# Patient Record
Sex: MALE | Race: WHITE | NOT HISPANIC OR LATINO | Employment: OTHER | URBAN - METROPOLITAN AREA
[De-identification: names, ages, dates, MRNs, and addresses within clinical notes are randomized per-mention and may not be internally consistent; named-entity substitution may affect disease eponyms.]

---

## 2017-01-01 ENCOUNTER — DOCUMENTATION (OUTPATIENT)
Dept: PHYSICAL THERAPY | Facility: HOSPITAL | Age: 82
End: 2017-01-01

## 2017-01-13 RX ORDER — DILTIAZEM HYDROCHLORIDE 60 MG/1
60 TABLET, FILM COATED ORAL EVERY 6 HOURS SCHEDULED
Qty: 120 TABLET | Refills: 6 | Status: SHIPPED | OUTPATIENT
Start: 2017-01-13 | End: 2017-06-08

## 2017-01-13 RX ORDER — METOPROLOL SUCCINATE 100 MG/1
100 TABLET, EXTENDED RELEASE ORAL
Qty: 30 TABLET | Refills: 6 | Status: SHIPPED | OUTPATIENT
Start: 2017-01-13 | End: 2017-01-18 | Stop reason: SDUPTHER

## 2017-01-16 RX ORDER — FENOFIBRATE 134 MG/1
CAPSULE ORAL
Qty: 30 CAPSULE | Refills: 3 | Status: SHIPPED | OUTPATIENT
Start: 2017-01-16 | End: 2017-05-18 | Stop reason: SDUPTHER

## 2017-01-18 RX ORDER — METOPROLOL SUCCINATE 100 MG/1
100 TABLET, EXTENDED RELEASE ORAL
Qty: 30 TABLET | Refills: 6 | Status: SHIPPED | OUTPATIENT
Start: 2017-01-18 | End: 2018-01-01 | Stop reason: SDUPTHER

## 2017-01-26 ENCOUNTER — LAB (OUTPATIENT)
Dept: INTERNAL MEDICINE | Facility: CLINIC | Age: 82
End: 2017-01-26

## 2017-01-26 DIAGNOSIS — R31.9 HEMATURIA: ICD-10-CM

## 2017-01-26 DIAGNOSIS — E78.01 FAMILIAL HYPERCHOLESTEROLEMIA: ICD-10-CM

## 2017-01-26 DIAGNOSIS — I48.20 CHRONIC ATRIAL FIBRILLATION (HCC): ICD-10-CM

## 2017-01-27 LAB
ALBUMIN SERPL-MCNC: 4.4 G/DL (ref 3.5–5.2)
ALBUMIN/GLOB SERPL: 1.7 G/DL
ALP SERPL-CCNC: 60 U/L (ref 40–129)
ALT SERPL-CCNC: 12 U/L (ref 5–41)
APPEARANCE UR: CLEAR
AST SERPL-CCNC: 21 U/L (ref 5–40)
BACTERIA #/AREA URNS HPF: ABNORMAL /HPF
BASOPHILS # BLD AUTO: 0.04 10*3/MM3 (ref 0–0.2)
BASOPHILS NFR BLD AUTO: 0.6 % (ref 0–2)
BILIRUB SERPL-MCNC: 0.6 MG/DL (ref 0.2–1.2)
BILIRUB UR QL STRIP: NEGATIVE
BUN SERPL-MCNC: 22 MG/DL (ref 8–23)
BUN/CREAT SERPL: 16.3 (ref 7–25)
CALCIUM SERPL-MCNC: 9.8 MG/DL (ref 8.8–10.5)
CASTS URNS MICRO: ABNORMAL
CASTS URNS QL MICRO: PRESENT
CHLORIDE SERPL-SCNC: 102 MMOL/L (ref 98–107)
CHOLEST SERPL-MCNC: 199 MG/DL (ref 0–200)
CO2 SERPL-SCNC: 26.7 MMOL/L (ref 22–29)
COLOR UR: YELLOW
CREAT SERPL-MCNC: 1.35 MG/DL (ref 0.76–1.27)
EOSINOPHIL # BLD AUTO: 0.14 10*3/MM3 (ref 0.1–0.3)
EOSINOPHIL NFR BLD AUTO: 2.2 % (ref 0–4)
EPI CELLS #/AREA URNS HPF: ABNORMAL /HPF
ERYTHROCYTE [DISTWIDTH] IN BLOOD BY AUTOMATED COUNT: 17.5 % (ref 11.5–14.5)
GLOBULIN SER CALC-MCNC: 2.6 GM/DL
GLUCOSE SERPL-MCNC: 88 MG/DL (ref 65–99)
GLUCOSE UR QL: NEGATIVE
HCT VFR BLD AUTO: 39.6 % (ref 42–52)
HDLC SERPL-MCNC: 52 MG/DL (ref 40–60)
HGB BLD-MCNC: 12.5 G/DL (ref 14–18)
HGB UR QL STRIP: NEGATIVE
IMM GRANULOCYTES # BLD: 0.01 10*3/MM3 (ref 0–0.03)
IMM GRANULOCYTES NFR BLD: 0.2 % (ref 0–0.5)
KETONES UR QL STRIP: NEGATIVE
LDLC SERPL CALC-MCNC: 129 MG/DL (ref 0–100)
LDLC/HDLC SERPL: 2.49 {RATIO}
LEUKOCYTE ESTERASE UR QL STRIP: NEGATIVE
LYMPHOCYTES # BLD AUTO: 3.11 10*3/MM3 (ref 0.6–4.8)
LYMPHOCYTES NFR BLD AUTO: 48.3 % (ref 20–45)
MCH RBC QN AUTO: 26 PG (ref 27–31)
MCHC RBC AUTO-ENTMCNC: 31.6 G/DL (ref 31–37)
MCV RBC AUTO: 82.3 FL (ref 80–94)
MICRO URNS: NORMAL
MICRO URNS: NORMAL
MONOCYTES # BLD AUTO: 0.88 10*3/MM3 (ref 0–1)
MONOCYTES NFR BLD AUTO: 13.7 % (ref 3–8)
MUCOUS THREADS URNS QL MICRO: PRESENT /HPF
NEUTROPHILS # BLD AUTO: 2.26 10*3/MM3 (ref 1.5–8.3)
NEUTROPHILS NFR BLD AUTO: 35 % (ref 45–70)
NITRITE UR QL STRIP: NEGATIVE
NRBC BLD AUTO-RTO: 0 /100 WBC (ref 0–0)
PH UR STRIP: 6.5 [PH] (ref 5–7.5)
PLATELET # BLD AUTO: 434 10*3/MM3 (ref 140–500)
POTASSIUM SERPL-SCNC: 4.1 MMOL/L (ref 3.5–5.2)
PROT SERPL-MCNC: 7 G/DL (ref 6–8.5)
PROT UR QL STRIP: NEGATIVE
RBC # BLD AUTO: 4.81 10*6/MM3 (ref 4.7–6.1)
RBC #/AREA URNS HPF: ABNORMAL /HPF
SODIUM SERPL-SCNC: 141 MMOL/L (ref 136–145)
SP GR UR: 1.02 (ref 1–1.03)
TRIGL SERPL-MCNC: 88 MG/DL (ref 0–150)
TSH SERPL DL<=0.005 MIU/L-ACNC: 1.23 MIU/ML (ref 0.27–4.2)
URINALYSIS REFLEX: NORMAL
UROBILINOGEN UR STRIP-MCNC: 0.2 MG/DL (ref 0.2–1)
VLDLC SERPL CALC-MCNC: 17.6 MG/DL (ref 8–32)
WBC # BLD AUTO: 6.44 10*3/MM3 (ref 4.8–10.8)
WBC #/AREA URNS HPF: ABNORMAL /HPF

## 2017-02-02 ENCOUNTER — OFFICE VISIT (OUTPATIENT)
Dept: INTERNAL MEDICINE | Facility: CLINIC | Age: 82
End: 2017-02-02

## 2017-02-02 VITALS
BODY MASS INDEX: 30.61 KG/M2 | SYSTOLIC BLOOD PRESSURE: 128 MMHG | OXYGEN SATURATION: 98 % | DIASTOLIC BLOOD PRESSURE: 80 MMHG | WEIGHT: 246.2 LBS | HEIGHT: 75 IN | HEART RATE: 57 BPM

## 2017-02-02 DIAGNOSIS — M79.89 SWELLING OF LOWER EXTREMITY: ICD-10-CM

## 2017-02-02 DIAGNOSIS — I10 ESSENTIAL HYPERTENSION: ICD-10-CM

## 2017-02-02 DIAGNOSIS — N18.30 CKD (CHRONIC KIDNEY DISEASE), STAGE III (HCC): Primary | ICD-10-CM

## 2017-02-02 DIAGNOSIS — D64.9 ANEMIA, UNSPECIFIED TYPE: ICD-10-CM

## 2017-02-02 DIAGNOSIS — E78.5 HYPERLIPIDEMIA, UNSPECIFIED HYPERLIPIDEMIA TYPE: ICD-10-CM

## 2017-02-02 PROCEDURE — 99214 OFFICE O/P EST MOD 30 MIN: CPT | Performed by: INTERNAL MEDICINE

## 2017-02-02 NOTE — PROGRESS NOTES
Subjective     Fahad Muhammad is a 81 y.o. male, who presents with a chief complaint of   Chief Complaint   Patient presents with   • Follow-up     5 wk f/u    • Atrial Fibrillation   • Hypertension   • Chronic Kidney Disease       HPI Comments: Patient is here for follow up his HTN, CKD stage 3, and AFib and he is doing well. He is now about 5 weeks out from his hospitalization for sepsis. He has follow up with Dr. Kelly (for hematuria next week) and Dr. Feliciano (for knee pain)    HTN: chronic issue and controlled very well. Tolerating medications well without any issues.     Leg swelling: this is an acute on chronic issue. He notes that it has gotten worse over the last few months and he is currently taking lasix daily. He feels pain in his legs. He does not have SOB and his echocardiogram in 12/2016 as roughly normal with good EF. He cannot tolerate compression stockings per patient due to the heat and warmth. He states that the swelling gets better when he props them up during the day.    CKD Stage: chronic issue and slightly worse on his new labs. Cr was 1.35 and GFR was 51. When reviewing his old labs, it looks as though his Cr bounces around a lot and his GFR has been high 40's-70 over the last two years. No change in urination.        The following portions of the patient's history were reviewed and updated as appropriate: allergies, current medications, past family history, past medical history, past social history, past surgical history and problem list.    Allergies: Levofloxacin and Statins    Current Outpatient Prescriptions:   •  aspirin 81 MG chewable tablet, Chew 81 mg daily., Disp: , Rfl:   •  cetirizine (zyrTEC) 5 MG tablet, Take 5 mg by mouth., Disp: , Rfl:   •  Cold Sore Products (LIP BALM MEDICATED) ointment, , Disp: , Rfl:   •  diltiaZEM (CARDIZEM) 60 MG tablet, Take 1 tablet by mouth Every 6 (Six) Hours., Disp: 120 tablet, Rfl: 6  •  docusate sodium 100 MG capsule, Take 100 mg by mouth 2  "(Two) Times a Day., Disp: , Rfl: 0  •  fenofibrate micronized (LOFIBRA) 134 MG capsule, TAKE 1 CAPSULE DAILY., Disp: 30 capsule, Rfl: 3  •  furosemide (LASIX) 20 MG tablet, Take 1 tablet by mouth Daily., Disp: , Rfl:   •  guaiFENesin (MUCINEX) 600 MG 12 hr tablet, Take 1 tablet by mouth 2 (two) times a day., Disp: 20 tablet, Rfl: 1  •  levETIRAcetam (KEPPRA) 500 MG tablet, Take 1 tablet by mouth 2 (two) times a day., Disp: 180 tablet, Rfl: 1  •  metoprolol succinate XL (TOPROL-XL) 100 MG 24 hr tablet, Take 1 tablet by mouth Daily., Disp: 30 tablet, Rfl: 6  •  Misc Natural Products (GLUCOSAMINE CHONDROITIN ADV PO), Take  by mouth., Disp: , Rfl:   •  Multiple Vitamin (MULTI-VITAMIN) tablet, Take 1 tablet by mouth daily., Disp: , Rfl:   •  Multiple Vitamins-Minerals (PX SENIOR VITAMIN PO), , Disp: , Rfl:   •  Omega-3 Fatty Acids (FISH OIL) 1000 MG capsule capsule, Take 1,000 mg by mouth daily., Disp: , Rfl:   •  omeprazole (PriLOSEC) 20 MG capsule, Take 1 capsule by mouth daily., Disp: 30 capsule, Rfl: 1  •  potassium chloride (K-DUR,KLOR-CON) 20 MEQ CR tablet, Take 2 tablets by mouth Daily., Disp: , Rfl:   •  rivaroxaban (XARELTO) 15 MG tablet, Take 15 mg by mouth Daily., Disp: , Rfl:   •  Saw Palmetto, Serenoa repens, 450 MG capsule, Take  by mouth., Disp: , Rfl:   There are no discontinued medications.    Review of Systems   Constitutional: Negative for chills and fever.   HENT: Positive for congestion and rhinorrhea.    Respiratory: Negative for cough and shortness of breath.    Cardiovascular: Positive for leg swelling. Negative for chest pain.   Gastrointestinal: Negative for abdominal pain, nausea and vomiting.   Musculoskeletal: Positive for gait problem.        Knee pain   Neurological: Negative for dizziness and headaches.       Objective     Visit Vitals   • /80 (BP Location: Left arm, Patient Position: Sitting, Cuff Size: Adult)   • Pulse 57   • Ht 75\" (190.5 cm)   • Wt 246 lb 3.2 oz (112 kg)   • SpO2 " 98%   • BMI 30.77 kg/m2         Physical Exam   Constitutional: He is oriented to person, place, and time. He appears well-developed and well-nourished. No distress.   HENT:   Head: Normocephalic and atraumatic.   Right Ear: External ear normal.   Left Ear: External ear normal.   Mouth/Throat: Oropharynx is clear and moist. No oropharyngeal exudate.   Eyes: Conjunctivae are normal. Right eye exhibits no discharge. Left eye exhibits no discharge. No scleral icterus.   Neck: Neck supple.   Cardiovascular: Normal rate, regular rhythm and normal heart sounds.  Exam reveals no gallop and no friction rub.    No murmur heard.  Pulmonary/Chest: Effort normal and breath sounds normal. No respiratory distress. He has no wheezes. He has no rales.   Musculoskeletal:   Pitting edema to mid calf bilaterally    Lymphadenopathy:     He has no cervical adenopathy.   Neurological: He is alert and oriented to person, place, and time.   Skin: Skin is warm. No rash noted.   Psychiatric: He has a normal mood and affect. His behavior is normal.   Nursing note and vitals reviewed.        Results for orders placed or performed in visit on 01/26/17   Comprehensive Metabolic Panel   Result Value Ref Range    Glucose 88 65 - 99 mg/dL    BUN 22 8 - 23 mg/dL    Creatinine 1.35 (H) 0.76 - 1.27 mg/dL    eGFR Non African Am 51 (L) >60 mL/min/1.73    eGFR African Am 62 >60 mL/min/1.73    BUN/Creatinine Ratio 16.3 7.0 - 25.0    Sodium 141 136 - 145 mmol/L    Potassium 4.1 3.5 - 5.2 mmol/L    Chloride 102 98 - 107 mmol/L    Total CO2 26.7 22.0 - 29.0 mmol/L    Calcium 9.8 8.8 - 10.5 mg/dL    Total Protein 7.0 6.0 - 8.5 g/dL    Albumin 4.40 3.50 - 5.20 g/dL    Globulin 2.6 gm/dL    A/G Ratio 1.7 g/dL    Total Bilirubin 0.6 0.2 - 1.2 mg/dL    Alkaline Phosphatase 60 40 - 129 U/L    AST (SGOT) 21 5 - 40 U/L    ALT (SGPT) 12 5 - 41 U/L   Lipid Panel With LDL / HDL Ratio   Result Value Ref Range    Total Cholesterol 199 0 - 200 mg/dL    Triglycerides 88 0  - 150 mg/dL    HDL Cholesterol 52 40 - 60 mg/dL    VLDL Cholesterol 17.6 8 - 32 mg/dL    LDL Cholesterol  129 (H) 0 - 100 mg/dL    LDL/HDL Ratio 2.49    TSH Rfx On Abnormal To Free T4   Result Value Ref Range    TSH 1.23 0.27 - 4.2 mIU/mL   Urinalysis With / Culture If Indicated   Result Value Ref Range    Specific Gravity, UA 1.020 1.005 - 1.030    pH, UA 6.5 5.0 - 7.5    Color, UA Yellow Yellow    Appearance, UA Clear Clear    Leukocytes, UA Negative Negative    Protein Negative Negative/Trace    Glucose, UA Negative Negative    Ketones Negative Negative    Blood, UA Negative Negative    Bilirubin, UA Negative Negative    Urobilinogen, UA 0.2 0.2 - 1.0 mg/dL    Nitrite, UA Negative Negative    Microscopic Examination Comment     MICROSCOPIC EXAMINATION See below:     Urinalysis Reflex Comment    Microscopic Examination   Result Value Ref Range    WBC, UA 0-5 0 - 5 /hpf    RBC, UA 0-2 0 - 2 /hpf    Epithelial Cells (non renal) None seen 0 - 10 /hpf    Casts Present (A) None seen    Cast Type Hyaline casts N/A    Mucus, UA Present Not Estab. /hpf    Bacteria, UA Few None seen/Few /hpf   CBC & Differential   Result Value Ref Range    WBC 6.44 4.80 - 10.80 10*3/mm3    RBC 4.81 4.70 - 6.10 10*6/mm3    Hemoglobin 12.5 (L) 14.0 - 18.0 g/dL    Hematocrit 39.6 (L) 42.0 - 52.0 %    MCV 82.3 80.0 - 94.0 fL    MCH 26.0 (L) 27.0 - 31.0 pg    MCHC 31.6 31.0 - 37.0 g/dL    RDW 17.5 (H) 11.5 - 14.5 %    Platelets 434 140 - 500 10*3/mm3    Neutrophil Rel % 35.0 (L) 45.0 - 70.0 %    Lymphocyte Rel % 48.3 (H) 20.0 - 45.0 %    Monocyte Rel % 13.7 (H) 3.0 - 8.0 %    Eosinophil Rel % 2.2 0.0 - 4.0 %    Basophil Rel % 0.6 0.0 - 2.0 %    Neutrophils Absolute 2.26 1.50 - 8.30 10*3/mm3    Lymphocytes Absolute 3.11 0.60 - 4.80 10*3/mm3    Monocytes Absolute 0.88 0.00 - 1.00 10*3/mm3    Eosinophils Absolute 0.14 0.10 - 0.30 10*3/mm3    Basophils Absolute 0.04 0.00 - 0.20 10*3/mm3    Immature Granulocyte Rel % 0.2 0.0 - 0.5 %    Immature  Grans Absolute 0.01 0.00 - 0.03 10*3/mm3    nRBC 0.0 0.0 - 0.0 /100 WBC       Assessment/Plan   Fahad was seen today for follow-up, atrial fibrillation, hypertension and chronic kidney disease.    Diagnoses and all orders for this visit:    CKD (chronic kidney disease), stage III  -     Comprehensive Metabolic Panel; Future  -     Urinalysis With / Culture If Indicated; Future    Essential hypertension    Hyperlipidemia, unspecified hyperlipidemia type    Swelling of lower extremity  -     US venous doppler lower extremity bilateral (duplex)    Anemia, unspecified type  -     CBC & Differential; Future    I am pleased with how patient is doing and he seems stable. His HTN is under good control and patient will continue to monitor with home BP cuff. No side effects from medications. Will continue current regimen of diltiazem and BB. His Afib is under good control and he is currently stable on rate control and ASA and Xarelto. Will follow up in 3 months. I would like him to see cardiology for follow up which he says that he is planning on scheduling with Dr. Santana.     His HLD Patient's cholesterol is under good control. Will continue current regimen of fenofibrate as he has had severe myalgias with statin therapy. No side effects from medications reported. Will follow up in 6 months to monitor levels     His anemia is improving on own and no longer has blood in his urine.     I am most worried about his leg swelling and kidney function change as he seems to be headed in the wrong direction. I will order lower extremity doppler to evaluate further as patient has had recent normal echocardiogram. We will continue lasix at current dose and patient will continue low salt diet. He does not want to consider compression stockings as he has not had good success in past. Once u/s is done and no concern for DVT or obstruction, we will increase his lasix to 40mg for a few days and recheck his kidney function and if stable,  can keep at that dose. Weight is up 4lbs since I saw him last and I think he would do well with diuresis.     Return in about 3 months (around 5/2/2017) for Recheck of HTN, Afib , and epilepsy.    Elisabeth Warren MD  02/02/2017

## 2017-02-07 ENCOUNTER — OFFICE VISIT (OUTPATIENT)
Dept: ORTHOPEDIC SURGERY | Facility: CLINIC | Age: 82
End: 2017-02-07

## 2017-02-07 DIAGNOSIS — M17.12 OSTEOARTHROSIS, LOCALIZED, PRIMARY, KNEE, LEFT: Primary | ICD-10-CM

## 2017-02-07 PROCEDURE — 20610 DRAIN/INJ JOINT/BURSA W/O US: CPT | Performed by: ORTHOPAEDIC SURGERY

## 2017-02-07 RX ADMIN — BUPIVACAINE HYDROCHLORIDE 4 ML: 5 INJECTION, SOLUTION EPIDURAL; INTRACAUDAL at 12:05

## 2017-02-07 RX ADMIN — LIDOCAINE HYDROCHLORIDE 4 ML: 10 INJECTION, SOLUTION INFILTRATION; PERINEURAL at 12:05

## 2017-02-07 RX ADMIN — TRIAMCINOLONE ACETONIDE 80 MG: 40 INJECTION, SUSPENSION INTRA-ARTICULAR; INTRAMUSCULAR at 12:05

## 2017-02-07 NOTE — PROGRESS NOTES
Subjective:     Patient ID: Fahad Muhammad is a 81 y.o. male.    Chief Complaint:  Follow-up left knee pain, DJD  History of Present Illness  Fahad Muhammad returns to clinic today stating that her left knee has done fairly well after last injection in March 2016 but over the last 2-3 months he is noted significant increase in pain over the anterior and medial aspects of his left knee.  Pain exacerbated with walking, stair climbing, standing for long periods of time as well as rotational activities.  Minimal improvement with rest at this point.  Mild intermittent swelling noted.  No raza locking or catching of left knee appreciated.  Does note crepitus on range of motion.  Rates current pain as 7-8 out of 10 aching in nature.  Denies any associated new numbness or tingling to left lower extremity.     Social History     Occupational History   • Not on file.     Social History Main Topics   • Smoking status: Former Smoker     Quit date: 12/3/1993   • Smokeless tobacco: Former User     Quit date: 9/7/1994   • Alcohol use 0.6 oz/week     1 Shots of liquor per week      Comment: occasional    • Drug use: No   • Sexual activity: Defer      Past Medical History   Diagnosis Date   • Arthritis    • Atrial fibrillation    • Coronary artery disease    • DDD (degenerative disc disease), lumbar    • Heme positive stool    • Hx of colonoscopy      Complete   • Hyperlipidemia    • Hypertension    • Neck strain    • Osteoarthritis    • Seizures    • Sepsis    • Stroke    • Vitamin D deficiency    • Wrist fracture, right      Past Surgical History   Procedure Laterality Date   • Coronary artery bypass graft  1996   • Hernia repair Right      Inguinal hernia repair   • Cardiac surgery         Family History   Problem Relation Age of Onset   • Heart disease Mother    • Cancer Father    • Cancer Sister          Review of Systems   Constitutional: Negative for chills, diaphoresis, fever and unexpected weight change.   HENT:  Negative for hearing loss, nosebleeds, sore throat and tinnitus.    Eyes: Negative for pain and visual disturbance.   Respiratory: Negative for cough, shortness of breath and wheezing.    Cardiovascular: Negative for chest pain and palpitations.   Gastrointestinal: Negative for abdominal pain, diarrhea, nausea and vomiting.   Endocrine: Negative for cold intolerance, heat intolerance and polydipsia.   Genitourinary: Negative for difficulty urinating, dysuria and hematuria.   Musculoskeletal: Positive for arthralgias. Negative for joint swelling and myalgias.   Skin: Negative for rash and wound.   Allergic/Immunologic: Negative for environmental allergies.   Neurological: Negative for dizziness, syncope and numbness.   Hematological: Does not bruise/bleed easily.   Psychiatric/Behavioral: Negative for dysphoric mood and sleep disturbance. The patient is not nervous/anxious.            Objective:  There were no vitals filed for this visit.  There were no vitals filed for this visit.  There is no height or weight on file to calculate BMI.  General: No acute distress.  Resp: normal respiratory effort  Skin: no rashes or wounds; normal turgor  Psych: mood and affect appropriate; recent and remote memory intact       Ortho Exam    Left knee-active range of motion 0-125°, 4+ out of 5 strength on flexion and extension.  Mild effusion, grade 1A Lachman, stable to varus and valgus stress 0 and 30°.  Maximal tenderness to palpation over medial lateral joint line as well as medial and lateral patellar facet, positive active patellar compression test, negative patellar apprehension test.  Imaging:  Review of most recent x-rays from hospitalization on December 14, 2016 a left knee indicates moderate degenerative change particularly along the medial and lateral compartments with moderate patellofemoral joint space narrowing noted as well, no evidence of intra-articular loose body.  No evidence of fracture dislocation, or  subluxation.  Assessment:       1. Osteoarthrosis, localized, primary, knee, left          Plan:  FABIAN query complete.        Discussed treatment options with patient today including not limited to repeat injection, physical therapy, home exercise program, and total knee arthroplasty.  Patient does not want proceed with surgery at this point time after reviewing risks and benefits.  He does want proceed with repeat injection, we'll work on home exercises for strengthening and range of motion.    Fahad Muhammad was in agreement with plan and had all questions answered.     Orders:  Orders Placed This Encounter   Procedures   • Large Joint Arthrocentesis       Medications:  No orders of the defined types were placed in this encounter.      Followup:  Return in about 3 months (around 5/7/2017).      Large Joint Arthrocentesis  Date/Time: 2/7/2017 12:05 PM  Consent given by: patient  Site marked: site marked  Timeout: Immediately prior to procedure a time out was called to verify the correct patient, procedure, equipment, support staff and site/side marked as required   Supporting Documentation  Indications: pain   Procedure Details  Location: knee - L knee  Preparation: Patient was prepped and draped in the usual sterile fashion  Needle size: 22 G  Medications administered: 4 mL bupivacaine (PF) 0.5 %; 4 mL lidocaine 1 %; 80 mg triamcinolone acetonide 40 MG/ML  Patient tolerance: patient tolerated the procedure well with no immediate complications          Dragon transcription disclaimer     Much of this encounter note is an electronic transcription/translation of spoken language to printed text. The electronic translation of spoken language may permit erroneous, or at times, nonsensical words or phrases to be inadvertently transcribed. Although I have reviewed the note for such errors, some may still exist.

## 2017-02-08 ENCOUNTER — TRANSCRIBE ORDERS (OUTPATIENT)
Dept: ADMINISTRATIVE | Facility: HOSPITAL | Age: 82
End: 2017-02-08

## 2017-02-08 DIAGNOSIS — M79.89 SWELLING OF LOWER EXTREMITY: Primary | ICD-10-CM

## 2017-02-10 ENCOUNTER — HOSPITAL ENCOUNTER (OUTPATIENT)
Dept: CARDIOLOGY | Facility: HOSPITAL | Age: 82
Discharge: HOME | End: 2017-02-10
Admitting: INTERNAL MEDICINE

## 2017-02-10 ENCOUNTER — APPOINTMENT (OUTPATIENT)
Dept: ULTRASOUND IMAGING | Facility: HOSPITAL | Age: 82
End: 2017-02-10
Attending: INTERNAL MEDICINE

## 2017-02-10 DIAGNOSIS — M79.89 SWELLING OF LOWER EXTREMITY: ICD-10-CM

## 2017-02-10 LAB
BH CV LOW VAS RIGHT POPLITEAL SPONT: 1
BH CV LOWER VASCULAR LEFT COMMON FEMORAL AUGMENT: NORMAL
BH CV LOWER VASCULAR LEFT COMMON FEMORAL COMPETENT: NORMAL
BH CV LOWER VASCULAR LEFT COMMON FEMORAL COMPRESS: NORMAL
BH CV LOWER VASCULAR LEFT COMMON FEMORAL PHASIC: NORMAL
BH CV LOWER VASCULAR LEFT COMMON FEMORAL SPONT: NORMAL
BH CV LOWER VASCULAR LEFT DISTAL FEMORAL COMPRESS: NORMAL
BH CV LOWER VASCULAR LEFT GASTRONEMIUS COMPRESS: NORMAL
BH CV LOWER VASCULAR LEFT GREATER SAPH AK COMPRESS: NORMAL
BH CV LOWER VASCULAR LEFT GREATER SAPH BK COMPRESS: NORMAL
BH CV LOWER VASCULAR LEFT LESSER SAPH COMPRESS: NORMAL
BH CV LOWER VASCULAR LEFT MID FEMORAL AUGMENT: NORMAL
BH CV LOWER VASCULAR LEFT MID FEMORAL COMPETENT: NORMAL
BH CV LOWER VASCULAR LEFT MID FEMORAL COMPRESS: NORMAL
BH CV LOWER VASCULAR LEFT MID FEMORAL PHASIC: NORMAL
BH CV LOWER VASCULAR LEFT MID FEMORAL SPONT: NORMAL
BH CV LOWER VASCULAR LEFT PERONEAL COMPRESS: NORMAL
BH CV LOWER VASCULAR LEFT POPLITEAL AUGMENT: NORMAL
BH CV LOWER VASCULAR LEFT POPLITEAL COMPETENT: NORMAL
BH CV LOWER VASCULAR LEFT POPLITEAL COMPRESS: NORMAL
BH CV LOWER VASCULAR LEFT POPLITEAL PHASIC: NORMAL
BH CV LOWER VASCULAR LEFT POPLITEAL SPONT: NORMAL
BH CV LOWER VASCULAR LEFT POSTERIOR TIBIAL COMPRESS: NORMAL
BH CV LOWER VASCULAR LEFT PROXIMAL FEMORAL COMPRESS: NORMAL
BH CV LOWER VASCULAR LEFT SAPHENOFEMORAL JUNCTION AUGMENT: NORMAL
BH CV LOWER VASCULAR LEFT SAPHENOFEMORAL JUNCTION COMPETENT: NORMAL
BH CV LOWER VASCULAR LEFT SAPHENOFEMORAL JUNCTION COMPRESS: NORMAL
BH CV LOWER VASCULAR LEFT SAPHENOFEMORAL JUNCTION PHASIC: NORMAL
BH CV LOWER VASCULAR LEFT SAPHENOFEMORAL JUNCTION SPONT: NORMAL
BH CV LOWER VASCULAR RIGHT COMMON FEMORAL AUGMENT: NORMAL
BH CV LOWER VASCULAR RIGHT COMMON FEMORAL COMPETENT: NORMAL
BH CV LOWER VASCULAR RIGHT COMMON FEMORAL COMPRESS: NORMAL
BH CV LOWER VASCULAR RIGHT COMMON FEMORAL PHASIC: NORMAL
BH CV LOWER VASCULAR RIGHT COMMON FEMORAL SPONT: NORMAL
BH CV LOWER VASCULAR RIGHT DISTAL FEMORAL COMPRESS: NORMAL
BH CV LOWER VASCULAR RIGHT GASTRONEMIUS COMPRESS: NORMAL
BH CV LOWER VASCULAR RIGHT LESSER SAPH COMPRESS: NORMAL
BH CV LOWER VASCULAR RIGHT MID FEMORAL AUGMENT: NORMAL
BH CV LOWER VASCULAR RIGHT MID FEMORAL COMPETENT: NORMAL
BH CV LOWER VASCULAR RIGHT MID FEMORAL COMPRESS: NORMAL
BH CV LOWER VASCULAR RIGHT MID FEMORAL PHASIC: NORMAL
BH CV LOWER VASCULAR RIGHT MID FEMORAL SPONT: NORMAL
BH CV LOWER VASCULAR RIGHT PERONEAL COMPRESS: NORMAL
BH CV LOWER VASCULAR RIGHT POPLITEAL AUGMENT: NORMAL
BH CV LOWER VASCULAR RIGHT POPLITEAL COMPETENT: NORMAL
BH CV LOWER VASCULAR RIGHT POPLITEAL COMPRESS: NORMAL
BH CV LOWER VASCULAR RIGHT POPLITEAL PHASIC: NORMAL
BH CV LOWER VASCULAR RIGHT POPLITEAL SPONT: NORMAL
BH CV LOWER VASCULAR RIGHT POSTERIOR TIBIAL COMPRESS: NORMAL
BH CV LOWER VASCULAR RIGHT PROXIMAL FEMORAL COMPRESS: NORMAL
BH CV LOWER VASCULAR RIGHT SAPHENOFEMORAL JUNCTION AUGMENT: NORMAL
BH CV LOWER VASCULAR RIGHT SAPHENOFEMORAL JUNCTION COMPETENT: NORMAL
BH CV LOWER VASCULAR RIGHT SAPHENOFEMORAL JUNCTION COMPRESS: NORMAL
BH CV LOWER VASCULAR RIGHT SAPHENOFEMORAL JUNCTION PHASIC: NORMAL
BH CV LOWER VASCULAR RIGHT SAPHENOFEMORAL JUNCTION SPONT: NORMAL
BH CV LOWER VASCULAR RIGHT VARICOSITY AK COMPRESS: NORMAL
BH CV LOWER VASCULAR RIGHT VARICOSITY BK COMPRESS: NORMAL

## 2017-02-10 PROCEDURE — 93970 EXTREMITY STUDY: CPT

## 2017-02-21 RX ORDER — BUPIVACAINE HYDROCHLORIDE 5 MG/ML
4 INJECTION, SOLUTION EPIDURAL; INTRACAUDAL
Status: COMPLETED | OUTPATIENT
Start: 2017-02-07 | End: 2017-02-07

## 2017-02-21 RX ORDER — TRIAMCINOLONE ACETONIDE 40 MG/ML
80 INJECTION, SUSPENSION INTRA-ARTICULAR; INTRAMUSCULAR
Status: COMPLETED | OUTPATIENT
Start: 2017-02-07 | End: 2017-02-07

## 2017-02-21 RX ORDER — LIDOCAINE HYDROCHLORIDE 10 MG/ML
4 INJECTION, SOLUTION INFILTRATION; PERINEURAL
Status: COMPLETED | OUTPATIENT
Start: 2017-02-07 | End: 2017-02-07

## 2017-02-23 ENCOUNTER — TELEPHONE (OUTPATIENT)
Dept: INTERNAL MEDICINE | Facility: CLINIC | Age: 82
End: 2017-02-23

## 2017-02-23 NOTE — TELEPHONE ENCOUNTER
Per Dr. Warren, I advised patient no DVT on report, but does have venous insufficiency.  Advised needs to wear compression stockings (he has these but does not wear regularly) and keep feet propped when sitting for long periods.  Patient voiced understanding.    ----- Message from Melisa Severino MA sent at 2/23/2017  2:04 PM EST -----  Regarding: FW: LOOKING FOR RESULTS      ----- Message -----     From: Melisa Severino MA     Sent: 2/21/2017   2:10 PM       To: Elisabeth Warren MD  Subject: FW: LOOKING FOR RESULTS                          I am unable to find any note regarding this in either the note section or on the report itself.  Not sure how to advise on this:  ·There was deep venous valvular incompetence noted in the right popliteal.  Thanks.  ----- Message -----     From: Elisabeth Warren MD     Sent: 2/21/2017  11:39 AM       To: Melisa Severino MA  Subject: RE: LOOKING FOR RESULTS                          I left a message in [employer] for him on 2/11. Can you access that record and just read him the results. I think maybe he just didn't read it. Let me know if he has questions or concerns.     ----- Message -----     From: Melisa Severino MA     Sent: 2/21/2017  11:01 AM       To: Elisabeth Warren MD  Subject: FW: LOOKING FOR RESULTS                              ----- Message -----     From: Betzaida Albert     Sent: 2/21/2017  10:57 AM       To: Michelle Riley[address] [place] [address]  Subject: LOOKING FOR RESULTS                              [place]    [phone]     Patient came by office because he hasn't heard anything results about his venous doppler ultrasound done on 2/8.

## 2017-03-16 ENCOUNTER — OFFICE VISIT (OUTPATIENT)
Dept: INTERNAL MEDICINE | Facility: CLINIC | Age: 82
End: 2017-03-16

## 2017-03-16 VITALS
BODY MASS INDEX: 29.69 KG/M2 | DIASTOLIC BLOOD PRESSURE: 78 MMHG | HEART RATE: 87 BPM | WEIGHT: 238.8 LBS | HEIGHT: 75 IN | SYSTOLIC BLOOD PRESSURE: 128 MMHG | OXYGEN SATURATION: 98 %

## 2017-03-16 DIAGNOSIS — S23.9XXA THORACIC BACK SPRAIN, INITIAL ENCOUNTER: Primary | ICD-10-CM

## 2017-03-16 DIAGNOSIS — L85.8 CUTANEOUS HORN: ICD-10-CM

## 2017-03-16 PROCEDURE — 99213 OFFICE O/P EST LOW 20 MIN: CPT | Performed by: INTERNAL MEDICINE

## 2017-03-16 RX ORDER — FUROSEMIDE 20 MG/1
20 TABLET ORAL DAILY
Qty: 90 TABLET | Refills: 3 | Status: SHIPPED | OUTPATIENT
Start: 2017-03-16 | End: 2018-01-01

## 2017-03-16 RX ORDER — TRAMADOL HYDROCHLORIDE 50 MG/1
50 TABLET ORAL EVERY 6 HOURS PRN
Qty: 45 TABLET | Refills: 0 | Status: SHIPPED | OUTPATIENT
Start: 2017-03-16 | End: 2017-06-08

## 2017-03-16 RX ORDER — GUAIFENESIN 600 MG/1
600 TABLET, EXTENDED RELEASE ORAL 2 TIMES DAILY
Qty: 60 TABLET | Refills: 1 | Status: SHIPPED | OUTPATIENT
Start: 2017-03-16

## 2017-03-16 NOTE — PATIENT INSTRUCTIONS
Use massage and heat to area that is tender  Take Tramadol every 6 hours as needed for pain        Back Exercises  If you have pain in your back, do these exercises 2-3 times each day or as told by your doctor. When the pain goes away, do the exercises once each day, but repeat the steps more times for each exercise (do more repetitions). If you do not have pain in your back, do these exercises once each day or as told by your doctor.  EXERCISES  Single Knee to Chest  Do these steps 3-5 times in a row for each le. Lie on your back on a firm bed or the floor with your legs stretched out.  2. Bring one knee to your chest.  3. Hold your knee to your chest by grabbing your knee or thigh.  4. Pull on your knee until you feel a gentle stretch in your lower back.  5. Keep doing the stretch for 10-30 seconds.  6. Slowly let go of your leg and straighten it.  Pelvic Tilt  Do these steps 5-10 times in a row:  1. Lie on your back on a firm bed or the floor with your legs stretched out.  2. Bend your knees so they point up to the ceiling. Your feet should be flat on the floor.  3. Tighten your lower belly (abdomen) muscles to press your lower back against the floor. This will make your tailbone point up to the ceiling instead of pointing down to your feet or the floor.  4. Stay in this position for 5-10 seconds while you gently tighten your muscles and breathe evenly.  Cat-Cow  Do these steps until your lower back bends more easily:  1. Get on your hands and knees on a firm surface. Keep your hands under your shoulders, and keep your knees under your hips. You may put padding under your knees.  2. Let your head hang down, and make your tailbone point down to the floor so your lower back is round like the back of a cat.  3. Stay in this position for 5 seconds.  4. Slowly lift your head and make your tailbone point up to the ceiling so your back hangs low (sags) like the back of a cow.  5. Stay in this position for 5  seconds.  Press-Ups  Do these steps 5-10 times in a row:  1. Lie on your belly (face-down) on the floor.  2. Place your hands near your head, about shoulder-width apart.  3. While you keep your back relaxed and keep your hips on the floor, slowly straighten your arms to raise the top half of your body and lift your shoulders. Do not use your back muscles. To make yourself more comfortable, you may change where you place your hands.  4. Stay in this position for 5 seconds.  5. Slowly return to lying flat on the floor.  Bridges  Do these steps 10 times in a row:  1. Lie on your back on a firm surface.  2. Bend your knees so they point up to the ceiling. Your feet should be flat on the floor.  3. Tighten your butt muscles and lift your butt off of the floor until your waist is almost as high as your knees. If you do not feel the muscles working in your butt and the back of your thighs, slide your feet 1-2 inches farther away from your butt.  4. Stay in this position for 3-5 seconds.  5. Slowly lower your butt to the floor, and let your butt muscles relax.  If this exercise is too easy, try doing it with your arms crossed over your chest.  Belly Crunches  Do these steps 5-10 times in a row:  1. Lie on your back on a firm bed or the floor with your legs stretched out.  2. Bend your knees so they point up to the ceiling. Your feet should be flat on the floor.  3. Cross your arms over your chest.  4. Tip your chin a little bit toward your chest but do not bend your neck.  5. Tighten your belly muscles and slowly raise your chest just enough to lift your shoulder blades a tiny bit off of the floor.  6. Slowly lower your chest and your head to the floor.  Back Lifts  Do these steps 5-10 times in a row:  1. Lie on your belly (face-down) with your arms at your sides, and rest your forehead on the floor.  2. Tighten the muscles in your legs and your butt.  3. Slowly lift your chest off of the floor while you keep your hips on  the floor. Keep the back of your head in line with the curve in your back. Look at the floor while you do this.  4. Stay in this position for 3-5 seconds.  5. Slowly lower your chest and your face to the floor.  GET HELP IF:  · Your back pain gets a lot worse when you do an exercise.  · Your back pain does not lessen 2 hours after you exercise.  If you have any of these problems, stop doing the exercises. Do not do them again unless your doctor says it is okay.  GET HELP RIGHT AWAY IF:  · You have sudden, very bad back pain. If this happens, stop doing the exercises. Do not do them again unless your doctor says it is okay.     This information is not intended to replace advice given to you by your health care provider. Make sure you discuss any questions you have with your health care provider.     Document Released: 01/20/2012 Document Revised: 09/07/2016 Document Reviewed: 02/11/2016  [employer] Interactive Patient Education ©2016 [employer]

## 2017-03-16 NOTE — PROGRESS NOTES
Subjective     Fahad Muhammad is a 81 y.o. male, who presents with a chief complaint of   Chief Complaint   Patient presents with   • Back Pain     pt states this issue has been constant for years, some days are worse than others. Recently pain has been bad, tylenol/aspirin is not helping anymore.    • Nasal Congestion     pt has x questions about mucinex        HPI Comments: Patient is here with back pain that is bilateral, but seems to move around. He feels that it started about 2 weeks ago after he was pulling weeds in his garden. He is having to sleep on his right side. No tingling or numbness in his legs. Movement seems to make the pain worse and getting up makes it worse. No changes in his bowel or bladder except when he took the Norco for pain. He has been using Arnicare Gel and heat and massage which has helped some. He has been usinghydrocodone/acteominophen that helped, but caused him to have constipation which he has stopped.      Skin lesion: he has had this for a while and states that it bleeds and hurts when he gets his hair cut. The lesion is getting larged. He has not done anything that has helped or changed the way that the lesions look.        The following portions of the patient's history were reviewed and updated as appropriate: allergies, current medications, past family history, past medical history, past social history, past surgical history and problem list.    Allergies: Levofloxacin and Statins    Current Outpatient Prescriptions:   •  aspirin 81 MG chewable tablet, Chew 81 mg daily., Disp: , Rfl:   •  cetirizine (zyrTEC) 5 MG tablet, Take 5 mg by mouth., Disp: , Rfl:   •  Cold Sore Products (LIP BALM MEDICATED) ointment, , Disp: , Rfl:   •  diltiaZEM (CARDIZEM) 60 MG tablet, Take 1 tablet by mouth Every 6 (Six) Hours., Disp: 120 tablet, Rfl: 6  •  docusate sodium 100 MG capsule, Take 100 mg by mouth 2 (Two) Times a Day., Disp: , Rfl: 0  •  fenofibrate micronized (LOFIBRA) 134 MG  capsule, TAKE 1 CAPSULE DAILY., Disp: 30 capsule, Rfl: 3  •  furosemide (LASIX) 20 MG tablet, Take 1 tablet by mouth Daily., Disp: 90 tablet, Rfl: 3  •  guaiFENesin (MUCINEX) 600 MG 12 hr tablet, Take 1 tablet by mouth 2 (Two) Times a Day., Disp: 60 tablet, Rfl: 1  •  levETIRAcetam (KEPPRA) 500 MG tablet, Take 1 tablet by mouth 2 (two) times a day., Disp: 180 tablet, Rfl: 1  •  metoprolol succinate XL (TOPROL-XL) 100 MG 24 hr tablet, Take 1 tablet by mouth Daily., Disp: 30 tablet, Rfl: 6  •  Misc Natural Products (GLUCOSAMINE CHONDROITIN ADV PO), Take  by mouth., Disp: , Rfl:   •  Multiple Vitamin (MULTI-VITAMIN) tablet, Take 1 tablet by mouth daily., Disp: , Rfl:   •  Multiple Vitamins-Minerals (PX SENIOR VITAMIN PO), , Disp: , Rfl:   •  Omega-3 Fatty Acids (FISH OIL) 1000 MG capsule capsule, Take 1,000 mg by mouth daily., Disp: , Rfl:   •  omeprazole (PriLOSEC) 20 MG capsule, Take 1 capsule by mouth daily., Disp: 30 capsule, Rfl: 1  •  potassium chloride (K-DUR,KLOR-CON) 20 MEQ CR tablet, Take 2 tablets by mouth Daily., Disp: , Rfl:   •  rivaroxaban (XARELTO) 15 MG tablet, Take 15 mg by mouth Daily., Disp: , Rfl:   •  Saw Palmetto, Serenoa repens, 450 MG capsule, Take  by mouth., Disp: , Rfl:   •  traMADol (ULTRAM) 50 MG tablet, Take 1 tablet by mouth Every 6 (Six) Hours As Needed for Moderate Pain (4-6) or Severe Pain (7-10)., Disp: 45 tablet, Rfl: 0  Medications Discontinued During This Encounter   Medication Reason   • furosemide (LASIX) 20 MG tablet Reorder   • guaiFENesin (MUCINEX) 600 MG 12 hr tablet Reorder       Review of Systems   Constitutional: Negative for chills and fever.   Gastrointestinal: Positive for constipation. Negative for diarrhea, nausea and vomiting.   Musculoskeletal: Positive for back pain and myalgias.   Neurological: Negative for weakness and numbness.       Objective     Visit Vitals   • /78 (BP Location: Left arm, Patient Position: Sitting, Cuff Size: Adult)   • Pulse 87   • Ht  "75\" (190.5 cm)   • Wt 238 lb 12.8 oz (108 kg)   • SpO2 98%   • BMI 29.85 kg/m2         Physical Exam   Constitutional: He is oriented to person, place, and time. He appears well-developed and well-nourished. No distress.   HENT:   Head: Normocephalic and atraumatic.   Right Ear: External ear normal.   Left Ear: External ear normal.   Mouth/Throat: Oropharynx is clear and moist. No oropharyngeal exudate.   Eyes: Conjunctivae are normal. Right eye exhibits no discharge. Left eye exhibits no discharge. No scleral icterus.   Neck: Neck supple.   Cardiovascular: Normal rate and normal heart sounds.  An irregularly irregular rhythm present. Exam reveals no gallop and no friction rub.    No murmur heard.  Pulmonary/Chest: Effort normal and breath sounds normal. No respiratory distress. He has no wheezes. He has no rales.   Abdominal: There is guarding.   Musculoskeletal:   Right upper lumbar paraspinal muscle with spasm. No tenderness over the spinal or left side.    Lymphadenopathy:     He has no cervical adenopathy.   Neurological: He is alert and oriented to person, place, and time.   Skin: Skin is warm. No rash noted.   Psychiatric: He has a normal mood and affect. His behavior is normal.   Nursing note and vitals reviewed.        Results for orders placed or performed during the hospital encounter of 02/10/17   Duplex venous lower extremity bilateral CAR   Result Value Ref Range    Right Popliteal Spont 1     Right Common Femoral Spont Y     Right Common Femoral Phasic Y     Right Common Femoral Augment Y     Right Common Femoral Competent Y     Right Common Femoral Compress C     Right Saphenofemoral Junction Spont Y     Right Saphenofemoral Junction Phasic Y     Right Saphenofemoral Junction Augment Y     Right Saphenofemoral Junction Competent Y     Right Saphenofemoral Junction Compress C     Right Proximal Femoral Compress C     Right Mid Femoral Spont Y     Right Mid Femoral Phasic Y     Right Mid Femoral " Augment Y     Right Mid Femoral Competent Y     Right Mid Femoral Compress C     Right Distal Femoral Compress C     Right Popliteal Spont Y     Right Popliteal Phasic Y     Right Popliteal Augment Y     Right Popliteal Competent N     Right Popliteal Compress C     Right Posterior Tibial Compress C     Right Peroneal Compress C     Right GastronemiusSoleal Compress C     Right Lesser Saph Compress C     Right Varicosity AK Compress C     Right Varicosity BK Compress C     Left Common Femoral Spont Y     Left Common Femoral Phasic Y     Left Common Femoral Augment Y     Left Common Femoral Competent Y     Left Common Femoral Compress C     Left Saphenofemoral Junction Spont Y     Left Saphenofemoral Junction Phasic Y     Left Saphenofemoral Junction Augment Y     Left Saphenofemoral Junction Competent Y     Left Saphenofemoral Junction Compress C     Left Proximal Femoral Compress C     Left Mid Femoral Spont Y     Left Mid Femoral Phasic Y     Left Mid Femoral Augment Y     Left Mid Femoral Competent Y     Left Mid Femoral Compress C     Left Distal Femoral Compress C     Left Popliteal Spont Y     Left Popliteal Phasic Y     Left Popliteal Augment Y     Left Popliteal Competent Y     Left Popliteal Compress C     Left Posterior Tibial Compress C     Left Peroneal Compress C     Left GastronemiusSoleal Compress C     Left Greater Saph AK Compress C     Left Greater Saph BK Compress C     Left Lesser Saph Compress C        Assessment/Plan   Fahad was seen today for back pain and nasal congestion.    Diagnoses and all orders for this visit:    Thoracic back sprain, initial encounter    Cutaneous horn  -     Ambulatory Referral to Dermatology    Other orders  -     traMADol (ULTRAM) 50 MG tablet; Take 1 tablet by mouth Every 6 (Six) Hours As Needed for Moderate Pain (4-6) or Severe Pain (7-10).  -     furosemide (LASIX) 20 MG tablet; Take 1 tablet by mouth Daily.  -     guaiFENesin (MUCINEX) 600 MG 12 hr tablet; Take  1 tablet by mouth 2 (Two) Times a Day.      Back Spasms/Thoracic pain: will treat with heat and stretches and Tramadol as needed for severe/moderate pain. If not better, will consider formal PT and patient to call if he would like to do.     Cutaneous horn: will refer to dermatology for eval    Return for Next scheduled follow up.    Elisabeth Warren MD  03/16/2017

## 2017-03-17 RX ORDER — LEVETIRACETAM 500 MG/1
TABLET ORAL
Qty: 180 TABLET | Refills: 1 | Status: SHIPPED | OUTPATIENT
Start: 2017-03-17 | End: 2017-05-18 | Stop reason: SDUPTHER

## 2017-03-30 DIAGNOSIS — M51.36 DDD (DEGENERATIVE DISC DISEASE), LUMBAR: Primary | ICD-10-CM

## 2017-03-31 ENCOUNTER — HOSPITAL ENCOUNTER (OUTPATIENT)
Dept: GENERAL RADIOLOGY | Facility: HOSPITAL | Age: 82
Discharge: HOME | End: 2017-03-31
Attending: INTERNAL MEDICINE | Admitting: INTERNAL MEDICINE

## 2017-03-31 ENCOUNTER — RESULTS ENCOUNTER (OUTPATIENT)
Dept: INTERNAL MEDICINE | Facility: CLINIC | Age: 82
End: 2017-03-31

## 2017-03-31 DIAGNOSIS — D64.9 ANEMIA, UNSPECIFIED TYPE: ICD-10-CM

## 2017-03-31 DIAGNOSIS — N18.30 CKD (CHRONIC KIDNEY DISEASE), STAGE III (HCC): ICD-10-CM

## 2017-03-31 PROCEDURE — 72100 X-RAY EXAM L-S SPINE 2/3 VWS: CPT

## 2017-04-03 ENCOUNTER — TELEPHONE (OUTPATIENT)
Dept: INTERNAL MEDICINE | Facility: CLINIC | Age: 82
End: 2017-04-03

## 2017-04-03 DIAGNOSIS — M47.819 ARTHRITIS OF SPINE: Primary | ICD-10-CM

## 2017-04-03 DIAGNOSIS — S32.000S LUMBAR COMPRESSION FRACTURE, SEQUELA: ICD-10-CM

## 2017-04-03 NOTE — TELEPHONE ENCOUNTER
----- Message from Elisabeth Warren MD sent at 4/3/2017 10:51 AM EDT -----  Yes, I think that Charla already took care of this. Charla, can you check on this and let the patient know?     ----- Message -----     From: Roseanna Washington MA     Sent: 4/3/2017  10:29 AM       To: Elisabeth Warren MD        ----- Message -----     From: Elisabeth Wright     Sent: 4/3/2017  10:19 AM       To: Michelle Riley2 [facility] Clinical [facility]    PT GOT HIS RESULTS FROM HIS XRAY AND IS READY TO GET HIS REFERRAL DONE FOR PHYSICAL THERAPY. CAN WE SET THAT UP HERE? PT SEES PRICE. CALL BACK -[phone]. ASAP-STATES HE IS IN LOTS OF PAIN. THIS IS FOR ARTHRITIS OF THE SPINE.

## 2017-04-03 NOTE — TELEPHONE ENCOUNTER
Spoke with patients wife. She is concerned on the time lapse of these events. She wanted to know what the quickest avenue would be for him to have pain relief and I told her that I could not exactly answer that and that Dr. Warren would not recommend PT being an option for pain relief if she did not think that it would be beneficial for patient. Wife stated she did not think he would do very well with PT but to go ahead with referral and we can go from there. I told her to call back with any further questions or concerns.       ----- Message from Elisabeth Warren MD sent at 4/1/2017 12:44 PM EDT -----  Please call patient with these results and let him know that he has lots of arthritis in his spine as well an old compression fracture of his L2 vertebrae that was first noted back in December. I would suggest that we try PT for a few weeks and if that does not help with the pain, that we pursue seeing a pain doctor that can inject his back. Would he be okay with me sending in PT referral?

## 2017-04-06 ENCOUNTER — HOSPITAL ENCOUNTER (OUTPATIENT)
Dept: PHYSICAL THERAPY | Facility: HOSPITAL | Age: 82
Setting detail: THERAPIES SERIES
Discharge: HOME | End: 2017-04-06

## 2017-04-06 DIAGNOSIS — M51.36 DDD (DEGENERATIVE DISC DISEASE), LUMBAR: Primary | ICD-10-CM

## 2017-04-06 PROCEDURE — 97035 APP MDLTY 1+ULTRASOUND EA 15: CPT

## 2017-04-06 PROCEDURE — G0283 ELEC STIM OTHER THAN WOUND: HCPCS

## 2017-04-06 PROCEDURE — G8978 MOBILITY CURRENT STATUS: HCPCS

## 2017-04-06 PROCEDURE — 97161 PT EVAL LOW COMPLEX 20 MIN: CPT

## 2017-04-06 PROCEDURE — G8979 MOBILITY GOAL STATUS: HCPCS

## 2017-04-06 NOTE — PROGRESS NOTES
Outpatient Physical Therapy Ortho Initial Evaluation  [facility]     Patient Name: Fahad Muhammad  : 1935  MRN: [id number]  Today's Date: 2017      Visit Date: 2017    Patient Active Problem List   Diagnosis   • Osteoarthrosis, localized, primary, knee   • Hyperlipidemia   • Hypertension   • Seizure disorder   • Vitamin D deficiency   • Coronary artery disease involving native coronary artery of native heart without angina pectoris   • Benign non-nodular prostatic hyperplasia with lower urinary tract symptoms   • Seasonal allergic rhinitis   • Atrial fibrillation   • CKD (chronic kidney disease), stage III   • Closed wedge compression fracture of second lumbar vertebra   • DDD (degenerative disc disease), lumbar   • Closed burst fracture of lumbar vertebra        Past Medical History:   Diagnosis Date   • Arthritis    • Atrial fibrillation    • Coronary artery disease    • DDD (degenerative disc disease), lumbar    • Heme positive stool    • Hx of colonoscopy     Complete   • Hyperlipidemia    • Hypertension    • Neck strain    • Osteoarthritis    • Seizures    • Sepsis    • Stroke    • Vitamin D deficiency    • Wrist fracture, right         Past Surgical History:   Procedure Laterality Date   • CARDIAC SURGERY     • CORONARY ARTERY BYPASS GRAFT     • HERNIA REPAIR Right     Inguinal hernia repair       Visit Dx:     ICD-10-CM ICD-9-CM   1. DDD (degenerative disc disease), lumbar M51.36 722.52             Patient History       17 1500          History    Chief Complaint Difficulty Walking;Difficulty with daily activities;Muscle tenderness;Muscle weakness;Pain  -CC      Type of Pain Back pain  -CC      Date Current Problem(s) Began --   6 weeks ago  -CC      Brief Description of Current Complaint Pt relates he has had episodes of back pain for many years but not as persistent as this episode. Pt denies having done any activity that may have aggravated sx. Pt states he was standing  and pain started. Prior to the onset had been feeling pretty well that day. Pt c/o sx across bilateral lumbar region > L .   -CC      Previous treatment for THIS PROBLEM Medication;Chiropractor   nothing has helped  -CC      Onset Date- PT 4-6-17  -CC      Patient/Caregiver Goals Relieve pain;Return to prior level of function;Improve mobility;Know what to do to help the symptoms  -CC      Patient's Rating of General Health --   Pt had been hosptialized 3 weeks in Dec 2016  -CC      Occupation/sports/leisure activities Pt  /retired  -CC      What clinical tests have you had for this problem? X-ray  -CC      Results of Clinical Tests L2 compression FX from a fall last year-multi-level degenerative changes in spine DDD/facet arthropathy- Partial fusion a T10-12  -CC      Pain     Pain Location Back   sitting in chair 1/10  -CC      Pain at Best 1  -CC      Pain at Worst 8   getting in and out of bed  -CC      Pain Frequency Constant/continuous  -CC      Pain Description Aching;Sharp;Stabbing;Tender  -CC      What Performance Factors Make the Current Problem(s) WORSE? worse with weight bearing  and transitonal motions- sit to stand/ getting in and out of bed  -CC      What Performance Factors Make the Current Problem(s) BETTER? sitting in partial reclined chair-lying on L side  -CC      Tolerance Time- Standing < 5 min  -CC      Tolerance Time- Sitting 60 min  -CC      Tolerance Time- Walking needs walker 5- 10 min  -CC      Tolerance Time- Lying relates does ok once lies on R side.   -CC      Is your sleep disturbed? --   once gets asleep -sleeps soundly  -CC      What position do you sleep in? Left sidelying  -CC      Difficulties at work? NA  -CC      Difficulties with ADL's? Yes  -CC      Difficulties with recreational activities? NA  -CC      Fall Risk Assessment    Any falls in the past year: Yes  -CC      Number of falls reported in the last 12 months one  -CC      Factors that contributed to the fall:  Tripped   on rug and fell on buttocks  -CC      Does patient have a fear of falling Yes (comment)  -CC      Previous Functional Level --   Did not need Rwx all the time  -CC      Daily Activities    Primary Language English  -CC      How does patient learn best? Reading  -CC      Teaching needs identified Home Exercise Program;Management of Condition  -CC      Patient is concerned about/has problems with Bed Mobility;Difficulty with self care (i.e. bathing, dressing, toileting:;Performing home management (household chores, shopping, care of dependents);Sitting;Standing;Transfers (getting out of a chair, bed);Walking  -CC      Does patient have problems with the following? None  -CC      Barriers to learning None  -CC      Pt Participated in POC and Goals Yes  -CC      Safety    Are you being hurt, hit, or frightened by anyone at home or in your life? No  -CC      Are you being neglected by a caregiver No  -CC        User Key  (r) = Recorded By, (t) = Taken By, (c) = Cosigned By    Initials Name Provider Type    CC Brenda Irvin, PT Physical Therapist                PT Ortho       04/06/17 1500    Subjective Comments    Subjective Comments Pt states he started using a RWX since onset of back pain sx. Pt states he feels more stable and has some reduction in severity of back pain if uses  -CC    Precautions and Contraindications    Precautions/Limitations --   Pt limited by pain  -CC    Posture/Observations    Cervical Lordosis Mild;Moderate;Increased  -CC    Thoracic Kyphosis Moderate;Increased  -CC    Rounded Shoulders Bilateral:;Moderate;Increased  -CC    Scapular Elevation Left:;Mild;Moderate;Elevated  -CC    Lumbar lordosis Moderate;Severe;Decreased  -CC    Iliac crests Left:;Moderate;Elevated  -CC    Genu valgus Bilateral:;Moderate  -CC    Posture/Observations Comments Pt stands with increase trunk flexion  -CC    Sensation    Sensation WNL? WFL  -CC    Neural Tension Signs- Lower Quarter Clearing     Slump Bilateral:;Negative  -CC    SLR --   NA to test -pt unable to lie supine  -    Lumbar ROM Screen- Lower Quarter Clearing    Lumbar Flexion Impaired   75%  -CC    Lumbar Extension Impaired   90 %  -CC    Lumbar Lateral Flexion Impaired   > LSB 80%  -CC    Lumbar Rotation Impaired  -CC    Lumbosacral Palpation    SI Bilateral:   denied  -CC    Lumbosacral Segment Tender  -CC    Spinous Process Tender   . at L4/L5  -CC    Erector Spinae (Paraspinals) Tender;Swollen   L lower lumbar  -CC    Lumbosacral Palpation? Yes  -CC    Lumbar/SI Special Tests    Standing Flexion Test (SI Dysfunction) Left:;Positive  -CC    MMT (Manual Muscle Testing)    General MMT Assessment Detail Grossly bilateral  LE  4 /5 -CC    Flexibility    Flexibility Tested? --   Pt unable to lie supine -unable to assess  -    Gait Assessment/Treatment    Gait, [place] Level independent  -    Gait, Assistive Device rolling walker  -    Gait, Gait Deviations bilateral:;antalgic;rachael decreased;forward flexed posture;step length decreased;weight-shifting ability decreased  -    Gait, Impairments decreased flexibility;impaired balance;pain   able to elicit posterior balance readjustment w/perturbations -CC    Gait, Comment Pt uses Rwx at all times for feeling of safety and to try and control back pain  -    Stairs Assessment/Treatment    Number of Stairs Pt lives in patio home and only 1/2 step into home  -CC    Stairs, Handrail Location none  -      User Key  (r) = Recorded By, (t) = Taken By, (c) = Cosigned By    Initials Name Provider Type    JAY Irvin PT Physical Therapist                            Therapy Education       04/06/17 4645          Therapy Education    Given HEP;Symptoms/condition management   Encouraged to increase mobiltiy with walker at home if can tolerate   -        User Key  (r) = Recorded By, (t) = Taken By, (c) = Cosigned By    Initials Name Provider Type    JAY Irvin  PT Physical Therapist                PT OP Goals       04/06/17 1600       PT Short Term Goals    STG Date to Achieve 05/05/17  -CC     STG 1 Pt able to tolerate and perform therapeutic exercises as instructed for HEP  -CC     STG 2 Pt will report decrease pain sx with transitional movement  ex: in/out of bed-sit to stand by 2 pain levels  -CC     STG 3 Pt will report decrease tenderness to palpation lumbar paraspinal 25-50%  -CC     STG 4 Pt will be able to walk in home safely for brief distances  without use of RWX   -CC     STG 5 Pt will be able to briefly tolerate lying in supine postion  -CC       User Key  (r) = Recorded By, (t) = Taken By, (c) = Cosigned By    Initials Name Provider Type    CC Brenda Irvin PT Physical Therapist                PT Assessment/Plan       04/06/17 1626       PT Assessment    Functional Limitations Decreased safety during functional activities;Impaired gait;Impaired locomotion;Limitation in home management;Limitations in community activities;Performance in self-care ADL  -CC     Impairments Balance;Joint integrity;Posture;Joint mobility;Range of motion;Impaired flexibility;Gait;Muscle strength;Pain  -CC     Assessment Comments Pt referred to PT with c/c of constant and at times very severe lumbar back pain for duration of 6 weeks. Pt has tried medication and seeing a chiropractor without relief.Pt DX with degernative changes in lumbar spine.  Pt presents wiith forward flexed posture ambulating with Rwx- 75% decrease in spinal moblity most movements-4/5 gross muscle strength in LE- and soft tissue dysfunction lumbar vertebrae and paraspinals. Negative neural signs in sitting postion but evaluation modified since pt unable to tolerate supine postion. Observed unsteadiness standing and moving without walker. Pt presents with general deconditioning with ongoing inactivity due to back pain but suspect hospitalization in December also a factor. Will give a trial of PT to  decrease pain sx and improve functional mobilty.                                                                                                                                                                                                                                                                                                                                                                                                         -CC     Please refer to paper survey for additional self-reported information Yes   Back Index-score 64  -CC     Rehab Potential Fair  -CC     Patient/caregiver participated in establishment of treatment plan and goals Yes  -CC     Patient would benefit from skilled therapy intervention Yes  -CC     PT Plan    PT Frequency 2x/week  -CC     Predicted Duration of Therapy Intervention (days/wks) 4 weeks  -CC     Planned CPT's? PT EVAL LOW COMPLEXITY: 75460;PT ULTRASOUND EA 15 MIN: 20170;PT HOT OR COLD PACK TREAT MCARE;PT HOT/COLD PACK WC NONMCARE: 70989;PT THER PROC EA 15 MIN: 91537;PT ELECTRICAL STIM UNATTEND:   -CC     Physical Therapy Interventions (Optional Details) lumbar stabilization;strengthening;stretching;modalities;taping;home exercise program;patient/family education  -CC     PT Plan Comments See per POC- will need to modifiy postioning for TE since not able to tolerate lying supine. Did not address HEP this session- wanted to see pt's response to modality treatments only. Pt had tried some back stretches per MD and he had flared sx.  -CC       User Key  (r) = Recorded By, (t) = Taken By, (c) = Cosigned By    Initials Name Provider Type    CC Brenda Irvin, PT Physical Therapist                Modalities       04/06/17 1500          Moist Heat    Location L-S spine -pt sidelying R with IFC  -CC      Rx Minutes 15 mins  -CC      MH S/P Rx Yes  -CC      ELECTRICAL STIMULATION    Attended/Unattended Unattended  -CC      Stimulation Type IFC  -CC       Location/Electrode Placement/Other L-S area with MH -pt sideying R  -CC      Rx Minutes 15 mins  -CC      Ultrasound [id number]    Location L-S area  -CC      Rx Minutes 8 min  -CC      Frequency 1.0 MHz  -CC      Intensity - Wts/cm 1.5  -CC        User Key  (r) = Recorded By, (t) = Taken By, (c) = Cosigned By    Initials Name Provider Type    JAY Irvin PT Physical Therapist              Exercises       04/06/17 1500          Subjective Comments    Subjective Comments Pt states he started using a RWX since onset of back pain sx. Pt states he feels more stable and has some reduction in severity of back pain if uses  -CC        User Key  (r) = Recorded By, (t) = Taken By, (c) = Cosigned By    Initials Name Provider Type    JAY Irvin PT Physical Therapist                              Time Calculation:   Start Time: 1315  Stop Time: 1415  Time Calculation (min): 60 min     Therapy Charges for Today     Code Description Service Date Service Provider Modifiers Qty    55670693156 HC PT EVAL LOW COMPLEXITY 1 4/6/2017 Brenda Irvin, PT GP 1    17777232299 HC PT ELECTRICAL STIM UNATTENDED 4/6/2017 Brenda Irvin, PT  1    37667727056 HC PT HOT OR COLD PACK TREAT MCARE 4/6/2017 Brenda Irvin, PT GP 1    17119924379 HC PT ULTRASOUND EA 15 MIN 4/6/2017 Brenda Irvin PT GP 1    [id number] HC PT MOBILITY CURRENT 4/6/2017 Brenda Irvin, PT GP, CK 1    14109215785 HC PT MOBILITY PROJECTED 4/6/2017 Brenda Irvin, PT GP, CJ 1          PT G-Codes  Functional Limitation: Mobility: Walking and moving around  Mobility: Walking and Moving Around Current Status (): At least 40 percent but less than 60 percent impaired, limited or restricted  Mobility: Walking and Moving Around Goal Status (): At least 20 percent but less than 40 percent impaired, limited or restricted         Brenda Irvin, PT  4/6/2017

## 2017-04-12 ENCOUNTER — HOSPITAL ENCOUNTER (OUTPATIENT)
Dept: PHYSICAL THERAPY | Facility: HOSPITAL | Age: 82
Setting detail: THERAPIES SERIES
Discharge: HOME | End: 2017-04-12

## 2017-04-12 PROCEDURE — 97035 APP MDLTY 1+ULTRASOUND EA 15: CPT

## 2017-04-12 PROCEDURE — G0283 ELEC STIM OTHER THAN WOUND: HCPCS

## 2017-04-12 PROCEDURE — 97110 THERAPEUTIC EXERCISES: CPT

## 2017-04-12 NOTE — PROGRESS NOTES
Outpatient Physical Therapy Ortho Treatment Note  JOLYNN Thibodeaux     Patient Name: Fahad Muhammad  : 1935  MRN: [id number]  Today's Date: 2017      Visit Date: 2017    Visit Dx:  No diagnosis found.    Patient Active Problem List   Diagnosis   • Osteoarthrosis, localized, primary, knee   • Hyperlipidemia   • Hypertension   • Seizure disorder   • Vitamin D deficiency   • Coronary artery disease involving native coronary artery of native heart without angina pectoris   • Benign non-nodular prostatic hyperplasia with lower urinary tract symptoms   • Seasonal allergic rhinitis   • Atrial fibrillation   • CKD (chronic kidney disease), stage III   • Closed wedge compression fracture of second lumbar vertebra   • DDD (degenerative disc disease), lumbar   • Closed burst fracture of lumbar vertebra        Past Medical History:   Diagnosis Date   • Arthritis    • Atrial fibrillation    • Coronary artery disease    • DDD (degenerative disc disease), lumbar    • Heme positive stool    • Hx of colonoscopy     Complete   • Hyperlipidemia    • Hypertension    • Neck strain    • Osteoarthritis    • Seizures    • Sepsis    • Stroke    • Vitamin D deficiency    • Wrist fracture, right         Past Surgical History:   Procedure Laterality Date   • CARDIAC SURGERY     • CORONARY ARTERY BYPASS GRAFT     • HERNIA REPAIR Right     Inguinal hernia repair                             PT Assessment/Plan       17 1415       PT Assessment    Assessment Comments Observed less antalgic gait with RWx this session and pt did have report that severity of sx have been less since PT session.Transitional motions still most painful.  Started stretches in sitting -modification necessary since pt can not tolerate supine position. Pt able to tolerate exercises without aggravating back sx.  -CC     PT Plan    PT Plan Comments Continue per POC-check response to TE introduced today  -CC       User Key  (r) = Recorded By, (t) =  Taken By, (c) = Cosigned By    Initials Name Provider Type    JAY Irvin, PT Physical Therapist                Modalities       04/12/17 1300          Subjective Comments    Subjective Comments Pt having a better day today-states sx fluctuate frequently but does report intensity of sx have been less since PT session last week  -CC      Moist Heat    Location L-S spine -pt sidelying R with IFC  -CC      Rx Minutes 15 mins  -CC      MH S/P Rx Yes  -CC      ELECTRICAL STIMULATION    Attended/Unattended Unattended  -CC      Stimulation Type IFC  -CC      Location/Electrode Placement/Other L-S area with MH -pt sideying R  -CC      Rx Minutes 15 mins  -CC      Ultrasound [id number]    Location L-S area  -CC      Rx Minutes 10 min  -CC      Frequency 1.0 MHz  -CC      Intensity - Wts/cm 1.5  -CC        User Key  (r) = Recorded By, (t) = Taken By, (c) = Cosigned By    Initials Name Provider Type    JAY Irvin, PT Physical Therapist                Exercises       04/12/17 1300          Subjective Comments    Subjective Comments Pt having a better day today-states sx fluctuate frequently but does report intensity of sx have been less since PT session last week  -CC      Exercise 1    Exercise Name 1 Seated neural tension osscilation L LE   -CC      Cueing 1 Verbal;Tactile  -CC      Equipment 1 --   sheet  -CC      Reps 1 20   Pt seated with foot on stool  -CC      Exercise 2    Exercise Name 2 Seated L hamstring stretch  -CC      Cueing 2 Verbal;Tactile  -CC      Equipment 2 --   sheet  -CC      Reps 2 4   L foot on stool  -CC      Time (Seconds) 2 15 sec  -CC      Exercise 3    Exercise Name 3 Seated PPT with lower abdominal hold  -CC      Reps 3 5  -CC      Time (Seconds) 3 5 sec  -CC        User Key  (r) = Recorded By, (t) = Taken By, (c) = Cosigned By    Initials Name Provider Type    JAY Irvin, PT Physical Therapist                                   Therapy Education        04/12/17 1414          Therapy Education    Given --   Issued handout for HEP stretches-reinforced that pt should not feel any pains sx in low back as performs exercises- if so needs to decrease force   -CC        User Key  (r) = Recorded By, (t) = Taken By, (c) = Cosigned By    Initials Name Provider Type    CC Brenda Irvin, PT Physical Therapist                Time Calculation:   Start Time: 1300  Stop Time: 1400  Time Calculation (min): 60 min    Therapy Charges for Today     Code Description Service Date Service Provider Modifiers Qty    65284407510 HC PT ULTRASOUND EA 15 MIN 4/12/2017 Brenda Irvin, PT GP 1    05397128151 HC PT THER PROC EA 15 MIN 4/12/2017 Brenda Irvin, PT GP 1    90708983251 HC PT HOT OR COLD PACK TREAT MCARE 4/12/2017 Brenda Irvin, PT GP 1    64440423788 HC PT ELECTRICAL STIM UNATTENDED 4/12/2017 Brenda Irvin, PT  1                    Brenda Irvin, PT  4/12/2017

## 2017-04-14 ENCOUNTER — HOSPITAL ENCOUNTER (OUTPATIENT)
Dept: PHYSICAL THERAPY | Facility: HOSPITAL | Age: 82
Setting detail: THERAPIES SERIES
Discharge: HOME | End: 2017-04-14

## 2017-04-14 PROCEDURE — 97110 THERAPEUTIC EXERCISES: CPT

## 2017-04-14 PROCEDURE — G0283 ELEC STIM OTHER THAN WOUND: HCPCS

## 2017-04-14 PROCEDURE — 97035 APP MDLTY 1+ULTRASOUND EA 15: CPT

## 2017-04-14 NOTE — PROGRESS NOTES
Outpatient Physical Therapy Ortho Treatment Note  JOLYNN Thibodeaux     Patient Name: Fahad Muhammad  : 1935  MRN: [id number]  Today's Date: 2017      Visit Date: 2017    Visit Dx:  No diagnosis found.    Patient Active Problem List   Diagnosis   • Osteoarthrosis, localized, primary, knee   • Hyperlipidemia   • Hypertension   • Seizure disorder   • Vitamin D deficiency   • Coronary artery disease involving native coronary artery of native heart without angina pectoris   • Benign non-nodular prostatic hyperplasia with lower urinary tract symptoms   • Seasonal allergic rhinitis   • Atrial fibrillation   • CKD (chronic kidney disease), stage III   • Closed wedge compression fracture of second lumbar vertebra   • DDD (degenerative disc disease), lumbar   • Closed burst fracture of lumbar vertebra        Past Medical History:   Diagnosis Date   • Arthritis    • Atrial fibrillation    • Coronary artery disease    • DDD (degenerative disc disease), lumbar    • Heme positive stool    • Hx of colonoscopy     Complete   • Hyperlipidemia    • Hypertension    • Neck strain    • Osteoarthritis    • Seizures    • Sepsis    • Stroke    • Vitamin D deficiency    • Wrist fracture, right         Past Surgical History:   Procedure Laterality Date   • CARDIAC SURGERY     • CORONARY ARTERY BYPASS GRAFT     • HERNIA REPAIR Right     Inguinal hernia repair                             PT Assessment/Plan       17 2966       PT Assessment    Assessment Comments Pt has had a more difficulty with getting in/out of bed last 2 days. Modified RX positions to avoid lying on plinth and pt tolerated PT session better. Added  strengthening exercises for core muscles in seated positon. Discussed possibiltiy of use of home TENS to manage pain sx. Pt was receptive to suggestion                                                                                                                                                                                    -CC     PT Plan    PT Plan Comments Continue PT-check response to PT session today -postioning and TE tolerance  -CC       User Key  (r) = Recorded By, (t) = Taken By, (c) = Cosigned By    Initials Name Provider Type    JAY Irvin PT Physical Therapist                Modalities       04/14/17 1500          Subjective Comments    Subjective Comments Pt relates if he did not have to go to bed he would feel better because getting in/out of bed is so painful. Pt also related that seated hamstring stretch bothered L knee joint so he stopped doing it  -CC      Moist Heat    Location L-S spine -pt seated in chair with IFC   -CC      Rx Minutes 15 mins  -CC      MH S/P Rx Yes  -CC      ELECTRICAL STIMULATION    Attended/Unattended Unattended  -CC      Stimulation Type IFC  -CC      Location/Electrode Placement/Other L-S area with MH-pt seated in chair to avoid increase sx with transitional motion on/off plinth  -CC      Rx Minutes 15 mins  -CC      Ultrasound [id number]    Location L-S area-pt seated on plinth to avoid pain with lying down/up from plinth  -CC      Rx Minutes 8 min  -CC      Frequency 1.0 MHz  -CC      Intensity - Wts/cm 1.5  -CC        User Key  (r) = Recorded By, (t) = Taken By, (c) = Cosigned By    Initials Name Provider Type    JAY Irvin PT Physical Therapist                Exercises       04/14/17 1500          Subjective Comments    Subjective Comments Pt relates if he did not have to go to bed he would feel better because getting in/out of bed is so painful. Pt also related that seated hamstring stretch bothered L knee joint so he stopped doing it  -CC      Exercise 1    Exercise Name 1 Seated neural tension osscilation L LE   -CC      Cueing 1 Verbal;Tactile  -CC      Equipment 1 --   sheet  -CC      Reps 1 20   Pt seated with foot on stool  -CC      Exercise 2    Exercise Name 2 Seated L hamstring stretch  -CC      Cueing 2 Verbal;Tactile   -CC      Equipment 2 --   Hold since pt c/o increase sx L knee joint  -CC      Reps 2 4   L foot on stool  -CC      Time (Seconds) 2 15 sec  -CC      Exercise 3    Exercise Name 3 Seated PPT with lower abdominal hold  -CC      Reps 3 10  -CC      Time (Seconds) 3 5 sec  -CC      Exercise 4    Exercise Name 4 Seated PPT with lower abdominals and bilateral UE raise  -CC      Cueing 4 Demo  -CC      Reps 4 5   UE 90 degrees to avoid increase back pain  -CC      Exercise 5    Exercise Name 5 Seated B Rows  -CC      Cueing 5 Demo  -CC      Equipment 5 Theraband  -CC      Resistance 5 Blue  -CC      Reps 5 10  -CC      Exercise 6    Exercise Name 6 Seated B UE extension  -CC      Cueing 6 Demo  -CC      Equipment 6 Theraband  -CC      Resistance 6 Blue  -CC      Reps 6 8  -CC        User Key  (r) = Recorded By, (t) = Taken By, (c) = Cosigned By    Initials Name Provider Type    JAY Irvin PT Physical Therapist                                   Therapy Education       04/14/17 1554          Therapy Education    Given --   Issued handout for rows and extension with blue TB. Reinforced pt not be vigorous -avoid increasing back pain  -CC        User Key  (r) = Recorded By, (t) = Taken By, (c) = Cosigned By    Initials Name Provider Type    CC Brenda Irvin PT Physical Therapist                Time Calculation:   Start Time: 1400  Stop Time: 1515  Time Calculation (min): 75 min    Therapy Charges for Today     Code Description Service Date Service Provider Modifiers Qty    51519062198 HC PT THER PROC EA 15 MIN 4/14/2017 Brenda Irvin, PT GP 1    07462003230 HC PT ULTRASOUND EA 15 MIN 4/14/2017 Brenda Irvin, PT GP 1    20132419567 HC PT HOT OR COLD PACK TREAT MCARE 4/14/2017 Brenda Irvin, PT GP 1    71375422725 HC PT ELECTRICAL STIM UNATTENDED 4/14/2017 Brenda Irvin, PT  1                    Brenda Irvin, PT  4/14/2017

## 2017-04-18 ENCOUNTER — HOSPITAL ENCOUNTER (OUTPATIENT)
Dept: PHYSICAL THERAPY | Facility: HOSPITAL | Age: 82
Setting detail: THERAPIES SERIES
Discharge: HOME | End: 2017-04-18

## 2017-04-18 PROCEDURE — 97035 APP MDLTY 1+ULTRASOUND EA 15: CPT

## 2017-04-18 PROCEDURE — 97110 THERAPEUTIC EXERCISES: CPT

## 2017-04-18 PROCEDURE — G0283 ELEC STIM OTHER THAN WOUND: HCPCS

## 2017-04-18 NOTE — PROGRESS NOTES
Outpatient Physical Therapy Ortho Treatment Note  JOLYNN Thibodeaux     Patient Name: Faahd Muhammad  : 1935  MRN: [id number]  Today's Date: 2017      Visit Date: 2017    Visit Dx:  No diagnosis found.    Patient Active Problem List   Diagnosis   • Osteoarthrosis, localized, primary, knee   • Hyperlipidemia   • Hypertension   • Seizure disorder   • Vitamin D deficiency   • Coronary artery disease involving native coronary artery of native heart without angina pectoris   • Benign non-nodular prostatic hyperplasia with lower urinary tract symptoms   • Seasonal allergic rhinitis   • Atrial fibrillation   • CKD (chronic kidney disease), stage III   • Closed wedge compression fracture of second lumbar vertebra   • DDD (degenerative disc disease), lumbar   • Closed burst fracture of lumbar vertebra        Past Medical History:   Diagnosis Date   • Arthritis    • Atrial fibrillation    • Coronary artery disease    • DDD (degenerative disc disease), lumbar    • Heme positive stool    • Hx of colonoscopy     Complete   • Hyperlipidemia    • Hypertension    • Neck strain    • Osteoarthritis    • Seizures    • Sepsis    • Stroke    • Vitamin D deficiency    • Wrist fracture, right         Past Surgical History:   Procedure Laterality Date   • CARDIAC SURGERY     • CORONARY ARTERY BYPASS GRAFT     • HERNIA REPAIR Right     Inguinal hernia repair                             PT Assessment/Plan       17 6576       PT Assessment    Assessment Comments Pt did show some improvement in tolerance to TB exercises with better posture and increase reps. Did trial of having pt standup while IFC RX on lumbar area and he reported he did not have the usual severe pain. Continued discussion with pt and spouse that a home TENS may be benefical for pain management                                                                                                                       -CC     PT Plan    PT Plan Comments  Continue 2x/week x 2 more weeks  -CC       User Key  (r) = Recorded By, (t) = Taken By, (c) = Cosigned By    Initials Name Provider Type    JAY Irvin PT Physical Therapist                Modalities       04/18/17 1500          Subjective Comments    Subjective Comments Pt had easier time to tolerate Rx last session in sitting vs attempting to lie down- Pt not sure if intensity or frequency of sx has changed -still very painful getting out of bed and chair  -CC      Moist Heat    Location L-S spine -pt seated in chair with IFC   -CC      Rx Minutes 15 mins  -CC      MH S/P Rx Yes  -CC      ELECTRICAL STIMULATION    Attended/Unattended Unattended  -CC      Stimulation Type IFC  -CC      Location/Electrode Placement/Other L-S area with MH-pt seated in chair to avoid increase sx with transitional motion on/off plinth  -CC      Rx Minutes 15 mins  -CC      Ultrasound [id number]    Location L-S area-pt seated on plinth to avoid pain with lying down/up from plinth  -CC      Rx Minutes 8 min  -CC      Frequency 1.0 MHz  -CC      Intensity - Wts/cm 1.5  -CC        User Key  (r) = Recorded By, (t) = Taken By, (c) = Cosigned By    Initials Name Provider Type    JAY Irvin PT Physical Therapist                Exercises       04/18/17 1500          Subjective Comments    Subjective Comments Pt had easier time to tolerate Rx last session in sitting vs attempting to lie down- Pt not sure if intensity or frequency of sx has changed -still very painful getting out of bed and chair  -CC      Exercise 1    Exercise Name 1 Seated neural tension osscilation L LE   -CC      Cueing 1 Verbal;Tactile  -CC      Equipment 1 --   sheet  -CC      Reps 1 20   Pt seated with foot on stool  -CC      Exercise 2    Exercise Name 2 Seated L hamstring stretch  -CC      Cueing 2 Verbal;Tactile  -CC      Equipment 2 --   Hold since pt c/o increase sx L knee joint  -CC      Reps 2 4   L foot on stool  -CC      Time (Seconds)  2 15 sec  -CC      Exercise 3    Exercise Name 3 Seated PPT with lower abdominal hold  -CC      Reps 3 10  -CC      Time (Seconds) 3 5 sec  -CC      Exercise 4    Exercise Name 4 Seated PPT with lower abdominals and bilateral UE raise  -CC      Cueing 4 Demo  -CC      Reps 4 10   UE 90 degrees to avoid increase back pain  -CC      Exercise 5    Exercise Name 5 Seated B Rows  -CC      Cueing 5 Demo  -CC      Equipment 5 Theraband  -CC      Weights/Plates 5 2  -CC      Resistance 5 Blue  -CC      Reps 5 10   x5 reps 2nd set  -CC      Exercise 6    Exercise Name 6 Seated B UE extension  -CC      Cueing 6 Demo  -CC      Equipment 6 Theraband  -CC      Weights/Plates 6 2   5 reps 2nd set  -CC      Resistance 6 Blue  -CC      Reps 6 10  -CC        User Key  (r) = Recorded By, (t) = Taken By, (c) = Cosigned By    Initials Name Provider Type    JAY Irvin PT Physical Therapist                                   Therapy Education       04/18/17 1725          Therapy Education    Given --   Reinforced HEP - discussed with pt and spouse that increase walking may be beneficial. Pt does not have sidewalks at his home but may try to do indoor walking at [facility]. Reinforced to start slowly and see what pt's tolerance will be  -CC        User Key  (r) = Recorded By, (t) = Taken By, (c) = Cosigned By    Initials Name Provider Type    JAY Irvin PT Physical Therapist                Time Calculation:   Start Time: 1500  Stop Time: 1600  Time Calculation (min): 60 min    Therapy Charges for Today     Code Description Service Date Service Provider Modifiers Qty    55769564932 HC PT ULTRASOUND EA 15 MIN 4/18/2017 Brenda Irvin PT GP 1    61232465461 HC PT THER PROC EA 15 MIN 4/18/2017 Brenda Irvin PT GP 1    99062177013 HC PT ELECTRICAL STIM UNATTENDED 4/18/2017 Brenda Irvin PT  1    20681889084 HC PT HOT OR COLD PACK TREAT MCARE 4/18/2017 Brenda Irvin  PT GP 1                    Brenda Irvin, PT  4/18/2017

## 2017-04-21 ENCOUNTER — HOSPITAL ENCOUNTER (OUTPATIENT)
Dept: PHYSICAL THERAPY | Facility: HOSPITAL | Age: 82
Setting detail: THERAPIES SERIES
Discharge: HOME | End: 2017-04-21

## 2017-04-21 PROCEDURE — 97110 THERAPEUTIC EXERCISES: CPT

## 2017-04-21 PROCEDURE — 97035 APP MDLTY 1+ULTRASOUND EA 15: CPT

## 2017-04-21 PROCEDURE — G0283 ELEC STIM OTHER THAN WOUND: HCPCS

## 2017-04-21 NOTE — PROGRESS NOTES
Outpatient Physical Therapy Ortho Treatment Note  JOLYNN Thibodeaux     Patient Name: Fahad Muhammad  : 1935  MRN: [id number]  Today's Date: 2017      Visit Date: 2017    Visit Dx:  No diagnosis found.    Patient Active Problem List   Diagnosis   • Osteoarthrosis, localized, primary, knee   • Hyperlipidemia   • Hypertension   • Seizure disorder   • Vitamin D deficiency   • Coronary artery disease involving native coronary artery of native heart without angina pectoris   • Benign non-nodular prostatic hyperplasia with lower urinary tract symptoms   • Seasonal allergic rhinitis   • Atrial fibrillation   • CKD (chronic kidney disease), stage III   • Closed wedge compression fracture of second lumbar vertebra   • DDD (degenerative disc disease), lumbar   • Closed burst fracture of lumbar vertebra        Past Medical History:   Diagnosis Date   • Arthritis    • Atrial fibrillation    • Coronary artery disease    • DDD (degenerative disc disease), lumbar    • Heme positive stool    • Hx of colonoscopy     Complete   • Hyperlipidemia    • Hypertension    • Neck strain    • Osteoarthritis    • Seizures    • Sepsis    • Stroke    • Vitamin D deficiency    • Wrist fracture, right         Past Surgical History:   Procedure Laterality Date   • CARDIAC SURGERY     • CORONARY ARTERY BYPASS GRAFT     • HERNIA REPAIR Right     Inguinal hernia repair                             PT Assessment/Plan       17 8960       PT Assessment    Assessment Comments Pt very challenged with lower ABs stabilization on dynadisc. Pt able to do bilateral TB row x5 reps without any back support. Pt having increase tolerance to TE and is starting to notice some increase functional endurance in ADL. Pt's spouse also has observed some gains in ADL tolerance  -CC     PT Plan    PT Plan Comments Continue per POC  -CC       User Key  (r) = Recorded By, (t) = Taken By, (c) = Cosigned By    Initials Name Provider Type    CC  Brenda Irvin, PT Physical Therapist                Modalities       04/21/17 1500          Subjective Comments    Subjective Comments Pt reports still having pain upon getting out of bed in the AM which is usuallly the worst day after therapy but has noticed it has not been as severe the past couple days  -CC      Subjective Pain    Able to rate subjective pain? yes  -CC      Subjective Pain Comment Pt states he has been able to be more active with less pain with and without use of RWX  -CC      Moist Heat    Location L-S spine -pt seated in chair with IFC   -CC      Rx Minutes 15 mins  -CC      MH S/P Rx Yes  -CC      ELECTRICAL STIMULATION    Attended/Unattended Unattended  -CC      Stimulation Type IFC  -CC      Location/Electrode Placement/Other L-S area with MH-pt seated in chair to avoid increase sx with transitional motion on/off plinth  -CC      Rx Minutes 15 mins  -CC      Ultrasound 31277    Location L-S area-pt seated on plinth to avoid pain with lying down/up from plinth  -CC      Rx Minutes 8 min  -CC      Frequency 1.0 MHz  -CC      Intensity - Wts/cm 1.5  -CC        User Key  (r) = Recorded By, (t) = Taken By, (c) = Cosigned By    Initials Name Provider Type     Brenda Irvin, KOJO Physical Therapist                Exercises       04/21/17 1600 04/21/17 1500       Subjective Comments    Subjective Comments  Pt reports still having pain upon getting out of bed in the AM which is usuallly the worst day after therapy but has noticed it has not been as severe the past couple days  -CC     Subjective Pain    Able to rate subjective pain?  yes  -CC     Subjective Pain Comment  Pt states he has been able to be more active with less pain with and without use of RWX  -CC     Exercise 1    Exercise Name 1 Seated neural tension osscilation L LE   -CC      Cueing 1 Verbal;Tactile  -CC      Equipment 1 --   sheet  -CC      Reps 1 20   Pt seated with foot on stool  -CC      Exercise 2     Exercise Name 2 Back stabilization seated on dynadisic  -CC      Cueing 2 Verbal;Demo  -CC      Equipment 2 --   dynadisc  -CC      Reps 2 5   Lower abs tightening without UE support  -CC      Exercise 3    Exercise Name 3 Seated PPT with lower abdominal hold  -CC      Reps 3 10  -CC      Time (Seconds) 3 5 sec  -CC      Exercise 4    Exercise Name 4 Seated PPT with lower abdominals and bilateral UE raise  -CC      Cueing 4 Demo  -CC      Reps 4 10   UE 90 degrees to avoid increase back pain  -CC      Exercise 5    Exercise Name 5 Seated B Rows  -CC      Cueing 5 Demo  -CC      Equipment 5 Theraband  -CC      Weights/Plates 5 2  -CC      Resistance 5 Blue  -CC      Reps 5 10   x5 reps 2nd set  -CC      Exercise 6    Exercise Name 6 Seated B UE extension  -CC      Cueing 6 Demo  -CC      Equipment 6 Theraband  -CC      Weights/Plates 6 2   5 reps 2nd set  -CC      Resistance 6 Blue  -CC      Reps 6 10  -CC        User Key  (r) = Recorded By, (t) = Taken By, (c) = Cosigned By    Initials Name Provider Type    JAY Irvin PT Physical Therapist                                   Therapy Education       04/21/17 1735          Therapy Education    Given --   Reinforced HEP  -CC        User Key  (r) = Recorded By, (t) = Taken By, (c) = Cosigned By    Initials Name Provider Type    JAY Irvin PT Physical Therapist                Time Calculation:   Start Time: 1400  Stop Time: 1500  Time Calculation (min): 60 min    Therapy Charges for Today     Code Description Service Date Service Provider Modifiers Qty    73466872606 HC PT THER PROC EA 15 MIN 4/21/2017 Brenda Irvin, PT GP 1    62788714326 HC PT ULTRASOUND EA 15 MIN 4/21/2017 Brenda Irvin, PT GP 1    07122490986 HC PT ELECTRICAL STIM UNATTENDED 4/21/2017 Brenda Irvin, PT  1    23134302529 HC PT HOT OR COLD PACK TREAT MCARE 4/21/2017 Brenda Irvin, PT GP 1                    Brenda MEJIA  Brandee, PT  4/21/2017

## 2017-04-25 ENCOUNTER — HOSPITAL ENCOUNTER (OUTPATIENT)
Dept: PHYSICAL THERAPY | Facility: HOSPITAL | Age: 82
Setting detail: THERAPIES SERIES
Discharge: HOME | End: 2017-04-25

## 2017-04-25 PROCEDURE — 97035 APP MDLTY 1+ULTRASOUND EA 15: CPT

## 2017-04-25 PROCEDURE — G0283 ELEC STIM OTHER THAN WOUND: HCPCS

## 2017-04-25 PROCEDURE — 97110 THERAPEUTIC EXERCISES: CPT

## 2017-04-25 NOTE — PROGRESS NOTES
Outpatient Physical Therapy Ortho Treatment Note  [facility]ode[facility]     Patient Name: Fahad Muhammad  : 1935  MRN: [id number]  Today's Date: 2017      Visit Date: 2017    Visit Dx:  No diagnosis found.    Patient Active Problem List   Diagnosis   • Osteoarthrosis, localized, primary, knee   • Hyperlipidemia   • Hypertension   • Seizure disorder   • Vitamin D deficiency   • Coronary artery disease involving native coronary artery of native heart without angina pectoris   • Benign non-nodular prostatic hyperplasia with lower urinary tract symptoms   • Seasonal allergic rhinitis   • Atrial fibrillation   • CKD (chronic kidney disease), stage III   • Closed wedge compression fracture of second lumbar vertebra   • DDD (degenerative disc disease), lumbar   • Closed burst fracture of lumbar vertebra        Past Medical History:   Diagnosis Date   • Arthritis    • Atrial fibrillation    • Coronary artery disease    • DDD (degenerative disc disease), lumbar    • Heme positive stool    • Hx of colonoscopy     Complete   • Hyperlipidemia    • Hypertension    • Neck strain    • Osteoarthritis    • Seizures    • Sepsis    • Stroke    • Vitamin D deficiency    • Wrist fracture, right         Past Surgical History:   Procedure Laterality Date   • CARDIAC SURGERY     • CORONARY ARTERY BYPASS GRAFT     • HERNIA REPAIR Right     Inguinal hernia repair                             PT Assessment/Plan       17 [id number]       PT Assessment    Assessment Comments Pt did better performing modified lumbar stabilization exercises today. Pt ambulated without RWX about 10 feet during session without LOB or c/o increase back pain  -CC       User Key  (r) = Recorded By, (t) = Taken By, (c) = Cosigned By    Initials Name Provider Type    CC Brenda Irvin, PT Physical Therapist                Modalities       17 1400          Subjective Comments    Subjective Comments Pt feels he is getting stronger with  exercises - can get up from chair easier most of the time now- getting out of bed still most painful  -CC      Subjective Pain    Subjective Pain Comment Pt states he is thinking of trying to sleep in his recliner to see if he has less pain upon getting up in the AM  -CC      Moist Heat    Location L-S spine -pt seated in chair with IFC   -CC      Rx Minutes 15 mins  -CC      MH S/P Rx Yes  -CC      ELECTRICAL STIMULATION    Attended/Unattended Unattended  -CC      Stimulation Type IFC  -CC      Location/Electrode Placement/Other L-S area with MH-pt seated in chair to avoid increase sx with transitional motion on/off plinth  -CC      Rx Minutes 15 mins  -CC      Ultrasound [id number]    Location L-S area-pt seated on plinth to avoid pain with lying down/up from plinth  -CC      Rx Minutes 8 min  -CC      Frequency 1.0 MHz  -CC      Intensity - Wts/cm 1.5  -CC        User Key  (r) = Recorded By, (t) = Taken By, (c) = Cosigned By    Initials Name Provider Type     Brenda Irvin, PT Physical Therapist                Exercises       04/25/17 1400          Subjective Comments    Subjective Comments Pt feels he is getting stronger with exercises - can get up from chair easier most of the time now- getting out of bed still most painful  -CC      Subjective Pain    Subjective Pain Comment Pt states he is thinking of trying to sleep in his recliner to see if he has less pain upon getting up in the AM  -CC      Exercise 1    Exercise Name 1 Seated neural tension osscilation L LE   -CC      Cueing 1 Verbal;Tactile  -CC      Equipment 1 --   sheet  -CC      Reps 1 20   Pt seated with foot on stool  -CC      Exercise 2    Exercise Name 2 Back stabilization seated on dynadisic  -CC      Cueing 2 Verbal;Demo  -CC      Equipment 2 --   dynadisc  -CC      Reps 2 --   Lower abs tightening without UE support x 6 reps  -CC      Exercise 3    Exercise Name 3 Seated PPT with lower abdominal hold  -CC      Reps 3 10  -CC      Time  (Seconds) 3 5 sec  -CC      Exercise 4    Exercise Name 4 Seated PPT with lower abdominals and bilateral UE raise  -CC      Cueing 4 Demo  -CC      Reps 4 10   UE 90 degrees to avoid increase back pain  -CC      Exercise 5    Exercise Name 5 Seated B Rows  -CC      Cueing 5 Demo  -CC      Equipment 5 Theraband  -CC      Weights/Plates 5 2  -CC      Resistance 5 Blue  -CC      Reps 5 10   x5 reps 2nd set  -CC      Exercise 6    Exercise Name 6 Seated B UE extension  -CC      Cueing 6 Demo  -CC      Equipment 6 Theraband  -CC      Weights/Plates 6 2   5 reps 2nd set  -CC      Resistance 6 Blue  -CC      Reps 6 10  -CC        User Key  (r) = Recorded By, (t) = Taken By, (c) = Cosigned By    Initials Name Provider Type    JAY Irvin PT Physical Therapist                                   Therapy Education       04/25/17 1455          Therapy Education    Given --   reinforced HEP -suggested pt try to sit at edge of kitchen chair and try to raise UE as when on dynadisc for lumbar spine strenghtening  -CC        User Key  (r) = Recorded By, (t) = Taken By, (c) = Cosigned By    Initials Name Provider Type    CC Brenda Irvin PT Physical Therapist                Time Calculation:   Stop Time: 1500    Therapy Charges for Today     Code Description Service Date Service Provider Modifiers Qty    03220669447 HC PT ULTRASOUND EA 15 MIN 4/25/2017 Brenda Irvin, PT GP 1    99137682456 HC PT THER PROC EA 15 MIN 4/25/2017 Brenda Irvin, PT GP 1    41116476855 HC PT ELECTRICAL STIM UNATTENDED 4/25/2017 Brenda Irvin, PT  1    [id number]  PT HOT OR COLD PACK TREAT MCARE 4/25/2017 Brenda Irvin, PT GP 1                    Brenda Irvin, PT  4/25/2017

## 2017-04-28 ENCOUNTER — HOSPITAL ENCOUNTER (OUTPATIENT)
Dept: PHYSICAL THERAPY | Facility: HOSPITAL | Age: 82
Setting detail: THERAPIES SERIES
Discharge: HOME | End: 2017-04-28

## 2017-04-28 PROCEDURE — 97110 THERAPEUTIC EXERCISES: CPT

## 2017-04-28 PROCEDURE — G0283 ELEC STIM OTHER THAN WOUND: HCPCS

## 2017-04-28 NOTE — PROGRESS NOTES
Outpatient Physical Therapy Ortho Treatment Note  [facility]     Patient Name: Fahad Muhammad  : 1935  MRN: [id number]  Today's Date: 2017      Visit Date: 2017    Visit Dx:  No diagnosis found.    Patient Active Problem List   Diagnosis   • Osteoarthrosis, localized, primary, knee   • Hyperlipidemia   • Hypertension   • Seizure disorder   • Vitamin D deficiency   • Coronary artery disease involving native coronary artery of native heart without angina pectoris   • Benign non-nodular prostatic hyperplasia with lower urinary tract symptoms   • Seasonal allergic rhinitis   • Atrial fibrillation   • CKD (chronic kidney disease), stage III   • Closed wedge compression fracture of second lumbar vertebra   • DDD (degenerative disc disease), lumbar   • Closed burst fracture of lumbar vertebra        Past Medical History:   Diagnosis Date   • Arthritis    • Atrial fibrillation    • Coronary artery disease    • DDD (degenerative disc disease), lumbar    • Heme positive stool    • Hx of colonoscopy     Complete   • Hyperlipidemia    • Hypertension    • Neck strain    • Osteoarthritis    • Seizures    • Sepsis    • Stroke    • Vitamin D deficiency    • Wrist fracture, right         Past Surgical History:   Procedure Laterality Date   • CARDIAC SURGERY     • CORONARY ARTERY BYPASS GRAFT     • HERNIA REPAIR Right     Inguinal hernia repair                             PT Assessment/Plan       17 1646       PT Assessment    Assessment Comments Pt having better tolerace to  seated umbar stabilization exercises on dynadisc. Pt showing some improved strenghening endurance but no change in pain especially with sit/supine and back .  -CC     PT Plan    PT Plan Comments Pt has one more session per POC and pt f/u with MD.Pt has completed US series allowed.CC       User Key  (r) = Recorded By, (t) = Taken By, (c) = Cosigned By    Initials Name Provider Type    CC Brenda Irvin, PT Physical  Therapist                Modalities       04/28/17 1500          Subjective Comments    Subjective Comments Pt relates he had more localized pain on L side in a smaller area yesterday and still there but less sx -CC      Moist Heat    Location L-S spine -pt seated in chair with IFC   -CC      Rx Minutes 15 mins  -CC      MH S/P Rx Yes  -CC      ELECTRICAL STIMULATION    Attended/Unattended Unattended  -CC      Stimulation Type IFC  -CC      Location/Electrode Placement/Other L-S area with MH-pt seated in chair to avoid increase sx with transitional motion on/off plinth  -CC      Rx Minutes 15 mins  -CC      Ultrasound [id number]    Location --  -CC      Rx Minutes --  -CC      Frequency --  -CC      Intensity - Wts/cm --  -CC        User Key  (r) = Recorded By, (t) = Taken By, (c) = Cosigned By    Initials Name Provider Type    JAY Irvin, PT Physical Therapist                Exercises       04/28/17 1500          Subjective Comments    Subjective Comments Pt relates he had more localized pain on L side in a smaller area yesterday and still there but less  -CC      Exercise 1    Exercise Name 1 Seated neural tension osscilation L LE   -CC      Cueing 1 Verbal;Tactile  -CC      Equipment 1 --   sheet  -CC      Reps 1 20   Pt seated with foot on stool  -CC      Exercise 2    Exercise Name 2 Back stabilization seated on dynadisic  -CC      Cueing 2 Verbal;Demo  -CC      Equipment 2 --   dynadisc  -CC      Reps 2 --   Lower abs tightening with UE raise x 10  -CC      Exercise 3    Exercise Name 3 Seated PPT with lower abdominal hold  -CC      Reps 3 10  -CC      Time (Seconds) 3 5 sec  -CC      Exercise 4    Exercise Name 4 Seated PPT with lower abdominals and bilateral UE raise  -CC      Cueing 4 Verbal  -CC      Equipment 4 [place]umbell  -CC      Weights/Plates 4 1  -CC      Reps 4 5   UE 90 degrees to avoid increase back pain  -CC      Exercise 5    Exercise Name 5 Seated B Rows  -CC      Cueing 5 Demo  -CC       Equipment 5 Theraband  -CC      Weights/Plates 5 2  -CC      Resistance 5 Blue  -CC      Reps 5 10   x5 reps 2nd set  -CC      Exercise 6    Exercise Name 6 Seated B UE extension  -CC      Cueing 6 Demo  -CC      Equipment 6 Theraband  -CC      Weights/Plates 6 2   5 reps 2nd set  -CC      Resistance 6 Blue  -CC      Reps 6 10  -CC      Exercise 7    Exercise Name 7 Dynadisc seated- hip marches with loweer abs holding  -CC      Cueing 7 Demo  -CC      Reps 7 4  -CC        User Key  (r) = Recorded By, (t) = Taken By, (c) = Cosigned By    Initials Name Provider Type    CC Brenda Irvin, PT Physical Therapist                                   Therapy Education       04/28/17 1645          Therapy Education    Given --   Continue HEP  -CC        User Key  (r) = Recorded By, (t) = Taken By, (c) = Cosigned By    Initials Name Provider Type    CC Brenda Irvin, PT Physical Therapist                Time Calculation:   Start Time: 1400  Stop Time: 1455  Time Calculation (min): 55 min    Therapy Charges for Today     Code Description Service Date Service Provider Modifiers Qty    96688426839 HC PT ELECTRICAL STIM UNATTENDED 4/28/2017 Brenda Irvin, PT  1    64094612777 HC PT HOT OR COLD PACK TREAT MCARE 4/28/2017 Brenda Irvin, PT GP 1    [id number] HC PT THER PROC EA 15 MIN 4/28/2017 Brenda Irvin, PT GP 1                    Brenda Irvin, PT  4/28/2017

## 2017-05-02 ENCOUNTER — HOSPITAL ENCOUNTER (OUTPATIENT)
Dept: PHYSICAL THERAPY | Facility: HOSPITAL | Age: 82
Setting detail: THERAPIES SERIES
Discharge: HOME | End: 2017-05-02

## 2017-05-02 PROCEDURE — G0283 ELEC STIM OTHER THAN WOUND: HCPCS

## 2017-05-02 PROCEDURE — 97110 THERAPEUTIC EXERCISES: CPT

## 2017-05-10 ENCOUNTER — DOCUMENTATION (OUTPATIENT)
Dept: PHYSICAL THERAPY | Facility: HOSPITAL | Age: 82
End: 2017-05-10

## 2017-05-10 ENCOUNTER — HOSPITAL ENCOUNTER (OUTPATIENT)
Dept: PHYSICAL THERAPY | Facility: HOSPITAL | Age: 82
Setting detail: THERAPIES SERIES
Discharge: HOME | End: 2017-05-10

## 2017-05-15 ENCOUNTER — APPOINTMENT (OUTPATIENT)
Dept: PHYSICAL THERAPY | Facility: HOSPITAL | Age: 82
End: 2017-05-15

## 2017-05-18 ENCOUNTER — OFFICE VISIT (OUTPATIENT)
Dept: INTERNAL MEDICINE | Facility: CLINIC | Age: 82
End: 2017-05-18

## 2017-05-18 VITALS
HEIGHT: 75 IN | DIASTOLIC BLOOD PRESSURE: 60 MMHG | SYSTOLIC BLOOD PRESSURE: 102 MMHG | HEART RATE: 74 BPM | WEIGHT: 227 LBS | RESPIRATION RATE: 18 BRPM | BODY MASS INDEX: 28.23 KG/M2

## 2017-05-18 DIAGNOSIS — R63.4 UNEXPLAINED WEIGHT LOSS: ICD-10-CM

## 2017-05-18 DIAGNOSIS — N18.30 CKD (CHRONIC KIDNEY DISEASE), STAGE III (HCC): ICD-10-CM

## 2017-05-18 DIAGNOSIS — M51.36 DDD (DEGENERATIVE DISC DISEASE), LUMBAR: ICD-10-CM

## 2017-05-18 DIAGNOSIS — I10 ESSENTIAL HYPERTENSION: ICD-10-CM

## 2017-05-18 DIAGNOSIS — M54.42 CHRONIC LEFT-SIDED LOW BACK PAIN WITH LEFT-SIDED SCIATICA: Primary | ICD-10-CM

## 2017-05-18 DIAGNOSIS — G89.29 CHRONIC LEFT-SIDED LOW BACK PAIN WITH LEFT-SIDED SCIATICA: Primary | ICD-10-CM

## 2017-05-18 DIAGNOSIS — M53.3 SACRO ILIAL PAIN: ICD-10-CM

## 2017-05-18 LAB
ALBUMIN SERPL-MCNC: 4.3 G/DL (ref 3.5–5.2)
ALBUMIN/GLOB SERPL: 1.6 G/DL
ALP SERPL-CCNC: 86 U/L (ref 40–129)
ALT SERPL-CCNC: 20 U/L (ref 5–41)
AST SERPL-CCNC: 21 U/L (ref 5–40)
BASOPHILS # BLD AUTO: 0.04 10*3/MM3 (ref 0–0.2)
BASOPHILS NFR BLD AUTO: 0.4 % (ref 0–2)
BILIRUB SERPL-MCNC: 1 MG/DL (ref 0.2–1.2)
BUN SERPL-MCNC: 19 MG/DL (ref 8–23)
BUN/CREAT SERPL: 16 (ref 7–25)
CALCIUM SERPL-MCNC: 10.1 MG/DL (ref 8.8–10.5)
CHLORIDE SERPL-SCNC: 97 MMOL/L (ref 98–107)
CO2 SERPL-SCNC: 27.2 MMOL/L (ref 22–29)
CREAT SERPL-MCNC: 1.19 MG/DL (ref 0.76–1.27)
EOSINOPHIL # BLD AUTO: 0.12 10*3/MM3 (ref 0.1–0.3)
EOSINOPHIL NFR BLD AUTO: 1.3 % (ref 0–4)
ERYTHROCYTE [DISTWIDTH] IN BLOOD BY AUTOMATED COUNT: 18.4 % (ref 11.5–14.5)
GLOBULIN SER CALC-MCNC: 2.7 GM/DL
GLUCOSE SERPL-MCNC: 88 MG/DL (ref 65–99)
HCT VFR BLD AUTO: 42.7 % (ref 42–52)
HGB BLD-MCNC: 13.9 G/DL (ref 14–18)
IMM GRANULOCYTES # BLD: 0.07 10*3/MM3 (ref 0–0.03)
IMM GRANULOCYTES NFR BLD: 0.7 % (ref 0–0.5)
LYMPHOCYTES # BLD AUTO: 2.7 10*3/MM3 (ref 0.6–4.8)
LYMPHOCYTES NFR BLD AUTO: 28.2 % (ref 20–45)
MCH RBC QN AUTO: 26.4 PG (ref 27–31)
MCHC RBC AUTO-ENTMCNC: 32.6 G/DL (ref 31–37)
MCV RBC AUTO: 81.2 FL (ref 80–94)
MONOCYTES # BLD AUTO: 1.05 10*3/MM3 (ref 0–1)
MONOCYTES NFR BLD AUTO: 11 % (ref 3–8)
NEUTROPHILS # BLD AUTO: 5.58 10*3/MM3 (ref 1.5–8.3)
NEUTROPHILS NFR BLD AUTO: 58.4 % (ref 45–70)
NRBC BLD AUTO-RTO: 0 /100 WBC (ref 0–0)
PLATELET # BLD AUTO: 497 10*3/MM3 (ref 140–500)
POTASSIUM SERPL-SCNC: 4.4 MMOL/L (ref 3.5–5.2)
PROT SERPL-MCNC: 7 G/DL (ref 6–8.5)
RBC # BLD AUTO: 5.26 10*6/MM3 (ref 4.7–6.1)
SODIUM SERPL-SCNC: 138 MMOL/L (ref 136–145)
UNABLE TO VOID: NORMAL
WBC # BLD AUTO: 9.56 10*3/MM3 (ref 4.8–10.8)

## 2017-05-18 PROCEDURE — 99214 OFFICE O/P EST MOD 30 MIN: CPT | Performed by: INTERNAL MEDICINE

## 2017-05-18 RX ORDER — FENOFIBRATE 134 MG/1
134 CAPSULE ORAL
Qty: 90 CAPSULE | Refills: 3 | Status: SHIPPED | OUTPATIENT
Start: 2017-05-18 | End: 2018-01-01 | Stop reason: SDUPTHER

## 2017-05-18 RX ORDER — LEVETIRACETAM 500 MG/1
500 TABLET ORAL 2 TIMES DAILY
Qty: 180 TABLET | Refills: 3 | Status: SHIPPED | OUTPATIENT
Start: 2017-05-18 | End: 2018-01-01 | Stop reason: SDUPTHER

## 2017-05-19 ENCOUNTER — TELEPHONE (OUTPATIENT)
Dept: INTERNAL MEDICINE | Facility: CLINIC | Age: 82
End: 2017-05-19

## 2017-05-19 DIAGNOSIS — I48.20 CHRONIC ATRIAL FIBRILLATION (HCC): Primary | ICD-10-CM

## 2017-05-22 ENCOUNTER — TELEPHONE (OUTPATIENT)
Dept: INTERNAL MEDICINE | Facility: CLINIC | Age: 82
End: 2017-05-22

## 2017-05-23 ENCOUNTER — APPOINTMENT (OUTPATIENT)
Dept: PHYSICAL THERAPY | Facility: HOSPITAL | Age: 82
End: 2017-05-23

## 2017-05-25 ENCOUNTER — TELEPHONE (OUTPATIENT)
Dept: INTERNAL MEDICINE | Facility: CLINIC | Age: 82
End: 2017-05-25

## 2017-06-08 ENCOUNTER — OFFICE VISIT (OUTPATIENT)
Dept: CARDIOLOGY | Facility: CLINIC | Age: 82
End: 2017-06-08

## 2017-06-08 VITALS
SYSTOLIC BLOOD PRESSURE: 120 MMHG | HEIGHT: 75 IN | WEIGHT: 227 LBS | BODY MASS INDEX: 28.23 KG/M2 | HEART RATE: 89 BPM | DIASTOLIC BLOOD PRESSURE: 62 MMHG

## 2017-06-08 DIAGNOSIS — E78.5 HYPERLIPIDEMIA, UNSPECIFIED HYPERLIPIDEMIA TYPE: Chronic | ICD-10-CM

## 2017-06-08 DIAGNOSIS — I25.10 CORONARY ARTERY DISEASE INVOLVING NATIVE CORONARY ARTERY OF NATIVE HEART WITHOUT ANGINA PECTORIS: Chronic | ICD-10-CM

## 2017-06-08 DIAGNOSIS — N18.30 CKD (CHRONIC KIDNEY DISEASE), STAGE III (HCC): ICD-10-CM

## 2017-06-08 DIAGNOSIS — I48.20 CHRONIC ATRIAL FIBRILLATION (HCC): Primary | Chronic | ICD-10-CM

## 2017-06-08 DIAGNOSIS — I10 ESSENTIAL HYPERTENSION: Chronic | ICD-10-CM

## 2017-06-08 PROCEDURE — 93000 ELECTROCARDIOGRAM COMPLETE: CPT | Performed by: INTERNAL MEDICINE

## 2017-06-08 PROCEDURE — 99214 OFFICE O/P EST MOD 30 MIN: CPT | Performed by: INTERNAL MEDICINE

## 2017-06-08 RX ORDER — DILTIAZEM HYDROCHLORIDE 240 MG/1
240 CAPSULE, COATED, EXTENDED RELEASE ORAL DAILY
Qty: 90 CAPSULE | Refills: 3 | Status: SHIPPED | OUTPATIENT
Start: 2017-06-08 | End: 2018-01-01 | Stop reason: SDUPTHER

## 2017-06-08 NOTE — PATIENT INSTRUCTIONS
Heart Disease Prevention  Heart disease is a leading cause of death. There are many things you can do to help prevent heart disease.  BE PHYSICALLY ACTIVE  Physical activity is good for your heart. It helps control your blood pressure, cholesterol levels, and weight. Try to be physically active every day. Ask your health care provider what activities are best for you.   BE A HEALTHY WEIGHT  Extra weight can strain your heart and affect your blood pressure and cholesterol levels. Lose weight with diet and exercise if recommended by your health care provider.  EAT HEART-HEALTHY FOODS  Follow a healthy eating plan as recommended by your health care provider or dietitian. Heart-healthy foods include:   · High-fiber foods. These include oat bran, oatmeal, and whole-grain breads and cereals.  · Fruits and vegetables.  Avoid:  · Alcohol.  · Fried foods.  · Foods high in saturated fat. These include meats, butter, whole dairy products, shortening, and coconut or palm oil.  · Salty foods. These include canned food, luncheon meat, salty snacks, and fast food.  KEEP YOUR CHOLESTEROL LEVELS UNDER CONTROL  Cholesterol is a substance that is used for many important functions. When your cholesterol levels are high, cholesterol can stick to the insides of your blood vessels, making them narrow or clog. This can lead to chest pain (angina) and a heart attack.   Keep your cholesterol levels under control as recommended by your health care provider. Have your cholesterol checked at least once a year. Target cholesterol levels (in mg/dL) for most people are:   · Total cholesterol below 200.  · LDL cholesterol below 100.  · HDL cholesterol above 40 in men and above 50 in women.  · Triglycerides below 150.  KEEP YOUR BLOOD PRESSURE UNDER CONTROL  Having high blood pressure (hypertension) puts you at risk for stroke and other forms of heart disease. Keep your blood pressure under control as recommended by your health care provider. Ask  your health care provider if you need treatment to lower your blood pressure. If you are 18-39 years of age, have your blood pressure checked every 3-5 years. If you are 40 years of age or older, have your blood pressure checked every year.  DO NOT USE TOBACCO PRODUCTS  Tobacco smoke can damage your heart and blood vessels. Do not use any tobacco products including cigarettes, chewing tobacco, or electronic cigarettes. If you need help quitting, ask your health care provider.  TAKE MEDICINES AS DIRECTED  Take medicines only as directed by your health care provider. Ask your health care provider whether you should take an aspirin every day. Taking aspirin can help reduce your risk of heart disease and stroke.   FOR MORE INFORMATION   To find out more about heart disease, visit the American Heart Association's website at www.americanheart.org     This information is not intended to replace advice given to you by your health care provider. Make sure you discuss any questions you have with your health care provider.     Document Released: 08/01/2005 Document Revised: 01/08/2016 Document Reviewed: 02/11/2015  [employer] Interactive Patient Education ©2017 [employer]

## 2017-06-08 NOTE — PROGRESS NOTES
Subjective:     Encounter Date:06/08/2017      Patient ID: Fahad Muhammad is a 81 y.o. male.    Chief Complaint:  History of Present Illness    Dear Dr. Warren,    I had the pleasure seeing this patient in the office today.  He comes in for follow-up of his cardiac status.  He was seen by Dr. Vega in December of last year when he was hospitalized.  Apparently prior to that he had been seen by Dr. Metcalf and Dr Payne at [facility], and his wife's states that he has been in to see Dr. Manzanares since then, although he thinks it was before he was hospitalized.  He states that he simply here today because your office and made an appointment to make sure that he had a follow-up appointment. Patient is a 81 year old male who has a history of coronary artery disease- s/p CABG in 1995 and chronic atrial fibrillation/aflutter- on Xarelto. He also has a history of hypertension, seizures, stroke, and arthritis.     He denies any cardiac complaints.This patient denies any chest pain, pressure, tightness, squeezing, or heartburn.  This patient has not experienced any feeling of palpitations, tachycardia or heart racing and no presyncope or syncope.  There has not been any problems with dizziness or lightheadedness.  There has not been any orthopnea or PND, and no problems with lower extremity edema.  This patient denies any shortness of breath at rest or with activity and has not had any wheezing.  This patient has not had any problems with unexplained nausea or vomiting. The patient has continued to perform daily activities of living without any specific problem or change in the level of activity.      He had an echocardiogram performed by Dr. Vega when she saw him in December:  Interpretation Summary   · Left ventricular function is hyperdynamic (EF > 70).  · All left ventricular wall segments contract normally.  · Left ventricular diastolic dysfunction (grade I) consistent with impaired relaxation.  · Calculated EF =  71.4%.  · Mild tricuspid valve regurgitation is present.  · Mild mitral valve regurgitation is present  · Estimated right ventricular systolic pressure from tricuspid regurgitation is mildly elevated (35-45 mmHg). Mild pulmonary hypertension is present.       The following portions of the patient's history were reviewed and updated as appropriate: allergies, current medications, past family history, past medical history, past social history, past surgical history and problem list.    Past Medical History:   Diagnosis Date   • Arthritis    • Atrial fibrillation    • Coronary artery disease    • DDD (degenerative disc disease), lumbar    • Heme positive stool    • Hx of colonoscopy     Complete   • Hyperlipidemia    • Hypertension    • Neck strain    • Osteoarthritis    • Seizures    • Sepsis    • Stroke    • Vitamin D deficiency    • Wrist fracture, right        Past Surgical History:   Procedure Laterality Date   • CARDIAC SURGERY     • CORONARY ARTERY BYPASS GRAFT  1996   • HERNIA REPAIR Right     Inguinal hernia repair       Social History     Social History   • Marital status:      Spouse name: N/A   • Number of children: N/A   • Years of education: N/A     Occupational History   • Not on file.     Social History Main Topics   • Smoking status: Former Smoker     Quit date: 12/3/1993   • Smokeless tobacco: Former User     Quit date: 9/7/1994   • Alcohol use 0.6 oz/week     1 Shots of liquor per week      Comment: occasional    • Drug use: No   • Sexual activity: Defer     Other Topics Concern   • Not on file     Social History Narrative       Review of Systems   Constitution: Positive for malaise/fatigue. Negative for chills, decreased appetite, fever and night sweats.   HENT: Negative for ear discharge, ear pain, hearing loss, nosebleeds and sore throat.    Eyes: Negative for blurred vision, double vision and pain.   Cardiovascular: Negative for cyanosis.   Respiratory: Positive for cough. Negative for  "hemoptysis and sputum production.    Endocrine: Negative for cold intolerance and heat intolerance.   Hematologic/Lymphatic: Negative for adenopathy.   Skin: Negative for dry skin, itching, nail changes, rash and suspicious lesions.   Musculoskeletal: Positive for joint pain. Negative for arthritis, gout, muscle cramps, muscle weakness, myalgias and neck pain.   Gastrointestinal: Negative for anorexia, bowel incontinence, constipation, diarrhea, dysphagia, hematemesis and jaundice.   Genitourinary: Negative for bladder incontinence, dysuria, flank pain, frequency, hematuria and nocturia.   Neurological: Negative for focal weakness, numbness, paresthesias and seizures.   Psychiatric/Behavioral: Negative for altered mental status, hallucinations, hypervigilance, suicidal ideas and thoughts of violence.   Allergic/Immunologic: Negative for persistent infections.         ECG 12 Lead  Date/Time: 6/8/2017 3:14 PM  Performed by: LUISA MANCINI III.  Authorized by: LUISA MANCINI III   Comparison: compared with previous ECG   Similar to previous ECG  Rhythm: atrial fibrillation  Rate: normal  Conduction: conduction normal  ST Segments: ST segments normal  T Waves: T waves normal  QRS axis: normal  Other: no other findings  Clinical impression: abnormal ECG               Objective:     Vitals:    06/08/17 1413   BP: 120/62   Pulse: 89   Weight: 227 lb (103 kg)   Height: 75\" (190.5 cm)         Physical Exam   Constitutional: He is oriented to person, place, and time. He appears well-developed and well-nourished. No distress.   HENT:   Head: Normocephalic and atraumatic.   Nose: Nose normal.   Mouth/Throat: Oropharynx is clear and moist.   Eyes: Conjunctivae and EOM are normal. Pupils are equal, round, and reactive to light. Right eye exhibits no discharge. Left eye exhibits no discharge.   Neck: Normal range of motion. Neck supple. No tracheal deviation present. No thyromegaly present.   Cardiovascular: Normal rate, S1 " normal, S2 normal, normal heart sounds and normal pulses.  An irregularly irregular rhythm present. Exam reveals no S3.    Pulmonary/Chest: Effort normal and breath sounds normal. No stridor. No respiratory distress. He exhibits no tenderness.   Abdominal: Soft. Bowel sounds are normal. He exhibits no distension and no mass. There is no tenderness. There is no rebound and no guarding.   Musculoskeletal: Normal range of motion. He exhibits no tenderness or deformity.   Lymphadenopathy:     He has no cervical adenopathy.   Neurological: He is alert and oriented to person, place, and time. He has normal reflexes.   Skin: Skin is warm and dry. No rash noted. He is not diaphoretic. No erythema.   Psychiatric: He has a normal mood and affect. Thought content normal.       Lab Review:             Performed        Assessment:          Diagnosis Plan   1. Chronic atrial fibrillation  diltiaZEM CD (CARDIZEM CD) 240 MG 24 hr capsule    ECG 12 Lead   2. Coronary artery disease involving native coronary artery of native heart without angina pectoris  diltiaZEM CD (CARDIZEM CD) 240 MG 24 hr capsule    ECG 12 Lead   3. Essential hypertension  diltiaZEM CD (CARDIZEM CD) 240 MG 24 hr capsule   4. Hyperlipidemia, unspecified hyperlipidemia type  diltiaZEM CD (CARDIZEM CD) 240 MG 24 hr capsule   5. CKD (chronic kidney disease), stage III            Plan:       1. Coronary Artery Disease  Assessment  • The patient has no angina    Plan  • Lifestyle modifications discussed include adhering to a heart healthy diet, avoidance of tobacco products, maintenance of a healthy weight, medication compliance, regular exercise and regular monitoring of cholesterol and blood pressure    Subjective - Objective  • There is a history of previous coronary artery bypass graft  • Current antiplatelet therapy includes aspirin 81 mg    2. Atrial Fibrillation and Atrial Flutter  Assessment  • The patient has permanent atrial fibrillation  • This is  non-valvular in etiology  • The patient's CHADS2-VASc score is 4  • A [id number] score of 2 or more is considered a high risk for a thromboembolic event  • Rivaroxaban prescribed    Plan  • Continue in atrial fibrillation with rate control  • Continue rivaroxaban for antithrombotic therapy, bleeding issues discussed  • Continue beta blocker and diltiazem for rate control  • He has been on diltiazem 60 mg 4 times daily.  I will switch him over to a once daily formulation    Thank you very much for allowing us to participate in the care of this pleasant patient.  Please don't hesitate to call if I can be of assistance in any way.      Current Outpatient Prescriptions:   •  aspirin 81 MG chewable tablet, Chew 81 mg daily., Disp: , Rfl:   •  cetirizine (zyrTEC) 5 MG tablet, Take 5 mg by mouth., Disp: , Rfl:   •  diltiaZEM (CARDIZEM) 60 MG tablet, Take 1 tablet by mouth Every 6 (Six) Hours., Disp: 120 tablet, Rfl: 6  •  docusate sodium 100 MG capsule, Take 100 mg by mouth 2 (Two) Times a Day. (Patient taking differently: Take 100 mg by mouth As Needed.), Disp: , Rfl: 0  •  fenofibrate micronized (LOFIBRA) 134 MG capsule, Take 1 capsule by mouth Every Morning Before Breakfast., Disp: 90 capsule, Rfl: 3  •  furosemide (LASIX) 20 MG tablet, Take 1 tablet by mouth Daily. (Patient taking differently: Take 20 mg by mouth As Needed.), Disp: 90 tablet, Rfl: 3  •  guaiFENesin (MUCINEX) 600 MG 12 hr tablet, Take 1 tablet by mouth 2 (Two) Times a Day. (Patient taking differently: Take 600 mg by mouth Daily.), Disp: 60 tablet, Rfl: 1  •  levETIRAcetam (KEPPRA) 500 MG tablet, Take 1 tablet by mouth 2 (Two) Times a Day., Disp: 180 tablet, Rfl: 3  •  metoprolol succinate XL (TOPROL-XL) 100 MG 24 hr tablet, Take 1 tablet by mouth Daily., Disp: 30 tablet, Rfl: 6  •  Misc Natural Products (GLUCOSAMINE CHONDROITIN ADV PO), Take  by mouth., Disp: , Rfl:   •  Multiple Vitamin (MULTI-VITAMIN) tablet, Take 1 tablet by mouth daily., Disp: ,  Rfl:   •  Omega-3 Fatty Acids (FISH OIL) 1000 MG capsule capsule, Take 1,000 mg by mouth daily., Disp: , Rfl:   •  rivaroxaban (XARELTO) 15 MG tablet, Take 15 mg by mouth Daily., Disp: , Rfl:   •  Saw Palmetto, Serenoa repens, 450 MG capsule, Take  by mouth., Disp: , Rfl:          EMR Dragon/Transcription disclaimer:    Much of this encounter note is an electronic transcription/translation of spoken language to printed text. The electronic translation of spoken language may permit erroneous, or at times, nonsensical words or phrases to be inadvertently transcribed; Although I have reviewed the note for such errors, some may still exist.

## 2017-08-07 NOTE — THERAPY DISCHARGE NOTE
Outpatient Physical Therapy Ortho Initial Evaluation/Discharge       Patient Name: Fahad Muhammad  : 1935  MRN: [id number]  Today's Date: 2017      Visit Date: 2017    Patient Active Problem List   Diagnosis   • Osteoarthrosis, localized, primary, knee   • Hyperlipidemia   • Hypertension   • Seizure disorder   • Vitamin D deficiency   • Coronary artery disease involving native coronary artery of native heart without angina pectoris   • Benign non-nodular prostatic hyperplasia with lower urinary tract symptoms   • Seasonal allergic rhinitis   • Atrial fibrillation   • CKD (chronic kidney disease), stage III   • Closed wedge compression fracture of second lumbar vertebra   • DDD (degenerative disc disease), lumbar   • Closed burst fracture of lumbar vertebra        Past Medical History:   Diagnosis Date   • Arthritis    • Atrial fibrillation    • Coronary artery disease    • DDD (degenerative disc disease), lumbar    • Heme positive stool    • Hx of colonoscopy     Complete   • Hyperlipidemia    • Hypertension    • Neck strain    • Osteoarthritis    • Seizures    • Sepsis    • Stroke    • Vitamin D deficiency    • Wrist fracture, right         Past Surgical History:   Procedure Laterality Date   • CARDIAC SURGERY     • CORONARY ARTERY BYPASS GRAFT     • HERNIA REPAIR Right     Inguinal hernia repair         Visit Dx:   No diagnosis found.                                   PT OP Goals       17 1100       PT Short Term Goals    STG Date to Achieve 17  -CC     STG 1 Pt able to tolerate and perform therapeutic exercises as instructed for HEP  -CC     STG 1 Progress Met  -CC     STG 2 Pt will report decrease pain sx with transitional movement  ex: in/out of bed-sit to stand by 2 pain levels  -CC     STG 2 Progress Not Met  -CC     STG 3 Pt will report decrease tenderness to palpation lumbar paraspinal 25-50%  -CC     STG 3 Progress Not Met  -CC     STG 4 Pt will be able to walk in  home safely for brief distance in home without use of RWX   -CC     STG 4 Progress Partially Met  -CC     STG 5 Pt will be able to briefly tolerate lying in supine postion  -CC     STG 5 Progress Not Met  -CC       User Key  (r) = Recorded By, (t) = Taken By, (c) = Cosigned By    Initials Name Provider Type    CC Brenda Irvin, PT Physical Therapist                                     Time Calculation:            PT G-Codes  Functional Limitation: Mobility: Walking and moving around  Mobility: Walking and Moving Around Current Status (): At least 60 percent but less than 80 percent impaired, limited or restricted  Mobility: Walking and Moving Around Goal Status (): At least 20 percent but less than 40 percent impaired, limited or restricted  Mobility: Walking and Moving Around Discharge Status (): At least 60 percent but less than 80 percent impaired, limited or restricted     OP PT Discharge Summary  Date of Discharge: 08/07/17  Reason for Discharge: Lack of progress  Outcomes Achieved: Unable to make functional progress toward goals at this time  Discharge Destination: Home with home program  Discharge Instructions: Pt attended PT x 9 sessions and did not make gains with decrease LBP except with temporary relief reported. Pt was issued a home TENS unit for pain management. Initially pt stated it was helpful but later he determined it really did not reduce his back pain enough to aid his functional mobility. Reinforced pt perform HEP as tolerated and avoid exercises that increase pain. Pt to f/u with PCP        Brenda Irvin, PT  8/7/2017

## 2018-01-01 ENCOUNTER — TREATMENT (OUTPATIENT)
Dept: PHYSICAL THERAPY | Facility: CLINIC | Age: 83
End: 2018-01-01

## 2018-01-01 ENCOUNTER — TELEPHONE (OUTPATIENT)
Dept: INTERNAL MEDICINE | Facility: CLINIC | Age: 83
End: 2018-01-01

## 2018-01-01 ENCOUNTER — LAB (OUTPATIENT)
Dept: INTERNAL MEDICINE | Facility: CLINIC | Age: 83
End: 2018-01-01

## 2018-01-01 ENCOUNTER — OFFICE VISIT (OUTPATIENT)
Dept: INTERNAL MEDICINE | Facility: CLINIC | Age: 83
End: 2018-01-01

## 2018-01-01 ENCOUNTER — APPOINTMENT (OUTPATIENT)
Dept: MRI IMAGING | Facility: HOSPITAL | Age: 83
End: 2018-01-01

## 2018-01-01 ENCOUNTER — APPOINTMENT (OUTPATIENT)
Dept: ULTRASOUND IMAGING | Facility: HOSPITAL | Age: 83
End: 2018-01-01

## 2018-01-01 ENCOUNTER — APPOINTMENT (OUTPATIENT)
Dept: MRI IMAGING | Facility: HOSPITAL | Age: 83
End: 2018-01-01
Attending: INTERNAL MEDICINE

## 2018-01-01 ENCOUNTER — APPOINTMENT (OUTPATIENT)
Dept: GENERAL RADIOLOGY | Facility: HOSPITAL | Age: 83
End: 2018-01-01

## 2018-01-01 ENCOUNTER — HOSPITAL ENCOUNTER (OUTPATIENT)
Dept: ULTRASOUND IMAGING | Facility: HOSPITAL | Age: 83
Discharge: HOME | End: 2018-05-18
Attending: INTERNAL MEDICINE | Admitting: INTERNAL MEDICINE

## 2018-01-01 ENCOUNTER — HOSPITAL ENCOUNTER (INPATIENT)
Facility: HOSPITAL | Age: 83
LOS: 11 days | End: 2018-07-20
Attending: INTERNAL MEDICINE | Admitting: INTERNAL MEDICINE

## 2018-01-01 ENCOUNTER — APPOINTMENT (OUTPATIENT)
Dept: CARDIOLOGY | Facility: HOSPITAL | Age: 83
End: 2018-01-01
Attending: NURSE PRACTITIONER

## 2018-01-01 ENCOUNTER — HOSPITAL ENCOUNTER (OUTPATIENT)
Dept: GENERAL RADIOLOGY | Facility: HOSPITAL | Age: 83
Discharge: HOME | End: 2018-05-29
Attending: INTERNAL MEDICINE

## 2018-01-01 ENCOUNTER — CLINICAL SUPPORT (OUTPATIENT)
Dept: INTERNAL MEDICINE | Facility: CLINIC | Age: 83
End: 2018-01-01

## 2018-01-01 ENCOUNTER — OFFICE VISIT (OUTPATIENT)
Dept: ORTHOPEDIC SURGERY | Facility: CLINIC | Age: 83
End: 2018-01-01

## 2018-01-01 ENCOUNTER — OFFICE VISIT (OUTPATIENT)
Dept: GASTROENTEROLOGY | Facility: CLINIC | Age: 83
End: 2018-01-01

## 2018-01-01 ENCOUNTER — APPOINTMENT (OUTPATIENT)
Dept: CT IMAGING | Facility: HOSPITAL | Age: 83
End: 2018-01-01

## 2018-01-01 ENCOUNTER — HOSPITAL ENCOUNTER (OUTPATIENT)
Dept: GENERAL RADIOLOGY | Facility: HOSPITAL | Age: 83
Discharge: HOME | End: 2018-05-29
Attending: INTERNAL MEDICINE | Admitting: INTERNAL MEDICINE

## 2018-01-01 ENCOUNTER — DOCUMENTATION (OUTPATIENT)
Dept: INTERNAL MEDICINE | Facility: CLINIC | Age: 83
End: 2018-01-01

## 2018-01-01 ENCOUNTER — APPOINTMENT (OUTPATIENT)
Dept: LAB | Facility: HOSPITAL | Age: 83
End: 2018-01-01
Attending: INTERNAL MEDICINE

## 2018-01-01 ENCOUNTER — RESULTS ENCOUNTER (OUTPATIENT)
Dept: INTERNAL MEDICINE | Facility: CLINIC | Age: 83
End: 2018-01-01

## 2018-01-01 ENCOUNTER — APPOINTMENT (OUTPATIENT)
Dept: CARDIOLOGY | Facility: HOSPITAL | Age: 83
End: 2018-01-01
Attending: INTERNAL MEDICINE

## 2018-01-01 VITALS
DIASTOLIC BLOOD PRESSURE: 64 MMHG | WEIGHT: 204.4 LBS | HEIGHT: 75 IN | TEMPERATURE: 98.3 F | OXYGEN SATURATION: 94 % | HEART RATE: 132 BPM | SYSTOLIC BLOOD PRESSURE: 118 MMHG | BODY MASS INDEX: 25.41 KG/M2 | RESPIRATION RATE: 14 BRPM

## 2018-01-01 VITALS
HEART RATE: 83 BPM | HEIGHT: 75 IN | OXYGEN SATURATION: 98 % | WEIGHT: 228 LBS | SYSTOLIC BLOOD PRESSURE: 100 MMHG | TEMPERATURE: 97.8 F | RESPIRATION RATE: 12 BRPM | BODY MASS INDEX: 28.35 KG/M2 | DIASTOLIC BLOOD PRESSURE: 52 MMHG

## 2018-01-01 VITALS
TEMPERATURE: 97.8 F | HEIGHT: 75 IN | SYSTOLIC BLOOD PRESSURE: 112 MMHG | WEIGHT: 211 LBS | HEART RATE: 71 BPM | BODY MASS INDEX: 26.24 KG/M2 | OXYGEN SATURATION: 98 % | DIASTOLIC BLOOD PRESSURE: 64 MMHG | RESPIRATION RATE: 18 BRPM

## 2018-01-01 VITALS
HEIGHT: 75 IN | SYSTOLIC BLOOD PRESSURE: 110 MMHG | TEMPERATURE: 97.6 F | HEART RATE: 85 BPM | WEIGHT: 207.6 LBS | DIASTOLIC BLOOD PRESSURE: 64 MMHG | BODY MASS INDEX: 25.81 KG/M2 | RESPIRATION RATE: 18 BRPM | OXYGEN SATURATION: 98 %

## 2018-01-01 VITALS
WEIGHT: 195.4 LBS | BODY MASS INDEX: 24.29 KG/M2 | DIASTOLIC BLOOD PRESSURE: 64 MMHG | SYSTOLIC BLOOD PRESSURE: 110 MMHG | HEIGHT: 75 IN

## 2018-01-01 VITALS
HEART RATE: 113 BPM | WEIGHT: 209.6 LBS | DIASTOLIC BLOOD PRESSURE: 66 MMHG | RESPIRATION RATE: 18 BRPM | OXYGEN SATURATION: 97 % | HEIGHT: 75 IN | BODY MASS INDEX: 26.06 KG/M2 | SYSTOLIC BLOOD PRESSURE: 130 MMHG | TEMPERATURE: 99.6 F

## 2018-01-01 VITALS
WEIGHT: 191.2 LBS | DIASTOLIC BLOOD PRESSURE: 46 MMHG | RESPIRATION RATE: 22 BRPM | BODY MASS INDEX: 23.77 KG/M2 | OXYGEN SATURATION: 82 % | SYSTOLIC BLOOD PRESSURE: 98 MMHG | HEIGHT: 75 IN | TEMPERATURE: 99 F

## 2018-01-01 VITALS
OXYGEN SATURATION: 98 % | TEMPERATURE: 97.6 F | DIASTOLIC BLOOD PRESSURE: 62 MMHG | BODY MASS INDEX: 25.36 KG/M2 | SYSTOLIC BLOOD PRESSURE: 102 MMHG | WEIGHT: 204 LBS | HEIGHT: 75 IN | RESPIRATION RATE: 18 BRPM | HEART RATE: 98 BPM

## 2018-01-01 VITALS — WEIGHT: 217 LBS | HEIGHT: 75 IN | BODY MASS INDEX: 26.98 KG/M2

## 2018-01-01 DIAGNOSIS — N18.30 CKD (CHRONIC KIDNEY DISEASE), STAGE III (HCC): ICD-10-CM

## 2018-01-01 DIAGNOSIS — I10 ESSENTIAL HYPERTENSION: Chronic | ICD-10-CM

## 2018-01-01 DIAGNOSIS — M51.36 DDD (DEGENERATIVE DISC DISEASE), LUMBAR: Primary | ICD-10-CM

## 2018-01-01 DIAGNOSIS — R52 PAIN: Primary | ICD-10-CM

## 2018-01-01 DIAGNOSIS — E78.5 HYPERLIPIDEMIA, UNSPECIFIED HYPERLIPIDEMIA TYPE: Chronic | ICD-10-CM

## 2018-01-01 DIAGNOSIS — S32.000D CLOSED WEDGE COMPRESSION FRACTURE OF LUMBAR VERTEBRA WITH ROUTINE HEALING, UNSPECIFIED LUMBAR VERTEBRAL LEVEL, SUBSEQUENT ENCOUNTER: ICD-10-CM

## 2018-01-01 DIAGNOSIS — I48.20 CHRONIC ATRIAL FIBRILLATION (HCC): Chronic | ICD-10-CM

## 2018-01-01 DIAGNOSIS — R60.0 LOCALIZED EDEMA: Primary | ICD-10-CM

## 2018-01-01 DIAGNOSIS — R63.4 WEIGHT LOSS, ABNORMAL: ICD-10-CM

## 2018-01-01 DIAGNOSIS — I50.31 ACUTE DIASTOLIC HEART FAILURE (HCC): ICD-10-CM

## 2018-01-01 DIAGNOSIS — E78.5 HYPERLIPIDEMIA, UNSPECIFIED HYPERLIPIDEMIA TYPE: Primary | Chronic | ICD-10-CM

## 2018-01-01 DIAGNOSIS — D50.9 IRON DEFICIENCY ANEMIA, UNSPECIFIED IRON DEFICIENCY ANEMIA TYPE: Primary | ICD-10-CM

## 2018-01-01 DIAGNOSIS — D50.9 IRON DEFICIENCY ANEMIA, UNSPECIFIED IRON DEFICIENCY ANEMIA TYPE: ICD-10-CM

## 2018-01-01 DIAGNOSIS — G40.909 SEIZURE DISORDER (HCC): ICD-10-CM

## 2018-01-01 DIAGNOSIS — M79.601 PAIN OF RIGHT UPPER EXTREMITY: ICD-10-CM

## 2018-01-01 DIAGNOSIS — M79.641 HAND PAIN, RIGHT: ICD-10-CM

## 2018-01-01 DIAGNOSIS — M17.12 PRIMARY LOCALIZED OSTEOARTHROSIS OF LEFT LOWER LEG: ICD-10-CM

## 2018-01-01 DIAGNOSIS — D72.829 LEUKOCYTOSIS, UNSPECIFIED TYPE: ICD-10-CM

## 2018-01-01 DIAGNOSIS — N18.30 CKD (CHRONIC KIDNEY DISEASE), STAGE III (HCC): Primary | ICD-10-CM

## 2018-01-01 DIAGNOSIS — G89.4 CHRONIC PAIN SYNDROME: ICD-10-CM

## 2018-01-01 DIAGNOSIS — D72.828 ACUTE NEUTROPHILIA: ICD-10-CM

## 2018-01-01 DIAGNOSIS — L03.113 CELLULITIS OF RIGHT UPPER EXTREMITY: Primary | ICD-10-CM

## 2018-01-01 DIAGNOSIS — R00.0 TACHYCARDIA DETERMINED BY EXAMINATION OF PULSE: ICD-10-CM

## 2018-01-01 DIAGNOSIS — M79.89 ARM SWELLING: Primary | ICD-10-CM

## 2018-01-01 DIAGNOSIS — R79.82 ELEVATED C-REACTIVE PROTEIN (CRP): ICD-10-CM

## 2018-01-01 DIAGNOSIS — N17.9 AKI (ACUTE KIDNEY INJURY) (HCC): ICD-10-CM

## 2018-01-01 DIAGNOSIS — N40.1 BENIGN NON-NODULAR PROSTATIC HYPERPLASIA WITH LOWER URINARY TRACT SYMPTOMS: ICD-10-CM

## 2018-01-01 DIAGNOSIS — I25.10 CORONARY ARTERY DISEASE INVOLVING NATIVE CORONARY ARTERY OF NATIVE HEART WITHOUT ANGINA PECTORIS: Chronic | ICD-10-CM

## 2018-01-01 DIAGNOSIS — R68.83 CHILLS (WITHOUT FEVER): ICD-10-CM

## 2018-01-01 DIAGNOSIS — E55.9 VITAMIN D DEFICIENCY: ICD-10-CM

## 2018-01-01 DIAGNOSIS — M25.531 WRIST PAIN, ACUTE, RIGHT: ICD-10-CM

## 2018-01-01 DIAGNOSIS — Z13.29 SCREENING FOR HYPOTHYROIDISM: ICD-10-CM

## 2018-01-01 DIAGNOSIS — D64.9 NORMOCYTIC ANEMIA: ICD-10-CM

## 2018-01-01 DIAGNOSIS — R00.0 TACHYCARDIA: ICD-10-CM

## 2018-01-01 DIAGNOSIS — Z00.00 MEDICARE ANNUAL WELLNESS VISIT, SUBSEQUENT: Primary | ICD-10-CM

## 2018-01-01 DIAGNOSIS — L03.90 RECURRENT CELLULITIS: Primary | ICD-10-CM

## 2018-01-01 DIAGNOSIS — L89.151 DECUBITUS ULCER OF SACRAL REGION, STAGE 1: ICD-10-CM

## 2018-01-01 DIAGNOSIS — I48.91 ATRIAL FIBRILLATION WITH RAPID VENTRICULAR RESPONSE (HCC): ICD-10-CM

## 2018-01-01 DIAGNOSIS — Z23 NEED FOR PNEUMOCOCCAL VACCINATION: ICD-10-CM

## 2018-01-01 DIAGNOSIS — Z23 NEED FOR HEPATITIS A IMMUNIZATION: Primary | ICD-10-CM

## 2018-01-01 LAB
25(OH)D3+25(OH)D2 SERPL-MCNC: 17.8 NG/ML
ACANTHOCYTES BLD QL SMEAR: ABNORMAL
ALBUMIN SERPL-MCNC: 2.6 G/DL (ref 3.5–5.2)
ALBUMIN SERPL-MCNC: 2.7 G/DL (ref 3.5–5.2)
ALBUMIN SERPL-MCNC: 2.9 G/DL (ref 3.5–5.2)
ALBUMIN SERPL-MCNC: 3.1 G/DL (ref 3.5–5.2)
ALBUMIN SERPL-MCNC: 3.4 G/DL (ref 3.5–5.2)
ALBUMIN SERPL-MCNC: 3.5 G/DL (ref 3.5–5.2)
ALBUMIN SERPL-MCNC: 3.7 G/DL (ref 3.5–5.2)
ALBUMIN SERPL-MCNC: 3.7 G/DL (ref 3.5–5.2)
ALBUMIN/CREAT UR: 89.6 MG/G CREAT (ref 0–30)
ALBUMIN/GLOB SERPL: 0.8 G/DL
ALBUMIN/GLOB SERPL: 0.8 G/DL
ALBUMIN/GLOB SERPL: 0.9 G/DL
ALBUMIN/GLOB SERPL: 0.9 G/DL
ALBUMIN/GLOB SERPL: 1 G/DL
ALBUMIN/GLOB SERPL: 1 G/DL
ALBUMIN/GLOB SERPL: 1.2 G/DL
ALBUMIN/GLOB SERPL: 1.3 G/DL
ALP SERPL-CCNC: 112 U/L (ref 40–129)
ALP SERPL-CCNC: 124 U/L (ref 40–129)
ALP SERPL-CCNC: 125 U/L (ref 40–129)
ALP SERPL-CCNC: 146 U/L (ref 40–129)
ALP SERPL-CCNC: 149 U/L (ref 40–129)
ALP SERPL-CCNC: 152 U/L (ref 40–129)
ALP SERPL-CCNC: 167 U/L (ref 40–129)
ALP SERPL-CCNC: 97 U/L (ref 40–129)
ALT SERPL W P-5'-P-CCNC: 13 U/L (ref 5–41)
ALT SERPL W P-5'-P-CCNC: 15 U/L (ref 5–41)
ALT SERPL W P-5'-P-CCNC: 16 U/L (ref 5–41)
ALT SERPL W P-5'-P-CCNC: 18 U/L (ref 5–41)
ALT SERPL W P-5'-P-CCNC: 18 U/L (ref 5–41)
ALT SERPL W P-5'-P-CCNC: 20 U/L (ref 5–41)
ALT SERPL-CCNC: 10 U/L (ref 5–41)
ALT SERPL-CCNC: 10 U/L (ref 5–41)
ANION GAP SERPL CALCULATED.3IONS-SCNC: 10.4 MMOL/L
ANION GAP SERPL CALCULATED.3IONS-SCNC: 10.9 MMOL/L
ANION GAP SERPL CALCULATED.3IONS-SCNC: 11.4 MMOL/L
ANION GAP SERPL CALCULATED.3IONS-SCNC: 11.5 MMOL/L
ANION GAP SERPL CALCULATED.3IONS-SCNC: 12 MMOL/L
ANION GAP SERPL CALCULATED.3IONS-SCNC: 12.1 MMOL/L
ANION GAP SERPL CALCULATED.3IONS-SCNC: 12.4 MMOL/L
ANION GAP SERPL CALCULATED.3IONS-SCNC: 13 MMOL/L
ANION GAP SERPL CALCULATED.3IONS-SCNC: 15 MMOL/L
ANION GAP SERPL CALCULATED.3IONS-SCNC: 24.8 MMOL/L
ANION GAP SERPL CALCULATED.3IONS-SCNC: 7.2 MMOL/L
ANION GAP SERPL CALCULATED.3IONS-SCNC: 8.6 MMOL/L
ANION GAP SERPL CALCULATED.3IONS-SCNC: 9.9 MMOL/L
ANISOCYTOSIS BLD QL: ABNORMAL
AORTIC DIMENSIONLESS INDEX: 0.8 (DI)
APPEARANCE UR: ABNORMAL
ARTERIAL PATENCY WRIST A: ABNORMAL
AST SERPL-CCNC: 19 U/L (ref 5–40)
AST SERPL-CCNC: 20 U/L (ref 5–40)
AST SERPL-CCNC: 20 U/L (ref 5–40)
AST SERPL-CCNC: 21 U/L (ref 5–40)
AST SERPL-CCNC: 23 U/L (ref 5–40)
AST SERPL-CCNC: 28 U/L (ref 5–40)
AST SERPL-CCNC: 28 U/L (ref 5–40)
AST SERPL-CCNC: 29 U/L (ref 5–40)
ATMOSPHERIC PRESS: 736 MMHG
ATMOSPHERIC PRESS: 736 MMHG
ATMOSPHERIC PRESS: 737 MMHG
BACTERIA #/AREA URNS HPF: ABNORMAL /HPF
BACTERIA BLD CULT: ABNORMAL
BACTERIA ID TEST ISLT QL CULT: ABNORMAL
BACTERIA SPEC AEROBE CULT: ABNORMAL
BACTERIA SPEC AEROBE CULT: NORMAL
BACTERIA UR CULT: NORMAL
BACTERIA UR CULT: NORMAL
BACTERIA UR QL AUTO: ABNORMAL /HPF
BASE EXCESS BLDA CALC-SCNC: -13 MMOL/L (ref 0–2)
BASE EXCESS BLDA CALC-SCNC: -15.5 MMOL/L (ref 0–2)
BASE EXCESS BLDA CALC-SCNC: -2.1 MMOL/L (ref 0–2)
BASOPHILS # BLD AUTO: 0.02 10*3/MM3 (ref 0–0.2)
BASOPHILS # BLD AUTO: 0.03 10*3/MM3 (ref 0–0.2)
BASOPHILS # BLD AUTO: 0.04 10*3/MM3 (ref 0–0.2)
BASOPHILS # BLD AUTO: 0.05 10*3/MM3 (ref 0–0.2)
BASOPHILS # BLD AUTO: 0.05 10*3/MM3 (ref 0–0.2)
BASOPHILS # BLD AUTO: 0.06 10*3/MM3 (ref 0–0.2)
BASOPHILS # BLD AUTO: 0.06 10*3/MM3 (ref 0–0.2)
BASOPHILS # BLD AUTO: 0.09 10*3/MM3 (ref 0–0.2)
BASOPHILS NFR BLD AUTO: 0.2 % (ref 0–2)
BASOPHILS NFR BLD AUTO: 0.3 % (ref 0–2)
BASOPHILS NFR BLD AUTO: 0.4 % (ref 0–2)
BASOPHILS NFR BLD AUTO: 0.5 % (ref 0–2)
BASOPHILS NFR BLD AUTO: 0.6 % (ref 0–2)
BDY SITE: ABNORMAL
BH CV ECHO MEAS - ACS: 1.7 CM
BH CV ECHO MEAS - ACS: 2.4 CM
BH CV ECHO MEAS - AI DEC SLOPE: 404 CM/SEC^2
BH CV ECHO MEAS - AI MAX PG: 65.9 MMHG
BH CV ECHO MEAS - AI MAX VEL: 406 CM/SEC
BH CV ECHO MEAS - AI P1/2T: 294.3 MSEC
BH CV ECHO MEAS - AO MAX PG (FULL): 3 MMHG
BH CV ECHO MEAS - AO MAX PG: 7.1 MMHG
BH CV ECHO MEAS - AO MEAN PG (FULL): 2 MMHG
BH CV ECHO MEAS - AO MEAN PG: 4 MMHG
BH CV ECHO MEAS - AO ROOT AREA (BSA CORRECTED): 2
BH CV ECHO MEAS - AO ROOT AREA (BSA CORRECTED): 2
BH CV ECHO MEAS - AO ROOT AREA: 14.5 CM^2
BH CV ECHO MEAS - AO ROOT AREA: 14.5 CM^2
BH CV ECHO MEAS - AO ROOT DIAM: 4.3 CM
BH CV ECHO MEAS - AO ROOT DIAM: 4.3 CM
BH CV ECHO MEAS - AO V2 MAX: 139 CM/SEC
BH CV ECHO MEAS - AO V2 MEAN: 86 CM/SEC
BH CV ECHO MEAS - AO V2 VTI: 23.1 CM
BH CV ECHO MEAS - AVA(I,A): 3.3 CM^2
BH CV ECHO MEAS - AVA(I,D): 3.3 CM^2
BH CV ECHO MEAS - AVA(V,A): 3 CM^2
BH CV ECHO MEAS - AVA(V,D): 3 CM^2
BH CV ECHO MEAS - BSA(HAYCOCK): 2.1 M^2
BH CV ECHO MEAS - BSA(HAYCOCK): 2.1 M^2
BH CV ECHO MEAS - BSA: 2.1 M^2
BH CV ECHO MEAS - BSA: 2.2 M^2
BH CV ECHO MEAS - BZI_BMI: 23.6 KILOGRAMS/M^2
BH CV ECHO MEAS - BZI_BMI: 24 KILOGRAMS/M^2
BH CV ECHO MEAS - BZI_METRIC_HEIGHT: 190.5 CM
BH CV ECHO MEAS - BZI_METRIC_HEIGHT: 190.5 CM
BH CV ECHO MEAS - BZI_METRIC_WEIGHT: 85.7 KG
BH CV ECHO MEAS - BZI_METRIC_WEIGHT: 87.1 KG
BH CV ECHO MEAS - CONTRAST EF (2CH): 65.5 ML/M^2
BH CV ECHO MEAS - CONTRAST EF 4CH: 68.3 ML/M^2
BH CV ECHO MEAS - EDV(CUBED): 135.8 ML
BH CV ECHO MEAS - EDV(CUBED): 82.3 ML
BH CV ECHO MEAS - EDV(MOD-SP2): 131 ML
BH CV ECHO MEAS - EDV(MOD-SP4): 132 ML
BH CV ECHO MEAS - EDV(TEICH): 126.1 ML
BH CV ECHO MEAS - EDV(TEICH): 85.4 ML
BH CV ECHO MEAS - EF(CUBED): 73.1 %
BH CV ECHO MEAS - EF(CUBED): 94.3 %
BH CV ECHO MEAS - EF(MOD-BP): 66 %
BH CV ECHO MEAS - EF(MOD-SP2): 65.5 %
BH CV ECHO MEAS - EF(MOD-SP4): 68.3 %
BH CV ECHO MEAS - EF(TEICH): 64.5 %
BH CV ECHO MEAS - EF(TEICH): 90.5 %
BH CV ECHO MEAS - ESV(CUBED): 36.6 ML
BH CV ECHO MEAS - ESV(CUBED): 4.7 ML
BH CV ECHO MEAS - ESV(MOD-SP2): 45.2 ML
BH CV ECHO MEAS - ESV(MOD-SP4): 41.8 ML
BH CV ECHO MEAS - ESV(TEICH): 44.8 ML
BH CV ECHO MEAS - ESV(TEICH): 8.1 ML
BH CV ECHO MEAS - FS: 35.4 %
BH CV ECHO MEAS - FS: 61.4 %
BH CV ECHO MEAS - IVS/LVPW: 0.88
BH CV ECHO MEAS - IVS/LVPW: 1.2
BH CV ECHO MEAS - IVSD: 1 CM
BH CV ECHO MEAS - IVSD: 1.1 CM
BH CV ECHO MEAS - LA DIMENSION: 4.1 CM
BH CV ECHO MEAS - LA/AO: 0.95
BH CV ECHO MEAS - LAT PEAK E' VEL: 15 CM/SEC
BH CV ECHO MEAS - LV DIASTOLIC VOL/BSA (35-75): 61.2 ML/M^2
BH CV ECHO MEAS - LV MASS(C)D: 170.2 GRAMS
BH CV ECHO MEAS - LV MASS(C)D: 175.2 GRAMS
BH CV ECHO MEAS - LV MASS(C)DI: 79.5 GRAMS/M^2
BH CV ECHO MEAS - LV MASS(C)DI: 81.2 GRAMS/M^2
BH CV ECHO MEAS - LV MAX PG: 4.2 MMHG
BH CV ECHO MEAS - LV MEAN PG: 2 MMHG
BH CV ECHO MEAS - LV SYSTOLIC VOL/BSA (12-30): 19.4 ML/M^2
BH CV ECHO MEAS - LV V1 MAX: 102 CM/SEC
BH CV ECHO MEAS - LV V1 MEAN: 59.9 CM/SEC
BH CV ECHO MEAS - LV V1 VTI: 18.1 CM
BH CV ECHO MEAS - LVIDD: 4.4 CM
BH CV ECHO MEAS - LVIDD: 5.1 CM
BH CV ECHO MEAS - LVIDS: 1.7 CM
BH CV ECHO MEAS - LVIDS: 3.3 CM
BH CV ECHO MEAS - LVLD AP2: 8.9 CM
BH CV ECHO MEAS - LVLD AP4: 9 CM
BH CV ECHO MEAS - LVLS AP2: 7.8 CM
BH CV ECHO MEAS - LVLS AP4: 7.4 CM
BH CV ECHO MEAS - LVOT AREA (M): 4.2 CM^2
BH CV ECHO MEAS - LVOT AREA: 4.2 CM^2
BH CV ECHO MEAS - LVOT DIAM: 2.3 CM
BH CV ECHO MEAS - LVPWD: 0.84 CM
BH CV ECHO MEAS - LVPWD: 1.2 CM
BH CV ECHO MEAS - MED PEAK E' VEL: 11 CM/SEC
BH CV ECHO MEAS - MR MAX PG: 81 MMHG
BH CV ECHO MEAS - MR MAX VEL: 450 CM/SEC
BH CV ECHO MEAS - MV DEC SLOPE: 1088 CM/SEC^2
BH CV ECHO MEAS - MV DEC TIME: 0.14 SEC
BH CV ECHO MEAS - MV E MAX VEL: 128 CM/SEC
BH CV ECHO MEAS - MV MAX PG: 10.1 MMHG
BH CV ECHO MEAS - MV MEAN PG: 4 MMHG
BH CV ECHO MEAS - MV P1/2T MAX VEL: 165 CM/SEC
BH CV ECHO MEAS - MV P1/2T: 44.4 MSEC
BH CV ECHO MEAS - MV V2 MAX: 159 CM/SEC
BH CV ECHO MEAS - MV V2 MEAN: 85.1 CM/SEC
BH CV ECHO MEAS - MV V2 VTI: 20.3 CM
BH CV ECHO MEAS - MVA P1/2T LCG: 1.3 CM^2
BH CV ECHO MEAS - MVA(P1/2T): 5 CM^2
BH CV ECHO MEAS - MVA(VTI): 3.7 CM^2
BH CV ECHO MEAS - PA ACC TIME: 0.1 SEC
BH CV ECHO MEAS - PA MAX PG (FULL): 1.8 MMHG
BH CV ECHO MEAS - PA MAX PG: 3.5 MMHG
BH CV ECHO MEAS - PA PR(ACCEL): 36.3 MMHG
BH CV ECHO MEAS - PA V2 MAX: 93 CM/SEC
BH CV ECHO MEAS - PULM DIAS VEL: 65.2 CM/SEC
BH CV ECHO MEAS - PULM S/D: 0.52
BH CV ECHO MEAS - PULM SYS VEL: 34 CM/SEC
BH CV ECHO MEAS - PVA(V,A): 3.9 CM^2
BH CV ECHO MEAS - PVA(V,D): 3.9 CM^2
BH CV ECHO MEAS - QP/QS: 0.66
BH CV ECHO MEAS - RAP SYSTOLE: 3 MMHG
BH CV ECHO MEAS - RAP SYSTOLE: 3 MMHG
BH CV ECHO MEAS - RV MAX PG: 1.6 MMHG
BH CV ECHO MEAS - RV MEAN PG: 1 MMHG
BH CV ECHO MEAS - RV V1 MAX: 63.5 CM/SEC
BH CV ECHO MEAS - RV V1 MEAN: 39.7 CM/SEC
BH CV ECHO MEAS - RV V1 VTI: 8.7 CM
BH CV ECHO MEAS - RVOT AREA: 5.7 CM^2
BH CV ECHO MEAS - RVOT DIAM: 2.7 CM
BH CV ECHO MEAS - RVSP: 20.6 MMHG
BH CV ECHO MEAS - RVSP: 23 MMHG
BH CV ECHO MEAS - SI(AO): 155.5 ML/M^2
BH CV ECHO MEAS - SI(CUBED): 36.2 ML/M^2
BH CV ECHO MEAS - SI(CUBED): 46 ML/M^2
BH CV ECHO MEAS - SI(LVOT): 34.9 ML/M^2
BH CV ECHO MEAS - SI(MOD-SP2): 39.8 ML/M^2
BH CV ECHO MEAS - SI(MOD-SP4): 41.8 ML/M^2
BH CV ECHO MEAS - SI(TEICH): 36.1 ML/M^2
BH CV ECHO MEAS - SI(TEICH): 37.7 ML/M^2
BH CV ECHO MEAS - SV(AO): 335.5 ML
BH CV ECHO MEAS - SV(CUBED): 77.6 ML
BH CV ECHO MEAS - SV(CUBED): 99.2 ML
BH CV ECHO MEAS - SV(LVOT): 75.2 ML
BH CV ECHO MEAS - SV(MOD-SP2): 85.8 ML
BH CV ECHO MEAS - SV(MOD-SP4): 90.2 ML
BH CV ECHO MEAS - SV(RVOT): 50 ML
BH CV ECHO MEAS - SV(TEICH): 77.3 ML
BH CV ECHO MEAS - SV(TEICH): 81.3 ML
BH CV ECHO MEAS - TAPSE (>1.6): 1.8 CM2
BH CV ECHO MEAS - TR MAX VEL: 210 CM/SEC
BH CV ECHO MEAS - TR MAX VEL: 223 CM/SEC
BH CV ECHO MEASUREMENTS AVERAGE E/E' RATIO: 9.85
BH CV VAS BP RIGHT ARM: NORMAL MMHG
BH CV VAS BP RIGHT ARM: NORMAL MMHG
BH CV XLRA - RV BASE: 4.1 CM
BH CV XLRA - TDI S': 20 CM/SEC
BILIRUB SERPL-MCNC: 0.5 MG/DL (ref 0.2–1.2)
BILIRUB SERPL-MCNC: 0.8 MG/DL (ref 0.2–1.2)
BILIRUB SERPL-MCNC: 0.9 MG/DL (ref 0.2–1.2)
BILIRUB SERPL-MCNC: 0.9 MG/DL (ref 0.2–1.2)
BILIRUB SERPL-MCNC: 1 MG/DL (ref 0.2–1.2)
BILIRUB SERPL-MCNC: 1 MG/DL (ref 0.2–1.2)
BILIRUB SERPL-MCNC: 1.2 MG/DL (ref 0.2–1.2)
BILIRUB SERPL-MCNC: 1.3 MG/DL (ref 0.2–1.2)
BILIRUB UR QL STRIP: ABNORMAL
BILIRUB UR QL STRIP: NEGATIVE
BLASTS NFR BLD MANUAL: 0 % (ref 0–0)
BODY TEMPERATURE: 37 C
BUN BLD-MCNC: 15 MG/DL (ref 8–23)
BUN BLD-MCNC: 15 MG/DL (ref 8–23)
BUN BLD-MCNC: 16 MG/DL (ref 8–23)
BUN BLD-MCNC: 17 MG/DL (ref 8–23)
BUN BLD-MCNC: 19 MG/DL (ref 8–23)
BUN BLD-MCNC: 20 MG/DL (ref 8–23)
BUN BLD-MCNC: 20 MG/DL (ref 8–23)
BUN BLD-MCNC: 22 MG/DL (ref 8–23)
BUN BLD-MCNC: 22 MG/DL (ref 8–23)
BUN BLD-MCNC: 23 MG/DL (ref 8–23)
BUN BLD-MCNC: 23 MG/DL (ref 8–23)
BUN BLD-MCNC: 26 MG/DL (ref 8–23)
BUN BLD-MCNC: 29 MG/DL (ref 8–23)
BUN SERPL-MCNC: 17 MG/DL (ref 8–23)
BUN SERPL-MCNC: 25 MG/DL (ref 8–23)
BUN SERPL-MCNC: 25 MG/DL (ref 8–23)
BUN/CREAT SERPL: 12.1 (ref 7–25)
BUN/CREAT SERPL: 14.1 (ref 7–25)
BUN/CREAT SERPL: 14.5 (ref 7–25)
BUN/CREAT SERPL: 15.6 (ref 7–25)
BUN/CREAT SERPL: 15.7 (ref 7–25)
BUN/CREAT SERPL: 16.3 (ref 7–25)
BUN/CREAT SERPL: 16.3 (ref 7–25)
BUN/CREAT SERPL: 18.4 (ref 7–25)
BUN/CREAT SERPL: 18.5 (ref 7–25)
BUN/CREAT SERPL: 18.7 (ref 7–25)
BUN/CREAT SERPL: 20.2 (ref 7–25)
BUN/CREAT SERPL: 20.2 (ref 7–25)
BUN/CREAT SERPL: 21 (ref 7–25)
BUN/CREAT SERPL: 21.4 (ref 7–25)
BUN/CREAT SERPL: 25 (ref 7–25)
BUN/CREAT SERPL: 31.9 (ref 7–25)
BURR CELLS BLD QL SMEAR: ABNORMAL
CALCIUM SERPL-MCNC: 10 MG/DL (ref 8.8–10.5)
CALCIUM SERPL-MCNC: 10 MG/DL (ref 8.8–10.5)
CALCIUM SERPL-MCNC: 10.1 MG/DL (ref 8.8–10.5)
CALCIUM SPEC-SCNC: 10 MG/DL (ref 8.8–10.5)
CALCIUM SPEC-SCNC: 6.9 MG/DL (ref 8.8–10.5)
CALCIUM SPEC-SCNC: 8.6 MG/DL (ref 8.8–10.5)
CALCIUM SPEC-SCNC: 8.7 MG/DL (ref 8.8–10.5)
CALCIUM SPEC-SCNC: 8.8 MG/DL (ref 8.8–10.5)
CALCIUM SPEC-SCNC: 9 MG/DL (ref 8.8–10.5)
CALCIUM SPEC-SCNC: 9.2 MG/DL (ref 8.8–10.5)
CALCIUM SPEC-SCNC: 9.3 MG/DL (ref 8.8–10.5)
CALCIUM SPEC-SCNC: 9.4 MG/DL (ref 8.8–10.5)
CALCIUM SPEC-SCNC: 9.9 MG/DL (ref 8.8–10.5)
CASTS URNS MICRO: ABNORMAL
CASTS URNS QL MICRO: PRESENT /LPF
CHLORIDE SERPL-SCNC: 100 MMOL/L (ref 98–107)
CHLORIDE SERPL-SCNC: 101 MMOL/L (ref 98–107)
CHLORIDE SERPL-SCNC: 102 MMOL/L (ref 98–107)
CHLORIDE SERPL-SCNC: 92 MMOL/L (ref 98–107)
CHLORIDE SERPL-SCNC: 95 MMOL/L (ref 98–107)
CHLORIDE SERPL-SCNC: 95 MMOL/L (ref 98–107)
CHLORIDE SERPL-SCNC: 96 MMOL/L (ref 98–107)
CHLORIDE SERPL-SCNC: 97 MMOL/L (ref 98–107)
CHLORIDE SERPL-SCNC: 98 MMOL/L (ref 98–107)
CHLORIDE SERPL-SCNC: 99 MMOL/L (ref 98–107)
CHOLEST SERPL-MCNC: 185 MG/DL (ref 0–200)
CLARITY UR: CLEAR
CO2 SERPL-SCNC: 17.2 MMOL/L (ref 22–29)
CO2 SERPL-SCNC: 20.9 MMOL/L (ref 22–29)
CO2 SERPL-SCNC: 24.5 MMOL/L (ref 22–29)
CO2 SERPL-SCNC: 24.6 MMOL/L (ref 22–29)
CO2 SERPL-SCNC: 25 MMOL/L (ref 22–29)
CO2 SERPL-SCNC: 26 MMOL/L (ref 22–29)
CO2 SERPL-SCNC: 26.6 MMOL/L (ref 22–29)
CO2 SERPL-SCNC: 26.8 MMOL/L (ref 22–29)
CO2 SERPL-SCNC: 27 MMOL/L (ref 22–29)
CO2 SERPL-SCNC: 29.6 MMOL/L (ref 22–29)
CO2 SERPL-SCNC: 29.8 MMOL/L (ref 22–29)
CO2 SERPL-SCNC: 30 MMOL/L (ref 22–29)
CO2 SERPL-SCNC: 30.5 MMOL/L (ref 22–29)
CO2 SERPL-SCNC: 31.1 MMOL/L (ref 22–29)
CO2 SERPL-SCNC: 32.1 MMOL/L (ref 22–29)
CO2 SERPL-SCNC: 32.4 MMOL/L (ref 22–29)
COLOR UR: ABNORMAL
COLOR UR: YELLOW
CREAT BLD-MCNC: 0.72 MG/DL (ref 0.76–1.27)
CREAT BLD-MCNC: 0.92 MG/DL (ref 0.76–1.27)
CREAT BLD-MCNC: 0.96 MG/DL (ref 0.76–1.27)
CREAT BLD-MCNC: 0.98 MG/DL (ref 0.76–1.27)
CREAT BLD-MCNC: 0.99 MG/DL (ref 0.76–1.27)
CREAT BLD-MCNC: 1.03 MG/DL (ref 0.76–1.27)
CREAT BLD-MCNC: 1.03 MG/DL (ref 0.76–1.27)
CREAT BLD-MCNC: 1.04 MG/DL (ref 0.76–1.27)
CREAT BLD-MCNC: 1.05 MG/DL (ref 0.76–1.27)
CREAT BLD-MCNC: 1.08 MG/DL (ref 0.76–1.27)
CREAT BLD-MCNC: 1.08 MG/DL (ref 0.76–1.27)
CREAT BLD-MCNC: 1.14 MG/DL (ref 0.76–1.27)
CREAT BLD-MCNC: 2.05 MG/DL (ref 0.76–1.27)
CREAT SERPL-MCNC: 1.34 MG/DL (ref 0.76–1.27)
CREAT SERPL-MCNC: 1.41 MG/DL (ref 0.76–1.27)
CREAT SERPL-MCNC: 1.73 MG/DL (ref 0.76–1.27)
CREAT UR-MCNC: 344.8 MG/DL
CRP SERPL-MCNC: 1.57 MG/DL (ref 0–0.5)
CRP SERPL-MCNC: 11.4 MG/DL (ref 0–0.5)
CRP SERPL-MCNC: 3.99 MG/DL (ref 0–0.5)
CRP SERPL-MCNC: 4.13 MG/DL (ref 0–0.5)
CRP SERPL-MCNC: 5.33 MG/DL (ref 0–0.5)
CRP SERPL-MCNC: 7.28 MG/DL (ref 0–0.5)
CRP SERPL-MCNC: 8.93 MG/DL (ref 0–0.5)
CRP SERPL-MCNC: 9.27 MG/DL (ref 0–0.5)
CRYSTALS URNS MICRO: ABNORMAL
DEPRECATED RDW RBC AUTO: 47.3 FL (ref 37–54)
DEPRECATED RDW RBC AUTO: 47.5 FL (ref 37–54)
DEPRECATED RDW RBC AUTO: 47.8 FL (ref 37–54)
DEPRECATED RDW RBC AUTO: 47.9 FL (ref 37–54)
DEPRECATED RDW RBC AUTO: 48.7 FL (ref 37–54)
DEPRECATED RDW RBC AUTO: 49.1 FL (ref 37–54)
DEPRECATED RDW RBC AUTO: 49.3 FL (ref 37–54)
DEPRECATED RDW RBC AUTO: 49.7 FL (ref 37–54)
DEPRECATED RDW RBC AUTO: 50.3 FL (ref 37–54)
DEPRECATED RDW RBC AUTO: 50.4 FL (ref 37–54)
DEPRECATED RDW RBC AUTO: 51.3 FL (ref 37–54)
DEPRECATED RDW RBC AUTO: 56.6 FL (ref 37–54)
DIGOXIN SERPL-MCNC: 0.55 NG/ML (ref 0.8–2)
EOSINOPHIL # BLD AUTO: 0.01 10*3/MM3 (ref 0.1–0.3)
EOSINOPHIL # BLD AUTO: 0.02 10*3/MM3 (ref 0.1–0.3)
EOSINOPHIL # BLD AUTO: 0.03 10*3/MM3 (ref 0.1–0.3)
EOSINOPHIL # BLD AUTO: 0.04 10*3/MM3 (ref 0.1–0.3)
EOSINOPHIL # BLD AUTO: 0.07 10*3/MM3 (ref 0.1–0.3)
EOSINOPHIL # BLD AUTO: 0.07 10*3/MM3 (ref 0.1–0.3)
EOSINOPHIL # BLD AUTO: 0.11 10*3/MM3 (ref 0.1–0.3)
EOSINOPHIL # BLD AUTO: 0.12 10*3/MM3 (ref 0.1–0.3)
EOSINOPHIL # BLD AUTO: 0.17 10*3/MM3 (ref 0.1–0.3)
EOSINOPHIL NFR BLD AUTO: 0.1 % (ref 0–4)
EOSINOPHIL NFR BLD AUTO: 0.2 % (ref 0–4)
EOSINOPHIL NFR BLD AUTO: 0.3 % (ref 0–4)
EOSINOPHIL NFR BLD AUTO: 0.4 % (ref 0–4)
EOSINOPHIL NFR BLD AUTO: 0.5 % (ref 0–4)
EOSINOPHIL NFR BLD AUTO: 0.8 % (ref 0–4)
EOSINOPHIL NFR BLD AUTO: 1 % (ref 0–4)
EOSINOPHIL NFR BLD AUTO: 1.1 % (ref 0–4)
EOSINOPHIL NFR BLD AUTO: 1.4 % (ref 0–4)
EPI CELLS #/AREA URNS HPF: ABNORMAL /HPF
ERYTHROCYTE [DISTWIDTH] IN BLOOD BY AUTOMATED COUNT: 14.9 % (ref 11.5–14.5)
ERYTHROCYTE [DISTWIDTH] IN BLOOD BY AUTOMATED COUNT: 18 % (ref 11.5–14.5)
ERYTHROCYTE [DISTWIDTH] IN BLOOD BY AUTOMATED COUNT: 18.1 % (ref 11.5–14.5)
ERYTHROCYTE [DISTWIDTH] IN BLOOD BY AUTOMATED COUNT: 18.2 % (ref 11.5–14.5)
ERYTHROCYTE [DISTWIDTH] IN BLOOD BY AUTOMATED COUNT: 18.2 % (ref 11.5–14.5)
ERYTHROCYTE [DISTWIDTH] IN BLOOD BY AUTOMATED COUNT: 18.3 % (ref 11.5–14.5)
ERYTHROCYTE [DISTWIDTH] IN BLOOD BY AUTOMATED COUNT: 18.4 % (ref 11.5–14.5)
ERYTHROCYTE [DISTWIDTH] IN BLOOD BY AUTOMATED COUNT: 18.6 % (ref 11.5–14.5)
ERYTHROCYTE [DISTWIDTH] IN BLOOD BY AUTOMATED COUNT: 18.9 % (ref 11.5–14.5)
ERYTHROCYTE [DISTWIDTH] IN BLOOD BY AUTOMATED COUNT: 19 % (ref 11.5–14.5)
ERYTHROCYTE [DISTWIDTH] IN BLOOD BY AUTOMATED COUNT: 19.9 % (ref 11.5–14.5)
ERYTHROCYTE [SEDIMENTATION RATE] IN BLOOD: 10 MM/HR (ref 0–20)
ERYTHROCYTE [SEDIMENTATION RATE] IN BLOOD: 25 MM/HR (ref 0–20)
ERYTHROCYTE [SEDIMENTATION RATE] IN BLOOD: 35 MM/HR (ref 0–20)
ERYTHROCYTE [SEDIMENTATION RATE] IN BLOOD: 52 MM/HR (ref 0–20)
FERRITIN SERPL-MCNC: 133 NG/ML (ref 30–400)
FERRITIN SERPL-MCNC: 79 NG/ML (ref 30–400)
FOLATE SERPL-MCNC: 10.33 NG/ML (ref 4.78–20)
GENTAMICIN SERPL-MCNC: 0.6 MCG/ML (ref 0.5–2)
GFR SERPL CREATININE-BSD FRML MDRD: 105 ML/MIN/1.73
GFR SERPL CREATININE-BSD FRML MDRD: 31 ML/MIN/1.73
GFR SERPL CREATININE-BSD FRML MDRD: 62 ML/MIN/1.73
GFR SERPL CREATININE-BSD FRML MDRD: 66 ML/MIN/1.73
GFR SERPL CREATININE-BSD FRML MDRD: 66 ML/MIN/1.73
GFR SERPL CREATININE-BSD FRML MDRD: 68 ML/MIN/1.73
GFR SERPL CREATININE-BSD FRML MDRD: 69 ML/MIN/1.73
GFR SERPL CREATININE-BSD FRML MDRD: 73 ML/MIN/1.73
GFR SERPL CREATININE-BSD FRML MDRD: 74 ML/MIN/1.73
GFR SERPL CREATININE-BSD FRML MDRD: 75 ML/MIN/1.73
GFR SERPL CREATININE-BSD FRML MDRD: 79 ML/MIN/1.73
GFR SERPLBLD CREATININE-BSD FMLA CKD-EPI: 38 ML/MIN/1.73
GFR SERPLBLD CREATININE-BSD FMLA CKD-EPI: 46 ML/MIN/1.73
GFR SERPLBLD CREATININE-BSD FMLA CKD-EPI: 48 ML/MIN/1.73
GFR SERPLBLD CREATININE-BSD FMLA CKD-EPI: 51 ML/MIN/1.73
GFR SERPLBLD CREATININE-BSD FMLA CKD-EPI: 59 ML/MIN/1.73
GFR SERPLBLD CREATININE-BSD FMLA CKD-EPI: 62 ML/MIN/1.73
GLOBULIN SER CALC-MCNC: 2.8 GM/DL
GLOBULIN SER CALC-MCNC: 3.2 GM/DL
GLOBULIN UR ELPH-MCNC: 3.2 GM/DL
GLOBULIN UR ELPH-MCNC: 3.2 GM/DL
GLOBULIN UR ELPH-MCNC: 3.3 GM/DL
GLOBULIN UR ELPH-MCNC: 3.4 GM/DL
GLOBULIN UR ELPH-MCNC: 3.4 GM/DL
GLOBULIN UR ELPH-MCNC: 3.5 GM/DL
GLUCOSE BLD-MCNC: 100 MG/DL (ref 65–99)
GLUCOSE BLD-MCNC: 102 MG/DL (ref 65–99)
GLUCOSE BLD-MCNC: 103 MG/DL (ref 65–99)
GLUCOSE BLD-MCNC: 107 MG/DL (ref 65–99)
GLUCOSE BLD-MCNC: 111 MG/DL (ref 65–99)
GLUCOSE BLD-MCNC: 114 MG/DL (ref 65–99)
GLUCOSE BLD-MCNC: 116 MG/DL (ref 65–99)
GLUCOSE BLD-MCNC: 65 MG/DL (ref 65–99)
GLUCOSE BLD-MCNC: 66 MG/DL (ref 65–99)
GLUCOSE BLD-MCNC: 87 MG/DL (ref 65–99)
GLUCOSE BLD-MCNC: 93 MG/DL (ref 65–99)
GLUCOSE BLD-MCNC: 95 MG/DL (ref 65–99)
GLUCOSE BLD-MCNC: 95 MG/DL (ref 65–99)
GLUCOSE BLDC GLUCOMTR-MCNC: 113 MG/DL (ref 70–130)
GLUCOSE BLDC GLUCOMTR-MCNC: 128 MG/DL (ref 70–130)
GLUCOSE BLDC GLUCOMTR-MCNC: 136 MG/DL (ref 70–130)
GLUCOSE BLDC GLUCOMTR-MCNC: 149 MG/DL (ref 70–130)
GLUCOSE BLDC GLUCOMTR-MCNC: 67 MG/DL (ref 70–130)
GLUCOSE BLDC GLUCOMTR-MCNC: 80 MG/DL (ref 70–130)
GLUCOSE BLDC GLUCOMTR-MCNC: 84 MG/DL (ref 70–130)
GLUCOSE SERPL-MCNC: 84 MG/DL (ref 65–99)
GLUCOSE SERPL-MCNC: 90 MG/DL (ref 65–99)
GLUCOSE SERPL-MCNC: 91 MG/DL (ref 65–99)
GLUCOSE UR QL: NEGATIVE
GLUCOSE UR STRIP-MCNC: NEGATIVE MG/DL
GRAM STN SPEC: ABNORMAL
HBA1C MFR BLD: 5 % (ref 4.8–5.6)
HCO3 BLDA-SCNC: 15.2 MMOL/L (ref 20–26)
HCO3 BLDA-SCNC: 15.3 MMOL/L (ref 20–26)
HCO3 BLDA-SCNC: 23.7 MMOL/L (ref 20–26)
HCT VFR BLD AUTO: 24.4 % (ref 42–52)
HCT VFR BLD AUTO: 26.9 % (ref 42–52)
HCT VFR BLD AUTO: 27.6 % (ref 42–52)
HCT VFR BLD AUTO: 27.7 % (ref 42–52)
HCT VFR BLD AUTO: 28.1 % (ref 42–52)
HCT VFR BLD AUTO: 28.5 % (ref 42–52)
HCT VFR BLD AUTO: 28.6 % (ref 42–52)
HCT VFR BLD AUTO: 29.2 % (ref 42–52)
HCT VFR BLD AUTO: 29.4 % (ref 42–52)
HCT VFR BLD AUTO: 31 % (ref 42–52)
HCT VFR BLD AUTO: 32.7 % (ref 42–52)
HCT VFR BLD AUTO: 33.4 % (ref 42–52)
HCT VFR BLD AUTO: 33.9 % (ref 42–52)
HCT VFR BLD AUTO: 34.1 % (ref 42–52)
HCT VFR BLD AUTO: 34.2 % (ref 42–52)
HCT VFR BLD AUTO: 34.9 % (ref 42–52)
HDLC SERPL-MCNC: 41 MG/DL (ref 40–60)
HGB BLD-MCNC: 10.1 G/DL (ref 14–18)
HGB BLD-MCNC: 10.2 G/DL (ref 14–18)
HGB BLD-MCNC: 10.5 G/DL (ref 14–18)
HGB BLD-MCNC: 10.9 G/DL (ref 14–18)
HGB BLD-MCNC: 10.9 G/DL (ref 14–18)
HGB BLD-MCNC: 11.2 G/DL (ref 14–18)
HGB BLD-MCNC: 11.4 G/DL (ref 14–18)
HGB BLD-MCNC: 7.8 G/DL (ref 14–18)
HGB BLD-MCNC: 8.8 G/DL (ref 14–18)
HGB BLD-MCNC: 9 G/DL (ref 14–18)
HGB BLD-MCNC: 9 G/DL (ref 14–18)
HGB BLD-MCNC: 9.1 G/DL (ref 14–18)
HGB BLD-MCNC: 9.3 G/DL (ref 14–18)
HGB BLD-MCNC: 9.4 G/DL (ref 14–18)
HGB BLD-MCNC: 9.5 G/DL (ref 14–18)
HGB BLD-MCNC: 9.7 G/DL (ref 14–18)
HGB BLDA-MCNC: 10 G/DL (ref 14–18)
HGB BLDA-MCNC: 10.7 G/DL (ref 14–18)
HGB BLDA-MCNC: 11 G/DL (ref 14–18)
HGB UR QL STRIP.AUTO: ABNORMAL
HGB UR QL STRIP: NEGATIVE
HOROWITZ INDEX BLD+IHG-RTO: 100 %
HYALINE CASTS UR QL AUTO: ABNORMAL /LPF
IMM GRANULOCYTES # BLD: 0.03 10*3/MM3 (ref 0–0.03)
IMM GRANULOCYTES # BLD: 0.04 10*3/MM3 (ref 0–0.03)
IMM GRANULOCYTES # BLD: 0.06 10*3/MM3 (ref 0–0.03)
IMM GRANULOCYTES # BLD: 0.07 10*3/MM3 (ref 0–0.03)
IMM GRANULOCYTES # BLD: 0.07 10*3/MM3 (ref 0–0.03)
IMM GRANULOCYTES # BLD: 0.08 10*3/MM3 (ref 0–0.03)
IMM GRANULOCYTES # BLD: 0.08 10*3/MM3 (ref 0–0.03)
IMM GRANULOCYTES # BLD: 0.09 10*3/MM3 (ref 0–0.03)
IMM GRANULOCYTES # BLD: 0.11 10*3/MM3 (ref 0–0.03)
IMM GRANULOCYTES NFR BLD: 0.3 % (ref 0–0.5)
IMM GRANULOCYTES NFR BLD: 0.3 % (ref 0–0.5)
IMM GRANULOCYTES NFR BLD: 0.4 % (ref 0–0.5)
IMM GRANULOCYTES NFR BLD: 0.5 % (ref 0–0.5)
IMM GRANULOCYTES NFR BLD: 0.6 % (ref 0–0.5)
IRON SATN MFR SERPL: 6 %
IRON SATN MFR SERPL: 7 %
IRON SERPL-MCNC: 19 MCG/DL (ref 59–158)
IRON SERPL-MCNC: 21 MCG/DL (ref 59–158)
KETONES UR QL STRIP: ABNORMAL
KETONES UR QL STRIP: ABNORMAL
LDLC SERPL CALC-MCNC: 123 MG/DL (ref 0–100)
LDLC/HDLC SERPL: 3 {RATIO}
LEFT ATRIUM VOLUME INDEX: 54 ML/M2
LEFT ATRIUM VOLUME: 111 CM3
LEUKOCYTE ESTERASE UR QL STRIP.AUTO: NEGATIVE
LEUKOCYTE ESTERASE UR QL STRIP: ABNORMAL
LV EF 2D ECHO EST: 68 %
LYMPHOCYTES # BLD AUTO: 1.25 10*3/MM3 (ref 0.6–4.8)
LYMPHOCYTES # BLD AUTO: 1.49 10*3/MM3 (ref 0.6–4.8)
LYMPHOCYTES # BLD AUTO: 1.5 10*3/MM3 (ref 0.6–4.8)
LYMPHOCYTES # BLD AUTO: 1.52 10*3/MM3 (ref 0.6–4.8)
LYMPHOCYTES # BLD AUTO: 1.82 10*3/MM3 (ref 0.6–4.8)
LYMPHOCYTES # BLD AUTO: 2.15 10*3/MM3 (ref 0.6–4.8)
LYMPHOCYTES # BLD AUTO: 2.29 10*3/MM3 (ref 0.6–4.8)
LYMPHOCYTES # BLD AUTO: 2.36 10*3/MM3 (ref 0.6–4.8)
LYMPHOCYTES # BLD AUTO: 2.55 10*3/MM3 (ref 0.6–4.8)
LYMPHOCYTES # BLD AUTO: 2.8 10*3/MM3 (ref 0.6–4.8)
LYMPHOCYTES # BLD AUTO: 2.8 10*3/MM3 (ref 0.6–4.8)
LYMPHOCYTES # BLD MANUAL: 1.59 10*3/MM3 (ref 0.6–4.8)
LYMPHOCYTES NFR BLD AUTO: 11.9 % (ref 20–45)
LYMPHOCYTES NFR BLD AUTO: 13 % (ref 20–45)
LYMPHOCYTES NFR BLD AUTO: 13.7 % (ref 20–45)
LYMPHOCYTES NFR BLD AUTO: 15.3 % (ref 20–45)
LYMPHOCYTES NFR BLD AUTO: 15.5 % (ref 20–45)
LYMPHOCYTES NFR BLD AUTO: 16.2 % (ref 20–45)
LYMPHOCYTES NFR BLD AUTO: 18.9 % (ref 20–45)
LYMPHOCYTES NFR BLD AUTO: 19.4 % (ref 20–45)
LYMPHOCYTES NFR BLD AUTO: 19.5 % (ref 20–45)
LYMPHOCYTES NFR BLD AUTO: 20.4 % (ref 20–45)
LYMPHOCYTES NFR BLD AUTO: 7.5 % (ref 20–45)
LYMPHOCYTES NFR BLD MANUAL: 1 % (ref 3–8)
LYMPHOCYTES NFR BLD MANUAL: 4 % (ref 20–45)
Lab: ABNORMAL
MACROCYTES BLD QL SMEAR: ABNORMAL
MAGNESIUM SERPL-MCNC: 1.7 MG/DL (ref 1.7–2.5)
MAGNESIUM SERPL-MCNC: 1.7 MG/DL (ref 1.7–2.5)
MAGNESIUM SERPL-MCNC: 2.1 MG/DL (ref 1.7–2.5)
MAXIMAL PREDICTED HEART RATE: 140 BPM
MAXIMAL PREDICTED HEART RATE: 140 BPM
MCH RBC QN AUTO: 23.8 PG (ref 27–31)
MCH RBC QN AUTO: 24 PG (ref 27–31)
MCH RBC QN AUTO: 24.1 PG (ref 27–31)
MCH RBC QN AUTO: 24.1 PG (ref 27–31)
MCH RBC QN AUTO: 24.2 PG (ref 27–31)
MCH RBC QN AUTO: 24.3 PG (ref 27–31)
MCH RBC QN AUTO: 24.5 PG (ref 27–31)
MCH RBC QN AUTO: 24.6 PG (ref 27–31)
MCH RBC QN AUTO: 24.6 PG (ref 27–31)
MCH RBC QN AUTO: 24.7 PG (ref 27–31)
MCH RBC QN AUTO: 25 PG (ref 27–31)
MCH RBC QN AUTO: 26.2 PG (ref 27–31)
MCHC RBC AUTO-ENTMCNC: 30.2 G/DL (ref 31–37)
MCHC RBC AUTO-ENTMCNC: 31.3 G/DL (ref 31–37)
MCHC RBC AUTO-ENTMCNC: 32 G/DL (ref 31–37)
MCHC RBC AUTO-ENTMCNC: 32 G/DL (ref 31–37)
MCHC RBC AUTO-ENTMCNC: 32.1 G/DL (ref 31–37)
MCHC RBC AUTO-ENTMCNC: 32.1 G/DL (ref 31–37)
MCHC RBC AUTO-ENTMCNC: 32.2 G/DL (ref 31–37)
MCHC RBC AUTO-ENTMCNC: 32.5 G/DL (ref 31–37)
MCHC RBC AUTO-ENTMCNC: 32.5 G/DL (ref 31–37)
MCHC RBC AUTO-ENTMCNC: 32.6 G/DL (ref 31–37)
MCHC RBC AUTO-ENTMCNC: 32.9 G/DL (ref 31–37)
MCHC RBC AUTO-ENTMCNC: 32.9 G/DL (ref 31–37)
MCHC RBC AUTO-ENTMCNC: 33 G/DL (ref 31–37)
MCHC RBC AUTO-ENTMCNC: 33 G/DL (ref 31–37)
MCHC RBC AUTO-ENTMCNC: 33.3 G/DL (ref 31–37)
MCHC RBC AUTO-ENTMCNC: 33.5 G/DL (ref 31–37)
MCV RBC AUTO: 73.3 FL (ref 80–94)
MCV RBC AUTO: 73.8 FL (ref 80–94)
MCV RBC AUTO: 73.9 FL (ref 80–94)
MCV RBC AUTO: 74.2 FL (ref 80–94)
MCV RBC AUTO: 74.3 FL (ref 80–94)
MCV RBC AUTO: 74.7 FL (ref 80–94)
MCV RBC AUTO: 75 FL (ref 80–94)
MCV RBC AUTO: 75 FL (ref 80–94)
MCV RBC AUTO: 75.1 FL (ref 80–94)
MCV RBC AUTO: 75.1 FL (ref 80–94)
MCV RBC AUTO: 75.2 FL (ref 80–94)
MCV RBC AUTO: 75.3 FL (ref 80–94)
MCV RBC AUTO: 75.9 FL (ref 80–94)
MCV RBC AUTO: 76.5 FL (ref 80–94)
MCV RBC AUTO: 81.3 FL (ref 80–94)
MCV RBC AUTO: 81.7 FL (ref 80–94)
METAMYELOCYTES NFR BLD MANUAL: 0 % (ref 0–0)
MICRO URNS: ABNORMAL
MICROALBUMIN UR-MCNC: 309 UG/ML
MODALITY: ABNORMAL
MONOCYTES # BLD AUTO: 0.4 10*3/MM3 (ref 0–1)
MONOCYTES # BLD AUTO: 0.83 10*3/MM3 (ref 0–1)
MONOCYTES # BLD AUTO: 0.97 10*3/MM3 (ref 0–1)
MONOCYTES # BLD AUTO: 1.06 10*3/MM3 (ref 0–1)
MONOCYTES # BLD AUTO: 1.14 10*3/MM3 (ref 0–1)
MONOCYTES # BLD AUTO: 1.28 10*3/MM3 (ref 0–1)
MONOCYTES # BLD AUTO: 1.48 10*3/MM3 (ref 0–1)
MONOCYTES # BLD AUTO: 1.49 10*3/MM3 (ref 0–1)
MONOCYTES # BLD AUTO: 1.6 10*3/MM3 (ref 0–1)
MONOCYTES # BLD AUTO: 1.69 10*3/MM3 (ref 0–1)
MONOCYTES # BLD AUTO: 1.71 10*3/MM3 (ref 0–1)
MONOCYTES # BLD AUTO: 2.08 10*3/MM3 (ref 0–1)
MONOCYTES NFR BLD AUTO: 10.4 % (ref 3–8)
MONOCYTES NFR BLD AUTO: 10.6 % (ref 3–8)
MONOCYTES NFR BLD AUTO: 11 % (ref 3–8)
MONOCYTES NFR BLD AUTO: 11.3 % (ref 3–8)
MONOCYTES NFR BLD AUTO: 11.9 % (ref 3–8)
MONOCYTES NFR BLD AUTO: 12.3 % (ref 3–8)
MONOCYTES NFR BLD AUTO: 8.7 % (ref 3–8)
MONOCYTES NFR BLD AUTO: 8.9 % (ref 3–8)
MONOCYTES NFR BLD AUTO: 9 % (ref 3–8)
MONOCYTES NFR BLD AUTO: 9.4 % (ref 3–8)
MONOCYTES NFR BLD AUTO: 9.9 % (ref 3–8)
MUCOUS THREADS URNS QL MICRO: PRESENT /HPF
MYELOCYTES NFR BLD MANUAL: 0 % (ref 0–0)
NEUTROPHILS # BLD AUTO: 11.61 10*3/MM3 (ref 1.5–8.3)
NEUTROPHILS # BLD AUTO: 13.75 10*3/MM3 (ref 1.5–8.3)
NEUTROPHILS # BLD AUTO: 13.78 10*3/MM3 (ref 1.5–8.3)
NEUTROPHILS # BLD AUTO: 14.87 10*3/MM3 (ref 1.5–8.3)
NEUTROPHILS # BLD AUTO: 37.75 10*3/MM3 (ref 1.5–8.3)
NEUTROPHILS # BLD AUTO: 6.78 10*3/MM3 (ref 1.5–8.3)
NEUTROPHILS # BLD AUTO: 7.21 10*3/MM3 (ref 1.5–8.3)
NEUTROPHILS # BLD AUTO: 7.69 10*3/MM3 (ref 1.5–8.3)
NEUTROPHILS # BLD AUTO: 7.7 10*3/MM3 (ref 1.5–8.3)
NEUTROPHILS # BLD AUTO: 7.96 10*3/MM3 (ref 1.5–8.3)
NEUTROPHILS # BLD AUTO: 8.17 10*3/MM3 (ref 1.5–8.3)
NEUTROPHILS # BLD AUTO: 9.7 10*3/MM3 (ref 1.5–8.3)
NEUTROPHILS NFR BLD AUTO: 66 % (ref 45–70)
NEUTROPHILS NFR BLD AUTO: 67.2 % (ref 45–70)
NEUTROPHILS NFR BLD AUTO: 67.8 % (ref 45–70)
NEUTROPHILS NFR BLD AUTO: 68.4 % (ref 45–70)
NEUTROPHILS NFR BLD AUTO: 73.5 % (ref 45–70)
NEUTROPHILS NFR BLD AUTO: 73.5 % (ref 45–70)
NEUTROPHILS NFR BLD AUTO: 74.7 % (ref 45–70)
NEUTROPHILS NFR BLD AUTO: 75.2 % (ref 45–70)
NEUTROPHILS NFR BLD AUTO: 75.4 % (ref 45–70)
NEUTROPHILS NFR BLD AUTO: 75.7 % (ref 45–70)
NEUTROPHILS NFR BLD AUTO: 82.8 % (ref 45–70)
NEUTROPHILS NFR BLD MANUAL: 86 % (ref 45–70)
NEUTS BAND NFR BLD MANUAL: 9 % (ref 0–5)
NITRITE UR QL STRIP: NEGATIVE
NITRITE UR QL STRIP: POSITIVE
NOTIFIED BY: ABNORMAL
NOTIFIED BY: ABNORMAL
NOTIFIED WHO: ABNORMAL
NOTIFIED WHO: ABNORMAL
NRBC BLD AUTO-RTO: 0 /100 WBC (ref 0–0)
NRBC BLD MANUAL-RTO: 0 /100 WBC (ref 0–0)
OTHER CELLS %: 0 % (ref 0–0)
PCO2 BLDA: 43.6 MM HG (ref 35–45)
PCO2 BLDA: 44.1 MM HG (ref 35–45)
PCO2 BLDA: 58.6 MM HG (ref 35–45)
PCO2 TEMP ADJ BLD: 43.6 MM HG (ref 35–45)
PCO2 TEMP ADJ BLD: 44.1 MM HG (ref 35–45)
PCO2 TEMP ADJ BLD: 58.6 MM HG (ref 35–45)
PEEP RESPIRATORY: 5 CM[H2O]
PEEP RESPIRATORY: 5 CM[H2O]
PEEP RESPIRATORY: 8 CM[H2O]
PH BLDA: 7.02 PH UNITS (ref 7.35–7.45)
PH BLDA: 7.15 PH UNITS (ref 7.35–7.45)
PH BLDA: 7.34 PH UNITS (ref 7.35–7.45)
PH UR STRIP.AUTO: <=5 [PH] (ref 4.5–8)
PH UR STRIP: 6 [PH] (ref 5–7.5)
PH, TEMP CORRECTED: 7.02 PH UNITS (ref 7.35–7.45)
PH, TEMP CORRECTED: 7.15 PH UNITS (ref 7.35–7.45)
PH, TEMP CORRECTED: 7.34 PH UNITS (ref 7.35–7.45)
PLASMA CELL PREC NFR BLD MANUAL: 0 % (ref 0–0)
PLAT MORPH BLD: NORMAL
PLATELET # BLD AUTO: 386 10*3/MM3 (ref 140–500)
PLATELET # BLD AUTO: 443 10*3/MM3 (ref 140–500)
PLATELET # BLD AUTO: 470 10*3/MM3 (ref 140–500)
PLATELET # BLD AUTO: 479 10*3/MM3 (ref 140–500)
PLATELET # BLD AUTO: 484 10*3/MM3 (ref 140–500)
PLATELET # BLD AUTO: 539 10*3/MM3 (ref 140–500)
PLATELET # BLD AUTO: 547 10*3/MM3 (ref 140–500)
PLATELET # BLD AUTO: 547 10*3/MM3 (ref 140–500)
PLATELET # BLD AUTO: 561 10*3/MM3 (ref 140–500)
PLATELET # BLD AUTO: 562 10*3/MM3 (ref 140–500)
PLATELET # BLD AUTO: 573 10*3/MM3 (ref 140–500)
PLATELET # BLD AUTO: 619 10*3/MM3 (ref 140–500)
PLATELET # BLD AUTO: 646 10*3/MM3 (ref 140–500)
PLATELET # BLD AUTO: 650 10*3/MM3 (ref 140–500)
PLATELET # BLD AUTO: 654 10*3/MM3 (ref 140–500)
PLATELET # BLD AUTO: 664 10*3/MM3 (ref 140–500)
PMV BLD AUTO: 9 FL (ref 7.4–10.4)
PMV BLD AUTO: 9.1 FL (ref 7.4–10.4)
PMV BLD AUTO: 9.1 FL (ref 7.4–10.4)
PMV BLD AUTO: 9.2 FL (ref 7.4–10.4)
PMV BLD AUTO: 9.4 FL (ref 7.4–10.4)
PMV BLD AUTO: 9.5 FL (ref 7.4–10.4)
PMV BLD AUTO: 9.6 FL (ref 7.4–10.4)
PMV BLD AUTO: 9.7 FL (ref 7.4–10.4)
PMV BLD AUTO: 9.8 FL (ref 7.4–10.4)
PMV BLD AUTO: 9.8 FL (ref 7.4–10.4)
PO2 BLDA: 118 MM HG (ref 83–108)
PO2 BLDA: 155 MM HG (ref 83–108)
PO2 BLDA: 70.7 MM HG (ref 83–108)
PO2 TEMP ADJ BLD: 118 MM HG (ref 83–108)
PO2 TEMP ADJ BLD: 155 MM HG (ref 83–108)
PO2 TEMP ADJ BLD: 70.7 MM HG (ref 83–108)
POTASSIUM BLD-SCNC: 2.9 MMOL/L (ref 3.5–5.2)
POTASSIUM BLD-SCNC: 3.1 MMOL/L (ref 3.5–5.2)
POTASSIUM BLD-SCNC: 3.2 MMOL/L (ref 3.5–5.2)
POTASSIUM BLD-SCNC: 3.5 MMOL/L (ref 3.5–5.2)
POTASSIUM BLD-SCNC: 3.6 MMOL/L (ref 3.5–5.2)
POTASSIUM BLD-SCNC: 3.7 MMOL/L (ref 3.5–5.2)
POTASSIUM BLD-SCNC: 3.9 MMOL/L (ref 3.5–5.2)
POTASSIUM BLD-SCNC: 4 MMOL/L (ref 3.5–5.2)
POTASSIUM BLD-SCNC: 4.2 MMOL/L (ref 3.5–5.2)
POTASSIUM BLD-SCNC: 4.4 MMOL/L (ref 3.5–5.2)
POTASSIUM BLD-SCNC: 4.4 MMOL/L (ref 3.5–5.2)
POTASSIUM BLD-SCNC: 4.7 MMOL/L (ref 3.5–5.2)
POTASSIUM BLD-SCNC: 5.6 MMOL/L (ref 3.5–5.2)
POTASSIUM SERPL-SCNC: 4.4 MMOL/L (ref 3.5–5.2)
POTASSIUM SERPL-SCNC: 4.7 MMOL/L (ref 3.5–5.2)
POTASSIUM SERPL-SCNC: 4.8 MMOL/L (ref 3.5–5.2)
PROCALCITONIN SERPL-MCNC: 0.08 NG/ML (ref 0.1–0.25)
PROCALCITONIN SERPL-MCNC: 0.11 NG/ML (ref 0.1–0.25)
PROCALCITONIN SERPL-MCNC: 0.15 NG/ML (ref 0.1–0.25)
PROLYMPHOCYTES NFR BLD MANUAL: 0 % (ref 0–0)
PROMYELOCYTES NFR BLD MANUAL: 0 % (ref 0–0)
PROT SERPL-MCNC: 5.8 G/DL (ref 6–8.5)
PROT SERPL-MCNC: 5.9 G/DL (ref 6–8.5)
PROT SERPL-MCNC: 6.3 G/DL (ref 6–8.5)
PROT SERPL-MCNC: 6.4 G/DL (ref 6–8.5)
PROT SERPL-MCNC: 6.5 G/DL (ref 6–8.5)
PROT SERPL-MCNC: 6.9 G/DL (ref 6–8.5)
PROT UR QL STRIP: ABNORMAL
PROT UR QL STRIP: NEGATIVE
RBC # BLD AUTO: 3.24 10*6/MM3 (ref 4.7–6.1)
RBC # BLD AUTO: 3.67 10*6/MM3 (ref 4.7–6.1)
RBC # BLD AUTO: 3.7 10*6/MM3 (ref 4.7–6.1)
RBC # BLD AUTO: 3.71 10*6/MM3 (ref 4.7–6.1)
RBC # BLD AUTO: 3.74 10*6/MM3 (ref 4.7–6.1)
RBC # BLD AUTO: 3.8 10*6/MM3 (ref 4.7–6.1)
RBC # BLD AUTO: 3.87 10*6/MM3 (ref 4.7–6.1)
RBC # BLD AUTO: 3.93 10*6/MM3 (ref 4.7–6.1)
RBC # BLD AUTO: 3.96 10*6/MM3 (ref 4.7–6.1)
RBC # BLD AUTO: 4.11 10*6/MM3 (ref 4.7–6.1)
RBC # BLD AUTO: 4.13 10*6/MM3 (ref 4.7–6.1)
RBC # BLD AUTO: 4.27 10*6/MM3 (ref 4.7–6.1)
RBC # BLD AUTO: 4.35 10*6/MM3 (ref 4.7–6.1)
RBC # BLD AUTO: 4.43 10*6/MM3 (ref 4.7–6.1)
RBC # BLD AUTO: 4.54 10*6/MM3 (ref 4.7–6.1)
RBC # BLD AUTO: 4.56 10*6/MM3 (ref 4.7–6.1)
RBC # UR: ABNORMAL /HPF
RBC #/AREA URNS HPF: ABNORMAL /HPF
REF LAB TEST METHOD: ABNORMAL
SAO2 % BLDCOA: 92.1 % (ref 94–99)
SAO2 % BLDCOA: 96.3 % (ref 94–99)
SAO2 % BLDCOA: 98.8 % (ref 94–99)
SET MECH RESP RATE: 16
SET MECH RESP RATE: 16
SET MECH RESP RATE: 22
SODIUM BLD-SCNC: 129 MMOL/L (ref 136–145)
SODIUM BLD-SCNC: 132 MMOL/L (ref 136–145)
SODIUM BLD-SCNC: 133 MMOL/L (ref 136–145)
SODIUM BLD-SCNC: 134 MMOL/L (ref 136–145)
SODIUM BLD-SCNC: 135 MMOL/L (ref 136–145)
SODIUM BLD-SCNC: 136 MMOL/L (ref 136–145)
SODIUM BLD-SCNC: 136 MMOL/L (ref 136–145)
SODIUM BLD-SCNC: 137 MMOL/L (ref 136–145)
SODIUM BLD-SCNC: 139 MMOL/L (ref 136–145)
SODIUM BLD-SCNC: 140 MMOL/L (ref 136–145)
SODIUM BLD-SCNC: 140 MMOL/L (ref 136–145)
SODIUM BLD-SCNC: 141 MMOL/L (ref 136–145)
SODIUM BLD-SCNC: 144 MMOL/L (ref 136–145)
SODIUM SERPL-SCNC: 134 MMOL/L (ref 136–145)
SODIUM SERPL-SCNC: 138 MMOL/L (ref 136–145)
SODIUM SERPL-SCNC: 138 MMOL/L (ref 136–145)
SP GR UR STRIP: 1.02 (ref 1–1.03)
SP GR UR: 1.02 (ref 1–1.03)
SQUAMOUS #/AREA URNS HPF: ABNORMAL /HPF
STRESS TARGET HR: 119 BPM
STRESS TARGET HR: 119 BPM
TIBC SERPL-MCNC: 283 MCG/DL (ref 261–498)
TIBC SERPL-MCNC: 302 MCG/DL (ref 261–498)
TRIGL SERPL-MCNC: 104 MG/DL (ref 0–150)
TROPONIN T SERPL-MCNC: 0.03 NG/ML (ref 0–0.03)
TROPONIN T SERPL-MCNC: 0.07 NG/ML (ref 0–0.03)
TROPONIN T SERPL-MCNC: 0.11 NG/ML (ref 0–0.03)
TROPONIN T SERPL-MCNC: 0.14 NG/ML (ref 0–0.03)
TROPONIN T SERPL-MCNC: <0.01 NG/ML (ref 0–0.03)
TSH SERPL DL<=0.05 MIU/L-ACNC: 0.81 MIU/ML (ref 0.27–4.2)
UIBC SERPL-MCNC: 262 MCG/DL (ref 112–346)
UIBC SERPL-MCNC: 283 MCG/DL (ref 112–346)
UNABLE TO VOID: NORMAL
UNIDENT CRYS URNS QL MICRO: PRESENT
URINALYSIS REFLEX: ABNORMAL
UROBILINOGEN UR QL STRIP: ABNORMAL
UROBILINOGEN UR STRIP-MCNC: 1 MG/DL (ref 0.2–1)
VANCOMYCIN SERPL-MCNC: 25.6 MCG/ML (ref 5–40)
VARIANT LYMPHS NFR BLD MANUAL: 0 % (ref 0–5)
VENTILATOR MODE: AC
VIT B12 SERPL-MCNC: 433 PG/ML (ref 232–1245)
VLDLC SERPL CALC-MCNC: 20.8 MG/DL (ref 8–32)
VT ON VENT VENT: 500 ML
WBC # BLD AUTO: 10.94 10*3/MM3 (ref 4.8–10.8)
WBC # BLD AUTO: 11.36 10*3/MM3 (ref 4.8–10.8)
WBC # BLD AUTO: 12.08 10*3/MM3 (ref 4.8–10.8)
WBC # BLD AUTO: 9.81 10*3/MM3 (ref 4.8–10.8)
WBC #/AREA URNS HPF: ABNORMAL /HPF
WBC MORPH BLD: NORMAL
WBC NRBC COR # BLD: 11.24 10*3/MM3 (ref 4.8–10.8)
WBC NRBC COR # BLD: 14.43 10*3/MM3 (ref 4.8–10.8)
WBC NRBC COR # BLD: 14.75 10*3/MM3 (ref 4.8–10.8)
WBC NRBC COR # BLD: 15.32 10*3/MM3 (ref 4.8–10.8)
WBC NRBC COR # BLD: 15.53 10*3/MM3 (ref 4.8–10.8)
WBC NRBC COR # BLD: 16.6 10*3/MM3 (ref 4.8–10.8)
WBC NRBC COR # BLD: 17.08 10*3/MM3 (ref 4.8–10.8)
WBC NRBC COR # BLD: 17.89 10*3/MM3 (ref 4.8–10.8)
WBC NRBC COR # BLD: 18.33 10*3/MM3 (ref 4.8–10.8)
WBC NRBC COR # BLD: 19.67 10*3/MM3 (ref 4.8–10.8)
WBC NRBC COR # BLD: 39.74 10*3/MM3 (ref 4.8–10.8)
WBC NRBC COR # BLD: 9.22 10*3/MM3 (ref 4.8–10.8)
WBC UR QL AUTO: ABNORMAL /HPF

## 2018-01-01 PROCEDURE — 93325 DOPPLER ECHO COLOR FLOW MAPG: CPT | Performed by: INTERNAL MEDICINE

## 2018-01-01 PROCEDURE — 93005 ELECTROCARDIOGRAM TRACING: CPT | Performed by: INTERNAL MEDICINE

## 2018-01-01 PROCEDURE — 94002 VENT MGMT INPAT INIT DAY: CPT

## 2018-01-01 PROCEDURE — 80048 BASIC METABOLIC PNL TOTAL CA: CPT | Performed by: INTERNAL MEDICINE

## 2018-01-01 PROCEDURE — 80048 BASIC METABOLIC PNL TOTAL CA: CPT | Performed by: HOSPITALIST

## 2018-01-01 PROCEDURE — 25010000002 HYDROMORPHONE PER 4 MG

## 2018-01-01 PROCEDURE — 74177 CT ABD & PELVIS W/CONTRAST: CPT

## 2018-01-01 PROCEDURE — 99233 SBSQ HOSP IP/OBS HIGH 50: CPT | Performed by: INTERNAL MEDICINE

## 2018-01-01 PROCEDURE — 70553 MRI BRAIN STEM W/O & W/DYE: CPT

## 2018-01-01 PROCEDURE — 25010000002 NALOXONE PER 1 MG

## 2018-01-01 PROCEDURE — 85025 COMPLETE CBC W/AUTO DIFF WBC: CPT | Performed by: NURSE PRACTITIONER

## 2018-01-01 PROCEDURE — 25010000002 VANCOMYCIN PER 500 MG

## 2018-01-01 PROCEDURE — 71045 X-RAY EXAM CHEST 1 VIEW: CPT

## 2018-01-01 PROCEDURE — 25010000002 ENOXAPARIN PER 10 MG: Performed by: NURSE PRACTITIONER

## 2018-01-01 PROCEDURE — C1751 CATH, INF, PER/CENT/MIDLINE: HCPCS

## 2018-01-01 PROCEDURE — 80048 BASIC METABOLIC PNL TOTAL CA: CPT | Performed by: NURSE PRACTITIONER

## 2018-01-01 PROCEDURE — 80053 COMPREHEN METABOLIC PANEL: CPT | Performed by: INTERNAL MEDICINE

## 2018-01-01 PROCEDURE — 94799 UNLISTED PULMONARY SVC/PX: CPT

## 2018-01-01 PROCEDURE — 92610 EVALUATE SWALLOWING FUNCTION: CPT

## 2018-01-01 PROCEDURE — 99232 SBSQ HOSP IP/OBS MODERATE 35: CPT | Performed by: NURSE PRACTITIONER

## 2018-01-01 PROCEDURE — A9577 INJ MULTIHANCE: HCPCS | Performed by: INTERNAL MEDICINE

## 2018-01-01 PROCEDURE — 84132 ASSAY OF SERUM POTASSIUM: CPT | Performed by: INTERNAL MEDICINE

## 2018-01-01 PROCEDURE — 25010000002 LEVETIRACETAM IN NACL 0.82% 500 MG/100ML SOLUTION: Performed by: INTERNAL MEDICINE

## 2018-01-01 PROCEDURE — 36415 COLL VENOUS BLD VENIPUNCTURE: CPT | Performed by: INTERNAL MEDICINE

## 2018-01-01 PROCEDURE — 84132 ASSAY OF SERUM POTASSIUM: CPT | Performed by: HOSPITALIST

## 2018-01-01 PROCEDURE — 0 GADOBENATE DIMEGLUMINE 529 MG/ML SOLUTION: Performed by: INTERNAL MEDICINE

## 2018-01-01 PROCEDURE — DRYNDL PR CUSTOM DRY NEEDLING SELF PAY: Performed by: PHYSICAL THERAPIST

## 2018-01-01 PROCEDURE — 25010000002 HYDROMORPHONE PER 4 MG: Performed by: INTERNAL MEDICINE

## 2018-01-01 PROCEDURE — 87077 CULTURE AEROBIC IDENTIFY: CPT | Performed by: INTERNAL MEDICINE

## 2018-01-01 PROCEDURE — 85025 COMPLETE CBC W/AUTO DIFF WBC: CPT | Performed by: HOSPITALIST

## 2018-01-01 PROCEDURE — 72158 MRI LUMBAR SPINE W/O & W/DYE: CPT

## 2018-01-01 PROCEDURE — 25010000002 PIPERACILLIN SOD-TAZOBACTAM PER 1 G: Performed by: INTERNAL MEDICINE

## 2018-01-01 PROCEDURE — 84484 ASSAY OF TROPONIN QUANT: CPT | Performed by: NURSE PRACTITIONER

## 2018-01-01 PROCEDURE — 36600 WITHDRAWAL OF ARTERIAL BLOOD: CPT

## 2018-01-01 PROCEDURE — 74176 CT ABD & PELVIS W/O CONTRAST: CPT

## 2018-01-01 PROCEDURE — 87040 BLOOD CULTURE FOR BACTERIA: CPT | Performed by: INTERNAL MEDICINE

## 2018-01-01 PROCEDURE — 84145 PROCALCITONIN (PCT): CPT | Performed by: NURSE PRACTITIONER

## 2018-01-01 PROCEDURE — 87150 DNA/RNA AMPLIFIED PROBE: CPT | Performed by: INTERNAL MEDICINE

## 2018-01-01 PROCEDURE — 25010000002 VANCOMYCIN

## 2018-01-01 PROCEDURE — 85025 COMPLETE CBC W/AUTO DIFF WBC: CPT | Performed by: INTERNAL MEDICINE

## 2018-01-01 PROCEDURE — 93005 ELECTROCARDIOGRAM TRACING: CPT | Performed by: NURSE PRACTITIONER

## 2018-01-01 PROCEDURE — 25010000002 LEVETRIRACETAM PER 10 MG

## 2018-01-01 PROCEDURE — 25010000002 HYDROMORPHONE PER 4 MG: Performed by: PSYCHIATRY & NEUROLOGY

## 2018-01-01 PROCEDURE — 25010000003 AMPICILLIN PER 500 MG: Performed by: INTERNAL MEDICINE

## 2018-01-01 PROCEDURE — 83735 ASSAY OF MAGNESIUM: CPT | Performed by: INTERNAL MEDICINE

## 2018-01-01 PROCEDURE — 84145 PROCALCITONIN (PCT): CPT | Performed by: INTERNAL MEDICINE

## 2018-01-01 PROCEDURE — 87103 BLOOD FUNGUS CULTURE: CPT | Performed by: INTERNAL MEDICINE

## 2018-01-01 PROCEDURE — 99232 SBSQ HOSP IP/OBS MODERATE 35: CPT | Performed by: INTERNAL MEDICINE

## 2018-01-01 PROCEDURE — 25010000002 DIGOXIN PER 500 MCG: Performed by: INTERNAL MEDICINE

## 2018-01-01 PROCEDURE — 25810000003 DEXTROSE-NACL PER 500 ML: Performed by: NURSE PRACTITIONER

## 2018-01-01 PROCEDURE — 0 DIATRIZOATE MEGLUMINE & SODIUM PER 1 ML: Performed by: INTERNAL MEDICINE

## 2018-01-01 PROCEDURE — 82962 GLUCOSE BLOOD TEST: CPT

## 2018-01-01 PROCEDURE — 93971 EXTREMITY STUDY: CPT

## 2018-01-01 PROCEDURE — 85027 COMPLETE CBC AUTOMATED: CPT | Performed by: INTERNAL MEDICINE

## 2018-01-01 PROCEDURE — 90670 PCV13 VACCINE IM: CPT | Performed by: INTERNAL MEDICINE

## 2018-01-01 PROCEDURE — 80053 COMPREHEN METABOLIC PANEL: CPT | Performed by: NURSE PRACTITIONER

## 2018-01-01 PROCEDURE — 82803 BLOOD GASES ANY COMBINATION: CPT

## 2018-01-01 PROCEDURE — 25010000003 AMPICILLIN-SULBACTAM PER 1.5 G: Performed by: NURSE PRACTITIONER

## 2018-01-01 PROCEDURE — 25010000002 METHOCARBAMOL 1000 MG/10ML SOLUTION: Performed by: INTERNAL MEDICINE

## 2018-01-01 PROCEDURE — 93010 ELECTROCARDIOGRAM REPORT: CPT | Performed by: INTERNAL MEDICINE

## 2018-01-01 PROCEDURE — 86140 C-REACTIVE PROTEIN: CPT | Performed by: NURSE PRACTITIONER

## 2018-01-01 PROCEDURE — 0 IOPAMIDOL PER 1 ML: Performed by: INTERNAL MEDICINE

## 2018-01-01 PROCEDURE — 99213 OFFICE O/P EST LOW 20 MIN: CPT | Performed by: ORTHOPAEDIC SURGERY

## 2018-01-01 PROCEDURE — 25010000003 AMPICILLIN-SULBACTAM PER 1.5 G: Performed by: INTERNAL MEDICINE

## 2018-01-01 PROCEDURE — 93306 TTE W/DOPPLER COMPLETE: CPT | Performed by: INTERNAL MEDICINE

## 2018-01-01 PROCEDURE — 99222 1ST HOSP IP/OBS MODERATE 55: CPT | Performed by: INTERNAL MEDICINE

## 2018-01-01 PROCEDURE — 85007 BL SMEAR W/DIFF WBC COUNT: CPT | Performed by: NURSE PRACTITIONER

## 2018-01-01 PROCEDURE — 85652 RBC SED RATE AUTOMATED: CPT | Performed by: NURSE PRACTITIONER

## 2018-01-01 PROCEDURE — 86140 C-REACTIVE PROTEIN: CPT | Performed by: INTERNAL MEDICINE

## 2018-01-01 PROCEDURE — 93325 DOPPLER ECHO COLOR FLOW MAPG: CPT

## 2018-01-01 PROCEDURE — 25010000002 HYDROMORPHONE PER 4 MG: Performed by: NURSE PRACTITIONER

## 2018-01-01 PROCEDURE — 73110 X-RAY EXAM OF WRIST: CPT

## 2018-01-01 PROCEDURE — 02HV33Z INSERTION OF INFUSION DEVICE INTO SUPERIOR VENA CAVA, PERCUTANEOUS APPROACH: ICD-10-PCS | Performed by: INTERNAL MEDICINE

## 2018-01-01 PROCEDURE — 20610 DRAIN/INJ JOINT/BURSA W/O US: CPT | Performed by: ORTHOPAEDIC SURGERY

## 2018-01-01 PROCEDURE — 99231 SBSQ HOSP IP/OBS SF/LOW 25: CPT | Performed by: HOSPITALIST

## 2018-01-01 PROCEDURE — 25010000002 METHOCARBAMOL 1000 MG/10ML SOLUTION: Performed by: NURSE PRACTITIONER

## 2018-01-01 PROCEDURE — 99214 OFFICE O/P EST MOD 30 MIN: CPT | Performed by: INTERNAL MEDICINE

## 2018-01-01 PROCEDURE — 25010000002 GENTAMICIN PER 80 MG: Performed by: INTERNAL MEDICINE

## 2018-01-01 PROCEDURE — 99221 1ST HOSP IP/OBS SF/LOW 40: CPT | Performed by: INTERNAL MEDICINE

## 2018-01-01 PROCEDURE — 25010000003 LEVETIRACETAM IN NACL 0.75% 1000 MG/100ML SOLUTION: Performed by: PSYCHIATRY & NEUROLOGY

## 2018-01-01 PROCEDURE — 99232 SBSQ HOSP IP/OBS MODERATE 35: CPT | Performed by: HOSPITALIST

## 2018-01-01 PROCEDURE — 25010000002 PROPOFOL 10 MG/ML EMULSION

## 2018-01-01 PROCEDURE — 80202 ASSAY OF VANCOMYCIN: CPT | Performed by: INTERNAL MEDICINE

## 2018-01-01 PROCEDURE — 93306 TTE W/DOPPLER COMPLETE: CPT

## 2018-01-01 PROCEDURE — 80170 ASSAY OF GENTAMICIN: CPT | Performed by: INTERNAL MEDICINE

## 2018-01-01 PROCEDURE — 99213 OFFICE O/P EST LOW 20 MIN: CPT | Performed by: INTERNAL MEDICINE

## 2018-01-01 PROCEDURE — 93308 TTE F-UP OR LMTD: CPT | Performed by: INTERNAL MEDICINE

## 2018-01-01 PROCEDURE — 87186 SC STD MICRODIL/AGAR DIL: CPT | Performed by: INTERNAL MEDICINE

## 2018-01-01 PROCEDURE — 93000 ELECTROCARDIOGRAM COMPLETE: CPT | Performed by: INTERNAL MEDICINE

## 2018-01-01 PROCEDURE — 90471 IMMUNIZATION ADMIN: CPT | Performed by: INTERNAL MEDICINE

## 2018-01-01 PROCEDURE — 94640 AIRWAY INHALATION TREATMENT: CPT

## 2018-01-01 PROCEDURE — 25010000002 AMPICILLIN PER 500 MG: Performed by: INTERNAL MEDICINE

## 2018-01-01 PROCEDURE — 25010000002 PHENYLEPHRINE 10 MG/ML SOLUTION 5 ML VIAL: Performed by: INTERNAL MEDICINE

## 2018-01-01 PROCEDURE — 25010000003 POTASSIUM CHLORIDE 10 MEQ/100ML SOLUTION: Performed by: INTERNAL MEDICINE

## 2018-01-01 PROCEDURE — 99215 OFFICE O/P EST HI 40 MIN: CPT | Performed by: INTERNAL MEDICINE

## 2018-01-01 PROCEDURE — 96160 PT-FOCUSED HLTH RISK ASSMT: CPT | Performed by: INTERNAL MEDICINE

## 2018-01-01 PROCEDURE — 90632 HEPA VACCINE ADULT IM: CPT | Performed by: INTERNAL MEDICINE

## 2018-01-01 PROCEDURE — G0009 ADMIN PNEUMOCOCCAL VACCINE: HCPCS | Performed by: INTERNAL MEDICINE

## 2018-01-01 PROCEDURE — 70450 CT HEAD/BRAIN W/O DYE: CPT

## 2018-01-01 PROCEDURE — 25010000003 LEVETIRACETAM IN NACL 0.75% 1000 MG/100ML SOLUTION: Performed by: NURSE PRACTITIONER

## 2018-01-01 PROCEDURE — 84484 ASSAY OF TROPONIN QUANT: CPT | Performed by: INTERNAL MEDICINE

## 2018-01-01 PROCEDURE — 80162 ASSAY OF DIGOXIN TOTAL: CPT | Performed by: INTERNAL MEDICINE

## 2018-01-01 PROCEDURE — 25010000002 MIDAZOLAM PER 1 MG

## 2018-01-01 PROCEDURE — 73130 X-RAY EXAM OF HAND: CPT

## 2018-01-01 PROCEDURE — 73562 X-RAY EXAM OF KNEE 3: CPT | Performed by: ORTHOPAEDIC SURGERY

## 2018-01-01 PROCEDURE — 93308 TTE F-UP OR LMTD: CPT

## 2018-01-01 PROCEDURE — 93970 EXTREMITY STUDY: CPT

## 2018-01-01 PROCEDURE — 99291 CRITICAL CARE FIRST HOUR: CPT | Performed by: INTERNAL MEDICINE

## 2018-01-01 PROCEDURE — 25010000002 DIGOXIN PER 500 MCG: Performed by: FAMILY MEDICINE

## 2018-01-01 PROCEDURE — 81001 URINALYSIS AUTO W/SCOPE: CPT | Performed by: INTERNAL MEDICINE

## 2018-01-01 PROCEDURE — 25010000002 HALOPERIDOL LACTATE PER 5 MG: Performed by: PSYCHIATRY & NEUROLOGY

## 2018-01-01 PROCEDURE — 84443 ASSAY THYROID STIM HORMONE: CPT | Performed by: INTERNAL MEDICINE

## 2018-01-01 PROCEDURE — 85652 RBC SED RATE AUTOMATED: CPT | Performed by: INTERNAL MEDICINE

## 2018-01-01 PROCEDURE — 83036 HEMOGLOBIN GLYCOSYLATED A1C: CPT | Performed by: NURSE PRACTITIONER

## 2018-01-01 PROCEDURE — 85027 COMPLETE CBC AUTOMATED: CPT | Performed by: HOSPITALIST

## 2018-01-01 PROCEDURE — G0439 PPPS, SUBSEQ VISIT: HCPCS | Performed by: INTERNAL MEDICINE

## 2018-01-01 PROCEDURE — 25010000002 LORAZEPAM PER 2 MG: Performed by: PSYCHIATRY & NEUROLOGY

## 2018-01-01 RX ORDER — BUMETANIDE 0.25 MG/ML
2 INJECTION INTRAMUSCULAR; INTRAVENOUS EVERY 12 HOURS
Status: DISCONTINUED | OUTPATIENT
Start: 2018-01-01 | End: 2018-01-01

## 2018-01-01 RX ORDER — CETIRIZINE HYDROCHLORIDE 10 MG/1
5 TABLET ORAL DAILY
Status: DISCONTINUED | OUTPATIENT
Start: 2018-01-01 | End: 2018-01-01

## 2018-01-01 RX ORDER — DIGOXIN 125 MCG
125 TABLET ORAL
Status: DISCONTINUED | OUTPATIENT
Start: 2018-01-01 | End: 2018-01-01

## 2018-01-01 RX ORDER — BUMETANIDE 1 MG/1
2 TABLET ORAL DAILY
Status: DISCONTINUED | OUTPATIENT
Start: 2018-01-01 | End: 2018-01-01

## 2018-01-01 RX ORDER — DILTIAZEM HYDROCHLORIDE 120 MG/1
240 CAPSULE, COATED, EXTENDED RELEASE ORAL
Status: DISCONTINUED | OUTPATIENT
Start: 2018-01-01 | End: 2018-01-01

## 2018-01-01 RX ORDER — NALOXONE HCL 0.4 MG/ML
VIAL (ML) INJECTION
Status: COMPLETED
Start: 2018-01-01 | End: 2018-01-01

## 2018-01-01 RX ORDER — ALBUTEROL SULFATE 90 UG/1
6 AEROSOL, METERED RESPIRATORY (INHALATION)
Status: DISCONTINUED | OUTPATIENT
Start: 2018-01-01 | End: 2018-01-01 | Stop reason: HOSPADM

## 2018-01-01 RX ORDER — LEVETIRACETAM 500 MG/1
500 TABLET ORAL
Qty: 180 TABLET | Refills: 2 | Status: SHIPPED | OUTPATIENT
Start: 2018-01-01

## 2018-01-01 RX ORDER — PROPOFOL 10 MG/ML
VIAL (ML) INTRAVENOUS ONCE
Status: CANCELLED | OUTPATIENT
Start: 2018-01-01 | End: 2018-01-01

## 2018-01-01 RX ORDER — BUMETANIDE 2 MG/1
2 TABLET ORAL DAILY
Qty: 30 TABLET | Refills: 0 | Status: SHIPPED | OUTPATIENT
Start: 2018-01-01

## 2018-01-01 RX ORDER — METHOCARBAMOL 500 MG/1
TABLET, FILM COATED ORAL
Refills: 0 | COMMUNITY
Start: 2017-01-01

## 2018-01-01 RX ORDER — POTASSIUM CHLORIDE 20 MEQ/1
40 TABLET, EXTENDED RELEASE ORAL AS NEEDED
Status: DISCONTINUED | OUTPATIENT
Start: 2018-01-01 | End: 2018-01-01 | Stop reason: HOSPADM

## 2018-01-01 RX ORDER — LEVETIRACETAM 500 MG/1
500 TABLET ORAL EVERY 12 HOURS SCHEDULED
Status: DISCONTINUED | OUTPATIENT
Start: 2018-01-01 | End: 2018-01-01

## 2018-01-01 RX ORDER — NICOTINE POLACRILEX 4 MG
15 LOZENGE BUCCAL
Status: DISCONTINUED | OUTPATIENT
Start: 2018-01-01 | End: 2018-01-01 | Stop reason: HOSPADM

## 2018-01-01 RX ORDER — LEVETIRACETAM 5 MG/ML
500 INJECTION INTRAVASCULAR EVERY 12 HOURS SCHEDULED
Status: DISCONTINUED | OUTPATIENT
Start: 2018-01-01 | End: 2018-01-01

## 2018-01-01 RX ORDER — POTASSIUM CHLORIDE 1500 MG/1
40 TABLET, FILM COATED, EXTENDED RELEASE ORAL ONCE
Status: COMPLETED | OUTPATIENT
Start: 2018-01-01 | End: 2018-01-01

## 2018-01-01 RX ORDER — DILTIAZEM HYDROCHLORIDE 240 MG/1
240 CAPSULE, COATED, EXTENDED RELEASE ORAL DAILY
Qty: 90 CAPSULE | Refills: 0 | Status: SHIPPED | OUTPATIENT
Start: 2018-01-01

## 2018-01-01 RX ORDER — METOPROLOL TARTRATE 5 MG/5ML
INJECTION INTRAVENOUS
Status: COMPLETED
Start: 2018-01-01 | End: 2018-01-01

## 2018-01-01 RX ORDER — POTASSIUM CHLORIDE 1.5 G/1.77G
40 POWDER, FOR SOLUTION ORAL AS NEEDED
Status: DISCONTINUED | OUTPATIENT
Start: 2018-01-01 | End: 2018-01-01 | Stop reason: HOSPADM

## 2018-01-01 RX ORDER — METHOCARBAMOL 500 MG/1
750 TABLET, FILM COATED ORAL 4 TIMES DAILY
Status: DISCONTINUED | OUTPATIENT
Start: 2018-01-01 | End: 2018-01-01

## 2018-01-01 RX ORDER — DILTIAZEM HYDROCHLORIDE 5 MG/ML
20 INJECTION INTRAVENOUS EVERY 6 HOURS
Status: DISCONTINUED | OUTPATIENT
Start: 2018-01-01 | End: 2018-01-01 | Stop reason: HOSPADM

## 2018-01-01 RX ORDER — METOPROLOL TARTRATE 5 MG/5ML
INJECTION INTRAVENOUS
Status: DISPENSED
Start: 2018-01-01 | End: 2018-01-01

## 2018-01-01 RX ORDER — LIDOCAINE 50 MG/G
PATCH TOPICAL
Status: COMPLETED
Start: 2018-01-01 | End: 2018-01-01

## 2018-01-01 RX ORDER — LORAZEPAM 2 MG/ML
INJECTION INTRAMUSCULAR
Status: DISCONTINUED
Start: 2018-01-01 | End: 2018-01-01 | Stop reason: WASHOUT

## 2018-01-01 RX ORDER — TRIAMCINOLONE ACETONIDE 40 MG/ML
80 INJECTION, SUSPENSION INTRA-ARTICULAR; INTRAMUSCULAR
Status: COMPLETED | OUTPATIENT
Start: 2018-01-01 | End: 2018-01-01

## 2018-01-01 RX ORDER — METHOCARBAMOL 100 MG/ML
1000 INJECTION, SOLUTION INTRAMUSCULAR; INTRAVENOUS EVERY 8 HOURS
Status: DISCONTINUED | OUTPATIENT
Start: 2018-01-01 | End: 2018-01-01

## 2018-01-01 RX ORDER — DILTIAZEM HYDROCHLORIDE 240 MG/1
CAPSULE, EXTENDED RELEASE ORAL
Qty: 30 CAPSULE | Refills: 3 | Status: SHIPPED | OUTPATIENT
Start: 2018-01-01

## 2018-01-01 RX ORDER — DIGOXIN 0.25 MG/ML
250 INJECTION INTRAMUSCULAR; INTRAVENOUS ONCE
Status: COMPLETED | OUTPATIENT
Start: 2018-01-01 | End: 2018-01-01

## 2018-01-01 RX ORDER — MIDAZOLAM HYDROCHLORIDE 5 MG/ML
INJECTION INTRAMUSCULAR; INTRAVENOUS
Status: COMPLETED
Start: 2018-01-01 | End: 2018-01-01

## 2018-01-01 RX ORDER — SODIUM CHLORIDE 9 MG/ML
INJECTION, SOLUTION INTRAVENOUS
Status: COMPLETED
Start: 2018-01-01 | End: 2018-01-01

## 2018-01-01 RX ORDER — PROPOFOL 10 MG/ML
VIAL (ML) INTRAVENOUS
Status: COMPLETED
Start: 2018-01-01 | End: 2018-01-01

## 2018-01-01 RX ORDER — NALOXONE HCL 0.4 MG/ML
0.4 VIAL (ML) INJECTION ONCE
Status: DISCONTINUED | OUTPATIENT
Start: 2018-01-01 | End: 2018-01-01

## 2018-01-01 RX ORDER — DILTIAZEM HYDROCHLORIDE 240 MG/1
240 CAPSULE, COATED, EXTENDED RELEASE ORAL DAILY
Qty: 30 CAPSULE | Refills: 0 | Status: SHIPPED | OUTPATIENT
Start: 2018-01-01 | End: 2018-01-01 | Stop reason: SDUPTHER

## 2018-01-01 RX ORDER — LEVETIRACETAM 10 MG/ML
1000 INJECTION INTRAVASCULAR EVERY 8 HOURS
Status: DISCONTINUED | OUTPATIENT
Start: 2018-01-01 | End: 2018-01-01 | Stop reason: HOSPADM

## 2018-01-01 RX ORDER — DEXTROSE AND SODIUM CHLORIDE 5; .9 G/100ML; G/100ML
INJECTION, SOLUTION INTRAVENOUS
Status: DISPENSED
Start: 2018-01-01 | End: 2018-01-01

## 2018-01-01 RX ORDER — VANCOMYCIN HYDROCHLORIDE 500 MG/10ML
INJECTION, POWDER, LYOPHILIZED, FOR SOLUTION INTRAVENOUS
Status: DISPENSED
Start: 2018-01-01 | End: 2018-01-01

## 2018-01-01 RX ORDER — METHOCARBAMOL 500 MG/1
500 TABLET, FILM COATED ORAL 2 TIMES DAILY PRN
Status: DISCONTINUED | OUTPATIENT
Start: 2018-01-01 | End: 2018-01-01

## 2018-01-01 RX ORDER — ACETAMINOPHEN 325 MG/1
650 TABLET ORAL EVERY 4 HOURS PRN
Status: DISCONTINUED | OUTPATIENT
Start: 2018-01-01 | End: 2018-01-01 | Stop reason: HOSPADM

## 2018-01-01 RX ORDER — SODIUM CHLORIDE 9 MG/ML
INJECTION, SOLUTION INTRAVENOUS
Status: DISPENSED
Start: 2018-01-01 | End: 2018-01-01

## 2018-01-01 RX ORDER — DEXTROSE AND SODIUM CHLORIDE 5; .9 G/100ML; G/100ML
75 INJECTION, SOLUTION INTRAVENOUS CONTINUOUS
Status: DISCONTINUED | OUTPATIENT
Start: 2018-01-01 | End: 2018-01-01 | Stop reason: HOSPADM

## 2018-01-01 RX ORDER — GUAIFENESIN 600 MG/1
600 TABLET, EXTENDED RELEASE ORAL DAILY
Status: DISCONTINUED | OUTPATIENT
Start: 2018-01-01 | End: 2018-01-01

## 2018-01-01 RX ORDER — LIDOCAINE 50 MG/G
2 PATCH TOPICAL
Status: DISCONTINUED | OUTPATIENT
Start: 2018-01-01 | End: 2018-01-01 | Stop reason: HOSPADM

## 2018-01-01 RX ORDER — RIVAROXABAN 15 MG/1
15 TABLET, FILM COATED ORAL DAILY
Qty: 90 TABLET | Refills: 2 | Status: SHIPPED | OUTPATIENT
Start: 2018-01-01

## 2018-01-01 RX ORDER — METOPROLOL SUCCINATE 50 MG/1
100 TABLET, EXTENDED RELEASE ORAL
Status: DISCONTINUED | OUTPATIENT
Start: 2018-01-01 | End: 2018-01-01

## 2018-01-01 RX ORDER — SODIUM CHLORIDE, SODIUM LACTATE, POTASSIUM CHLORIDE, CALCIUM CHLORIDE 600; 310; 30; 20 MG/100ML; MG/100ML; MG/100ML; MG/100ML
500 INJECTION, SOLUTION INTRAVENOUS ONCE
Status: COMPLETED | OUTPATIENT
Start: 2018-01-01 | End: 2018-01-01

## 2018-01-01 RX ORDER — METHOCARBAMOL 500 MG/1
500 TABLET, FILM COATED ORAL EVERY 8 HOURS SCHEDULED
Status: DISCONTINUED | OUTPATIENT
Start: 2018-01-01 | End: 2018-01-01

## 2018-01-01 RX ORDER — LEVETIRACETAM 500 MG/5ML
INJECTION, SOLUTION, CONCENTRATE INTRAVENOUS
Status: COMPLETED
Start: 2018-01-01 | End: 2018-01-01

## 2018-01-01 RX ORDER — NITROGLYCERIN 0.4 MG/1
0.4 TABLET SUBLINGUAL
Status: DISCONTINUED | OUTPATIENT
Start: 2018-01-01 | End: 2018-01-01 | Stop reason: HOSPADM

## 2018-01-01 RX ORDER — DIGOXIN 0.25 MG/ML
500 INJECTION INTRAMUSCULAR; INTRAVENOUS ONCE
Status: COMPLETED | OUTPATIENT
Start: 2018-01-01 | End: 2018-01-01

## 2018-01-01 RX ORDER — DEXTROSE MONOHYDRATE 25 G/50ML
25 INJECTION, SOLUTION INTRAVENOUS
Status: DISCONTINUED | OUTPATIENT
Start: 2018-01-01 | End: 2018-01-01 | Stop reason: HOSPADM

## 2018-01-01 RX ORDER — ASPIRIN 81 MG/1
162 TABLET, CHEWABLE ORAL ONCE
Status: COMPLETED | OUTPATIENT
Start: 2018-01-01 | End: 2018-01-01

## 2018-01-01 RX ORDER — MAGNESIUM SULFATE HEPTAHYDRATE 40 MG/ML
2 INJECTION, SOLUTION INTRAVENOUS AS NEEDED
Status: DISCONTINUED | OUTPATIENT
Start: 2018-01-01 | End: 2018-01-01 | Stop reason: HOSPADM

## 2018-01-01 RX ORDER — SODIUM CHLORIDE 9 MG/ML
40 INJECTION, SOLUTION INTRAVENOUS AS NEEDED
Status: DISCONTINUED | OUTPATIENT
Start: 2018-01-01 | End: 2018-01-01 | Stop reason: HOSPADM

## 2018-01-01 RX ORDER — FENOFIBRATE 134 MG/1
134 CAPSULE ORAL
Qty: 90 CAPSULE | Refills: 3 | Status: SHIPPED | OUTPATIENT
Start: 2018-01-01 | End: 2018-01-01

## 2018-01-01 RX ORDER — VASOPRESSIN 20 U/ML
INJECTION PARENTERAL
Status: DISCONTINUED
Start: 2018-01-01 | End: 2018-01-01 | Stop reason: HOSPADM

## 2018-01-01 RX ORDER — ALBUTEROL SULFATE 90 UG/1
2 AEROSOL, METERED RESPIRATORY (INHALATION)
Status: DISCONTINUED | OUTPATIENT
Start: 2018-01-01 | End: 2018-01-01

## 2018-01-01 RX ORDER — ACETAMINOPHEN 650 MG/1
650 SUPPOSITORY RECTAL EVERY 4 HOURS PRN
Status: DISCONTINUED | OUTPATIENT
Start: 2018-01-01 | End: 2018-01-01 | Stop reason: HOSPADM

## 2018-01-01 RX ORDER — ACETAMINOPHEN 325 MG/1
TABLET ORAL
Status: COMPLETED
Start: 2018-01-01 | End: 2018-01-01

## 2018-01-01 RX ORDER — L.ACID,PARA/B.BIFIDUM/S.THERM 8B CELL
1 CAPSULE ORAL DAILY
Status: DISCONTINUED | OUTPATIENT
Start: 2018-01-01 | End: 2018-01-01

## 2018-01-01 RX ORDER — SODIUM CHLORIDE, SODIUM LACTATE, POTASSIUM CHLORIDE, CALCIUM CHLORIDE 600; 310; 30; 20 MG/100ML; MG/100ML; MG/100ML; MG/100ML
125 INJECTION, SOLUTION INTRAVENOUS CONTINUOUS
Status: ACTIVE | OUTPATIENT
Start: 2018-01-01 | End: 2018-01-01

## 2018-01-01 RX ORDER — POTASSIUM CHLORIDE 7.45 MG/ML
10 INJECTION INTRAVENOUS
Status: DISCONTINUED | OUTPATIENT
Start: 2018-01-01 | End: 2018-01-01 | Stop reason: HOSPADM

## 2018-01-01 RX ORDER — LORAZEPAM 2 MG/ML
1 INJECTION INTRAMUSCULAR ONCE AS NEEDED
Status: DISCONTINUED | OUTPATIENT
Start: 2018-01-01 | End: 2018-01-01

## 2018-01-01 RX ORDER — MAGNESIUM SULFATE HEPTAHYDRATE 40 MG/ML
4 INJECTION, SOLUTION INTRAVENOUS AS NEEDED
Status: DISCONTINUED | OUTPATIENT
Start: 2018-01-01 | End: 2018-01-01 | Stop reason: HOSPADM

## 2018-01-01 RX ORDER — SENNA AND DOCUSATE SODIUM 50; 8.6 MG/1; MG/1
2 TABLET, FILM COATED ORAL 2 TIMES DAILY
Status: DISCONTINUED | OUTPATIENT
Start: 2018-01-01 | End: 2018-01-01

## 2018-01-01 RX ORDER — POTASSIUM CHLORIDE 20 MEQ/1
20 TABLET, EXTENDED RELEASE ORAL DAILY
Status: DISCONTINUED | OUTPATIENT
Start: 2018-01-01 | End: 2018-01-01 | Stop reason: HOSPADM

## 2018-01-01 RX ORDER — SODIUM CHLORIDE 0.9 % (FLUSH) 0.9 %
1-10 SYRINGE (ML) INJECTION AS NEEDED
Status: DISCONTINUED | OUTPATIENT
Start: 2018-01-01 | End: 2018-01-01 | Stop reason: HOSPADM

## 2018-01-01 RX ORDER — DILTIAZEM HYDROCHLORIDE 5 MG/ML
20 INJECTION INTRAVENOUS ONCE
Status: COMPLETED | OUTPATIENT
Start: 2018-01-01 | End: 2018-01-01

## 2018-01-01 RX ORDER — DIGOXIN 0.25 MG/ML
125 INJECTION INTRAMUSCULAR; INTRAVENOUS
Status: DISCONTINUED | OUTPATIENT
Start: 2018-01-01 | End: 2018-01-01 | Stop reason: HOSPADM

## 2018-01-01 RX ORDER — SULFAMETHOXAZOLE AND TRIMETHOPRIM 800; 160 MG/1; MG/1
1 TABLET ORAL 2 TIMES DAILY
Qty: 14 TABLET | Refills: 0 | Status: SHIPPED | OUTPATIENT
Start: 2018-01-01 | End: 2018-01-01

## 2018-01-01 RX ORDER — LIDOCAINE HYDROCHLORIDE 10 MG/ML
8 INJECTION, SOLUTION EPIDURAL; INFILTRATION; INTRACAUDAL; PERINEURAL
Status: COMPLETED | OUTPATIENT
Start: 2018-01-01 | End: 2018-01-01

## 2018-01-01 RX ORDER — ASPIRIN 300 MG/1
150 SUPPOSITORY RECTAL DAILY
Status: DISCONTINUED | OUTPATIENT
Start: 2018-01-01 | End: 2018-01-01 | Stop reason: HOSPADM

## 2018-01-01 RX ORDER — METOPROLOL SUCCINATE 100 MG/1
100 TABLET, EXTENDED RELEASE ORAL
Qty: 30 TABLET | Refills: 6 | Status: SHIPPED | OUTPATIENT
Start: 2018-01-01

## 2018-01-01 RX ORDER — HALOPERIDOL 5 MG/ML
2 INJECTION INTRAMUSCULAR
Status: DISCONTINUED | OUTPATIENT
Start: 2018-01-01 | End: 2018-01-01 | Stop reason: HOSPADM

## 2018-01-01 RX ORDER — SODIUM CHLORIDE 450 MG/100ML
100 INJECTION, SOLUTION INTRAVENOUS CONTINUOUS
Status: DISCONTINUED | OUTPATIENT
Start: 2018-01-01 | End: 2018-01-01

## 2018-01-01 RX ORDER — LEVETIRACETAM 500 MG/1
500 TABLET ORAL
Qty: 180 TABLET | Refills: 2 | Status: SHIPPED | OUTPATIENT
Start: 2018-01-01 | End: 2018-01-01 | Stop reason: SDUPTHER

## 2018-01-01 RX ORDER — DOCUSATE SODIUM 100 MG/1
100 CAPSULE, LIQUID FILLED ORAL AS NEEDED
Status: DISCONTINUED | OUTPATIENT
Start: 2018-01-01 | End: 2018-01-01

## 2018-01-01 RX ORDER — ETOMIDATE 2 MG/ML
INJECTION INTRAVENOUS
Status: COMPLETED
Start: 2018-01-01 | End: 2018-01-01

## 2018-01-01 RX ORDER — AMPICILLIN AND SULBACTAM 2; 1 G/1; G/1
3 INJECTION, POWDER, FOR SOLUTION INTRAMUSCULAR; INTRAVENOUS EVERY 6 HOURS
Status: DISCONTINUED | OUTPATIENT
Start: 2018-01-01 | End: 2018-01-01 | Stop reason: CLARIF

## 2018-01-01 RX ORDER — CEPHALEXIN 500 MG/1
500 CAPSULE ORAL 3 TIMES DAILY
Qty: 30 CAPSULE | Refills: 0 | Status: SHIPPED | OUTPATIENT
Start: 2018-01-01 | End: 2018-01-01

## 2018-01-01 RX ORDER — SODIUM CHLORIDE 450 MG/100ML
75 INJECTION, SOLUTION INTRAVENOUS CONTINUOUS
Status: DISCONTINUED | OUTPATIENT
Start: 2018-01-01 | End: 2018-01-01

## 2018-01-01 RX ORDER — IRON,FM,PS/FOLIC/B,C18/L.CASEI 130-1.25MG
1 CAPSULE ORAL DAILY
COMMUNITY

## 2018-01-01 RX ORDER — ACETAMINOPHEN 650 MG/1
SUPPOSITORY RECTAL
Status: COMPLETED
Start: 2018-01-01 | End: 2018-01-01

## 2018-01-01 RX ORDER — POLYETHYLENE GLYCOL 3350 17 G/17G
17 POWDER, FOR SOLUTION ORAL DAILY
Status: DISCONTINUED | OUTPATIENT
Start: 2018-01-01 | End: 2018-01-01

## 2018-01-01 RX ORDER — SODIUM CHLORIDE 450 MG/100ML
INJECTION, SOLUTION INTRAVENOUS
Status: COMPLETED
Start: 2018-01-01 | End: 2018-01-01

## 2018-01-01 RX ORDER — FENTANYL 12 UG/H
1 PATCH TRANSDERMAL
Status: DISCONTINUED | OUTPATIENT
Start: 2018-01-01 | End: 2018-01-01

## 2018-01-01 RX ORDER — NITROGLYCERIN 0.4 MG/1
TABLET SUBLINGUAL
Status: COMPLETED
Start: 2018-01-01 | End: 2018-01-01

## 2018-01-01 RX ORDER — ASPIRIN 81 MG/1
81 TABLET, CHEWABLE ORAL DAILY
Status: DISCONTINUED | OUTPATIENT
Start: 2018-01-01 | End: 2018-01-01

## 2018-01-01 RX ORDER — DEXTROSE MONOHYDRATE 50 MG/ML
75 INJECTION, SOLUTION INTRAVENOUS CONTINUOUS
Status: DISCONTINUED | OUTPATIENT
Start: 2018-01-01 | End: 2018-01-01 | Stop reason: CLARIF

## 2018-01-01 RX ORDER — LORAZEPAM 2 MG/ML
1 INJECTION INTRAMUSCULAR EVERY 4 HOURS PRN
Status: DISCONTINUED | OUTPATIENT
Start: 2018-01-01 | End: 2018-01-01 | Stop reason: HOSPADM

## 2018-01-01 RX ORDER — LEVETIRACETAM 10 MG/ML
1000 INJECTION INTRAVASCULAR EVERY 12 HOURS SCHEDULED
Status: DISCONTINUED | OUTPATIENT
Start: 2018-01-01 | End: 2018-01-01

## 2018-01-01 RX ADMIN — ALBUTEROL SULFATE 6 PUFF: 90 AEROSOL, METERED RESPIRATORY (INHALATION) at 07:48

## 2018-01-01 RX ADMIN — GADOBENATE DIMEGLUMINE 17 ML: 529 INJECTION, SOLUTION INTRAVENOUS at 15:40

## 2018-01-01 RX ADMIN — POTASSIUM CHLORIDE 10 MEQ: 10 INJECTION, SOLUTION INTRAVENOUS at 12:26

## 2018-01-01 RX ADMIN — HYDROMORPHONE HYDROCHLORIDE 0.25 MG: 1 INJECTION, SOLUTION INTRAMUSCULAR; INTRAVENOUS; SUBCUTANEOUS at 21:08

## 2018-01-01 RX ADMIN — DOCUSATE SODIUM AND SENNOSIDES 2 TABLET: 8.6; 5 TABLET, FILM COATED ORAL at 22:08

## 2018-01-01 RX ADMIN — SODIUM CHLORIDE 3 G: 900 INJECTION INTRAVENOUS at 09:21

## 2018-01-01 RX ADMIN — PIPERACILLIN SODIUM AND TAZOBACTAM SODIUM 3.38 G: 3; .375 INJECTION, POWDER, LYOPHILIZED, FOR SOLUTION INTRAVENOUS at 05:52

## 2018-01-01 RX ADMIN — HYDROMORPHONE HYDROCHLORIDE 0.25 MG: 1 INJECTION, SOLUTION INTRAMUSCULAR; INTRAVENOUS; SUBCUTANEOUS at 03:15

## 2018-01-01 RX ADMIN — POTASSIUM CHLORIDE 40 MEQ: 1500 TABLET, EXTENDED RELEASE ORAL at 19:37

## 2018-01-01 RX ADMIN — PIPERACILLIN SODIUM AND TAZOBACTAM SODIUM 3.38 G: 3; .375 INJECTION, POWDER, LYOPHILIZED, FOR SOLUTION INTRAVENOUS at 04:47

## 2018-01-01 RX ADMIN — POTASSIUM CHLORIDE 40 MEQ: 1500 TABLET, EXTENDED RELEASE ORAL at 09:01

## 2018-01-01 RX ADMIN — LEVETIRACETAM 500 MG: 5 INJECTION INTRAVENOUS at 08:24

## 2018-01-01 RX ADMIN — LEVETIRACETAM 500 MG: 500 TABLET ORAL at 08:01

## 2018-01-01 RX ADMIN — HYDROMORPHONE HYDROCHLORIDE 0.25 MG: 1 INJECTION, SOLUTION INTRAMUSCULAR; INTRAVENOUS; SUBCUTANEOUS at 17:08

## 2018-01-01 RX ADMIN — SODIUM CHLORIDE 75 ML/HR: 4.5 INJECTION, SOLUTION INTRAVENOUS at 11:31

## 2018-01-01 RX ADMIN — POTASSIUM CHLORIDE 20 MEQ: 1500 TABLET, EXTENDED RELEASE ORAL at 08:41

## 2018-01-01 RX ADMIN — SODIUM CHLORIDE, POTASSIUM CHLORIDE, SODIUM LACTATE AND CALCIUM CHLORIDE 125 ML/HR: 600; 310; 30; 20 INJECTION, SOLUTION INTRAVENOUS at 11:50

## 2018-01-01 RX ADMIN — LEVETIRACETAM 1000 MG: 10 INJECTION INTRAVENOUS at 09:26

## 2018-01-01 RX ADMIN — LEVETIRACETAM 1000 MG: 10 INJECTION INTRAVENOUS at 20:20

## 2018-01-01 RX ADMIN — Medication 1500 MG: at 06:37

## 2018-01-01 RX ADMIN — Medication 10 ML: at 23:42

## 2018-01-01 RX ADMIN — POTASSIUM CHLORIDE 20 MEQ: 1500 TABLET, EXTENDED RELEASE ORAL at 20:10

## 2018-01-01 RX ADMIN — LEVETIRACETAM 500 MG: 500 TABLET ORAL at 08:10

## 2018-01-01 RX ADMIN — DIGOXIN 125 MCG: 125 TABLET ORAL at 14:35

## 2018-01-01 RX ADMIN — SODIUM CHLORIDE 3 G: 900 INJECTION INTRAVENOUS at 10:07

## 2018-01-01 RX ADMIN — CETIRIZINE HYDROCHLORIDE 5 MG: 10 TABLET, FILM COATED ORAL at 09:04

## 2018-01-01 RX ADMIN — ASPIRIN 81 MG 81 MG: 81 TABLET ORAL at 09:45

## 2018-01-01 RX ADMIN — SODIUM CHLORIDE 3 G: 900 INJECTION INTRAVENOUS at 16:43

## 2018-01-01 RX ADMIN — ACETAMINOPHEN 650 MG: 650 SUPPOSITORY RECTAL at 00:26

## 2018-01-01 RX ADMIN — SODIUM CHLORIDE 3 G: 900 INJECTION INTRAVENOUS at 04:35

## 2018-01-01 RX ADMIN — METHOCARBAMOL 1000 MG: 100 INJECTION INTRAMUSCULAR; INTRAVENOUS at 00:41

## 2018-01-01 RX ADMIN — HYDROMORPHONE HYDROCHLORIDE 0.25 MG: 1 INJECTION, SOLUTION INTRAMUSCULAR; INTRAVENOUS; SUBCUTANEOUS at 03:37

## 2018-01-01 RX ADMIN — Medication 1500 MG: at 05:26

## 2018-01-01 RX ADMIN — DICLOFENAC 4 G: 10 GEL TOPICAL at 11:10

## 2018-01-01 RX ADMIN — DICLOFENAC 4 G: 10 GEL TOPICAL at 20:55

## 2018-01-01 RX ADMIN — RIVAROXABAN 5 MG: 10 TABLET, FILM COATED ORAL at 14:32

## 2018-01-01 RX ADMIN — PHENYLEPHRINE HYDROCHLORIDE 0.5 MCG/KG/MIN: 10 INJECTION INTRAVENOUS at 02:52

## 2018-01-01 RX ADMIN — POTASSIUM CHLORIDE 40 MEQ: 1500 TABLET, EXTENDED RELEASE ORAL at 05:49

## 2018-01-01 RX ADMIN — METOPROLOL SUCCINATE 100 MG: 50 TABLET, EXTENDED RELEASE ORAL at 16:56

## 2018-01-01 RX ADMIN — HYDROMORPHONE HYDROCHLORIDE 0.5 MG: 1 INJECTION, SOLUTION INTRAMUSCULAR; INTRAVENOUS; SUBCUTANEOUS at 06:39

## 2018-01-01 RX ADMIN — ENOXAPARIN SODIUM 90 MG: 100 INJECTION SUBCUTANEOUS at 17:13

## 2018-01-01 RX ADMIN — LEVETIRACETAM 500 MG: 5 INJECTION INTRAVENOUS at 20:58

## 2018-01-01 RX ADMIN — METHOCARBAMOL 1000 MG: 100 INJECTION INTRAMUSCULAR; INTRAVENOUS at 17:09

## 2018-01-01 RX ADMIN — AMPICILLIN 2 G: 2 INJECTION, POWDER, FOR SOLUTION INTRAVENOUS at 05:31

## 2018-01-01 RX ADMIN — METOPROLOL SUCCINATE 25 MG: 50 TABLET, EXTENDED RELEASE ORAL at 01:16

## 2018-01-01 RX ADMIN — METHOCARBAMOL 500 MG: 500 TABLET ORAL at 18:47

## 2018-01-01 RX ADMIN — DOCUSATE SODIUM AND SENNOSIDES 2 TABLET: 8.6; 5 TABLET, FILM COATED ORAL at 09:02

## 2018-01-01 RX ADMIN — SODIUM CHLORIDE 3 G: 900 INJECTION INTRAVENOUS at 09:17

## 2018-01-01 RX ADMIN — GUAIFENESIN 600 MG: 600 TABLET, EXTENDED RELEASE ORAL at 08:01

## 2018-01-01 RX ADMIN — METHOCARBAMOL 500 MG: 500 TABLET ORAL at 15:33

## 2018-01-01 RX ADMIN — Medication 10 ML: at 18:22

## 2018-01-01 RX ADMIN — CETIRIZINE HYDROCHLORIDE 5 MG: 10 TABLET, FILM COATED ORAL at 09:45

## 2018-01-01 RX ADMIN — LEVETIRACETAM 500 MG: 500 TABLET ORAL at 22:08

## 2018-01-01 RX ADMIN — LEVETIRACETAM 500 MG: 500 TABLET ORAL at 09:43

## 2018-01-01 RX ADMIN — ENOXAPARIN SODIUM 90 MG: 100 INJECTION SUBCUTANEOUS at 18:54

## 2018-01-01 RX ADMIN — POTASSIUM CHLORIDE 10 MEQ: 10 INJECTION, SOLUTION INTRAVENOUS at 16:21

## 2018-01-01 RX ADMIN — SODIUM CHLORIDE 500 ML: 9 INJECTION, SOLUTION INTRAVENOUS at 23:13

## 2018-01-01 RX ADMIN — GUAIFENESIN 600 MG: 600 TABLET, EXTENDED RELEASE ORAL at 09:43

## 2018-01-01 RX ADMIN — METHOCARBAMOL 500 MG: 500 TABLET ORAL at 07:53

## 2018-01-01 RX ADMIN — SODIUM CHLORIDE 3 G: 900 INJECTION INTRAVENOUS at 22:38

## 2018-01-01 RX ADMIN — SODIUM BICARBONATE 50 MEQ: 84 INJECTION, SOLUTION INTRAVENOUS at 06:05

## 2018-01-01 RX ADMIN — Medication 1500 MG: at 13:18

## 2018-01-01 RX ADMIN — ACETAMINOPHEN 650 MG: 325 TABLET, FILM COATED ORAL at 20:24

## 2018-01-01 RX ADMIN — DILTIAZEM HYDROCHLORIDE 240 MG: 120 CAPSULE, COATED, EXTENDED RELEASE ORAL at 09:01

## 2018-01-01 RX ADMIN — LEVETIRACETAM 500 MG: 500 TABLET ORAL at 20:10

## 2018-01-01 RX ADMIN — DICLOFENAC 4 G: 10 GEL TOPICAL at 13:02

## 2018-01-01 RX ADMIN — PIPERACILLIN SODIUM AND TAZOBACTAM SODIUM 3.38 G: 3; .375 INJECTION, POWDER, LYOPHILIZED, FOR SOLUTION INTRAVENOUS at 22:08

## 2018-01-01 RX ADMIN — SODIUM CHLORIDE 3 G: 900 INJECTION INTRAVENOUS at 03:57

## 2018-01-01 RX ADMIN — DILTIAZEM HYDROCHLORIDE 20 MG: 5 INJECTION INTRAVENOUS at 18:23

## 2018-01-01 RX ADMIN — AMPICILLIN 2 G: 2 INJECTION, POWDER, FOR SOLUTION INTRAVENOUS at 04:41

## 2018-01-01 RX ADMIN — GUAIFENESIN 600 MG: 600 TABLET, EXTENDED RELEASE ORAL at 09:03

## 2018-01-01 RX ADMIN — DIGOXIN 500 MCG: 0.25 INJECTION INTRAMUSCULAR; INTRAVENOUS at 13:06

## 2018-01-01 RX ADMIN — DILTIAZEM HYDROCHLORIDE 45 MG: 30 TABLET, FILM COATED ORAL at 11:14

## 2018-01-01 RX ADMIN — DICLOFENAC 4 G: 10 GEL TOPICAL at 08:24

## 2018-01-01 RX ADMIN — RIVAROXABAN 15 MG: 15 TABLET, FILM COATED ORAL at 09:25

## 2018-01-01 RX ADMIN — LEVETIRACETAM 500 MG: 5 INJECTION INTRAVENOUS at 08:46

## 2018-01-01 RX ADMIN — AMPICILLIN 2 G: 2 INJECTION, POWDER, FOR SOLUTION INTRAVENOUS at 20:52

## 2018-01-01 RX ADMIN — HYDROMORPHONE HYDROCHLORIDE 0.25 MG: 1 INJECTION, SOLUTION INTRAMUSCULAR; INTRAVENOUS; SUBCUTANEOUS at 18:11

## 2018-01-01 RX ADMIN — RIVAROXABAN 15 MG: 15 TABLET, FILM COATED ORAL at 09:01

## 2018-01-01 RX ADMIN — SODIUM CHLORIDE 75 ML/HR: 4.5 INJECTION, SOLUTION INTRAVENOUS at 22:31

## 2018-01-01 RX ADMIN — Medication 1500 MG: at 12:28

## 2018-01-01 RX ADMIN — SODIUM CHLORIDE 3 G: 900 INJECTION INTRAVENOUS at 10:44

## 2018-01-01 RX ADMIN — AMPICILLIN 2 G: 2 INJECTION, POWDER, FOR SOLUTION INTRAVENOUS at 17:47

## 2018-01-01 RX ADMIN — LEVETIRACETAM 500 MG: 5 INJECTION INTRAVENOUS at 21:27

## 2018-01-01 RX ADMIN — AMPICILLIN 2 G: 2 INJECTION, POWDER, FOR SOLUTION INTRAVENOUS at 01:07

## 2018-01-01 RX ADMIN — LEVETIRACETAM 500 MG: 500 INJECTION, SOLUTION, CONCENTRATE INTRAVENOUS at 21:26

## 2018-01-01 RX ADMIN — METOPROLOL TARTRATE 5 MG: 1 INJECTION, SOLUTION INTRAVENOUS at 20:56

## 2018-01-01 RX ADMIN — POTASSIUM CHLORIDE 40 MEQ: 1500 TABLET, EXTENDED RELEASE ORAL at 06:20

## 2018-01-01 RX ADMIN — SODIUM CHLORIDE 100 ML/HR: 4.5 INJECTION, SOLUTION INTRAVENOUS at 07:00

## 2018-01-01 RX ADMIN — DIATRIZOATE MEGLUMINE AND DIATRIZOATE SODIUM 30 ML: 600; 100 SOLUTION ORAL; RECTAL at 18:45

## 2018-01-01 RX ADMIN — BUMETANIDE 2 MG: 1 TABLET ORAL at 08:35

## 2018-01-01 RX ADMIN — ASPIRIN 81 MG 81 MG: 81 TABLET ORAL at 09:25

## 2018-01-01 RX ADMIN — AMPICILLIN 2 G: 2 INJECTION, POWDER, FOR SOLUTION INTRAVENOUS at 14:35

## 2018-01-01 RX ADMIN — AMPICILLIN 2 G: 2 INJECTION, POWDER, FOR SOLUTION INTRAVENOUS at 01:37

## 2018-01-01 RX ADMIN — LIDOCAINE 2 PATCH: 50 PATCH CUTANEOUS at 21:32

## 2018-01-01 RX ADMIN — METOPROLOL TARTRATE 5 MG: 1 INJECTION, SOLUTION INTRAVENOUS at 05:32

## 2018-01-01 RX ADMIN — AMPICILLIN 2 G: 2 INJECTION, POWDER, FOR SOLUTION INTRAVENOUS at 20:34

## 2018-01-01 RX ADMIN — HYDROMORPHONE HYDROCHLORIDE 0.25 MG: 1 INJECTION, SOLUTION INTRAMUSCULAR; INTRAVENOUS; SUBCUTANEOUS at 04:35

## 2018-01-01 RX ADMIN — ENOXAPARIN SODIUM 90 MG: 100 INJECTION SUBCUTANEOUS at 05:09

## 2018-01-01 RX ADMIN — NITROGLYCERIN 0.4 MG: 0.4 TABLET SUBLINGUAL at 23:27

## 2018-01-01 RX ADMIN — SODIUM CHLORIDE 3 G: 900 INJECTION INTRAVENOUS at 22:03

## 2018-01-01 RX ADMIN — AMPICILLIN 2 G: 2 INJECTION, POWDER, FOR SOLUTION INTRAVENOUS at 13:10

## 2018-01-01 RX ADMIN — HYDROMORPHONE HYDROCHLORIDE 0.25 MG: 1 INJECTION, SOLUTION INTRAMUSCULAR; INTRAVENOUS; SUBCUTANEOUS at 23:03

## 2018-01-01 RX ADMIN — BUMETANIDE 2 MG: 1 TABLET ORAL at 08:06

## 2018-01-01 RX ADMIN — METOPROLOL SUCCINATE 100 MG: 50 TABLET, EXTENDED RELEASE ORAL at 10:07

## 2018-01-01 RX ADMIN — LEVETIRACETAM 1000 MG: 10 INJECTION INTRAVENOUS at 13:45

## 2018-01-01 RX ADMIN — LEVETIRACETAM 500 MG: 500 TABLET ORAL at 20:37

## 2018-01-01 RX ADMIN — LEVETIRACETAM 500 MG: 500 TABLET ORAL at 08:36

## 2018-01-01 RX ADMIN — METHOCARBAMOL 750 MG: 500 TABLET ORAL at 08:02

## 2018-01-01 RX ADMIN — DIGOXIN 125 MCG: 125 TABLET ORAL at 11:19

## 2018-01-01 RX ADMIN — HALOPERIDOL LACTATE 2 MG: 5 INJECTION, SOLUTION INTRAMUSCULAR at 19:20

## 2018-01-01 RX ADMIN — LEVETIRACETAM 1000 MG: 10 INJECTION INTRAVENOUS at 05:01

## 2018-01-01 RX ADMIN — SODIUM CHLORIDE 3 G: 900 INJECTION INTRAVENOUS at 04:10

## 2018-01-01 RX ADMIN — RIVAROXABAN 15 MG: 15 TABLET, FILM COATED ORAL at 09:44

## 2018-01-01 RX ADMIN — DILTIAZEM HYDROCHLORIDE 20 MG: 5 INJECTION INTRAVENOUS at 07:50

## 2018-01-01 RX ADMIN — VANCOMYCIN HYDROCHLORIDE 1500 MG: 1 INJECTION, POWDER, LYOPHILIZED, FOR SOLUTION INTRAVENOUS at 00:01

## 2018-01-01 RX ADMIN — HYDROMORPHONE HYDROCHLORIDE 0.5 MG: 1 INJECTION, SOLUTION INTRAMUSCULAR; INTRAVENOUS; SUBCUTANEOUS at 08:49

## 2018-01-01 RX ADMIN — ASPIRIN 81 MG 81 MG: 81 TABLET ORAL at 16:57

## 2018-01-01 RX ADMIN — DILTIAZEM HYDROCHLORIDE 20 MG: 5 INJECTION INTRAVENOUS at 13:09

## 2018-01-01 RX ADMIN — SODIUM CHLORIDE 250 ML: 9 INJECTION, SOLUTION INTRAVENOUS at 17:54

## 2018-01-01 RX ADMIN — SODIUM CHLORIDE, POTASSIUM CHLORIDE, SODIUM LACTATE AND CALCIUM CHLORIDE 500 ML: 600; 310; 30; 20 INJECTION, SOLUTION INTRAVENOUS at 03:21

## 2018-01-01 RX ADMIN — DOCUSATE SODIUM AND SENNOSIDES 2 TABLET: 8.6; 5 TABLET, FILM COATED ORAL at 12:46

## 2018-01-01 RX ADMIN — DILTIAZEM HYDROCHLORIDE 300 MG: 180 CAPSULE, COATED, EXTENDED RELEASE ORAL at 08:36

## 2018-01-01 RX ADMIN — AMPICILLIN 2 G: 2 INJECTION, POWDER, FOR SOLUTION INTRAVENOUS at 05:02

## 2018-01-01 RX ADMIN — HYDROMORPHONE HYDROCHLORIDE 0.5 MG: 1 INJECTION, SOLUTION INTRAMUSCULAR; INTRAVENOUS; SUBCUTANEOUS at 03:13

## 2018-01-01 RX ADMIN — Medication 1 CAPSULE: at 08:01

## 2018-01-01 RX ADMIN — NALOXONE HYDROCHLORIDE 0.4 MG: 0.4 INJECTION, SOLUTION INTRAMUSCULAR; INTRAVENOUS; SUBCUTANEOUS at 12:13

## 2018-01-01 RX ADMIN — METHOCARBAMOL 750 MG: 500 TABLET ORAL at 11:15

## 2018-01-01 RX ADMIN — HYDROMORPHONE HYDROCHLORIDE 1 MG: 1 INJECTION, SOLUTION INTRAMUSCULAR; INTRAVENOUS; SUBCUTANEOUS at 14:00

## 2018-01-01 RX ADMIN — SODIUM CHLORIDE 100 ML/HR: 4.5 INJECTION, SOLUTION INTRAVENOUS at 11:17

## 2018-01-01 RX ADMIN — IPRATROPIUM BROMIDE 6 PUFF: 17 AEROSOL, METERED RESPIRATORY (INHALATION) at 07:48

## 2018-01-01 RX ADMIN — DILTIAZEM HYDROCHLORIDE 45 MG: 30 TABLET, FILM COATED ORAL at 19:00

## 2018-01-01 RX ADMIN — ENOXAPARIN SODIUM 90 MG: 100 INJECTION SUBCUTANEOUS at 17:09

## 2018-01-01 RX ADMIN — POTASSIUM CHLORIDE 10 MEQ: 10 INJECTION, SOLUTION INTRAVENOUS at 10:58

## 2018-01-01 RX ADMIN — DIGOXIN 125 MCG: 125 TABLET ORAL at 13:01

## 2018-01-01 RX ADMIN — POTASSIUM CHLORIDE 10 MEQ: 10 INJECTION, SOLUTION INTRAVENOUS at 09:58

## 2018-01-01 RX ADMIN — METOPROLOL TARTRATE 2.5 MG: 1 INJECTION, SOLUTION INTRAVENOUS at 00:08

## 2018-01-01 RX ADMIN — Medication 10 ML: at 10:54

## 2018-01-01 RX ADMIN — LIDOCAINE 2 PATCH: 50 PATCH CUTANEOUS at 22:26

## 2018-01-01 RX ADMIN — NOREPINEPHRINE BITARTRATE 0.02 MCG/KG/MIN: 1 INJECTION, SOLUTION, CONCENTRATE INTRAVENOUS at 08:00

## 2018-01-01 RX ADMIN — PROPOFOL 5 MCG/KG/MIN: 10 INJECTION, EMULSION INTRAVENOUS at 20:13

## 2018-01-01 RX ADMIN — METHOCARBAMOL 500 MG: 500 TABLET ORAL at 14:41

## 2018-01-01 RX ADMIN — DICLOFENAC 4 G: 10 GEL TOPICAL at 08:47

## 2018-01-01 RX ADMIN — DICLOFENAC 4 G: 10 GEL TOPICAL at 08:07

## 2018-01-01 RX ADMIN — POTASSIUM CHLORIDE 20 MEQ: 1500 TABLET, EXTENDED RELEASE ORAL at 08:36

## 2018-01-01 RX ADMIN — METOPROLOL SUCCINATE 100 MG: 50 TABLET, EXTENDED RELEASE ORAL at 08:35

## 2018-01-01 RX ADMIN — ASPIRIN 81 MG 81 MG: 81 TABLET ORAL at 08:35

## 2018-01-01 RX ADMIN — ASPIRIN 81 MG 81 MG: 81 TABLET ORAL at 08:01

## 2018-01-01 RX ADMIN — SODIUM CHLORIDE 3 G: 900 INJECTION INTRAVENOUS at 12:24

## 2018-01-01 RX ADMIN — POTASSIUM CHLORIDE 10 MEQ: 10 INJECTION, SOLUTION INTRAVENOUS at 15:14

## 2018-01-01 RX ADMIN — METHOCARBAMOL 500 MG: 500 TABLET ORAL at 01:14

## 2018-01-01 RX ADMIN — DILTIAZEM HYDROCHLORIDE 300 MG: 180 CAPSULE, COATED, EXTENDED RELEASE ORAL at 10:06

## 2018-01-01 RX ADMIN — ACETAMINOPHEN 650 MG: 650 SUPPOSITORY RECTAL at 20:06

## 2018-01-01 RX ADMIN — SODIUM CHLORIDE 40 ML: 9 INJECTION, SOLUTION INTRAVENOUS at 05:56

## 2018-01-01 RX ADMIN — HYDROMORPHONE HYDROCHLORIDE 0.25 MG: 1 INJECTION, SOLUTION INTRAMUSCULAR; INTRAVENOUS; SUBCUTANEOUS at 03:54

## 2018-01-01 RX ADMIN — LORAZEPAM 1 MG: 2 INJECTION INTRAMUSCULAR; INTRAVENOUS at 12:10

## 2018-01-01 RX ADMIN — POTASSIUM CHLORIDE 10 MEQ: 10 INJECTION, SOLUTION INTRAVENOUS at 13:45

## 2018-01-01 RX ADMIN — Medication 1 MG: at 09:01

## 2018-01-01 RX ADMIN — HYDROMORPHONE HYDROCHLORIDE 0.5 MG: 1 INJECTION, SOLUTION INTRAMUSCULAR; INTRAVENOUS; SUBCUTANEOUS at 23:23

## 2018-01-01 RX ADMIN — RIVAROXABAN 20 MG: 20 TABLET, FILM COATED ORAL at 18:11

## 2018-01-01 RX ADMIN — HYDROMORPHONE HYDROCHLORIDE 0.5 MG: 1 INJECTION, SOLUTION INTRAMUSCULAR; INTRAVENOUS; SUBCUTANEOUS at 21:24

## 2018-01-01 RX ADMIN — NITROGLYCERIN: 0.4 TABLET SUBLINGUAL at 23:45

## 2018-01-01 RX ADMIN — POLYETHYLENE GLYCOL (3350) 17 G: 17 POWDER, FOR SOLUTION ORAL at 12:47

## 2018-01-01 RX ADMIN — Medication 1 MG: at 09:31

## 2018-01-01 RX ADMIN — DILTIAZEM HYDROCHLORIDE 20 MG: 5 INJECTION INTRAVENOUS at 01:07

## 2018-01-01 RX ADMIN — DILTIAZEM HYDROCHLORIDE 300 MG: 180 CAPSULE, COATED, EXTENDED RELEASE ORAL at 08:08

## 2018-01-01 RX ADMIN — METHOCARBAMOL 500 MG: 500 TABLET ORAL at 08:36

## 2018-01-01 RX ADMIN — BUMETANIDE 2 MG: 1 TABLET ORAL at 08:00

## 2018-01-01 RX ADMIN — GUAIFENESIN 600 MG: 600 TABLET, EXTENDED RELEASE ORAL at 16:57

## 2018-01-01 RX ADMIN — HYDROMORPHONE HYDROCHLORIDE 0.25 MG: 1 INJECTION, SOLUTION INTRAMUSCULAR; INTRAVENOUS; SUBCUTANEOUS at 15:05

## 2018-01-01 RX ADMIN — DICLOFENAC 4 G: 10 GEL TOPICAL at 00:42

## 2018-01-01 RX ADMIN — AMPICILLIN 2 G: 2 INJECTION, POWDER, FOR SOLUTION INTRAVENOUS at 17:44

## 2018-01-01 RX ADMIN — Medication 1 CAPSULE: at 09:25

## 2018-01-01 RX ADMIN — AMPICILLIN 2 G: 2 INJECTION, POWDER, FOR SOLUTION INTRAVENOUS at 00:42

## 2018-01-01 RX ADMIN — POTASSIUM CHLORIDE 40 MEQ: 1500 TABLET, EXTENDED RELEASE ORAL at 14:32

## 2018-01-01 RX ADMIN — AMPICILLIN 2 G: 2 INJECTION, POWDER, FOR SOLUTION INTRAVENOUS at 09:25

## 2018-01-01 RX ADMIN — ACETAMINOPHEN 650 MG: 650 SUPPOSITORY RECTAL at 22:03

## 2018-01-01 RX ADMIN — LEVETIRACETAM 500 MG: 500 TABLET ORAL at 23:01

## 2018-01-01 RX ADMIN — DILTIAZEM HYDROCHLORIDE 45 MG: 30 TABLET, FILM COATED ORAL at 05:12

## 2018-01-01 RX ADMIN — Medication 1 MG: at 08:00

## 2018-01-01 RX ADMIN — METHOCARBAMOL 500 MG: 500 TABLET ORAL at 13:04

## 2018-01-01 RX ADMIN — GENTAMICIN SULFATE 100 MG: 40 INJECTION, SOLUTION INTRAMUSCULAR; INTRAVENOUS at 11:52

## 2018-01-01 RX ADMIN — SODIUM CHLORIDE 500 ML: 900 INJECTION, SOLUTION INTRAVENOUS at 06:06

## 2018-01-01 RX ADMIN — METOPROLOL SUCCINATE 100 MG: 50 TABLET, EXTENDED RELEASE ORAL at 07:54

## 2018-01-01 RX ADMIN — METHOCARBAMOL 1000 MG: 100 INJECTION INTRAMUSCULAR; INTRAVENOUS at 01:08

## 2018-01-01 RX ADMIN — METOPROLOL SUCCINATE 100 MG: 50 TABLET, EXTENDED RELEASE ORAL at 08:42

## 2018-01-01 RX ADMIN — LEVETIRACETAM 500 MG: 500 TABLET ORAL at 21:36

## 2018-01-01 RX ADMIN — DOCUSATE SODIUM AND SENNOSIDES 2 TABLET: 8.6; 5 TABLET, FILM COATED ORAL at 23:00

## 2018-01-01 RX ADMIN — AMPICILLIN 2 G: 2 INJECTION, POWDER, FOR SOLUTION INTRAVENOUS at 08:46

## 2018-01-01 RX ADMIN — METHOCARBAMOL 500 MG: 500 TABLET ORAL at 23:33

## 2018-01-01 RX ADMIN — ALBUTEROL SULFATE 2 PUFF: 90 AEROSOL, METERED RESPIRATORY (INHALATION) at 00:28

## 2018-01-01 RX ADMIN — SODIUM CHLORIDE 100 ML/HR: 4.5 INJECTION, SOLUTION INTRAVENOUS at 20:16

## 2018-01-01 RX ADMIN — DILTIAZEM HYDROCHLORIDE 45 MG: 30 TABLET, FILM COATED ORAL at 23:16

## 2018-01-01 RX ADMIN — METHOCARBAMOL 1000 MG: 100 INJECTION INTRAMUSCULAR; INTRAVENOUS at 11:21

## 2018-01-01 RX ADMIN — LIDOCAINE HYDROCHLORIDE 8 ML: 10 INJECTION, SOLUTION EPIDURAL; INFILTRATION; INTRACAUDAL; PERINEURAL at 14:02

## 2018-01-01 RX ADMIN — POTASSIUM CHLORIDE 40 MEQ: 1500 TABLET, EXTENDED RELEASE ORAL at 18:11

## 2018-01-01 RX ADMIN — METHOCARBAMOL 750 MG: 500 TABLET ORAL at 21:05

## 2018-01-01 RX ADMIN — ACETAMINOPHEN 650 MG: 325 TABLET, FILM COATED ORAL at 15:31

## 2018-01-01 RX ADMIN — Medication 1 MG: at 08:36

## 2018-01-01 RX ADMIN — LEVETIRACETAM 500 MG: 500 TABLET ORAL at 20:23

## 2018-01-01 RX ADMIN — ENOXAPARIN SODIUM 90 MG: 100 INJECTION SUBCUTANEOUS at 06:34

## 2018-01-01 RX ADMIN — DICLOFENAC 4 G: 10 GEL TOPICAL at 17:44

## 2018-01-01 RX ADMIN — LEVETIRACETAM 500 MG: 500 TABLET ORAL at 09:01

## 2018-01-01 RX ADMIN — HYDROMORPHONE HYDROCHLORIDE 0.25 MG: 1 INJECTION, SOLUTION INTRAMUSCULAR; INTRAVENOUS; SUBCUTANEOUS at 01:37

## 2018-01-01 RX ADMIN — DILTIAZEM HYDROCHLORIDE 240 MG: 120 CAPSULE, COATED, EXTENDED RELEASE ORAL at 09:45

## 2018-01-01 RX ADMIN — HYDROMORPHONE HYDROCHLORIDE 0.5 MG: 1 INJECTION, SOLUTION INTRAMUSCULAR; INTRAVENOUS; SUBCUTANEOUS at 05:17

## 2018-01-01 RX ADMIN — HYDROMORPHONE HYDROCHLORIDE 0.25 MG: 1 INJECTION, SOLUTION INTRAMUSCULAR; INTRAVENOUS; SUBCUTANEOUS at 11:11

## 2018-01-01 RX ADMIN — ASPIRIN 81 MG 81 MG: 81 TABLET ORAL at 08:41

## 2018-01-01 RX ADMIN — PIPERACILLIN SODIUM AND TAZOBACTAM SODIUM 3.38 G: 3; .375 INJECTION, POWDER, LYOPHILIZED, FOR SOLUTION INTRAVENOUS at 15:32

## 2018-01-01 RX ADMIN — VASOPRESSIN 0.02 UNITS/MIN: 20 INJECTION, SOLUTION INTRAMUSCULAR; SUBCUTANEOUS at 04:53

## 2018-01-01 RX ADMIN — METOPROLOL SUCCINATE 100 MG: 50 TABLET, EXTENDED RELEASE ORAL at 08:27

## 2018-01-01 RX ADMIN — PIPERACILLIN SODIUM AND TAZOBACTAM SODIUM 3.38 G: 3; .375 INJECTION, POWDER, LYOPHILIZED, FOR SOLUTION INTRAVENOUS at 17:57

## 2018-01-01 RX ADMIN — METHOCARBAMOL 500 MG: 500 TABLET ORAL at 18:10

## 2018-01-01 RX ADMIN — POTASSIUM CHLORIDE 40 MEQ: 1500 TABLET, EXTENDED RELEASE ORAL at 07:51

## 2018-01-01 RX ADMIN — AMPICILLIN 2 G: 2 INJECTION, POWDER, FOR SOLUTION INTRAVENOUS at 17:12

## 2018-01-01 RX ADMIN — GADOBENATE DIMEGLUMINE 17 ML: 529 INJECTION, SOLUTION INTRAVENOUS at 12:50

## 2018-01-01 RX ADMIN — ETOMIDATE 10 MG: 2 INJECTION, SOLUTION INTRAVENOUS at 19:02

## 2018-01-01 RX ADMIN — DILTIAZEM HYDROCHLORIDE 20 MG: 5 INJECTION INTRAVENOUS at 11:41

## 2018-01-01 RX ADMIN — SODIUM CHLORIDE 3 G: 900 INJECTION INTRAVENOUS at 21:44

## 2018-01-01 RX ADMIN — IPRATROPIUM BROMIDE 2 PUFF: 17 AEROSOL, METERED RESPIRATORY (INHALATION) at 00:28

## 2018-01-01 RX ADMIN — SODIUM CHLORIDE 3 G: 900 INJECTION INTRAVENOUS at 16:38

## 2018-01-01 RX ADMIN — GADOBENATE DIMEGLUMINE 18 ML: 529 INJECTION, SOLUTION INTRAVENOUS at 14:15

## 2018-01-01 RX ADMIN — SODIUM CHLORIDE 3 G: 900 INJECTION INTRAVENOUS at 16:16

## 2018-01-01 RX ADMIN — SODIUM CHLORIDE 3 G: 900 INJECTION INTRAVENOUS at 22:02

## 2018-01-01 RX ADMIN — HYDROMORPHONE HYDROCHLORIDE 0.5 MG: 1 INJECTION, SOLUTION INTRAMUSCULAR; INTRAVENOUS; SUBCUTANEOUS at 11:05

## 2018-01-01 RX ADMIN — RIVAROXABAN 15 MG: 15 TABLET, FILM COATED ORAL at 08:01

## 2018-01-01 RX ADMIN — DILTIAZEM HYDROCHLORIDE 300 MG: 180 CAPSULE, COATED, EXTENDED RELEASE ORAL at 08:00

## 2018-01-01 RX ADMIN — ENOXAPARIN SODIUM 90 MG: 100 INJECTION SUBCUTANEOUS at 05:39

## 2018-01-01 RX ADMIN — MAGNESIUM SULFATE HEPTAHYDRATE 4 G: 40 INJECTION, SOLUTION INTRAVENOUS at 09:39

## 2018-01-01 RX ADMIN — SODIUM BICARBONATE: 84 INJECTION, SOLUTION INTRAVENOUS at 06:49

## 2018-01-01 RX ADMIN — IOPAMIDOL 100 ML: 755 INJECTION, SOLUTION INTRAVENOUS at 22:45

## 2018-01-01 RX ADMIN — METOPROLOL SUCCINATE 100 MG: 50 TABLET, EXTENDED RELEASE ORAL at 08:09

## 2018-01-01 RX ADMIN — PIPERACILLIN SODIUM AND TAZOBACTAM SODIUM 3.38 G: 3; .375 INJECTION, POWDER, LYOPHILIZED, FOR SOLUTION INTRAVENOUS at 23:01

## 2018-01-01 RX ADMIN — CETIRIZINE HYDROCHLORIDE 5 MG: 10 TABLET, FILM COATED ORAL at 08:36

## 2018-01-01 RX ADMIN — BUMETANIDE 2 MG: 0.25 INJECTION INTRAMUSCULAR; INTRAVENOUS at 05:49

## 2018-01-01 RX ADMIN — HYDROMORPHONE HYDROCHLORIDE 0.25 MG: 1 INJECTION, SOLUTION INTRAMUSCULAR; INTRAVENOUS; SUBCUTANEOUS at 09:27

## 2018-01-01 RX ADMIN — ENOXAPARIN SODIUM 90 MG: 100 INJECTION SUBCUTANEOUS at 17:45

## 2018-01-01 RX ADMIN — BUMETANIDE 2 MG: 1 TABLET ORAL at 09:25

## 2018-01-01 RX ADMIN — POTASSIUM CHLORIDE 10 MEQ: 10 INJECTION, SOLUTION INTRAVENOUS at 08:47

## 2018-01-01 RX ADMIN — Medication 1 CAPSULE: at 08:36

## 2018-01-01 RX ADMIN — PHENYLEPHRINE HYDROCHLORIDE 3 MCG/KG/MIN: 10 INJECTION INTRAVENOUS at 07:57

## 2018-01-01 RX ADMIN — Medication 1 MG: at 09:45

## 2018-01-01 RX ADMIN — METOPROLOL TARTRATE 5 MG: 1 INJECTION, SOLUTION INTRAVENOUS at 14:05

## 2018-01-01 RX ADMIN — CETIRIZINE HYDROCHLORIDE 5 MG: 10 TABLET, FILM COATED ORAL at 16:56

## 2018-01-01 RX ADMIN — DIGOXIN 125 MCG: 0.25 INJECTION INTRAMUSCULAR; INTRAVENOUS at 13:08

## 2018-01-01 RX ADMIN — DIGOXIN 125 MCG: 125 TABLET ORAL at 11:30

## 2018-01-01 RX ADMIN — DOCUSATE SODIUM AND SENNOSIDES 2 TABLET: 8.6; 5 TABLET, FILM COATED ORAL at 20:20

## 2018-01-01 RX ADMIN — POTASSIUM CHLORIDE 40 MEQ: 1500 TABLET, EXTENDED RELEASE ORAL at 09:39

## 2018-01-01 RX ADMIN — BUMETANIDE 2 MG: 0.25 INJECTION INTRAMUSCULAR; INTRAVENOUS at 17:08

## 2018-01-01 RX ADMIN — CETIRIZINE HYDROCHLORIDE 5 MG: 10 TABLET, FILM COATED ORAL at 08:01

## 2018-01-01 RX ADMIN — NITROGLYCERIN 0.4 MG: 0.4 TABLET SUBLINGUAL at 02:14

## 2018-01-01 RX ADMIN — ACETAMINOPHEN 650 MG: 325 TABLET, FILM COATED ORAL at 11:31

## 2018-01-01 RX ADMIN — FENTANYL 1 PATCH: 12 PATCH TRANSDERMAL at 18:10

## 2018-01-01 RX ADMIN — GUAIFENESIN 600 MG: 600 TABLET, EXTENDED RELEASE ORAL at 09:25

## 2018-01-01 RX ADMIN — ASPIRIN 150 MG: 300 SUPPOSITORY RECTAL at 09:27

## 2018-01-01 RX ADMIN — ASPIRIN 81 MG 162 MG: 81 TABLET ORAL at 00:15

## 2018-01-01 RX ADMIN — SODIUM CHLORIDE 500 ML: 9 INJECTION, SOLUTION INTRAVENOUS at 19:28

## 2018-01-01 RX ADMIN — DEXTROSE AND SODIUM CHLORIDE 75 ML/HR: 5; 900 INJECTION, SOLUTION INTRAVENOUS at 08:03

## 2018-01-01 RX ADMIN — Medication 10 ML: at 09:02

## 2018-01-01 RX ADMIN — POTASSIUM CHLORIDE 10 MEQ: 10 INJECTION, SOLUTION INTRAVENOUS at 12:44

## 2018-01-01 RX ADMIN — DICLOFENAC 4 G: 10 GEL TOPICAL at 17:09

## 2018-01-01 RX ADMIN — POTASSIUM CHLORIDE 10 MEQ: 10 INJECTION, SOLUTION INTRAVENOUS at 11:36

## 2018-01-01 RX ADMIN — ENOXAPARIN SODIUM 90 MG: 100 INJECTION SUBCUTANEOUS at 06:30

## 2018-01-01 RX ADMIN — LEVETIRACETAM 500 MG: 500 TABLET ORAL at 20:20

## 2018-01-01 RX ADMIN — Medication 1500 MG: at 11:31

## 2018-01-01 RX ADMIN — CETIRIZINE HYDROCHLORIDE 5 MG: 10 TABLET, FILM COATED ORAL at 09:25

## 2018-01-01 RX ADMIN — SODIUM CHLORIDE 100 ML/HR: 4.5 INJECTION, SOLUTION INTRAVENOUS at 21:25

## 2018-01-01 RX ADMIN — AMPICILLIN 2 G: 2 INJECTION, POWDER, FOR SOLUTION INTRAVENOUS at 13:02

## 2018-01-01 RX ADMIN — LEVETIRACETAM 500 MG: 500 TABLET ORAL at 09:25

## 2018-01-01 RX ADMIN — LEVETIRACETAM 500 MG: 5 INJECTION INTRAVENOUS at 20:52

## 2018-01-01 RX ADMIN — METOPROLOL SUCCINATE 100 MG: 50 TABLET, EXTENDED RELEASE ORAL at 09:43

## 2018-01-01 RX ADMIN — POTASSIUM CHLORIDE 10 MEQ: 10 INJECTION, SOLUTION INTRAVENOUS at 06:44

## 2018-01-01 RX ADMIN — Medication 1 MG: at 16:56

## 2018-01-01 RX ADMIN — DILTIAZEM HYDROCHLORIDE 300 MG: 180 CAPSULE, COATED, EXTENDED RELEASE ORAL at 08:29

## 2018-01-01 RX ADMIN — HYDROMORPHONE HYDROCHLORIDE 0.5 MG: 1 INJECTION, SOLUTION INTRAMUSCULAR; INTRAVENOUS; SUBCUTANEOUS at 11:12

## 2018-01-01 RX ADMIN — METHOCARBAMOL 500 MG: 500 TABLET ORAL at 09:03

## 2018-01-01 RX ADMIN — HYDROMORPHONE HYDROCHLORIDE 1 MG: 1 INJECTION, SOLUTION INTRAMUSCULAR; INTRAVENOUS; SUBCUTANEOUS at 12:50

## 2018-01-01 RX ADMIN — AMPICILLIN 2 G: 2 INJECTION, POWDER, FOR SOLUTION INTRAVENOUS at 21:46

## 2018-01-01 RX ADMIN — MIDAZOLAM HYDROCHLORIDE: 5 INJECTION, SOLUTION INTRAMUSCULAR; INTRAVENOUS at 01:08

## 2018-01-01 RX ADMIN — DEXTROSE AND SODIUM CHLORIDE 75 ML/HR: 5; 900 INJECTION, SOLUTION INTRAVENOUS at 02:43

## 2018-01-01 RX ADMIN — RIVAROXABAN 15 MG: 15 TABLET, FILM COATED ORAL at 16:57

## 2018-01-01 RX ADMIN — SODIUM CHLORIDE 3 G: 900 INJECTION INTRAVENOUS at 04:00

## 2018-01-01 RX ADMIN — METHOCARBAMOL 500 MG: 500 TABLET ORAL at 20:20

## 2018-01-01 RX ADMIN — LIDOCAINE 2 PATCH: 50 PATCH CUTANEOUS at 20:57

## 2018-01-01 RX ADMIN — DILTIAZEM HYDROCHLORIDE 45 MG: 30 TABLET, FILM COATED ORAL at 13:01

## 2018-01-01 RX ADMIN — DEXTROSE MONOHYDRATE 25 G: 25 INJECTION, SOLUTION INTRAVENOUS at 20:35

## 2018-01-01 RX ADMIN — LORAZEPAM 1 MG: 2 INJECTION INTRAMUSCULAR; INTRAVENOUS at 18:20

## 2018-01-01 RX ADMIN — TRIAMCINOLONE ACETONIDE 80 MG: 40 INJECTION, SUSPENSION INTRA-ARTICULAR; INTRAMUSCULAR at 14:02

## 2018-01-01 RX ADMIN — METOPROLOL SUCCINATE 100 MG: 50 TABLET, EXTENDED RELEASE ORAL at 09:01

## 2018-01-01 RX ADMIN — METHOCARBAMOL 750 MG: 500 TABLET ORAL at 13:01

## 2018-01-01 RX ADMIN — DIGOXIN 250 MCG: 0.25 INJECTION INTRAMUSCULAR; INTRAVENOUS at 14:32

## 2018-01-01 RX ADMIN — ASPIRIN 81 MG 81 MG: 81 TABLET ORAL at 09:04

## 2018-01-01 RX ADMIN — BUMETANIDE 2 MG: 0.25 INJECTION INTRAMUSCULAR; INTRAVENOUS at 16:57

## 2018-01-15 NOTE — PROGRESS NOTES
Physical Therapy Initial Evaluation and Plan of Care        Patient: Fahad Muhammad   : 1935  Diagnosis/ICD-10 Code:  DDD (degenerative disc disease), lumbar [M51.36]  Referring practitioner: No ref. provider found  Date of Initial Visit: 1/15/2018  Today's Date: 1/15/2018  Patient seen for 1 sessions                 Subjective Evaluation    History of Present Illness  Date of onset: 1/15/2016  Mechanism of injury: Stumbled backwards fell and fractured L2- pain meds, chiropractor, PT x 5 weeks, pain management, back brace.  Pain worse with standing comfortable sitting      Patient Occupation: retired Quality of life: good    Pain  Current pain ratin  At best pain ratin  At worst pain rating: 10  Location: left low back pain - occasionally both sides   Quality: cramping  Relieving factors: rest, ice and heat  Aggravating factors: ambulation (carrying)  Progression: worsening    Diagnostic Tests  X-ray: abnormal (multi-level DDD)    Treatments  Previous treatment: chiropractic, immobilization, physical therapy, medication and injection treatment  Current treatment: physical therapy  Patient Goals  Patient goals for therapy: decreased edema, decreased pain, increased motion and independence with ADLs/IADLs             Objective       Palpation   Left   Tenderness of the iliopsoas, lumbar paraspinals and quadratus lumborum.     Additional Palpation Details  Tender L > R L2-L4 PVM's         Assessment & Plan     Assessment  Assessment details: Informed and signed consent for Dry Needling obtained. Instructed patient on purpose and procedure. Asked patient to apply moist heat again tonight. Cont PT 1x per week or as indicated by patient for Dry Needling. Discussed with patient and wife feel that patient would benefit long-term from a proper fitting back brace.         Manual Therapy:         mins  79944;  Therapeutic Exercise:         mins  09033;     Neuromuscular Eulalio:        mins  49501;     Therapeutic Activity:          mins  88426;     Gait Training:          mins  69290;     Ultrasound:         mins  43485;    Electrical Stimulation:         mins  21442 ( );  Dry Needling     15     mins self-pay    Timed Treatment:      mins   Total Treatment:     25   mins    PT SIGNATURE: Madiha Green, PT   DATE TREATMENT INITIATED: 1/15/2018    Initial Certification  Certification Period: 4/15/2018  I certify that the therapy services are furnished while this patient is under my care.  The services outlined above are required by this patient, and will be reviewed every 90 days.     PHYSICIAN:       DATE:     Please sign and return via fax to [phone].. Thank you, [facility].

## 2018-01-23 NOTE — PROGRESS NOTES
Physical Therapy Daily Progress Note        Visit # : 2  Fahad Muhammad reports: I am actually feeling a little better for the first time in a long time     Subjective     Objective       Palpation   Left   Hypertonic in the erector spinae, iliopsoas, lumbar paraspinals and quadratus lumborum.     Right   Hypertonic in the erector spinae, iliopsoas, lumbar paraspinals and quadratus lumborum.     Tenderness     Left Hip   Tenderness in the PSIS.     Right Hip   Tenderness in the PSIS.      See Exercise, Manual, and Modality Logs for complete treatment.       Assessment & Plan     Assessment  Assessment details: Patient reported improved subjective reports and mobility after last dry needling session. Presents with increased muscle tone bilateral Lumbar PVM's. Improved muscle tone after Dry Needling today. Cont PT 1x per week or as indicated by patient for Dry Needling.         Progress per Plan of Care           Manual Therapy:         mins  04102;  Therapeutic Exercise:         mins  87859;     Neuromuscular Eulalio:       mins  19327;    Therapeutic Activity:          mins  93901;     Gait Training:           mins  42988;     Ultrasound:          mins  94178;    Electrical Stimulation:         mins  01989 ( );  Dry Needling     15     mins self-pay    Timed Treatment:      mins   Total Treatment:     25   mins    Madiha Green, PT  Physical Therapist  [place] License # [id number]

## 2018-01-30 NOTE — PROGRESS NOTES
Physical Therapy Daily Progress Note      Visit # : 3  Fahad Muhammad reports: Think I am doing a little better overall - feel like I am moving better - pain is about the same    Subjective     Objective       Palpation   Left   Hypertonic in the erector spinae, lumbar interspinals, lumbar paraspinals and piriformis.   Tenderness of the iliopsoas and quadratus lumborum.     Right   Hypertonic in the erector spinae, lumbar interspinals, lumbar paraspinals and piriformis. Tenderness of the iliopsoas and quadratus lumborum.      See Exercise, Manual, and Modality Logs for complete treatment.       Assessment & Plan     Assessment  Assessment details: Patient reported improved subjective reports and mobility after last dry needling session. Presents with increased muscle tone bilateral Lumbar PVM's. Improved muscle tone after Dry Needling today. Cont PT 1x per week or as indicated by patient for Dry Needling.         Progress strengthening /stabilization /functional activity           Manual Therapy:         mins  61702;  Therapeutic Exercise:         mins  53022;     Neuromuscular Eulalio:        mins  15525;    Therapeutic Activity:          mins  42782;     Gait Training:           mins  46695;     Ultrasound:          mins  66275;    Electrical Stimulation:         mins  02039 ( );  Dry Needling     15     mins self-pay    Timed Treatment:      mins   Total Treatment:     25   mins    Madiha Green PT  Physical Therapist  [place] License # [id number]

## 2018-02-05 NOTE — PROGRESS NOTES
Physical Therapy Daily Progress Note        Visit # : 4  Fahad Muhammad reports: I was sitting for a long time yesterday - got up and my right leg went completely numb    Subjective     Objective       Palpation   Left   Hypertonic in the erector spinae, lumbar paraspinals and quadratus lumborum.     Right   Hypertonic in the erector spinae and rectus abdominus. Tenderness of the lumbar paraspinals and quadratus lumborum.      See Exercise, Manual, and Modality Logs for complete treatment.       Assessment & Plan     Assessment  Assessment details: Patient reported improved subjective reports and mobility after last dry needling session. Presents with increased muscle tone bilateral Lumbar PVM's. Improved muscle tone after Dry Needling today. Educated patient to alter position every 30 min or so when relaxing at home.  Cont PT 1x per week or as indicated by patient for Dry Needling.         Progress per Plan of Care           Manual Therapy:         mins  80416;  Therapeutic Exercise:         mins  72380;     Neuromuscular Eulalio:        mins  76526;    Therapeutic Activity:          mins  35160;     Gait Training:           mins  25575;     Ultrasound:         mins  33679;    Electrical Stimulation:         mins  31898 ( );  Dry Needling     15     mins self-pay    Timed Treatment:      mins   Total Treatment:     25   mins    Madiha Green, PT  Physical Therapist  [place] License # [id number]

## 2018-02-13 NOTE — PROGRESS NOTES
Physical Therapy Daily Progress Note        Visit # : 5  Fahad Muhammad reports: Over everything I have tried - I think the Dry Needling is helping the most     Subjective     Objective       Palpation   Left   Hypertonic in the erector spinae, gluteus barbara, iliopsoas, lumbar interspinals, lumbar paraspinals, piriformis and quadratus lumborum.     Right   Hypertonic in the erector spinae, gluteus barbara, iliopsoas, lumbar interspinals, lumbar paraspinals, piriformis and quadratus lumborum.      See Exercise, Manual, and Modality Logs for complete treatment.       Assessment & Plan     Assessment  Assessment details: Patient reported improved subjective reports and mobility after last dry needling session. Presents with increased muscle tone bilateral Lumbar PVM's. Improved muscle tone after Dry Needling today. Educated patient to apply moist heat again tonight at home.  Cont PT 1x per week or as indicated by patient for Dry Needling.         Progress strengthening /stabilization /functional activity           Manual Therapy:         mins  26751;  Therapeutic Exercise:         mins  00861;     Neuromuscular Eulalio:       mins  42737;    Therapeutic Activity:          mins  81610;     Gait Training:           mins  31201;     Ultrasound:          mins  40054;    Electrical Stimulation:         mins  72692 ( );  Dry Needling     15     mins self-pay    Timed Treatment:      mins   Total Treatment:     25   mins    Madiha Green, PT  Physical Therapist  [place] License # [id number]

## 2018-02-22 NOTE — PROGRESS NOTES
Physical Therapy Daily Progress Note        Visit # : 6  Fahad Muhammad reports: The Dry Needling is really helping the pain - I just need something to help me walk better now    Subjective     Objective       Palpation   Left   Hypertonic in the lumbar paraspinals and quadratus lumborum.   Tenderness of the erector spinae and lumbar interspinals.     Right   Hypertonic in the lumbar paraspinals and quadratus lumborum. Tenderness of the erector spinae and lumbar interspinals.      See Exercise, Manual, and Modality Logs for complete treatment.       Assessment & Plan     Assessment  Assessment details: Patient reported improved subjective reports and mobility after last dry needling session. Presents with increased muscle tone bilateral Lumbar PVM's. Improved muscle tone after Dry Needling today. Educated patient to apply moist heat again tonight at home.  Cont PT 1x per week or as indicated by patient for Dry Needling.         Progress per Plan of Care           Manual Therapy:         mins  60873;  Therapeutic Exercise:         mins  64684;     Neuromuscular Eulalio:        mins  54105;    Therapeutic Activity:         mins  16999;     Gait Training:           mins  87049;     Ultrasound:          mins  54052;    Electrical Stimulation:         mins  00586 ( );  Dry Needling     15     mins self-pay    Timed Treatment:      mins   Total Treatment:     25   mins    Madiha Green, PT  Physical Therapist  [place] License # [id number]

## 2018-03-01 NOTE — PROGRESS NOTES
Physical Therapy Daily Progress Note        Visit # : 7  Fahad Muhammad reports: I am noticing some improvement with Dry Needling - seems to last for several days     Subjective     Objective       Palpation   Left   Hypertonic in the erector spinae, gluteus barbara, gluteus medius, lumbar interspinals, lumbar paraspinals, piriformis and quadratus lumborum.     Right   Hypertonic in the erector spinae, gluteus barbara, gluteus medius, lumbar interspinals, lumbar paraspinals, piriformis and quadratus lumborum.      See Exercise, Manual, and Modality Logs for complete treatment.       Assessment & Plan     Assessment  Assessment details: Patient reported improved subjective reports and mobility after last dry needling session. Presents with increased muscle tone bilateral Lumbar PVM's. Improved muscle tone after Dry Needling today. Educated patient to apply moist heat again tonight at home.  Cont PT 1x per week or as indicated by patient for Dry Needling. Issued back brace today - patient verbalized and demonstrated proper donning/ doffing and wear schedule.         Progress per Plan of Care           Manual Therapy:         mins  37453;  Therapeutic Exercise:         mins  83128;     Neuromuscular Eulalio:        mins  75057;    Therapeutic Activity:          mins  45040;     Gait Training:           mins  44855;     Ultrasound:         mins  00286;    Electrical Stimulation:         mins  23188 ( );  Dry Needling      15    mins self-pay    Timed Treatment:      mins   Total Treatment:     25   mins    Madiha Green PT  Physical Therapist  [place] License # [id number]

## 2018-03-08 NOTE — PROGRESS NOTES
Please call and check on patient. His kidney function is down and his white blood cell count is up? Is he doing okay? I will see him tomorrow, but wanted to make sure that he was not sick or having any acute issues.

## 2018-03-09 PROBLEM — G89.4 CHRONIC PAIN SYNDROME: Status: ACTIVE | Noted: 2018-01-01

## 2018-03-09 NOTE — PROGRESS NOTES
QUICK REFERENCE INFORMATION:  The ABCs of the Annual Wellness Visit    Subsequent Medicare Wellness Visit    HEALTH RISK ASSESSMENT    1935    Recent Hospitalizations:  No hospitalization(s) within the last year..        Current Medical Providers:  Patient Care Team:  Elisabeth Warren MD as PCP - General (Internal Medicine)  Alexsander Matute MD as Consulting Physician (Neurology)  MD Ry Goff III, MD as Consulting Physician (Cardiology)        Smoking Status:  History   Smoking Status   • Former Smoker   • Quit date: 12/3/1993   Smokeless Tobacco   • Former User   • Quit date: 9/7/1994       Alcohol Consumption:  History   Alcohol Use   • 0.6 oz/week   • 1 Shots of liquor per week     Comment: occasional        Depression Screen:   PHQ-2/PHQ-9 Depression Screening 3/9/2018   Little interest or pleasure in doing things 1   Feeling down, depressed, or hopeless 3   Trouble falling or staying asleep, or sleeping too much 3   Feeling tired or having little energy 1   Poor appetite or overeating 1   Feeling bad about yourself - or that you are a failure or have let yourself or your family down 3   Trouble concentrating on things, such as reading the newspaper or watching television 2   Moving or speaking so slowly that other people could have noticed. Or the opposite - being so fidgety or restless that you have been moving around a lot more than usual 0   Thoughts that you would be better off dead, or of hurting yourself in some way 0   Total Score 14   If you checked off any problems, how difficult have these problems made it for you to do your work, take care of things at home, or get along with other people? Somewhat difficult       Health Habits and Functional and Cognitive Screening:  Functional & Cognitive Status 3/9/2018   Do you have difficulty preparing food and eating? Yes   Do you have difficulty bathing yourself, getting dressed or grooming yourself? No   Do you have  difficulty using the toilet? No   Do you have difficulty moving around from place to place? Yes   Do you have trouble with steps or getting out of a bed or a chair? Yes   In the past year have you fallen or experienced a near fall? No   Current Diet Well Balanced Diet   Dental Exam Not up to date    Eye Exam Not up to date   Exercise (times per week) 0 times per week, but walks when he can    Current Exercise Activities Include None   Do you need help using the phone?  No   Are you deaf or do you have serious difficulty hearing?  Yes   Do you need help with transportation? Yes   Do you need help shopping? Yes   Do you need help preparing meals?  Yes   Do you need help with housework?  Yes   Do you need help with laundry? Yes   Do you need help taking your medications? No   Do you need help managing money? No   Have you felt unusual stress, anger or loneliness in the last month? Yes   Who do you live with? Spouse   If you need help, do you have trouble finding someone available to you? No   Have you been bothered in the last four weeks by sexual problems? No   Do you have difficulty concentrating, remembering or making decisions? Yes           Does the patient have evidence of cognitive impairment? No    Aspirin use counseling: Taking ASA appropriately as indicated      Recent Lab Results:  CMP:  Lab Results   Component Value Date    GLU 84 03/07/2018    BUN 17 03/07/2018    CREATININE 1.41 (H) 03/07/2018    EGFRIFNONA 48 (L) 03/07/2018    EGFRIFAFRI 59 (L) 03/07/2018    BCR 12.1 03/07/2018     03/07/2018    K 4.8 03/07/2018    CO2 27.0 03/07/2018    CALCIUM 10.1 03/07/2018    PROTENTOTREF 6.9 03/07/2018    ALBUMIN 3.70 03/07/2018    LABGLOBREF 3.2 03/07/2018    LABIL2 1.2 03/07/2018    BILITOT 1.0 03/07/2018    ALKPHOS 112 03/07/2018    AST 21 03/07/2018    ALT 10 03/07/2018     Lipid Panel:  Lab Results   Component Value Date    TRIG 104 03/07/2018    HDL 41 03/07/2018    VLDL 20.8 03/07/2018    LDLHDL 3.00  "03/07/2018     HbA1c:       Visual Acuity:  No exam data present    Age-appropriate Screening Schedule:  Refer to the list below for future screening recommendations based on patient's age, sex and/or medical conditions. Orders for these recommended tests are listed in the plan section. The patient has been provided with a written plan.    Health Maintenance   Topic Date Due   • TDAP/TD VACCINES (1 - Tdap) 09/25/1956   • ZOSTER VACCINE  01/21/2016   • LIPID PANEL  03/07/2019   • INFLUENZA VACCINE  Completed   • PNEUMOCOCCAL VACCINES (65+ LOW/MEDIUM RISK)  Completed        Subjective   History of Present Illness    Fahad Muhammad is a 82 y.o. male who presents for an Subsequent Wellness Visit.    81 yo gentleman with chronic pain of his back and kness, HTN, Afib on Xarelto, and epilepsy here for follow up. He has chronic low back pain related to DDD and compression fractures and is taking Tylenol and Robaxin for his pain and is seeing Dr. Deal. He saw a chiropractor that helped him. He is seeing PT as well that is not helping much. He has has epidural and facet injections as well as \"burning\" of nerves that did not last. .Acupuncture has been helpful and he has had more pain where he has the compression fracture. He walks with his walker and can walk about 500 feet without getting in pain. He wants to continue PT for his gait disturbance.     He saw Dr. Garcia for his Afib which is stable. He is taking Diltiazem and Xarelto which he is tolerating well as well as BB.     He saw Dr. Severino for the spot on his kidney. He suggested that it be followed closely. Nothing further to do.     He does have blood in his stool when he wipes sometimes . He does have fatigue that is chronic. I reviewed his labs with him that showed new normocytic anemia. He is unsure when his last c-scope was, but thinks that it was done at [facility] in [place].     He has CKD that is chronic and has not been drinking enough water. He states " that he forgets and prefers tea or coffee.    He has CAD s/p CABG and has been intolerant to statins. He is doing well on fibrate and his Omega 3. LDL is 123 on this last check. TG's are normal.     The following portions of the patient's history were reviewed and updated as appropriate: allergies, current medications, past family history, past medical history, past social history, past surgical history and problem list.    Outpatient Medications Prior to Visit   Medication Sig Dispense Refill   • aspirin 81 MG chewable tablet Chew 81 mg daily.     • cetirizine (zyrTEC) 5 MG tablet Take 5 mg by mouth.     • diltiaZEM CD (CARDIZEM CD) 240 MG 24 hr capsule Take 1 capsule by mouth Daily. 90 capsule 3   • docusate sodium 100 MG capsule Take 100 mg by mouth 2 (Two) Times a Day. (Patient taking differently: Take 100 mg by mouth As Needed.)  0   • fenofibrate micronized (LOFIBRA) 134 MG capsule Take 1 capsule by mouth Every Morning Before Breakfast. 90 capsule 3   • furosemide (LASIX) 20 MG tablet Take 1 tablet by mouth Daily. (Patient taking differently: Take 20 mg by mouth As Needed.) 90 tablet 3   • guaiFENesin (MUCINEX) 600 MG 12 hr tablet Take 1 tablet by mouth 2 (Two) Times a Day. (Patient taking differently: Take 600 mg by mouth Daily.) 60 tablet 1   • levETIRAcetam (KEPPRA) 500 MG tablet Take 1 tablet by mouth 2 (Two) Times a Day. 180 tablet 3   • metoprolol succinate XL (TOPROL-XL) 100 MG 24 hr tablet Take 1 tablet by mouth Daily. 30 tablet 6   • Misc Natural Products (GLUCOSAMINE CHONDROITIN ADV PO) Take  by mouth.     • Multiple Vitamin (MULTI-VITAMIN) tablet Take 1 tablet by mouth daily.     • Omega-3 Fatty Acids (FISH OIL) 1000 MG capsule capsule Take 1,000 mg by mouth daily.     • rivaroxaban (XARELTO) 15 MG tablet Take 1 tablet by mouth Daily. 30 tablet 3   • Saw Palmetto, Serenoa repens, 450 MG capsule Take  by mouth.       No facility-administered medications prior to visit.        Patient Active Problem  List   Diagnosis   • Osteoarthrosis, localized, primary, knee   • Hyperlipidemia   • Hypertension   • Seizure disorder   • Vitamin D deficiency   • Coronary artery disease involving native coronary artery of native heart without angina pectoris   • Benign non-nodular prostatic hyperplasia with lower urinary tract symptoms   • Seasonal allergic rhinitis   • Atrial fibrillation   • CKD (chronic kidney disease), stage III   • Closed wedge compression fracture of lumbar vertebra with routine healing   • DDD (degenerative disc disease), lumbar   • Closed stable burst fracture of lumbar vertebra   • Chronic pain syndrome       Advance Care Planning:  has an advance directive - a copy HAS NOT been provided. Have asked the patient to send this to us to add to record.    Identification of Risk Factors:  Risk factors include: weight , cardiovascular risk, inactivity, increased fall risk, chronic pain, isolation, vision limitations, hearing limitations and polypharmacy.    Review of Systems   Constitutional: Positive for fatigue. Negative for chills, fever and unexpected weight change.   HENT: Negative for congestion.    Eyes: Negative for visual disturbance.   Respiratory: Negative for cough.    Gastrointestinal: Negative for abdominal pain, constipation, diarrhea, nausea and vomiting.   Musculoskeletal: Positive for arthralgias, back pain, gait problem and myalgias.   Skin: Negative for rash.   Neurological: Negative for seizures and headaches.       Compared to one year ago, the patient feels his physical health is better.  Compared to one year ago, the patient feels his mental health is better.    Objective     Physical Exam   Constitutional: He is oriented to person, place, and time. He appears well-developed and well-nourished. He appears ill (chronically illl appearing ). No distress.   HENT:   Head: Normocephalic and atraumatic.   Right Ear: External ear normal.   Left Ear: External ear normal.   Mouth/Throat:  "Oropharynx is clear and moist. No oropharyngeal exudate.   Eyes: Conjunctivae are normal. Right eye exhibits no discharge. Left eye exhibits no discharge. No scleral icterus.   Neck: Neck supple.   Cardiovascular: Normal rate and normal heart sounds.  An irregularly irregular rhythm present. Exam reveals no gallop and no friction rub.    No murmur heard.  Pulmonary/Chest: Effort normal and breath sounds normal. No respiratory distress. He has no wheezes. He has no rales.   Lymphadenopathy:     He has no cervical adenopathy.   Neurological: He is alert and oriented to person, place, and time. He exhibits normal muscle tone. Coordination (walking with walker with antalgic gait ) abnormal.   Skin: Skin is warm. No rash noted.   Psychiatric: He has a normal mood and affect. His behavior is normal.   Nursing note and vitals reviewed.      Vitals:    03/09/18 0940   BP: 100/52   BP Location: Left arm   Patient Position: Sitting   Cuff Size: Adult   Pulse: 83   Resp: 12   Temp: 97.8 °F (36.6 °C)   TempSrc: Oral   SpO2: 98%   Weight: 103 kg (228 lb)   Height: 190.5 cm (75\")   PainSc:   6   PainLoc: Back       Body mass index is 28.5 kg/(m^2).  Discussed the patient's BMI with him. BMI is above normal parameters. Follow-up plan includes:  exercise counseling and nutrition counseling.    Assessment/Plan   Patient Self-Management and Personalized Health Advice  The patient has been provided with information about: diet, exercise, weight management, fall prevention, designing advance directives and supplements and preventive services including:   · Advance directive, Diabetes screening, see lab orders, Exercise counseling provided, Fall Risk assessment done, Fall Risk plan of care done. Prevnar given     Visit Diagnoses:    ICD-10-CM ICD-9-CM   1. Medicare annual wellness visit, subsequent Z00.00 V70.0   2. Chronic atrial fibrillation I48.2 427.31   3. Essential hypertension I10 401.9   4. CKD (chronic kidney disease), stage " III N18.3 585.3   5. Seizure disorder G40.909 345.90   6. Hyperlipidemia, unspecified hyperlipidemia type E78.5 272.4   7. Normocytic anemia D64.9 285.9   8. JONA (acute kidney injury) N17.9 584.9   9. Need for pneumococcal vaccination Z23 V03.82   10. Chronic pain syndrome G89.4 338.4     Will continue to see pain management for his pain. I can prescribe Robaxin if he needs this and can use other modalities including acupuncture, topical Voltaren as well as PT.     His BP is a little on the low side, but tolerating medications well. He is going to watch this at home and if continues to be low, we will go down on his BB to 75mg and see how he tolerates this. He is taking Lasix as needed, not regularly.     He does have JONA on CKD. He admits that he is not drinking enough water. Will  Increase this and hold and Lasix. Repeat BMP in 2 weeks.     Continue Keppra for his seizures. He has been stable with this and no indication that he has had a seizure in sometime.     His anemia is worrisome. Likely just related to chronic illness, however, will obtain labs as well as old c-scope for review.No blood in stool regularly other than from hemorrhoids, but will need to make sure that there is no GI bleed if this continues to be low.  GI referral if repeat in still low and all other labs check out okay.     Prevnar given today.     Consider stopping fibrate at next visit since his TG's are normal. I would love to go down on his medication that are not necessary and he is in agreement.   Orders Placed This Encounter   Procedures   • Pneumococcal Conjugate Vaccine 13-Valent All (PCV13)   • Folate   • Iron Profile   • Ferritin   • Urinalysis With / Culture If Indicated - Urine, Clean Catch   • Microalbumin / Creatinine Urine Ratio - Urine, Clean Catch   • Vitamin B12       Outpatient Encounter Prescriptions as of 3/9/2018   Medication Sig Dispense Refill   • aspirin 81 MG chewable tablet Chew 81 mg daily.     • cetirizine  (zyrTEC) 5 MG tablet Take 5 mg by mouth.     • diltiaZEM CD (CARDIZEM CD) 240 MG 24 hr capsule Take 1 capsule by mouth Daily. 90 capsule 3   • docusate sodium 100 MG capsule Take 100 mg by mouth 2 (Two) Times a Day. (Patient taking differently: Take 100 mg by mouth As Needed.)  0   • fenofibrate micronized (LOFIBRA) 134 MG capsule Take 1 capsule by mouth Every Morning Before Breakfast. 90 capsule 3   • furosemide (LASIX) 20 MG tablet Take 1 tablet by mouth Daily. (Patient taking differently: Take 20 mg by mouth As Needed.) 90 tablet 3   • guaiFENesin (MUCINEX) 600 MG 12 hr tablet Take 1 tablet by mouth 2 (Two) Times a Day. (Patient taking differently: Take 600 mg by mouth Daily.) 60 tablet 1   • levETIRAcetam (KEPPRA) 500 MG tablet Take 1 tablet by mouth 2 (Two) Times a Day. 180 tablet 3   • methocarbamol (ROBAXIN) 500 MG tablet TAKE 1 TABLET BY MOUTH TWICE A DAY, PRN  0   • metoprolol succinate XL (TOPROL-XL) 100 MG 24 hr tablet Take 1 tablet by mouth Daily. 30 tablet 6   • Misc Natural Products (GLUCOSAMINE CHONDROITIN ADV PO) Take  by mouth.     • Multiple Vitamin (MULTI-VITAMIN) tablet Take 1 tablet by mouth daily.     • Omega-3 Fatty Acids (FISH OIL) 1000 MG capsule capsule Take 1,000 mg by mouth daily.     • rivaroxaban (XARELTO) 15 MG tablet Take 1 tablet by mouth Daily. 30 tablet 3   • Saw Palmetto, Serenoa repens, 450 MG capsule Take  by mouth.       No facility-administered encounter medications on file as of 3/9/2018.        Reviewed use of high risk medication in the elderly: yes  Reviewed for potential of harmful drug interactions in the elderly: yes    Follow Up:  Return in about 3 months (around 6/9/2018) for Recheck of kidneys and pain .     An After Visit Summary and PPPS with all of these plans were given to the patient.

## 2018-03-09 NOTE — TELEPHONE ENCOUNTER
"Patient/wife advised and voiced understanding.    ----- Message from Elisabeth Warren MD sent at 3/9/2018  2:28 PM EST -----  Okay, will you let patient know that he never had one done and if his blood work comes back concerning for iron being low we can talk about a c-scope and EGD to make sure that he is not having bleeding in his colon or stomach.     ----- Message -----     From: Melisa Severino MA     Sent: 3/9/2018   2:15 PM       To: Elisabeth Warren MD    No c-scope record found at either [facility] or [facility].  I checked with patient.  He said it was done by Dr. Ng.  I check ed with Melisa @ Dr. Ng's office.  She stated they only have one OV note from 2016, and a c-scope was supposed to be set up but was not.    ----- Message -----     From: Melisa Severino MA     Sent: 3/9/2018  12:58 PM       To: Melisa Severino MA        ----- Message -----     From: Elisabeth Warren MD     Sent: 3/9/2018  12:27 PM       [facility]    Can we obtain c-scope for patient. States he had it a few years ago at [facility] with an \"old man\".         "

## 2018-03-09 NOTE — PATIENT INSTRUCTIONS
Medicare Wellness  Personal Prevention Plan of Service     Date of Office Visit:  2018  Encounter Provider:  Elisabeth Warren MD  Place of Service:  [facility]  Patient Name: Fahad Muhammad  :  1935    As part of the Medicare Wellness portion of your visit today, we are providing you with this personalized preventive plan of services (PPPS). This plan is based upon recommendations of the [place] Preventive Services Task Force (USPSTF) and the Advisory Committee on Immunization Practices (ACIP).    This lists the preventive care services that should be considered, and provides dates of when you are due. Items listed as completed are up-to-date and do not require any further intervention.    Health Maintenance   Topic Date Due   • TDAP/TD VACCINES (1 - Tdap) 1956   • PNEUMOCOCCAL VACCINES (65+ LOW/MEDIUM RISK) (1 of 2 - PCV13) 2002   • ZOSTER VACCINE  2016   • LIPID PANEL  2019   • MEDICARE ANNUAL WELLNESS  2019   • INFLUENZA VACCINE  Completed       Orders Placed This Encounter   Procedures   • Folate   • Iron Profile   • Ferritin   • Urinalysis With / Culture If Indicated - Urine, Clean Catch   • Microalbumin / Creatinine Urine Ratio - Urine, Clean Catch   • Vitamin B12       Return in about 3 months (around 2018) for Recheck of kidneys and pain .

## 2018-03-15 NOTE — PROGRESS NOTES
Physical Therapy Daily Progress Note        Visit # : 8  Fahad Muhammad reports: I went to my PCP she wants me to start PT - my back still feels better after the Dry Needling and wearing the back brace    Subjective     Objective       Palpation   Left   Hypertonic in the erector spinae, lumbar interspinals, lumbar paraspinals and quadratus lumborum.      See Exercise, Manual, and Modality Logs for complete treatment.       Assessment & Plan     Assessment  Assessment details: Patient reported improved subjective reports and mobility after last dry needling session. Presents with increased muscle tone bilateral Lumbar PVM's. Improved muscle tone after Dry Needling today. Educated patient to apply moist heat again tonight at home.  Cont PT 1x per week or as indicated by patient for Dry Needling. Sent note to  Dr. Warren for PT order/ specification as indicated by patient.         Progress strengthening /stabilization /functional activity           Manual Therapy:         mins  21322;  Therapeutic Exercise:        mins  88755;     Neuromuscular Eulalio:        mins  39081;    Therapeutic Activity:          mins  74985;     Gait Training:          mins  56408;     Ultrasound:          mins  03939;    Electrical Stimulation:         mins  82126 ( );  Dry Needling     15     mins self-pay    Timed Treatment:      mins   Total Treatment:     25   mins    Madiha Green, PT  Physical Therapist  [place] License # [id number]

## 2018-03-15 NOTE — PROGRESS NOTES
Iron level is really low. Needs to see GI for iron deficiency anemia  to discuss scope to evaluate this. In meantime, he needs to start on ferrous sulfate 325mg of iron once daily with a glass of orange juice or orange.Increase to BID if he can tolerate. Will make his stools darker in color and may have some constipation. Needs follow up in 3 months with labs before.

## 2018-03-16 NOTE — TELEPHONE ENCOUNTER
"Patient has been advised and voiced understanding. Patient would like to see Dr. Ng (same as his wife), he advised me not to add a referral and that he will call to make his own apt.  Patient states he cannot take RX ferrous sulfate due to it making him ill. Patient states he was once told \"If I get the shot I will be better off.\" I advised I would speak with Dr. Warren and let her know.        ----- Message from Elisabeth Warren MD sent at 3/15/2018 12:50 PM EDT -----  Iron level is really low. Needs to see GI for iron deficiency anemia  to discuss scope to evaluate this. In meantime, he needs to start on ferrous sulfate 325mg of iron once daily with a glass of orange juice or orange.Increase to BID if he can tolerate. Will make his stools darker in color and may have some constipation. Needs follow up in 3 months with labs before.    "

## 2018-03-20 NOTE — PROGRESS NOTES
Physical Therapy Daily Progress Note        Visit # : 9  Fahad Muhammad reports: I wore my brace for 5 hours one day and then the next day my back was sore and I couldn't sit in my brace - overall back is feeling a little better     Subjective     Objective       Palpation   Left   Hypertonic in the erector spinae, lumbar interspinals, lumbar paraspinals and quadratus lumborum.     Right   Hypertonic in the erector spinae, lumbar interspinals, lumbar paraspinals and quadratus lumborum.      See Exercise, Manual, and Modality Logs for complete treatment.       Assessment & Plan     Assessment  Assessment details: Patient reported improved subjective reports and mobility after last dry needling session. Presents with increased muscle tone bilateral Lumbar PVM's. Improved muscle tone after Dry Needling today. Educated patient to apply moist heat again tonight at home.  Cont PT 1x per week or as indicated by patient for Dry Needling.         Progress strengthening /stabilization /functional activity           Manual Therapy:         mins  90366;  Therapeutic Exercise:         mins  25055;     Neuromuscular Eulalio:        mins  53631;    Therapeutic Activity:          mins  12224;     Gait Training:           mins  21111;     Ultrasound:          mins  89494;    Electrical Stimulation:         mins  95532 ( );  Dry Needling     15     mins self-pay    Timed Treatment:      mins   Total Treatment:     25   mins    Madiha Green, PT  Physical Therapist  [place] License # [id number]

## 2018-03-29 NOTE — PROGRESS NOTES
Physical Therapy Daily Progress Note        Visit # : 10  Fahad Muhammad reports: The only thing that has given me prolonged relief is the Dry Needling     Subjective     Objective       Palpation   Left   Hypertonic in the erector spinae, lumbar interspinals, lumbar paraspinals and quadratus lumborum.     Right   Hypertonic in the erector spinae, lumbar interspinals, lumbar paraspinals and quadratus lumborum.      See Exercise, Manual, and Modality Logs for complete treatment.       Assessment & Plan     Assessment  Assessment details: Patient reported improved subjective reports and mobility after last dry needling session. Presents with increased muscle tone bilateral Lumbar PVM's. Improved muscle tone after Dry Needling today. Educated patient to apply moist heat again tonight at home.  Cont PT 1x per week or as indicated by patient for Dry Needling. Discussed with patient switching to conservative PT - he feels Dry Needling is really helping - asked patient to start walking more at home - every 30 min get up and walk.         Progress per Plan of Care           Manual Therapy:         mins  55592;  Therapeutic Exercise:         mins  01555;     Neuromuscular Eulalio:       mins  88125;    Therapeutic Activity:          mins  30843;     Gait Training:           mins  22163;     Ultrasound:          mins  45276;    Electrical Stimulation:         mins  36566 ( );  Dry Needling     15     mins self-pay    Timed Treatment:      mins   Total Treatment:     25   mins    Madiha Green, PT  Physical Therapist  [place] License # [id number]

## 2018-04-10 NOTE — PROGRESS NOTES
Physical Therapy Daily Progress Note        Visit # : 11  Fahad Muhammad reports: I have good and bad days - still can't walk for prolonged periods of time     Subjective     Objective       Palpation   Left   Hypertonic in the erector spinae, lumbar paraspinals and quadratus lumborum.     Right   Hypertonic in the erector spinae, lumbar paraspinals and quadratus lumborum.      See Exercise, Manual, and Modality Logs for complete treatment.       Assessment & Plan     Assessment  Assessment details: Patient reported improved subjective reports and mobility after last dry needling session. Presents with increased muscle tone bilateral Lumbar PVM's. Improved muscle tone after Dry Needling today. Educated patient to apply moist heat again tonight at home.  Cont PT 1x per week or as indicated by patient for Dry Needling.         Progress per Plan of Care           Manual Therapy:         mins  28163;  Therapeutic Exercise:        mins  73960;     Neuromuscular Eulalio:        mins  02775;    Therapeutic Activity:          mins  83495;     Gait Training:           mins  23572;     Ultrasound:          mins  57324;    Electrical Stimulation:         mins  39786 ( );  Dry Needling     15     mins self-pay    Timed Treatment:      mins   Total Treatment:     25   mins    Madiha Green, PT  Physical Therapist  [place] License # [id number]

## 2018-04-19 NOTE — PROGRESS NOTES
Physical Therapy Daily Progress Note        Visit # : 12  Fahad Muhammad reports: This is definitely helping the sore spot is getting smaller - now my knees are really bothering me    Subjective     Objective       Palpation   Left   Hypertonic in the erector spinae, lumbar interspinals, lumbar paraspinals and quadratus lumborum.     Right   Hypertonic in the erector spinae, lumbar interspinals, lumbar paraspinals and quadratus lumborum.      See Exercise, Manual, and Modality Logs for complete treatment.       Assessment & Plan     Assessment  Assessment details: Patient reported improved subjective reports and mobility after last dry needling session. Presents with increased muscle tone bilateral Lumbar PVM's. Improved muscle tone after Dry Needling today. Educated patient to apply moist heat again tonight at home.  Cont PT 1x per week or as indicated by patient for Dry Needling.         Progress strengthening /stabilization /functional activity           Manual Therapy:         mins  51302;  Therapeutic Exercise:         mins  70868;     Neuromuscular Eulalio:        mins  64578;    Therapeutic Activity:          mins  90346;     Gait Training:          mins  13481;     Ultrasound:          mins  98342;    Electrical Stimulation:         mins  68211 ( );  Dry Needling     15     mins self-pay    Timed Treatment:      mins   Total Treatment:     25   mins    Madiha Green PT  Physical Therapist  [place] License # [id number]

## 2018-04-24 NOTE — PROGRESS NOTES
Physical Therapy Daily Progress Note        Visit # : 13  Fahad Muhammad reports: My back has been feeling about the same - no significant improvement     Subjective     Objective       Palpation   Left   Hypertonic in the erector spinae, lumbar paraspinals and quadratus lumborum.     Right   Hypertonic in the erector spinae, lumbar paraspinals and quadratus lumborum.      See Exercise, Manual, and Modality Logs for complete treatment.       Assessment & Plan     Assessment  Assessment details: Patient reported improved subjective reports and mobility after last dry needling session. Presents with increased muscle tone bilateral Lumbar PVM's. Improved muscle tone after Dry Needling today. Educated patient to apply moist heat again tonight at home.  Cont PT 1x per week or as indicated by patient for Dry Needling.         Progress strengthening /stabilization /functional activity           Manual Therapy:         mins  39514;  Therapeutic Exercise:         mins  78497;     Neuromuscular Eulalio:       mins  14242;    Therapeutic Activity:          mins  55780;     Gait Training:           mins  76143;     Ultrasound:          mins  12555;    Electrical Stimulation:         mins  82919 ( );  Dry Needling     15     mins self-pay    Timed Treatment:      mins   Total Treatment:     25   mins    Madiha Green PT  Physical Therapist  [place] License # [id number]

## 2018-05-01 NOTE — PROGRESS NOTES
Subjective:     Patient ID: Fahad Muhammad is a 82 y.o. male.    Chief Complaint:  Follow-up left knee pain, DJD  History of Present Illness  Fahad Muhammad returns to clinic today for evaluation of left knee, states his last injection back in February 2017 did very well for his pain but over the last 2-3 weeks he's been having increasing symptoms particularly along the anterior medial aspect of his left knee, worse with getting up from a seated position, walking, stair climbing.  Using walker for assistance in mobilization.  Rates current level of pain as an 8-9 out of 10 aching in nature, occasional sharp pain with deep flexion and rotation on the left knee noted.  Moderate swelling does wax and wane.  Denies associated numbness or tingling to left lower extremity, denies radiation of pain.      Social History     Occupational History   • Not on file.     Social History Main Topics   • Smoking status: Former Smoker     Quit date: 12/3/1993   • Smokeless tobacco: Former User     Quit date: 9/7/1994   • Alcohol use 0.6 oz/week     1 Shots of liquor per week      Comment: occasional    • Drug use: No   • Sexual activity: Defer      Past Medical History:   Diagnosis Date   • Arthritis    • Atrial fibrillation    • Coronary artery disease    • DDD (degenerative disc disease), lumbar    • Heme positive stool    • Hx of colonoscopy     Complete   • Hyperlipidemia    • Hypertension    • Neck strain    • Osteoarthritis    • Seizures    • Sepsis    • Stroke    • Vitamin D deficiency    • Wrist fracture, right      Past Surgical History:   Procedure Laterality Date   • CARDIAC SURGERY     • CORONARY ARTERY BYPASS GRAFT  1996   • HERNIA REPAIR Right     Inguinal hernia repair       Family History   Problem Relation Age of Onset   • Heart disease Mother    • Cancer Father    • Cancer Sister          Review of Systems   Constitutional: Negative for chills, diaphoresis, fever and unexpected weight change.   HENT: Negative  "for hearing loss, nosebleeds, sore throat and tinnitus.    Eyes: Negative for pain and visual disturbance.   Respiratory: Negative for cough, shortness of breath and wheezing.    Cardiovascular: Negative for chest pain and palpitations.   Gastrointestinal: Negative for abdominal pain, diarrhea, nausea and vomiting.   Endocrine: Negative for cold intolerance, heat intolerance and polydipsia.   Genitourinary: Negative for difficulty urinating, dysuria and hematuria.   Musculoskeletal: Positive for arthralgias, back pain and myalgias. Negative for joint swelling.   Skin: Negative for rash and wound.   Allergic/Immunologic: Negative for environmental allergies.   Neurological: Negative for dizziness, syncope and numbness.   Hematological: Does not bruise/bleed easily.   Psychiatric/Behavioral: Negative for dysphoric mood and sleep disturbance. The patient is not nervous/anxious.            Objective:  Vitals:    05/01/18 1320   Weight: 98.4 kg (217 lb)   Height: 190.5 cm (75\")     1    05/01/18  1320   Weight: 98.4 kg (217 lb)     Body mass index is 27.12 kg/m².  General: No acute distress.  Resp: normal respiratory effort  Skin: no rashes or wounds; normal turgor  Psych: mood and affect appropriate; recent and remote memory intact         Ortho Exam    Left knee-active range of motion 5-120°, 4+ out of 5 strength on flexion and extension, moderate effusion noted.  Maximal tenderness along medial joint line as well as medial and lateral patellar facet, mildly positive active patellar compression test, positive Caldwell exam with pain, no click.  Stable to varus and valgus stress at 5 and 30°.  Brisk cap refill all digits, 1+ dorsalis pedis pulse left foot.    Imaging:  Left Knee X-Ray  Indication: Pain    AP, Lateral, and [place] views    Findings:  No fracture  No bony lesion  Advanced tricompartment osteoarthritis with osteophyte formation along medial tibial plateau, near bone-on-bone articulation medial compartment " noted with overall varus alignment appreciated.    Compared to prior office x-rays    Assessment:       1. Pain    2. Primary localized osteoarthrosis of left lower leg          Plan:          Discussed treatment options at length with patient at today's visit. Given significant and prolonged improvement with prior intra-articular injection to the left knee, patient will like to proceed with that today.  I also reviewed options at length with him including but not limited to weight loss, home exercise program, physical therapy, off  brace, and total knee arthroplasty.    Fahad Muhammad was in agreement with plan and had all questions answered.     Orders:  Orders Placed This Encounter   Procedures   • Large Joint Arthrocentesis   • XR Knee 3 View Left       Medications:  No orders of the defined types were placed in this encounter.      Followup:  Return in about 3 months (around 8/1/2018).    Fahad was seen today for pain and edema.    Diagnoses and all orders for this visit:    Pain  -     XR Knee 3 View Left    Primary localized osteoarthrosis of left lower leg    Other orders  -     Large Joint Arthrocentesis          Dictated utilizing Fliton dictation   Large Joint Arthrocentesis  Date/Time: 5/1/2018 2:02 PM  Consent given by: patient  Site marked: site marked  Timeout: Immediately prior to procedure a time out was called to verify the correct patient, procedure, equipment, support staff and site/side marked as required   Supporting Documentation  Indications: pain   Procedure Details  Location: knee - L knee  Preparation: Patient was prepped and draped in the usual sterile fashion  Needle size: 22 G  Approach: anterolateral  Medications administered: 8 mL lidocaine PF 1% 1 %; 80 mg triamcinolone acetonide 40 MG/ML  Patient tolerance: patient tolerated the procedure well with no immediate complications      10 minutes out of 15 minutes face-to-face time was spent counseling patient extensively on  exercise, opportunities for weight loss with particular target of weight loss of 15 pounds, options in regards to underlying pathology including but not limited to surgical options including arthroplasty as well as injections.

## 2018-05-08 NOTE — TELEPHONE ENCOUNTER
----- Message from Betzaida Meganmichel sent at 5/7/2018  3:54 PM EDT -----  Regarding: Hep A Coverage  [place]    Patient interested in getting Hep A due to recent outbreak in [facility].  I spoke with Mrs. Muhammad about insurance possibly not covering per the [facility] in [place].    5/7 phoned [employer] at [phone] asking if insurance would cover Hep A Immunization for patient. I did not have cpt code for representative.  I was told immunization in PCP office should be covered at 100#.  Phone Ref # [id number]    5/7 phoned [employer] a second time the same date with same question and again was told generally immunizations are covered at 100%. Phone Ref # [id number]

## 2018-05-11 NOTE — TELEPHONE ENCOUNTER
----- Message from Betzaida Meganmichel sent at 5/7/2018  3:54 PM EDT -----  Regarding: Hep A Coverage  [place]    Patient interested in getting Hep A due to recent outbreak in [facility].  I spoke with Mrs. Muhammad about insurance possibly not covering per the [facility] in [place].    5/7 phoned [employer] at [phone] asking if insurance would cover Hep A Immunization for patient. I did not have cpt code for representative.  I was told immunization in PCP office should be covered at 100#.  Phone Ref # [id number]    5/7 phoned [employer] a second time the same date with same question and again was told generally immunizations are covered at 100%. Phone Ref # [id number]

## 2018-05-16 NOTE — TELEPHONE ENCOUNTER
"Spoke to patient last week- patient voiced understanding.     ----- Message from Elisabeth Warren MD sent at 5/9/2018  1:46 PM EDT -----  Regarding: RE:    Okay, is he had bleeding on his stool, gets dizzy, or light headed, he needs to let someone know. Try to eat iron rich foods until then. We can attempt IV iron on future once he discusses with me.    ----- Message -----  From: Charla Nick CMA  Sent: 5/9/2018   8:56 AM  To: Elisabeth Warren MD    I was able to get a hold of the patient, after a few attempts. I meant to speak to patient yesterday at his Hep A shot but forgot. Patient states he tried the tablet in the past but never the oral solution. I asked if patient would be willing to try oral and patient denied. Patient states he would like to wait until he f/u with you in early June.     ----- Message -----  From: Elisabeth Warren MD  Sent: 3/19/2018   8:11 AM  To: Charla Nick CMA    Can he take an oral solution? The iron infusions are very expensive and we should try for at least one week before we attempt the IV.     ----- Message -----  From: Charla Nick CMA  Sent: 3/16/2018 4:52 PM   To: Elisabeth Warren MD    Patient has been advised and voiced understanding. Patient would like to see Dr. Ng (same as his wife), he advised me not to add a referral and that he will call to make his own apt.  Patient states he cannot take RX ferrous sulfate due to it making him ill. Patient states he was once told \"If I get the shot I will be better off.\" I advised I would speak with Dr. Warren and let her know.         ----- Message from Elisabeth Warren MD sent at 3/15/2018 12:50 PM EDT -----  Iron level is really low. Needs to see GI for iron deficiency anemia  to discuss scope to evaluate this. In meantime, he needs to start on ferrous sulfate 325mg of iron once daily with a glass of orange juice or orange.Increase to BID if he can tolerate. Will make his stools darker in color and may have some " constipation. Needs follow up in 3 months with labs before.

## 2018-05-18 NOTE — PROGRESS NOTES
Fahad Muhammad is a 82 y.o. male, who presents with a chief complaint of   Chief Complaint   Patient presents with   • Arm Pain     R arm, forearm, swelling, painful        83 yo M with Afib here with 1 week history of chills and right arm swelling in the lower part of his arm. Sleeps on that arm a lot. No redness or signs of cellulitis. He has had pain and pitting edema. Wife states that he has been fatigued and tired more than usual. No fever. No recent injury to that arm.     He has higher HR here, but no CP or palpitations or SOB.          The following portions of the patient's history were reviewed and updated as appropriate: allergies, current medications, past family history, past medical history, past social history, past surgical history and problem list.    Allergies: Gabapentin; Levofloxacin; and Statins    Current Outpatient Prescriptions:   •  aspirin 81 MG chewable tablet, Chew 81 mg daily., Disp: , Rfl:   •  cetirizine (zyrTEC) 5 MG tablet, Take 5 mg by mouth., Disp: , Rfl:   •  diltiaZEM CD (CARDIZEM CD) 240 MG 24 hr capsule, Take 1 capsule by mouth Daily., Disp: 90 capsule, Rfl: 3  •  docusate sodium 100 MG capsule, Take 100 mg by mouth 2 (Two) Times a Day. (Patient taking differently: Take 100 mg by mouth As Needed.), Disp: , Rfl: 0  •  fenofibrate micronized (LOFIBRA) 134 MG capsule, Take 1 capsule by mouth Every Morning Before Breakfast., Disp: 90 capsule, Rfl: 3  •  furosemide (LASIX) 20 MG tablet, Take 1 tablet by mouth Daily. (Patient taking differently: Take 20 mg by mouth As Needed.), Disp: 90 tablet, Rfl: 3  •  guaiFENesin (MUCINEX) 600 MG 12 hr tablet, Take 1 tablet by mouth 2 (Two) Times a Day. (Patient taking differently: Take 600 mg by mouth Daily.), Disp: 60 tablet, Rfl: 1  •  levETIRAcetam (KEPPRA) 500 MG tablet, Take 1 tablet by mouth 2 (Two) Times a Day., Disp: 180 tablet, Rfl: 3  •  methocarbamol (ROBAXIN) 500 MG tablet, TAKE 1 TABLET BY MOUTH TWICE A DAY, PRN, Disp: ,  "Rfl: 0  •  metoprolol succinate XL (TOPROL-XL) 100 MG 24 hr tablet, Take 1 tablet by mouth Daily., Disp: 30 tablet, Rfl: 6  •  Misc Natural Products (GLUCOSAMINE CHONDROITIN ADV PO), Take  by mouth., Disp: , Rfl:   •  Multiple Vitamin (MULTI-VITAMIN) tablet, Take 1 tablet by mouth daily., Disp: , Rfl:   •  Omega-3 Fatty Acids (FISH OIL) 1000 MG capsule capsule, Take 1,000 mg by mouth daily., Disp: , Rfl:   •  rivaroxaban (XARELTO) 15 MG tablet, Take 1 tablet by mouth Daily., Disp: 90 tablet, Rfl: 1  •  Saw Palmetto, Serenoa repens, 450 MG capsule, Take  by mouth., Disp: , Rfl:   There are no discontinued medications.    Review of Systems   Constitutional: Positive for chills and fatigue. Negative for fever.   Respiratory: Negative for cough and shortness of breath.    Cardiovascular: Negative for chest pain and palpitations.   Gastrointestinal: Negative for abdominal pain, constipation, diarrhea, nausea and vomiting.   Skin: Negative for rash.        Swelling and pain in his right arm    Neurological: Negative for dizziness and headaches.             /66 (BP Location: Left arm, Patient Position: Sitting, Cuff Size: Adult)   Pulse 113   Temp 99.6 °F (37.6 °C) (Oral)   Resp 18   Ht 190.5 cm (75\")   Wt 95.1 kg (209 lb 9.6 oz)   SpO2 97%   BMI 26.20 kg/m²         Physical Exam   Constitutional: He is oriented to person, place, and time. He appears well-developed and well-nourished. No distress.   HENT:   Head: Normocephalic and atraumatic.   Right Ear: External ear normal.   Left Ear: External ear normal.   Mouth/Throat: Oropharynx is clear and moist. No oropharyngeal exudate.   Eyes: Conjunctivae are normal. Right eye exhibits no discharge. Left eye exhibits no discharge. No scleral icterus.   Neck: Neck supple.   Cardiovascular: Regular rhythm and normal heart sounds.  Tachycardia present.  Exam reveals no gallop and no friction rub.    No murmur heard.  Pulmonary/Chest: Effort normal and breath sounds " normal. No respiratory distress. He has no wheezes. He has no rales.   Neurological: He is alert and oriented to person, place, and time.   Skin: Skin is warm. No rash noted.   Diffuse swelling with pitting edema of right wrist up to near elbow. No redness. Normal pulses. Not warmer to touch than other arm.    Psychiatric: He has a normal mood and affect. His behavior is normal.   Nursing note and vitals reviewed.      ECG 12 Lead  Date/Time: 5/18/2018 4:41 PM  Performed by: KORINA THRASHER  Authorized by: KORINA THRASHER   Comparison: compared with previous ECG from 6/8/2017  Rhythm: sinus rhythm  Rate: normal  Conduction: left bundle branch block  ST Segments: ST segments normal  T Waves: T waves normal  QRS axis: normal  Other: no other findings  Clinical impression: abnormal ECG              Results for orders placed or performed in visit on 03/10/18   Comprehensive metabolic panel   Result Value Ref Range    Glucose 84 65 - 99 mg/dL    BUN 17 8 - 23 mg/dL    Creatinine 1.41 (H) 0.76 - 1.27 mg/dL    eGFR Non African Am 48 (L) >60 mL/min/1.73    eGFR African Am 59 (L) >60 mL/min/1.73    BUN/Creatinine Ratio 12.1 7.0 - 25.0    Sodium 138 136 - 145 mmol/L    Potassium 4.8 3.5 - 5.2 mmol/L    Chloride 100 98 - 107 mmol/L    Total CO2 27.0 22.0 - 29.0 mmol/L    Calcium 10.1 8.8 - 10.5 mg/dL    Total Protein 6.9 6.0 - 8.5 g/dL    Albumin 3.70 3.50 - 5.20 g/dL    Globulin 3.2 gm/dL    A/G Ratio 1.2 g/dL    Total Bilirubin 1.0 0.2 - 1.2 mg/dL    Alkaline Phosphatase 112 40 - 129 U/L    AST (SGOT) 21 5 - 40 U/L    ALT (SGPT) 10 5 - 41 U/L   Lipid Panel With LDL / HDL Ratio   Result Value Ref Range    Total Cholesterol 185 0 - 200 mg/dL    Triglycerides 104 0 - 150 mg/dL    HDL Cholesterol 41 40 - 60 mg/dL    VLDL Cholesterol 20.8 8 - 32 mg/dL    LDL Cholesterol  123 (H) 0 - 100 mg/dL    LDL/HDL Ratio 3.00    Vitamin D 25 hydroxy   Result Value Ref Range    25 Hydroxy, Vitamin D 17.8 ng/ml   CBC & Differential   Result  Value Ref Range    WBC 12.08 (H) 4.80 - 10.80 10*3/mm3    RBC 4.27 (L) 4.70 - 6.10 10*6/mm3    Hemoglobin 11.2 (L) 14.0 - 18.0 g/dL    Hematocrit 34.9 (L) 42.0 - 52.0 %    MCV 81.7 80.0 - 94.0 fL    MCH 26.2 (L) 27.0 - 31.0 pg    MCHC 32.1 31.0 - 37.0 g/dL    RDW 14.9 (H) 11.5 - 14.5 %    Platelets 443 140 - 500 10*3/mm3    Neutrophil Rel % 66.0 45.0 - 70.0 %    Lymphocyte Rel % 19.5 (L) 20.0 - 45.0 %    Monocyte Rel % 12.3 (H) 3.0 - 8.0 %    Eosinophil Rel % 1.4 0.0 - 4.0 %    Basophil Rel % 0.5 0.0 - 2.0 %    Neutrophils Absolute 7.96 1.50 - 8.30 10*3/mm3    Lymphocytes Absolute 2.36 0.60 - 4.80 10*3/mm3    Monocytes Absolute 1.49 (H) 0.00 - 1.00 10*3/mm3    Eosinophils Absolute 0.17 0.10 - 0.30 10*3/mm3    Basophils Absolute 0.06 0.00 - 0.20 10*3/mm3    Immature Granulocyte Rel % 0.3 0.0 - 0.5 %    Immature Grans Absolute 0.04 (H) 0.00 - 0.03 10*3/mm3    nRBC 0.0 0.0 - 0.0 /100 WBC           Fahad was seen today for arm pain.    Diagnoses and all orders for this visit:    Arm swelling  -     CBC & Differential  -     Comprehensive Metabolic Panel  -     C-reactive Protein  -     US venous doppler upper extremity right (duplex)    Pain of right upper extremity  -     CBC & Differential  -     Comprehensive Metabolic Panel  -     C-reactive Protein  -     US venous doppler upper extremity right (duplex)    Chills (without fever)    Tachycardia  -     ECG 12 Lead      Patient with arm swelling of unknown cause. Looks like dependent edema on exam, but chills and tachycardia worries me for infection. Will check labs and doppler and call patient with results later tonight.     Once those are back, we will have more information. He is on Xarelto so low likelihood of clot, but could happen.     Spoke with Dr. Vega regarding EKG and she felt that it was likely relate to rate which seemed to be related to pain and possible infection. She said if no CP or SOB, not to worry and will likely resolve once rate normalizes.      Will determine follow based on results of test.       Return Return to clinic after test .    Elisabeth Warren MD  05/18/2018

## 2018-05-21 PROBLEM — R63.4 WEIGHT LOSS, ABNORMAL: Status: ACTIVE | Noted: 2018-01-01

## 2018-05-21 PROBLEM — D50.9 IRON DEFICIENCY ANEMIA: Status: ACTIVE | Noted: 2018-01-01

## 2018-05-21 NOTE — TELEPHONE ENCOUNTER
Patient scheduled for f/u later this afternoon.     ----- Message from Elisabeth Warren MD sent at 5/18/2018  6:16 PM EDT -----  Spoke to patient’s wife at 6:15p regarding negative u/s and elevated WBC and CRP. Start Keflex TID and sent to pharmacy. Call wife on Monday at [phone] to schedule follow up with me on Monday. Told them to go to ER if worse over weekend, fever, confusion is worsening symptoms.

## 2018-05-22 NOTE — TELEPHONE ENCOUNTER
Patient has been advised and voiced understanding.     ----- Message from Elisabeth Warren MD sent at 5/22/2018 11:20 AM EDT -----  Blood work looks better. White blood cell counts is better and the elevated inflammation marker is down. I want him to keep his follow up as scheduled. We may repeat blood work the day that he comes back in.

## 2018-05-22 NOTE — PROGRESS NOTES
Blood work looks better. White blood cell counts is better and the elevated inflammation marker is down. I want him to keep his follow up as scheduled. We may repeat blood work the day that he comes back in.

## 2018-05-24 NOTE — PROGRESS NOTES
Physical Therapy Daily Progress Note        Visit # : 16  Fahad Muhammad reports: I have had a rough week - my back has been sore and I have cellulitis on my right arm     Subjective     Objective       Palpation   Left   Hypertonic in the erector spinae, lumbar interspinals, lumbar paraspinals and quadratus lumborum.      See Exercise, Manual, and Modality Logs for complete treatment.       Assessment & Plan     Assessment  Assessment details: Patient reported increased subjective reports of left low back pain with prolonged sitting. Presents with increased muscle tone Left > Right Lumbar PVM's. Improved muscle tone after Dry Needling today. Educated patient to apply moist heat again tonight at home.  Cont PT 1x per week or as indicated by patient for Dry Needling.         Progress per Plan of Care           Manual Therapy:         mins  04605;  Therapeutic Exercise:        mins  97005;     Neuromuscular Eulalio:        mins  92144;    Therapeutic Activity:          mins  29955;     Gait Training:           mins  21685;     Ultrasound:         mins  49385;    Electrical Stimulation:         mins  52408 ( );  Dry Needling     15     mins self-pay    Timed Treatment:      mins   Total Treatment:     25   mins    Madiha Green PT  Physical Therapist  [place] License # [id number]

## 2018-05-29 NOTE — TELEPHONE ENCOUNTER
----- Message from Roseanna Washington MA sent at 5/29/2018 10:55 AM EDT -----  Regarding: FW: MEDICATION ?  Contact: [phone]      ----- Message -----  From: Elisabeth Warren MD  Sent: 5/29/2018  10:39 AM  To: Roseanna Washington MA  Subject: RE: MEDICATION ?                                 I need to see him. I cannot just give him antibiotic again without examining him as he may need something stronger or there could be something else wrong. Please schedule him today for acute, may need to be seen at lunch if no other openings.   ----- Message -----  From: Roseanna Washington MA  Sent: 5/29/2018  10:27 AM  To: Elisabeth Warren MD  Subject: FW: MEDICATION ?                                     ----- Message -----  From: Celestino Daniel  Sent: 5/29/2018  10:01 AM  To: Michelle Schuler [address]  Subject: MEDICATION ?                                     [facility]    Patient's spouse called to say that the swelling in his arm has gone down from his cellulitis but now it is in his thumb and it is painful.  They are asking to fill the antibiotic again if possible.    [facility]    Please call pt    Thanks!  celestino

## 2018-05-31 NOTE — TELEPHONE ENCOUNTER
Patient wife has been advised and voiced understanding. Advised to contact office with any questions or concerns.     ----- Message from Elisabeth Warren MD sent at 5/30/2018  9:53 AM EDT -----  X-ray of hand and wrist are negative for infection, but show significant arthritis which may be the cause of some of his pain. Tylenol is michelle for him to take for pain as well as ice. Let me know if he needs anything stronger.

## 2018-06-06 NOTE — PROGRESS NOTES
I am going to see patient on 6/12 in clinic, but until then please call and tell him to push fluids. His kidney numbers do not look good because he is so dehydrated. Needs to drink at least 8 full glasses of water today and if kidney numbers are not better, I will give him fluids in hospital tomorrow. Can we also check on getting Venofer for him? Can that be done in hospital in the [facility]? Melisa may know and she is back today.

## 2018-06-11 NOTE — PROGRESS NOTES
Physical Therapy Daily Progress Note      Visit # : 17  Fahad Muhammad reports: My back always feels better for a couple days after Dry Needling     Subjective     Objective       Palpation   Left   Hypertonic in the erector spinae, lumbar paraspinals and quadratus lumborum.      See Exercise, Manual, and Modality Logs for complete treatment.       Assessment & Plan     Assessment  Assessment details: Patient reported improvement in subjective reports after Dry Needling. Presents with increased muscle tone Left > Right Lumbar PVM's. Improved muscle tone after Dry Needling today. Educated patient to apply moist heat again tonight at home.  Cont PT 1x per week or as indicated by patient for Dry Needling.         Progress per Plan of Care           Manual Therapy:         mins  58672;  Therapeutic Exercise:        mins  25265;     Neuromuscular Eulalio:        mins  08225;    Therapeutic Activity:         mins  02913;     Gait Training:           mins  70952;     Ultrasound:         mins  62407;    Electrical Stimulation:         mins  08097 ( );  Dry Needling     15     mins self-pay    Timed Treatment:      mins   Total Treatment:     25   mins    Madiha Green PT  Physical Therapist  [place] License # [id number]

## 2018-06-12 NOTE — PROGRESS NOTES
Fahad Muhammad is a 82 y.o. male, who presents with a chief complaint of   Chief Complaint   Patient presents with   • Follow-up     3 month f/u, recent labs    • Chronic Kidney Disease       81 yo M with Afib, CAD, HLD, HTN and seizure here for follow up. He has lost about 15-20 lbs in the last 6 months. He has been eating less because his appetite is not as good. He is not exercising much.     He has Afib that is managed by Dr. Garcia. He is taking Toprol and Cardizem every day and tolerating well. His BP is running lower here, but asymptomatic.     He is taking about 4 Tylenol per day for pain. He does well on this for his pain.     He has JEAN MARIE that is stable and he is getting worked up by Dr. Ng. He has not tolerated iron pills in the past.     His HLD is chronic and doing well on current regimen. Not tolerated statin in past and his TG's were close to 100 a few months ago.    CKD has worsened and not drinking as much water as he should. He has been on Keflex as well as Bactrim for arm infection over the last few weeks that has now cleared. Still has elevated WBC and CRP.            The following portions of the patient's history were reviewed and updated as appropriate: allergies, current medications, past family history, past medical history, past social history, past surgical history and problem list.    Allergies: Gabapentin; Levofloxacin; and Statins    Current Outpatient Prescriptions:   •  aspirin 81 MG chewable tablet, Chew 81 mg daily., Disp: , Rfl:   •  cetirizine (zyrTEC) 5 MG tablet, Take 5 mg by mouth., Disp: , Rfl:   •  diltiaZEM CD (CARDIZEM CD) 240 MG 24 hr capsule, TAKE 1 CAPSULE BY MOUTH DAILY., Disp: 30 capsule, Rfl: 0  •  docusate sodium 100 MG capsule, Take 100 mg by mouth 2 (Two) Times a Day. (Patient taking differently: Take 100 mg by mouth As Needed.), Disp: , Rfl: 0  •  furosemide (LASIX) 20 MG tablet, Take 1 tablet by mouth Daily. (Patient taking differently: Take 20 mg by  "mouth As Needed.), Disp: 90 tablet, Rfl: 3  •  guaiFENesin (MUCINEX) 600 MG 12 hr tablet, Take 1 tablet by mouth 2 (Two) Times a Day. (Patient taking differently: Take 600 mg by mouth Daily.), Disp: 60 tablet, Rfl: 1  •  Iron-FA-B Kwp-X-Nwwg-Probiotic (FUSION PLUS) capsule, Take 1 tablet by mouth Daily., Disp: , Rfl:   •  levETIRAcetam (KEPPRA) 500 MG tablet, TAKE 1 TABLET BY MOUTH 2 (TWO) TIMES A DAY., Disp: 180 tablet, Rfl: 2  •  methocarbamol (ROBAXIN) 500 MG tablet, TAKE 1 TABLET BY MOUTH TWICE A DAY, PRN, Disp: , Rfl: 0  •  metoprolol succinate XL (TOPROL-XL) 100 MG 24 hr tablet, Take 1 tablet by mouth Daily., Disp: 30 tablet, Rfl: 6  •  Misc Natural Products (GLUCOSAMINE CHONDROITIN ADV PO), Take  by mouth., Disp: , Rfl:   •  Multiple Vitamin (MULTI-VITAMIN) tablet, Take 1 tablet by mouth daily., Disp: , Rfl:   •  Omega-3 Fatty Acids (FISH OIL) 1000 MG capsule capsule, Take 1,000 mg by mouth daily., Disp: , Rfl:   •  rivaroxaban (XARELTO) 15 MG tablet, Take 1 tablet by mouth Daily., Disp: 90 tablet, Rfl: 1  •  Saw Palmetto, Serenoa repens, 450 MG capsule, Take  by mouth., Disp: , Rfl:   Medications Discontinued During This Encounter   Medication Reason   • fenofibrate micronized (LOFIBRA) 134 MG capsule *Therapy completed       Review of Systems   Constitutional: Positive for fatigue. Negative for chills and fever.   HENT: Negative for congestion.    Respiratory: Negative for cough and shortness of breath.    Cardiovascular: Negative for chest pain and palpitations.   Gastrointestinal: Negative for abdominal pain, constipation and diarrhea.   Genitourinary: Positive for frequency (in the middle of the night). Negative for difficulty urinating and dysuria.   Skin: Negative for rash.   Hematological: Negative for adenopathy.             /62 (BP Location: Right arm, Patient Position: Sitting, Cuff Size: Adult)   Pulse 98   Temp 97.6 °F (36.4 °C) (Oral)   Resp 18   Ht 190.5 cm (75\")   Wt 92.5 kg (204 " lb)   SpO2 98%   BMI 25.50 kg/m²       Physical Exam   Constitutional: He is oriented to person, place, and time. He appears well-developed and well-nourished. He appears ill (using walker and chronically ill ). No distress.   HENT:   Head: Normocephalic and atraumatic.   Right Ear: External ear normal.   Left Ear: External ear normal.   Mouth/Throat: Oropharynx is clear and moist. No oropharyngeal exudate.   Eyes: Conjunctivae are normal. Right eye exhibits no discharge. Left eye exhibits no discharge. No scleral icterus.   Neck: Neck supple.   Cardiovascular: Normal rate and normal heart sounds.  An irregularly irregular rhythm present. Exam reveals no gallop and no friction rub.    No murmur heard.  Pulmonary/Chest: Effort normal and breath sounds normal. No respiratory distress. He has no wheezes. He has no rales.   Lymphadenopathy:     He has no cervical adenopathy.   Neurological: He is alert and oriented to person, place, and time.   Skin: Skin is warm. No rash noted.   Psychiatric: He has a normal mood and affect. His behavior is normal.   Nursing note and vitals reviewed.        Results for orders placed or performed in visit on 06/08/18   Comprehensive metabolic panel   Result Value Ref Range    Glucose 91 65 - 99 mg/dL    BUN 25 (H) 8 - 23 mg/dL    Creatinine 1.73 (H) 0.76 - 1.27 mg/dL    eGFR Non African Am 38 (L) >60 mL/min/1.73    eGFR  Am 46 (L) >60 mL/min/1.73    BUN/Creatinine Ratio 14.5 7.0 - 25.0    Sodium 134 (L) 136 - 145 mmol/L    Potassium 4.4 3.5 - 5.2 mmol/L    Chloride 97 (L) 98 - 107 mmol/L    Total CO2 24.5 22.0 - 29.0 mmol/L    Calcium 10.0 8.8 - 10.5 mg/dL    Total Protein 6.5 6.0 - 8.5 g/dL    Albumin 3.70 3.50 - 5.20 g/dL    Globulin 2.8 gm/dL    A/G Ratio 1.3 g/dL    Total Bilirubin 0.5 0.2 - 1.2 mg/dL    Alkaline Phosphatase 124 40 - 129 U/L    AST (SGOT) 20 5 - 40 U/L    ALT (SGPT) 10 5 - 41 U/L   C-reactive protein   Result Value Ref Range    C-Reactive Protein 8.93 (H)  0.00 - 0.50 mg/dL   Iron Profile   Result Value Ref Range    TIBC 283 261 - 498 mcg/dL    UIBC 262 112 - 346 mcg/dL    Iron 21 (L) 59 - 158 mcg/dL    Iron Saturation 7 %   Ferritin   Result Value Ref Range    Ferritin 133.00 30.00 - 400.00 ng/mL   Unable To Void   Result Value Ref Range    Unable to Void Comment    CBC & Differential   Result Value Ref Range    WBC 10.94 (H) 4.80 - 10.80 10*3/mm3    RBC 4.54 (L) 4.70 - 6.10 10*6/mm3    Hemoglobin 10.9 (L) 14.0 - 18.0 g/dL    Hematocrit 34.1 (L) 42.0 - 52.0 %    MCV 75.1 (L) 80.0 - 94.0 fL    MCH 24.0 (L) 27.0 - 31.0 pg    MCHC 32.0 31.0 - 37.0 g/dL    RDW 18.0 (H) 11.5 - 14.5 %    Platelets 650 (H) 140 - 500 10*3/mm3    Neutrophil Rel % 74.7 (H) 45.0 - 70.0 %    Lymphocyte Rel % 13.7 (L) 20.0 - 45.0 %    Monocyte Rel % 10.4 (H) 3.0 - 8.0 %    Eosinophil Rel % 0.2 0.0 - 4.0 %    Basophil Rel % 0.4 0.0 - 2.0 %    Neutrophils Absolute 8.17 1.50 - 8.30 10*3/mm3    Lymphocytes Absolute 1.50 0.60 - 4.80 10*3/mm3    Monocytes Absolute 1.14 (H) 0.00 - 1.00 10*3/mm3    Eosinophils Absolute 0.02 (L) 0.10 - 0.30 10*3/mm3    Basophils Absolute 0.04 0.00 - 0.20 10*3/mm3    Immature Granulocyte Rel % 0.6 (H) 0.0 - 0.5 %    Immature Grans Absolute 0.07 (H) 0.00 - 0.03 10*3/mm3    nRBC 0.0 0.0 - 0.0 /100 WBC           Fahad was seen today for follow-up and chronic kidney disease.    Diagnoses and all orders for this visit:    Hyperlipidemia, unspecified hyperlipidemia type    Essential hypertension    Chronic atrial fibrillation    CKD (chronic kidney disease), stage III  -     Basic Metabolic Panel    Iron deficiency anemia, unspecified iron deficiency anemia type    Weight loss, abnormal    Leukocytosis, unspecified type  -     CBC & Differential    Elevated C-reactive protein (CRP)  -     C-reactive Protein      I am going to stop fibrate since his TG's are 100 and recheck in 3 months and see how he does off of this. Will see back in 9/2018 with labs before.     His BP is a  little on the low side today. I am going to talk to Dr. Garcia about possibly reducing Dilt to 180mg per day. He is rate controlled today on exam.     He has an JONA on CKD today. Will recheck today and push fluids. No reason for this at this time other than the antibiotics which he is no longer taking. If Cr is still high on recheck, will hold Lasix for a few days.     JEAN MARIE is stable. He has appointment with GI and we will start iron supplement today that we had in the office today. If tolerates, will try to give him more samples.     Will recheck CBC and CRP today as they are still elevated after infection.     Weight check in one month. Encouraged small meals, Ensure and follow up with me and Dr. Ng.     Return in about 3 months (around 9/12/2018) for Recheck.    Elisabeth Warren MD  06/12/2018

## 2018-06-12 NOTE — PROGRESS NOTES
White blood cell count is all normal, kidney function better and CRP almost normal. I want him to hold his Lasix for 3 days and push fluids and we will recheck kidney function is one week. Just needs BMP.

## 2018-06-25 NOTE — PROGRESS NOTES
PATIENT INFORMATION  Fahad Muhammad       - 1935    CHIEF COMPLAINT  Chief Complaint   Patient presents with   • Weight Loss   • Anemia       HISTORY OF PRESENT ILLNESS  83 yo with iron deficiency anemia no obvious bleeding and doesn't know when his last colonosocpy wa and I tolerating his daily iron  Is down 10 or so pounds overall but is only a fair historian.       Anemia   Presents for follow-up visit. Symptoms include bruises/bleeds easily, leg swelling and weight loss. There has been no abdominal pain, anorexia or pica. Signs of blood loss that are present include hematochezia and melena. Signs of blood loss that are not present include hematemesis. Compliance problems include medication side effects.  Compliance with medications is 51-75%. Side effects of medications include GI discomfort.           REVIEW OF SYSTEMS  Review of Systems   Constitutional: Positive for unexpected weight change and weight loss.   Gastrointestinal: Positive for hematochezia and melena. Negative for abdominal pain, anorexia and hematemesis.   Hematological: Bruises/bleeds easily.   All other systems reviewed and are negative.        ACTIVE PROBLEMS  Patient Active Problem List    Diagnosis   • Weight loss, abnormal [R63.4]   • Iron deficiency anemia [D50.9]   • Chronic pain syndrome [G89.4]   • Closed wedge compression fracture of lumbar vertebra with routine healing [S32.000D]   • DDD (degenerative disc disease), lumbar [M51.36]   • Seizure disorder [G40.909]   • Vitamin D deficiency [E55.9]   • Coronary artery disease involving native coronary artery of native heart without angina pectoris [I25.10]   • Benign non-nodular prostatic hyperplasia with lower urinary tract symptoms [N40.1]   • Seasonal allergic rhinitis [J30.2]   • Atrial fibrillation [I48.91]   • CKD (chronic kidney disease), stage III [N18.3]   • Hyperlipidemia [E78.5]   • Hypertension [I10]   • Closed stable burst fracture of lumbar vertebra [S32.001A]    • Osteoarthrosis, localized, primary, knee [M17.10]         PAST MEDICAL HISTORY  Past Medical History:   Diagnosis Date   • Arthritis    • Atrial fibrillation    • Coronary artery disease    • DDD (degenerative disc disease), lumbar    • Heme positive stool    • Hx of colonoscopy     Complete   • Hyperlipidemia    • Hypertension    • Neck strain    • Osteoarthritis    • Seizures    • Sepsis    • Stroke    • Vitamin D deficiency    • Wrist fracture, right          SURGICAL HISTORY  Past Surgical History:   Procedure Laterality Date   • CARDIAC SURGERY     • COLONOSCOPY     • CORONARY ARTERY BYPASS GRAFT  1996   • HERNIA REPAIR Right     Inguinal hernia repair         FAMILY HISTORY  Family History   Problem Relation Age of Onset   • Heart disease Mother    • Cancer Father    • Cancer Sister    • Colon cancer Neg Hx    • Colon polyps Neg Hx          SOCIAL HISTORY  Social History     Occupational History   • Not on file.     Social History Main Topics   • Smoking status: Former Smoker     Quit date: 12/3/1993   • Smokeless tobacco: Former User     Quit date: 9/7/1994   • Alcohol use 0.6 oz/week     1 Shots of liquor per week      Comment: occasional    • Drug use: No   • Sexual activity: Defer         CURRENT MEDICATIONS    Current Outpatient Prescriptions:   •  aspirin 81 MG chewable tablet, Chew 81 mg daily., Disp: , Rfl:   •  cetirizine (zyrTEC) 5 MG tablet, Take 5 mg by mouth., Disp: , Rfl:   •  diltiaZEM CD (CARDIZEM CD) 240 MG 24 hr capsule, TAKE 1 CAPSULE BY MOUTH DAILY., Disp: 30 capsule, Rfl: 0  •  diltiaZEM CD (CARDIZEM CD) 240 MG 24 hr capsule, Take 1 capsule by mouth Daily. Pt needs to contact office to schedule an appt for further refills, Disp: 90 capsule, Rfl: 0  •  docusate sodium 100 MG capsule, Take 100 mg by mouth 2 (Two) Times a Day. (Patient taking differently: Take 100 mg by mouth As Needed.), Disp: , Rfl: 0  •  furosemide (LASIX) 20 MG tablet, Take 1 tablet by mouth Daily. (Patient taking  "differently: Take 20 mg by mouth As Needed.), Disp: 90 tablet, Rfl: 3  •  guaiFENesin (MUCINEX) 600 MG 12 hr tablet, Take 1 tablet by mouth 2 (Two) Times a Day. (Patient taking differently: Take 600 mg by mouth Daily.), Disp: 60 tablet, Rfl: 1  •  Iron-FA-B Ziz-L-Euuq-Probiotic (FUSION PLUS) capsule, Take 1 tablet by mouth Daily., Disp: , Rfl:   •  levETIRAcetam (KEPPRA) 500 MG tablet, TAKE 1 TABLET BY MOUTH 2 (TWO) TIMES A DAY., Disp: 180 tablet, Rfl: 2  •  methocarbamol (ROBAXIN) 500 MG tablet, TAKE 1 TABLET BY MOUTH TWICE A DAY, PRN, Disp: , Rfl: 0  •  metoprolol succinate XL (TOPROL-XL) 100 MG 24 hr tablet, Take 1 tablet by mouth Daily., Disp: 30 tablet, Rfl: 6  •  Misc Natural Products (GLUCOSAMINE CHONDROITIN ADV PO), Take  by mouth., Disp: , Rfl:   •  Multiple Vitamin (MULTI-VITAMIN) tablet, Take 1 tablet by mouth daily., Disp: , Rfl:   •  Omega-3 Fatty Acids (FISH OIL) 1000 MG capsule capsule, Take 1,000 mg by mouth daily., Disp: , Rfl:   •  Saw Palmetto, Serenoa repens, 450 MG capsule, Take  by mouth., Disp: , Rfl:   •  XARELTO 15 MG tablet, TAKE 1 TABLET BY MOUTH DAILY., Disp: 90 tablet, Rfl: 2    ALLERGIES  Gabapentin; Levofloxacin; and Statins    VITALS  Vitals:    06/25/18 1444   BP: 110/64   Weight: 88.6 kg (195 lb 6.4 oz)   Height: 190.5 cm (75\")       LAST RESULTS   Office Visit on 06/12/2018   Component Date Value Ref Range Status   • WBC 06/12/2018 11.36* 4.80 - 10.80 10*3/mm3 Final   • RBC 06/12/2018 4.35* 4.70 - 6.10 10*6/mm3 Final   • Hemoglobin 06/12/2018 10.5* 14.0 - 18.0 g/dL Final   • Hematocrit 06/12/2018 32.7* 42.0 - 52.0 % Final   • MCV 06/12/2018 75.2* 80.0 - 94.0 fL Final   • MCH 06/12/2018 24.1* 27.0 - 31.0 pg Final   • MCHC 06/12/2018 32.1  31.0 - 37.0 g/dL Final   • RDW 06/12/2018 19.0* 11.5 - 14.5 % Final   • Platelets 06/12/2018 646* 140 - 500 10*3/mm3 Final   • Neutrophil Rel % 06/12/2018 67.8  45.0 - 70.0 % Final   • Lymphocyte Rel % 06/12/2018 18.9* 20.0 - 45.0 % Final   • " Monocyte Rel % 06/12/2018 11.3* 3.0 - 8.0 % Final   • Eosinophil Rel % 06/12/2018 1.1  0.0 - 4.0 % Final   • Basophil Rel % 06/12/2018 0.4  0.0 - 2.0 % Final   • Neutrophils Absolute 06/12/2018 7.70  1.50 - 8.30 10*3/mm3 Final   • Lymphocytes Absolute 06/12/2018 2.15  0.60 - 4.80 10*3/mm3 Final   • Monocytes Absolute 06/12/2018 1.28* 0.00 - 1.00 10*3/mm3 Final   • Eosinophils Absolute 06/12/2018 0.12  0.10 - 0.30 10*3/mm3 Final   • Basophils Absolute 06/12/2018 0.05  0.00 - 0.20 10*3/mm3 Final   • Immature Granulocyte Rel % 06/12/2018 0.5  0.0 - 0.5 % Final   • Immature Grans Absolute 06/12/2018 0.06* 0.00 - 0.03 10*3/mm3 Final   • nRBC 06/12/2018 0.0  0.0 - 0.0 /100 WBC Final   • C-Reactive Protein 06/12/2018 1.57* 0.00 - 0.50 mg/dL Final   • Glucose 06/12/2018 90  65 - 99 mg/dL Final   • BUN 06/12/2018 25* 8 - 23 mg/dL Final   • Creatinine 06/12/2018 1.34* 0.76 - 1.27 mg/dL Final   • eGFR Non  Am 06/12/2018 51* >60 mL/min/1.73 Final    Comment: The MDRD GFR formula is only valid for adults with stable  renal function between ages 18 and 70.     • eGFR  Am 06/12/2018 62  >60 mL/min/1.73 Final   • BUN/Creatinine Ratio 06/12/2018 18.7  7.0 - 25.0 Final   • Sodium 06/12/2018 138  136 - 145 mmol/L Final   • Potassium 06/12/2018 4.7  3.5 - 5.2 mmol/L Final   • Chloride 06/12/2018 101  98 - 107 mmol/L Final   • Total CO2 06/12/2018 26.8  22.0 - 29.0 mmol/L Final   • Calcium 06/12/2018 10.0  8.8 - 10.5 mg/dL Final     Xr Wrist 3+ View Right    Result Date: 5/29/2018  Narrative: EXAM: 3 views the right wrist.  DATE: 5/29/2018.  HISTORY: Right hand and wrist cellulitis with swelling which began 2 weeks ago. Persistent pain after antibiotic therapy, greatest in the first digit.  FINDINGS: No fracture. No dislocation. Mild radio carpal joint space. Mild degenerative spurring of the distal scaphoid. Mild degenerative narrowing of the scaphoid-trapezium and first carpal metacarpal joints. Mild calcification the  triangular fibrocartilage. No radiographic evidence of osteomyelitis. No retained radiopaque foreign body. No subcutaneous gas is seen. Mild osteopenia.      Impression: Degenerative changes the right wrist with mild osteopenia. No acute findings.  This report was finalized on 5/29/2018 4:31 PM by Dr. Nadege Correa MD.      Xr Hand 3+ View Right    Result Date: 5/29/2018  Narrative: EXAM: 3 views of the right hand  DATE: 5/29/2018.  HISTORY: Right hand and wrist cellulitis with swelling which began 2 weeks ago. Completed antibiotic treatment, with persistent pain greatest in the first digit.  FINDINGS: No fracture. No dislocation. No radiographic evidence of osteomyelitis. Mild osteopenic changes. Mild degenerative changes the interphalangeal joints, first metacarpal phalangeal joint, and the radial margin of the wrist. No unexpected retained radiopaque foreign body. No osteolytic or osteoblastic abnormality. No soft tissue gas.      Impression: Mild generalized osteopenia and mild degenerative changes of the right hand. No acute findings.  This report was finalized on 5/29/2018 4:30 PM by Dr. Nadege Correa MD.        PHYSICAL EXAM  Physical Exam   Constitutional: He is oriented to person, place, and time. He appears well-developed and well-nourished.   HENT:   Head: Normocephalic.   Mouth/Throat: Oropharynx is clear and moist.   Eyes: Conjunctivae and EOM are normal. Pupils are equal, round, and reactive to light. No scleral icterus.   Neck: Normal range of motion. Neck supple. No thyromegaly present.   Cardiovascular: Normal rate, regular rhythm, normal heart sounds and intact distal pulses.  Exam reveals no gallop.    No murmur heard.  Pulmonary/Chest: Effort normal and breath sounds normal. He has no wheezes. He has no rales.   Abdominal: Soft. Bowel sounds are normal. He exhibits no shifting dullness, no distension, no fluid wave, no abdominal bruit, no ascites and no mass. There is no hepatosplenomegaly.  There is no tenderness. There is no guarding and negative Lau's sign. Hernia confirmed negative in the ventral area.   Musculoskeletal: Normal range of motion. He exhibits no edema.   Lymphadenopathy:     He has no cervical adenopathy.   Neurological: He is alert and oriented to person, place, and time.   Skin: Skin is warm and dry. No rash noted. He is not diaphoretic. No erythema.   Psychiatric: His behavior is normal.       ASSESSMENT  Diagnoses and all orders for this visit:    Iron deficiency anemia, unspecified iron deficiency anemia type    -  Will review his last colon and encourage him to stick with his oral iron.     Weight loss, abnormal          PLAN  Return in about 3 months (around 9/25/2018).

## 2018-06-28 NOTE — PROGRESS NOTES
Physical Therapy Daily Progress Note        Visit # : 18  Fahad Muhammad reports: My mid-back is more sore today - unable to sleep in bed past several nights     Subjective     Objective       Postural Observations    Additional Postural Observation Details  Patient unable to tolerate laying supine or side-lying today - had to perform Dry Needling in sitting     Palpation   Left   Hypertonic in the erector spinae, lumbar paraspinals and quadratus lumborum.     Right   Hypertonic in the erector spinae, lumbar paraspinals and quadratus lumborum.     Additional Palpation Details  Increased muscle tone bilateral thoracic PVM's     See Exercise, Manual, and Modality Logs for complete treatment.       Assessment & Plan     Assessment  Assessment details: Patient reported improvement in subjective reports after Dry Needling. Presents with increased muscle tone Left > Right Lumbar PVM's. Improved muscle tone after Dry Needling today. Educated patient to apply moist heat again tonight at home.  Cont PT 1x per week or as indicated by patient for Dry Needling.         Progress strengthening /stabilization /functional activity           Manual Therapy:         mins  16871;  Therapeutic Exercise:         mins  46496;     Neuromuscular Eulalio:        mins  99480;    Therapeutic Activity:          mins  68280;     Gait Training:           mins  85319;     Ultrasound:         mins  87075;    Electrical Stimulation:         mins  58124 ( );  Dry Needling     15     mins self-pay    Timed Treatment:      mins   Total Treatment:     25   mins    Madiha Green, PT  Physical Therapist  [place] License # [id number]

## 2018-06-29 NOTE — TELEPHONE ENCOUNTER
"Patient wife has been advised and voiced understanding. Patient wife requesting [facility], referral placed. MRI order placed. Patient wife advised he can use Tylenol until MRI but will contact our office if patient changes his mind.     ----- Message from Elisabeth Warren MD sent at 2018 12:00 PM EDT -----  It is reasonable for patient to take hemp oil if he wants for pain. I think we should get him set up for MRI of his back to consider getting him into pain management. We can give him stronger pain medication before we can get MRI ordered if Tylenol I not helping like Tramadol. Heat is also good. Let me know if they are okay with this, sounds like he is in a lot of pain.     ----- Message -----  From: Charla Nick CMA  Sent: 2018  11:47 AM  To: Elisabeth Warren MD    Patient wife called this afternoon. Wife states that patient had PT dry needling done yesterday. Patient has been in \"severe\" pain this week due to his back, wife states patient has slept a lot this week. Wife states patient woke up \"very\" confused this AM. Wife is also asking if she can start patient on \"hemp oil\". I asked to speak to patient. I asked patient a series of questions: name, , age, address, last meal etc. Patient answered all questions adequately and at appropriate speed. Patient states PT dry needling feels good at the time but does not keep him pain free for very long. Patient uses heating pad at home. Patient states the reason his wife was concerned is because he was attempting to go to the bathroom and started walking in the wrong direction. I asked patient if he realized he was walking the wrong way and patient confirmed. Patient states he was \"not foggy\" this AM. Patient states back pain is at a \"10\", not consistent high pain but spasms occur.       "

## 2018-07-02 NOTE — TELEPHONE ENCOUNTER
----- Message from Carolina Montgomery sent at 7/2/2018  3:19 PM EDT -----  Spoke w/ wife and sent message to ainsley about conversation w/ wife  ----- Message -----  From: Melisa Severino MA  Sent: 7/2/2018   2:30 PM  To: Carolina Montgomery    I'm answering phones today.  Could you call about this?  ----- Message -----  From: Elisabeth Warren MD  Sent: 7/2/2018   2:06 PM  To: Melisa Severino MA    I would discuss with the facility that he is getting MRI and see if they have suggestions. They may be able to place blankets or elevate table to accomodates. I am honestly not sure, but they do MRI's on people with low back pain all the time, so there has to be something they can do. Let me know what you find out.     ----- Message -----  From: Melisa Severino MA  Sent: 7/2/2018   1:58 PM  To: Elisabeth Warren MD    Patient wife calls stating that patient is scheduled for an MRI and is unable to lie on his back x 2 years.

## 2018-07-03 NOTE — PROGRESS NOTES
Physical Therapy Daily Progress Note        Visit # : 19  Fahad Muhammad reports: My back is still really hurting - have not been able to sleep in the bed     Subjective     Objective       Palpation   Left   Hypertonic in the erector spinae, lumbar interspinals, lumbar paraspinals and quadratus lumborum.     Right   Hypertonic in the erector spinae, lumbar interspinals, lumbar paraspinals and quadratus lumborum.      See Exercise, Manual, and Modality Logs for complete treatment.       Assessment & Plan     Assessment  Assessment details: Patient reported improvement in subjective reports after Dry Needling. Presents with increased muscle tone Left > Right Lumbar PVM's. Improved muscle tone after Dry Needling today. Educated patient to apply moist heat again tonight at home.  Cont PT 1x per week or as indicated by patient for Dry Needling.         Progress strengthening /stabilization /functional activity           Manual Therapy:         mins  63373;  Therapeutic Exercise:         mins  45554;     Neuromuscular Eulalio:       mins  12354;    Therapeutic Activity:          mins  53083;     Gait Training:           mins  35905;     Ultrasound:          mins  10998;    Electrical Stimulation:         mins  59238 ( );  Dry Needling     15     mins self-pay    Timed Treatment:      mins   Total Treatment:     25   mins    Madiha Green, PT  Physical Therapist  [place] License # [id number]

## 2018-07-09 NOTE — H&P
[facility] HOSPITALIST     Elisabeth Warren MD    CHIEF COMPLAINT: LE swelling and SOB    HISTORY OF PRESENT ILLNESS:    Pt is a 83 yo male PMH as noted below who went to his Primary office today for checkup and while there was noted to have increasing LE edema as well as abnormal heart rate. Pt a wife report that he was recently in rehab for back pain and he has been at home and had progressive LE swelling as he is mostly stays in bed and only ambulates from chair to restroom. He has had some SOB. In the primary office his HR was elevated to the 120-130 and irregular. Direct admission was requested. Currently he feels slightly fatigued with mild SOB in bed. He has no fever or chills. They do additionally report he has developed sacral sore since staying mostly in bed.      Past Medical History:   Diagnosis Date   • Arthritis    • Atrial fibrillation (CMS/HCC)    • Coronary artery disease    • DDD (degenerative disc disease), lumbar    • Heme positive stool    • Hx of colonoscopy     Complete   • Hyperlipidemia    • Hypertension    • Neck strain    • Osteoarthritis    • Seizures (CMS/HCC)    • Sepsis (CMS/HCC)    • Stroke (CMS/HCC)    • Vitamin D deficiency    • Wrist fracture, right      Past Surgical History:   Procedure Laterality Date   • CARDIAC SURGERY     • COLONOSCOPY     • CORONARY ARTERY BYPASS GRAFT  1996   • HERNIA REPAIR Right     Inguinal hernia repair     Family History   Problem Relation Age of Onset   • Heart disease Mother    • Cancer Father    • Cancer Sister    • Colon cancer Neg Hx    • Colon polyps Neg Hx      Social History   Substance Use Topics   • Smoking status: Former Smoker     Quit date: 12/3/1993   • Smokeless tobacco: Former User     Quit date: 9/7/1994   • Alcohol use 0.6 oz/week     1 Shots of liquor per week      Comment: occasional      Prescriptions Prior to Admission   Medication Sig Dispense Refill Last Dose   • aspirin 81 MG chewable tablet Chew 81 mg daily.    Taking   • bumetanide (BUMEX) 2 MG tablet Take 1 tablet by mouth Daily. 30 tablet 0    • cetirizine (zyrTEC) 5 MG tablet Take 5 mg by mouth.   Taking   • diltiaZEM CD (CARDIZEM CD) 240 MG 24 hr capsule Take 1 capsule by mouth Daily. Pt needs to contact office to schedule an appt for further refills 90 capsule 0 Taking   • docusate sodium 100 MG capsule Take 100 mg by mouth 2 (Two) Times a Day. (Patient taking differently: Take 100 mg by mouth As Needed.)  0 Taking   • guaiFENesin (MUCINEX) 600 MG 12 hr tablet Take 1 tablet by mouth 2 (Two) Times a Day. (Patient taking differently: Take 600 mg by mouth Daily.) 60 tablet 1 Taking   • Iron-FA-B Ztn-E-Gozo-Probiotic (FUSION PLUS) capsule Take 1 tablet by mouth Daily.   Taking   • levETIRAcetam (KEPPRA) 500 MG tablet TAKE 1 TABLET BY MOUTH 2 (TWO) TIMES A DAY. 180 tablet 2 Taking   • methocarbamol (ROBAXIN) 500 MG tablet TAKE 1 TABLET BY MOUTH TWICE A DAY, PRN  0 Taking   • metoprolol succinate XL (TOPROL-XL) 100 MG 24 hr tablet Take 1 tablet by mouth Daily. 30 tablet 6 Taking   • Misc Natural Products (GLUCOSAMINE CHONDROITIN ADV PO) Take  by mouth.   Taking   • Multiple Vitamin (MULTI-VITAMIN) tablet Take 1 tablet by mouth daily.   Taking   • Omega-3 Fatty Acids (FISH OIL) 1000 MG capsule capsule Take 1,000 mg by mouth daily.   Taking   • Saw Palmetto, Serenoa repens, 450 MG capsule Take  by mouth.   Taking   • XARELTO 15 MG tablet TAKE 1 TABLET BY MOUTH DAILY. 90 tablet 2 Taking     Allergies:  Gabapentin; Levofloxacin; and Statins    REVIEW OF SYSTEMS:  Please see the above history of present illness for pertinent positives and negatives.  The remainder of the patient's systems have been reviewed and are negative.     Vital Signs  Temp:  [98.3 °F (36.8 °C)-98.7 °F (37.1 °C)] 98.7 °F (37.1 °C)  Heart Rate:  [] 93  Resp:  [14-18] 18  BP: (103-118)/(64-69) 103/69  Oxygen Therapy  SpO2: 94 %  Device (Oxygen Therapy): (P) room air}  Body mass index is 24.05  "kg/m².  Flowsheet Rows      First Filed Value   Admission Height  190.5 cm (75\") Documented at 07/09/2018 1507   Admission Weight  87.3 kg (192 lb 6.4 oz) Documented at 07/09/2018 1507             Physical Exam:  Physical Exam   Constitutional: Patient appears well-developed and well-nourished and in no acute distress   HEENT:   Head: Normocephalic and atraumatic.   Eyes:  Pupils are equal, round, and reactive to light. EOM are intact. Sclera are anicteric and non-injected.  Mouth and Throat: Patient has moist mucous membranes. Oropharynx is clear of any erythema or exudate.     Neck: Neck supple. No JVD present. No thyromegaly present. No lymphadenopathy present.  Cardiovascular: Irreg Irregular rate, no murumr  Pulmonary/Chest: Lungs are clear to auscultation bilaterally. No respiratory distress. No wheezes. No rhonchi. No rales.   Abdominal: Soft. Bowel sounds are normal. No distension and no mass. There is no hepatosplenomegaly. There is no tenderness.   Musculoskeletal: Normal Muscle tone  Extremities: Severe pitting edema b/l LE  Neurological: Patient is alert and oriented to person, place, and time. Cranial nerves II-XII are grossly intact with no focal deficits.  Skin: stage I-II ulcer of sacrum no drainage     Results Review:    I reviewed the patient's new clinical results.  Lab Results (most recent)     None          Imaging Results (most recent)     None            ECG/EMG Results (most recent)     None            Assessment/Plan     Acute HFpEF exacerbation   -poss d/t uncontrolled a fib  -start Iv bumex  -check albumin   -echo '16 with EF 71 and grade I DDFX, will repeat  -cardiology consulted from primary office    A fib, variable control   -currently Hr , EKG from office was 128  -will give home metoprolol and cont on cardizem  -if Hr becomes too uncontrolled may need gtt  -check mg and basic labs  -c/w Xarelto    Seizure disorder  -c/w Keppra    Chronic microcytic anemia  -being worked up by " primary  -check current hgb and c/w iron levels    CKD III  -monitor Cr closely with diuresis    DVT ppx-Xarelto    I discussed the patients findings and my recommendations with patient and wife.     Stan Palacios DO  07/09/18  4:04 PM

## 2018-07-09 NOTE — PROGRESS NOTES
"      Fahad Muhammad is a 82 y.o. male, who presents with a chief complaint of   Chief Complaint   Patient presents with   • Edema       81 yo M with AFib, diastolic dysfunction,  CKD stage 3, and ne JEAN MARIE on Xarelto here for acute visit.     2 weeks ago, he was doing very well and then had some more fatigue and back issues. He started sleeping in a chair with his feet down.  He did feel more SOB. His right leg was swelling more than left. No leg redness or pain. He is still taking Lasix 20mg every other day for the last 2 weeks which has not helped. He has noticed decreased exercise tolerance as well. He doesn't have CP or palpitations.     He noticed that he had a \"blister in the superior gluteal cleft\" about 2 weeks ago that has been oozing blood onto his underwear. No fever or chills. Wife is retired nurse and has been putting ointment on it that has not helped.          The following portions of the patient's history were reviewed and updated as appropriate: allergies, current medications, past family history, past medical history, past social history, past surgical history and problem list.    Allergies: Gabapentin; Levofloxacin; and Statins  No current outpatient prescriptions on file.  Medications Discontinued During This Encounter   Medication Reason   • diltiaZEM CD (CARDIZEM CD) 240 MG 24 hr capsule Duplicate order   • furosemide (LASIX) 20 MG tablet *Therapy completed       Review of Systems   Constitutional: Positive for fatigue. Negative for chills and fever.   Respiratory: Positive for shortness of breath.    Cardiovascular: Positive for leg swelling. Negative for chest pain and palpitations.   Skin: Positive for wound.   Neurological: Negative for dizziness and headaches.             /64   Pulse (!) 132   Temp 98.3 °F (36.8 °C)   Resp 14   Ht 190.5 cm (75\")   Wt 92.7 kg (204 lb 6.4 oz)   SpO2 94%   BMI 25.55 kg/m²         Physical Exam   Constitutional: He is oriented to person, place, " and time. He appears well-developed and well-nourished. No distress.   HENT:   Head: Normocephalic and atraumatic.   Right Ear: External ear normal.   Left Ear: External ear normal.   Mouth/Throat: Oropharynx is clear and moist. No oropharyngeal exudate.   Eyes: Conjunctivae are normal. Right eye exhibits no discharge. Left eye exhibits no discharge. No scleral icterus.   Neck: Neck supple.   Cardiovascular: Normal heart sounds.  An irregularly irregular rhythm present. Tachycardia present.  Exam reveals no gallop and no friction rub.    No murmur heard.  Pulmonary/Chest: Effort normal and breath sounds normal. No respiratory distress. He has no wheezes. He has no rales.   Musculoskeletal: He exhibits edema (pitting edema to mid calf , right great than left ).   Lymphadenopathy:     He has no cervical adenopathy.   Neurological: He is alert and oriented to person, place, and time.   Skin: Skin is warm. No rash noted.   Psychiatric: He has a normal mood and affect. His behavior is normal.   Nursing note and vitals reviewed.        Results for orders placed or performed in visit on 06/12/18   C-reactive Protein   Result Value Ref Range    C-Reactive Protein 1.57 (H) 0.00 - 0.50 mg/dL   Basic Metabolic Panel   Result Value Ref Range    Glucose 90 65 - 99 mg/dL    BUN 25 (H) 8 - 23 mg/dL    Creatinine 1.34 (H) 0.76 - 1.27 mg/dL    eGFR Non African Am 51 (L) >60 mL/min/1.73    eGFR African Am 62 >60 mL/min/1.73    BUN/Creatinine Ratio 18.7 7.0 - 25.0    Sodium 138 136 - 145 mmol/L    Potassium 4.7 3.5 - 5.2 mmol/L    Chloride 101 98 - 107 mmol/L    Total CO2 26.8 22.0 - 29.0 mmol/L    Calcium 10.0 8.8 - 10.5 mg/dL   CBC & Differential   Result Value Ref Range    WBC 11.36 (H) 4.80 - 10.80 10*3/mm3    RBC 4.35 (L) 4.70 - 6.10 10*6/mm3    Hemoglobin 10.5 (L) 14.0 - 18.0 g/dL    Hematocrit 32.7 (L) 42.0 - 52.0 %    MCV 75.2 (L) 80.0 - 94.0 fL    MCH 24.1 (L) 27.0 - 31.0 pg    MCHC 32.1 31.0 - 37.0 g/dL    RDW 19.0 (H) 11.5  - 14.5 %    Platelets 646 (H) 140 - 500 10*3/mm3    Neutrophil Rel % 67.8 45.0 - 70.0 %    Lymphocyte Rel % 18.9 (L) 20.0 - 45.0 %    Monocyte Rel % 11.3 (H) 3.0 - 8.0 %    Eosinophil Rel % 1.1 0.0 - 4.0 %    Basophil Rel % 0.4 0.0 - 2.0 %    Neutrophils Absolute 7.70 1.50 - 8.30 10*3/mm3    Lymphocytes Absolute 2.15 0.60 - 4.80 10*3/mm3    Monocytes Absolute 1.28 (H) 0.00 - 1.00 10*3/mm3    Eosinophils Absolute 0.12 0.10 - 0.30 10*3/mm3    Basophils Absolute 0.05 0.00 - 0.20 10*3/mm3    Immature Granulocyte Rel % 0.5 0.0 - 0.5 %    Immature Grans Absolute 0.06 (H) 0.00 - 0.03 10*3/mm3    nRBC 0.0 0.0 - 0.0 /100 WBC     ECG 12 Lead  Date/Time: 7/9/2018 3:08 PM  Performed by: KORINA THRASHER  Authorized by: KORINA THRASHER   Comparison: compared with previous ECG from 5/18/2018  Rhythm: atrial fibrillation  Rate: tachycardic  Conduction: conduction normal  ST Segments: ST segments normal  T Waves: T waves normal  QRS axis: normal  Other: no other findings  Clinical impression: abnormal ECG                Fahad was seen today for edema.    Diagnoses and all orders for this visit:    Localized edema   -     CBC & Differential  -     Comprehensive Metabolic Panel  -     proBNP  -     Magnesium  -     TSH Rfx On Abnormal To Free T4    Atrial fibrillation with rapid ventricular response (CMS/HCC)  -     CBC & Differential  -     Comprehensive Metabolic Panel  -     ECG 12 Lead    CKD (chronic kidney disease), stage III  -     Comprehensive Metabolic Panel    Acute diastolic heart failure (CMS/HCC)   -     proBNP    Tachycardia determined by examination of pulse  -     ECG 12 Lead    Decubitus ulcer of sacral region, stage 1    Screening for hypothyroidism  -     TSH Rfx On Abnormal To Free T4    Other orders  -     bumetanide (BUMEX) 2 MG tablet; Take 1 tablet by mouth Daily.      I initially was going to see him tomorrow with diuresis at home, but could not get his HR down which I thought was from walking from the  waiting area to the room. EKG reveled Afib with RVR. I am concerned with the severity of his chronic medical illnesses and acute decline that he needs to have IV diuresis with better rate control. I spoke to Dr. Garcia (his cardiologist) as well as Dr. Palacios (hospitalist on call ) and they are agreeable to admitting.  Patient was sent to hospital and complete workup there including labs, CXR and echo. Discussed with Dr. Palacios about his sacral decubitus ulcer and he will need wound care as well. Spent 40 minutes face to face time with patient regarding his care and coordinating admission to hospital today due to see acute decompensation.     Return Go to registration to be admitted.    Elisabeth Warren MD  07/09/2018

## 2018-07-10 PROBLEM — M79.89 LEG SWELLING: Status: ACTIVE | Noted: 2018-01-01

## 2018-07-10 PROBLEM — R93.1 ABNORMAL ECHOCARDIOGRAM: Status: ACTIVE | Noted: 2018-01-01

## 2018-07-10 PROBLEM — L89.159 DECUBITUS ULCER OF SACRAL REGION: Status: ACTIVE | Noted: 2018-01-01

## 2018-07-10 PROBLEM — I48.92 ATRIAL FIBRILLATION AND FLUTTER (HCC): Status: ACTIVE | Noted: 2018-01-01

## 2018-07-10 PROBLEM — I48.91 ATRIAL FIBRILLATION AND FLUTTER (HCC): Status: ACTIVE | Noted: 2018-01-01

## 2018-07-10 NOTE — CONSULTS
Malnutrition Severity Assessment    Patient Name:  Fahad Muhammad  YOB: 1935  MRN: [id number]  Admit Date:  7/9/2018    Patient meets criteria for : Severe malnutrition    Comments:  Pt with Pressure Injury & Significant wt loss in 3-4 months.     Malnutrition Type: Acute Illness/Injury Malnutrition     Malnutrition Type (last 8 hours)      Malnutrition Severity Assessment     Row Name 07/10/18 1245       Malnutrition Severity Assessment    Malnutrition Type Acute Illness/Injury Malnutrition    Row Name 07/10/18 1245       Physical Signs of Malnutrition (Acute)    Secondary Physical Signs Present (comment)   Pressure Injury gluteal area noted    Row Name 07/10/18 1245       Weight Status (Acute)    Weight Loss Severe (>7.5% / 3 mo)    Row Name 07/10/18 1245       Criteria Met (Must meet criteria for severity in at least 2 of these categories: M Wasting, Fat Loss, Fluid, Secondary Signs, Wt. Status, Intake)    Patient meets criteria for  Severe malnutrition          Electronically signed by:  Keri Cleaning RD  07/10/18 12:48 PM

## 2018-07-10 NOTE — PROGRESS NOTES
"SERVICE: [facility] HOSPITALIST    CONSULTANTS:  Cardiology    CHIEF COMPLAINT: LE Edema    SUBJECTIVE:    Pt unclear if edema is better or worse, complains of significant joint pain, and that is why he has not been getting up and going to the bathroom. Denies infx symptoms, specifically cough, fever, new skin discoloration, dsyuria, but may have increased urinary frequency.    States he feels chills, but unclear if new or chronic.    OBJECTIVE:    /66   Pulse 98   Temp 98.3 °F (36.8 °C) (Oral)   Resp 18   Ht 190.5 cm (75\")   Wt 87.3 kg (192 lb 7.4 oz)   SpO2 98%   BMI 24.06 kg/m²     MEDS/LABS REVIEWED AND ORDERED      aspirin 81 mg Oral Daily   b complex-C-folic acid 1 capsule Oral Daily   bumetanide 2 mg Intravenous Q12H   cetirizine 5 mg Oral Daily   diltiaZEM  mg Oral Q24H   guaiFENesin 600 mg Oral Daily   levETIRAcetam 500 mg Oral Q12H   metoprolol succinate  mg Oral Q24H   rivaroxaban 15 mg Oral Daily       Physical Exam   Constitutional: He appears well-developed and well-nourished. No distress.   Eyes: Pupils are equal, round, and reactive to light.   Neck:   JVD not well visualized, no noted heptojuglar reflex   Cardiovascular: Normal rate, normal heart sounds and intact distal pulses.    Irregularrly irregular   Pulmonary/Chest: Effort normal and breath sounds normal. No stridor. No respiratory distress. He has no wheezes.   Abdominal: Soft. Bowel sounds are normal. He exhibits distension.   Musculoskeletal: He exhibits edema.   3+ pitting edema to above knee   Neurological: He is alert.   Skin: Skin is warm and dry. No rash noted. He is not diaphoretic. No erythema.   Nursing note and vitals reviewed.      LAB/DIAGNOSTICS:    Lab Results (last 24 hours)     Procedure Component Value Units Date/Time    Magnesium [[id number]]  (Normal) Collected:  07/10/18 0411    Specimen:  Blood Updated:  07/10/18 0603     Magnesium 1.7 mg/dL     Basic Metabolic Panel [[id number]] "  (Abnormal) Collected:  07/10/18 0411    Specimen:  Blood Updated:  07/10/18 0450     Glucose 102 (H) mg/dL      BUN 19 mg/dL      Creatinine 1.03 mg/dL      Sodium 136 mmol/L      Potassium 2.9 (L) mmol/L      Chloride 97 (L) mmol/L      CO2 26.6 mmol/L      Calcium 9.4 mg/dL      eGFR Non African Amer 69 mL/min/1.73      BUN/Creatinine Ratio 18.4     Anion Gap 12.4 mmol/L     Narrative:       The MDRD GFR formula is only valid for adults with stable renal function between ages 18 and 70.    CBC (No Diff) [[id number]]  (Abnormal) Collected:  07/10/18 0411    Specimen:  Blood Updated:  07/10/18 0423     WBC 17.08 (H) 10*3/mm3      RBC 3.93 (L) 10*6/mm3      Hemoglobin 9.5 (L) g/dL      Hematocrit 29.2 (L) %      MCV 74.3 (L) fL      MCH 24.2 (L) pg      MCHC 32.5 g/dL      RDW 18.4 (H) %      RDW-SD 49.1 fl      MPV 9.0 fL      Platelets 547 (H) 10*3/mm3     Comprehensive Metabolic Panel [[id number]]  (Abnormal) Collected:  07/09/18 1609    Specimen:  Blood Updated:  07/09/18 1631     Glucose 95 mg/dL      BUN 20 mg/dL      Creatinine 1.08 mg/dL      Sodium 136 mmol/L      Potassium 3.7 mmol/L      Chloride 95 (L) mmol/L      CO2 26.0 mmol/L      Calcium 9.9 mg/dL      Total Protein 6.9 g/dL      Albumin 3.40 (L) g/dL      ALT (SGPT) 18 U/L      AST (SGOT) 23 U/L      Alkaline Phosphatase 167 (H) U/L      Total Bilirubin 1.3 (H) mg/dL      eGFR Non African Amer 66 mL/min/1.73      Globulin 3.5 gm/dL      A/G Ratio 1.0 g/dL      BUN/Creatinine Ratio 18.5     Anion Gap 15.0 mmol/L     Narrative:       The MDRD GFR formula is only valid for adults with stable renal function between ages 18 and 70.    Magnesium [[id number]]  (Normal) Collected:  07/09/18 1609    Specimen:  Blood Updated:  07/09/18 1631     Magnesium 1.7 mg/dL     CBC (No Diff) [[id number]]  (Abnormal) Collected:  07/09/18 1609    Specimen:  Blood Updated:  07/09/18 1616     WBC 17.89 (H) 10*3/mm3      RBC 4.13 (L) 10*6/mm3      Hemoglobin 10.2 (L) g/dL       Hematocrit 31.0 (L) %      MCV 75.1 (L) fL      MCH 24.7 (L) pg      MCHC 32.9 g/dL      RDW 18.6 (H) %      RDW-SD 50.3 fl      MPV 9.6 fL      Platelets 547 (H) 10*3/mm3         Results for orders placed during the hospital encounter of 07/09/18   Adult Transthoracic Echo Complete W/ Cont if Necessary Per Protocol    Narrative · Left ventricular systolic function is normal. Calculated EF = 66%.   Estimated EF was in agreement with the calculated EF. Estimated EF = 68%.   Normal left ventricular cavity size noted. All left ventricular wall   segments contract normally. Left ventricular wall thickness is consistent   with mild concentric hypertrophy. Left ventricular diastolic function was   indeterminate.  · Left atrial volume is severely increased.  · Right atrial cavity size is severely dilated.  · The aortic valve is abnormal in structure. There is severe thickening of   the aortic valve. The aortic valve is severely thickened with hazy density   on the ventricular surface of the aortic valve. Cannot rule out vegetative   process. Consider transesophageal echocardiogram to assess further..  · The mitral valve is abnormal in structure. The anterior mitral valve   leaflet is thickened on the ventricular surface. Suspicious for possible   vegetative process. Consider transesophageal echocardiogram if clinically   indicated.. Mild mitral valve regurgitation is present.  · Mild tricuspid valve regurgitation is present. Estimated right   ventricular systolic pressure from tricuspid regurgitation is normal (<35   mmHg).        Xr Chest 1 View    Result Date: 7/9/2018  1. Cardiomegaly. CABG. Pulmonary vascularity is within normal limits. 2. Chronically low lung volumes with chronic elevation of the right hemidiaphragm and chronic scarring or atelectasis in the right lung base. 3. Ectatic and tortuous thoracic aorta.  This report was finalized on 7/9/2018 4:39 PM by Dr. Jefferson Bowen MD.           ASSESSMENT/PLAN:    Acute  HF, clinically unclear if   - Cr 1.03, co2 26, likely room to continue iv diuresis, continue bumex bid  - Cardiology following  - Echo shows WNL LV systolic function, indeterminate diastolic, but thickened valves that could represent vegetations  - Discuss need for SKIP w/ Cards    Afib w/ intermittent RVR  - On xarelto  - Usually well controlled, but has short paroxysms of increased rates into the 120's on tele  - 's this AM, will keep with home metop of 100mg, & home dilt of 240mg    Leukocytosis  - Check procal, crp and sed  - check blood cultures,  - No resp symptoms, do not feel the need to repeat cxr at this time    Sacral Ulcer poa  - wound care  - pt has been afebrile, at this time, doubt source of leukocytosis but will monitor    Seizure Disorder  - Cont keppra

## 2018-07-10 NOTE — SIGNIFICANT NOTE
07/10/18 1151   Rehab Time/Intention   Evaluation Not Performed patient/family decline, not feeling well  (Pt c/o chronic back pain (pain level is at baseline per pt) which he states limits his activity level. Attempted to encourage pt to particapte in evaluation (answer basic orientation question, state prior level of function, sit on eob, transfer to chair, etc) for a 20 minute period.  Pt either declined to answer the questions, gave responses that were off topic or declined the  activity.  Pt stated he would get up on his own if her were at home, but he said it was easier to have staff assist him in the hospital)   Rehab Treatment   Discipline occupational therapist

## 2018-07-10 NOTE — CONSULTS
Date of Hospital Visit: 07/10/18  Encounter Provider: Naeem Laughlin MD  Place of Service: [facility] CARDIOLOGY  Patient Name: Fahad Muhammad  :1935  Referral Provider: Stan Palacios DO    Chief complaint: Abnormal HR     CHADS2-VASc score: 4    History of Present Illness: Mr. Fahad Muhammad is an 82 year old man with CAD (CABG in ) and atrial fibrillation/flutter (chronic).  He has numerous other medical conditions; he follows with Dr. Garcia for his cardiac care but was last seen in the office in 2017.    He was directly admitted yesterday after Dr. Warren saw him in her office.  He has been in rehab for back pain, but has been markedly immobile.  He has developed a sacral wound from immobility.  His wife has noticed increased leg swelling and increasing fatigue; his pulse was noted to be elevated yesterday.  He received IV bumetanide with good diuresis.  He is requiring no oxygen, and states his breathing is fine, but that he is tired.      An echo was performed today; it shows normal systolic function indeterminate diastology.  There is concern for valvular vegetation.  Upon my review, I suspect that he has valvular thickening without vegetation.      Previous Testing:    ECHO 7/10/18      Renal US 12/15/16      Past Medical History:   Diagnosis Date   • Arthritis    • Benign non-nodular prostatic hyperplasia with lower urinary tract symptoms 2016   • Chronic atrial fibrillation (CMS/HCC)    • Chronic pain syndrome 3/9/2018   • CKD (chronic kidney disease), stage III 2016   • Coronary artery disease    • DDD (degenerative disc disease), lumbar    • Heme positive stool    • Hx of colonoscopy     Complete   • Hyperlipidemia    • Hypertension    • Iron deficiency anemia 2018   • Neck strain    • Osteoarthritis    • Seizure disorder (CMS/HCC) 2016   • Seizures (CMS/HCC)    • Sepsis (CMS/HCC)    • Stroke (CMS/HCC)    • Vitamin D deficiency    •  Wrist fracture, right        Past Surgical History:   Procedure Laterality Date   • CARDIAC SURGERY     • COLONOSCOPY     • CORONARY ARTERY BYPASS GRAFT  1996   • HERNIA REPAIR Right     Inguinal hernia repair       Prior to Admission medications    Medication Sig Start Date End Date Taking? Authorizing Provider   aspirin 81 MG chewable tablet Chew 81 mg daily. 1/14/16   Historical Provider, MD   bumetanide (BUMEX) 2 MG tablet Take 1 tablet by mouth Daily. 7/9/18   Elisabeth Warren MD   cetirizine (zyrTEC) 5 MG tablet Take 5 mg by mouth.    Historical Provider, MD   diltiaZEM CD (CARDIZEM CD) 240 MG 24 hr capsule Take 1 capsule by mouth Daily. Pt needs to contact office to schedule an appt for further refills 6/14/18   Ry Garcia III, MD   docusate sodium 100 MG capsule Take 100 mg by mouth 2 (Two) Times a Day.  Patient taking differently: Take 100 mg by mouth As Needed. 12/15/16   FELI Macario   guaiFENesin (MUCINEX) 600 MG 12 hr tablet Take 1 tablet by mouth 2 (Two) Times a Day.  Patient taking differently: Take 600 mg by mouth Daily. 3/16/17   Elisabeth Warren MD   Iron-FA-B Vzd-P-Llzi-Probiotic (FUSION PLUS) capsule Take 1 tablet by mouth Daily.    Historical Provider, MD   levETIRAcetam (KEPPRA) 500 MG tablet TAKE 1 TABLET BY MOUTH 2 (TWO) TIMES A DAY. 6/14/18   Elisabeth Warren MD   methocarbamol (ROBAXIN) 500 MG tablet TAKE 1 TABLET BY MOUTH TWICE A DAY, PRN 12/14/17   Historical Provider, MD   metoprolol succinate XL (TOPROL-XL) 100 MG 24 hr tablet Take 1 tablet by mouth Daily. 2/13/18   Elisabeth Warren MD   Misc Natural Products (GLUCOSAMINE CHONDROITIN ADV PO) Take  by mouth.    Historical Provider, MD   Multiple Vitamin (MULTI-VITAMIN) tablet Take 1 tablet by mouth daily. 11/25/13   Historical Provider, MD   Omega-3 Fatty Acids (FISH OIL) 1000 MG capsule capsule Take 1,000 mg by mouth daily. 11/25/13   Historical Provider, MD   Saw Sterling, Serenoa repens, 450 MG capsule Take  by mouth.     "Historical Provider, MD   XARELTO 15 MG tablet TAKE 1 TABLET BY MOUTH DAILY. 6/14/18   Elisabeth Warren MD       Social History     Social History   • Marital status:      Spouse name: N/A   • Number of children: N/A   • Years of education: N/A     Occupational History   • Not on file.     Social History Main Topics   • Smoking status: Former Smoker     Quit date: 12/3/1993   • Smokeless tobacco: Former User     Quit date: 9/7/1994   • Alcohol use 0.6 oz/week     1 Shots of liquor per week      Comment: occasional    • Drug use: No   • Sexual activity: Defer     Other Topics Concern   • Not on file     Social History Narrative   • No narrative on file       Family History   Problem Relation Age of Onset   • Heart disease Mother    • Cancer Father    • Cancer Sister    • Colon cancer Neg Hx    • Colon polyps Neg Hx        Review of Systems   Constitutional: Positive for fatigue.   Cardiovascular: Positive for leg swelling.   Musculoskeletal: Positive for back pain.   Skin: Positive for wound.   All other systems reviewed and are negative.       Objective:     Vitals:    07/09/18 2254 07/10/18 0413 07/10/18 0604 07/10/18 1059   BP: 117/65 110/66 107/66 113/67   BP Location:    Right arm   Patient Position:    Sitting   Pulse: 92 99 98 110   Resp: 19 18 18 18   Temp: 98.4 °F (36.9 °C) 98.4 °F (36.9 °C) 98.3 °F (36.8 °C) 98.9 °F (37.2 °C)   TempSrc: Oral Oral Oral Oral   SpO2: 98% 96% 98% 95%   Weight:       Height:         Body mass index is 24.06 kg/m².  Flowsheet Rows      First Filed Value   Admission Height  190.5 cm (75\") Documented at 07/09/2018 1507   Admission Weight  87.3 kg (192 lb 6.4 oz) Documented at 07/09/2018 1507          Physical Exam   Constitutional: He appears well-developed and well-nourished.   HENT:   Head: Normocephalic.   Nose: Nose normal.   Mouth/Throat: Oropharynx is clear and moist.   Eyes: Conjunctivae and EOM are normal. Pupils are equal, round, and reactive to light.   Neck: Normal " range of motion. No JVD present.   Cardiovascular: Normal rate, regular rhythm and intact distal pulses.    Murmur heard.   Systolic murmur is present with a grade of 1/6   Pulmonary/Chest: Effort normal and breath sounds normal.   Abdominal: Soft. He exhibits no mass. There is no tenderness.   Musculoskeletal: Normal range of motion. He exhibits no edema (marked edema, doughy (both pitting and nonpitting), with varicosities).   Neurological: No cranial nerve deficit.   Skin: Skin is warm and dry. No erythema.   Psychiatric: He has a normal mood and affect. His behavior is normal.   Vitals reviewed.              Lab Review:                  Results from last 7 days  Lab Units 07/10/18  0411   SODIUM mmol/L 136   POTASSIUM mmol/L 2.9*   CHLORIDE mmol/L 97*   CO2 mmol/L 26.6   BUN mg/dL 19   CREATININE mg/dL 1.03   GLUCOSE mg/dL 102*   CALCIUM mg/dL 9.4           Results from last 7 days  Lab Units 07/10/18  0411   WBC 10*3/mm3 17.08*   HEMOGLOBIN g/dL 9.5*   HEMATOCRIT % 29.2*   PLATELETS 10*3/mm3 547*               Results from last 7 days  Lab Units 07/10/18  0411   MAGNESIUM mg/dL 1.7       EKG 7/10/18      EKG 12/5/16        I personally viewed and interpreted the patient's EKG/Telemetry data    Assessment/Plan:     1.  Chronic atrial fibrillation and flutter (CMS/HCC) -- with suboptimal rate control lately, likely as he's in flutter more so than atrial fibrillation.  He's on diltiazem and metoprolol at home without much room to move in terms of BP.  I'm going to give him one dose of digoxin today; but this isn't a good option long-term either due to CKD.  He's on renally dosed rivaroxaban.    2.  Leg swelling -- his edema is well out of proportion to any CHF findings.  He doesn't have JVD, and his lungs are clear.  The swelling is somewhat doughy and nonpitting, and looks more like venous insufficiency than anything else.  His albumin is a little low, but not severe.  I will check a TSH.  He needs to elevate  them, and they likely need to be wrapped.  It's unlikely that he has DVTs as he's anticoagulated, but some people are under-responders to DOACs, and he's quite immobile, so I'm going to exclude it.  His LVEF is normal.    3.  Abnormal echo -- I see MV/AV thickening.  A vegetation can't be excluded on the MV but I have low clinical suspicion.  I will check blood cultures.  If those are normal, then it's unlikely he has endocarditis.  He is not a candidate for a SKIP in his current clinical state.    4.  HTN -- well controlled    5.  CKD    6.  Decubitus ulcer    7.  Hypokalemia

## 2018-07-10 NOTE — NURSING NOTE
Discharge Planning Assessment  [facility]     Patient Name: Fahad Muhammad  MRN: [id number]  Today's Date: 7/10/2018    Admit Date: 7/9/2018          Discharge Needs Assessment     Row Name 07/10/18 1413       Living Environment    Lives With spouse    Name(s) of Who Lives With Patient Denisa Muhammad - wife    Current Living Arrangements home/apartment/condo    Primary Care Provided by spouse/significant other    Provides Primary Care For no one, unable/limited ability to care for self    Family Caregiver if Needed spouse    Quality of Family Relationships involved;helpful;supportive    Able to Return to Prior Arrangements yes       Resource/Environmental Concerns    Resource/Environmental Concerns none       Transition Planning    Patient/Family Anticipates Transition to home with family    Patient/Family Anticipated Services at Transition none    Transportation Anticipated family or friend will provide       Discharge Needs Assessment    Readmission Within the Last 30 Days no previous admission in last 30 days    Equipment Currently Used at Home walker, rolling;rollator    Anticipated Changes Related to Illness none    Equipment Needed After Discharge none            Discharge Plan     Row Name 07/10/18 1418       Plan    Plan Home when stable    Patient/Family in Agreement with Plan yes    Plan Comments Spoke with Mr Muhammad and his wife (with permission) at bedside.  [facility] in [place].  He has had [facility] in the past but is not current at the moment.  He uses a rolling walker or rolltor for ambulation.  He is not on any oxygen.  He is independent with his ADL's and requires minimal assist from his wife.  [facility] [place] and there are no issues with paying for hiw prescriptions.  Plan is home when stable.  Will continue to follow        Destination     No service coordination in this encounter.      Durable Medical Equipment     No service coordination in this  encounter.      Dialysis/Infusion     No service coordination in this encounter.      Home Medical Care     No service coordination in this encounter.      Social Care     No service coordination in this encounter.                Demographic Summary     Row Name 07/10/18 3403       General Information    Admission Type inpatient    Arrived From home    Referral Source admission list    Reason for Consult discharge planning    Preferred Language English     Used During This Interaction no       Contact Information    Permission Granted to Share Info With             Functional Status    No documentation.           Psychosocial    No documentation.           Abuse/Neglect    No documentation.           Legal    No documentation.           Substance Abuse    No documentation.           Patient Forms    No documentation.         Amina Morel RN

## 2018-07-10 NOTE — CONSULTS
Adult Nutrition  Assessment/PES    Patient Name:  Fahad Muhammad  YOB: 1935  MRN: [id number]  Admit Date:  7/9/2018    Assessment Date:  7/10/2018    Comments:  Recommend add Ensure Enlive (house for Plus) 3 per day  Will cont to follow and monitor.           Reason for Assessment     Row Name 07/10/18 1238          Reason for Assessment    Reason For Assessment nurse/nurse practitioner consult     Diagnosis other (see comments)   Acute HF, AFIB, Sz d/o, Pressure Injury     Identified At Risk by Screening Criteria MST SCORE 2+             Nutrition/Diet History     Row Name 07/10/18 1239          Nutrition/Diet History    Typical Food/Fluid Intake Pt receiving care from staff/therpay at 2 attempted visits.  Noted rehab recently for back pain reported in notes.             Anthropometrics     Row Name 07/10/18 1240          Anthropometrics    Weight --   87.3 kg         Usual Body Weight (UBW)    Weight Loss --   35.6# loss      Weight Loss Time Frame since 3/2018 per EMR data which is 15.6% loss         Body Mass Index (BMI)    BMI Assessment BMI 18.5-24.9: normal             Labs/Tests/Procedures/Meds     Row Name 07/10/18 1241          Labs/Procedures/Meds    Lab Results Reviewed reviewed     Lab Results Comments k 2.9 L        Diagnostic Tests/Procedures    Diagnostic Test/Procedure Reviewed --   none to note        Medications    Pertinent Medications Reviewed reviewed     Pertinent Medications Comments b/c complex, bumex             Physical Findings     Row Name 07/10/18 1242          Physical Findings    Skin pressure injury   gluteal stage 1-2 per interdisciplinary rounds             Estimated/Assessed Needs     Row Name 07/10/18 1243          Calculation Measurements    Weight Used For Calculations 87.3 kg (192 lb 7.4 oz)        Estimated/Assessed Needs    Additional Documentation Calorie Requirements (Group);Fluid Requirements (Group);Protein Requirements (Group)        Calorie  Requirements    Estimated Calorie Need Method [place] Jeor     Estimated Calorie Requirement Comment 4958-1282 kcal (mifflin x 1.2- mifflin + 500 kcal )                                                                           [place]-St. Jeor Equation    RMR ([place]-St. Jeor Equation) 1668.63        Protein Requirements    Est Protein Requirement Amount (gms/kg) 1.0 gm protein     Estimated Protein Requirements (gms/day) 87.3        Fluid Requirements    Estimated Fluid Requirements (mL/day) --   4774-7861 ml CHF guidelines     Estimated Fluid Requirement Method other (see comments)                   Nutrition Prescription Ordered     Row Name 07/10/18 1244          Nutrition Prescription PO    Common Modifiers Cardiac             Evaluation of Received Nutrient/Fluid Intake     Row Name 07/10/18 1245 07/10/18 1243       Calculation Measurements    Weight Used For Calculations  -- 87.3 kg (192 lb 7.4 oz)       Fluid Intake Evaluation    Oral Fluid (mL) --   120 ml insufficent data first day of admit  --       PO Evaluation    Number of Meals 2  --    % PO Intake 63%  --            Evaluation of Prescribed Nutrient/Fluid Intake     Row Name 07/10/18 1243          Calculation Measurements    Weight Used For Calculations 87.3 kg (192 lb 7.4 oz)             Malnutrition Severity Assessment     Row Name 07/10/18 1245          Malnutrition Severity Assessment    Malnutrition Type Acute Illness/Injury Malnutrition        Physical Signs of Malnutrition (Acute)    Secondary Physical Signs Present (comment)   Pressure Injury gluteal area noted        Weight Status (Acute)    Weight Loss Severe (>7.5% / 3 mo)        Criteria Met (Must meet criteria for severity in at least 2 of these categories: M Wasting, Fat Loss, Fluid, Secondary Signs, Wt. Status, Intake)    Patient meets criteria for  Severe malnutrition         Problem/Interventions:        Problem 1     Row Name 07/10/18 1246          Nutrition Diagnoses Problem 1     Problem 1 Malnutrition     Etiology (related to) Factors Affecting Nutrition     Signs/Symptoms (evidenced by) Unintended Weight Change;Other (comment)   pressure injury      Unintended Weight Change Loss     Number of Pounds Lost 35.6#      Weight loss time period 3-4 months                     Intervention Goal     Row Name 07/10/18 1247          Intervention Goal    PO Establish PO;PO intake (%)     PO Intake % 75 %   or greater     Weight No significant weight loss             Nutrition Intervention     Row Name 07/10/18 1247          Nutrition Intervention    RD/Tech Action Follow Tx progress             Nutrition Prescription     Row Name 07/10/18 1247          Nutrition Prescription PO    PO Prescription Begin/change supplement     Supplement Ensure Enlive     Supplement Frequency 3 times a day             Education/Evaluation     Row Name 07/10/18 1248          Education    Education Other (comment)   Pt with other staff at visits, will follow.         Monitor/Evaluation    Monitor Per protocol;I&O;PO intake;Pertinent labs;Weight;Symptoms;Skin status         Electronically signed by:  Keri Cleaning RD  07/10/18 12:49 PM

## 2018-07-10 NOTE — SIGNIFICANT NOTE
"   07/10/18 1304   Rehab Time/Intention   Evaluation Not Performed patient/family declined evaluation  (pt reports he is currently having low back pain, and states this current pain is at baseline for him prior to admission.  Attempted to have patient participate in evaluation for over 20 minute period, however pt adamantly refused to answer orientation questions, or provided off topic responses to questions.  Patient continued to decline out of bed activity throughout evaluation attempts, stating if he were at home he would get up and do it, but states \"it is easier to just get help here\" )     "

## 2018-07-10 NOTE — PLAN OF CARE
Problem: Patient Care Overview  Goal: Plan of Care Review  Outcome: Ongoing (interventions implemented as appropriate)   07/10/18 0537   Coping/Psychosocial   Plan of Care Reviewed With patient   Plan of Care Review   Progress improving       Problem: Skin Injury Risk (Adult)  Goal: Identify Related Risk Factors and Signs and Symptoms  Outcome: Ongoing (interventions implemented as appropriate)   07/10/18 0537   Skin Injury Risk (Adult)   Related Risk Factors (Skin Injury Risk) advanced age;body weight extremes;edema     Goal: Skin Health and Integrity  Outcome: Ongoing (interventions implemented as appropriate)   07/10/18 0537   Skin Injury Risk (Adult)   Skin Health and Integrity making progress toward outcome       Problem: Fall Risk (Adult)  Goal: Identify Related Risk Factors and Signs and Symptoms  Outcome: Ongoing (interventions implemented as appropriate)   07/10/18 0537   Fall Risk (Adult)   Related Risk Factors (Fall Risk) age-related changes;fatigue/slow reaction;gait/mobility problems   Signs and Symptoms (Fall Risk) presence of risk factors     Goal: Absence of Fall  Outcome: Ongoing (interventions implemented as appropriate)   07/10/18 0537   Fall Risk (Adult)   Absence of Fall making progress toward outcome

## 2018-07-11 NOTE — PROGRESS NOTES
LOS: 2 days   Patient Care Team:  Elisabeth Warren MD as PCP - General (Internal Medicine)  Alexsander Matute MD as Consulting Physician (Neurology)  MD Ry Goff III, MD as Consulting Physician (Cardiology)        Subjective     Interval History:     Patient Complaints:   Patient Denies:         Review of Systems:        Objective     Vital Signs  Temp:  [97 °F (36.1 °C)-100.9 °F (38.3 °C)] 97.6 °F (36.4 °C)  Heart Rate:  [] 81  Resp:  [18-20] 20  BP: ()/(55-69) 99/61    Physical Exam:          Results Review:      Lab Results (last 72 hours)     Procedure Component Value Units Date/Time    Blood Culture - Blood, Arm, Left [[id number]]  (Abnormal) Collected:  07/10/18 1130    Specimen:  Blood from Arm, Left Updated:  07/11/18 1110     Blood Culture Abnormal Stain (A)     Gram Stain Result Aerobic Bottle Gram positive cocci in chains      Aerobic Bottle Gram negative bacilli    Blood Culture ID, PCR - Blood, [[id number]]  (Abnormal) Collected:  07/10/18 1130    Specimen:  Blood from Arm, Left Updated:  07/11/18 1109     BCID, PCR Enterococcus spp. Identification by BCID PCR. (C)     BCID, PCR 2 Escherichia coli. Identification by BCID PCR. (C)    Blood Culture - Blood, [[id number]] Collected:  07/11/18 1008    Specimen:  Blood from Hand, Right Updated:  07/11/18 1012    Blood Culture - Blood, [[id number]] Collected:  07/11/18 1009    Specimen:  Blood from Hand, Left Updated:  07/11/18 1011    Blood Culture - Blood, Arm, Left [[id number]]  (Abnormal) Collected:  07/10/18 1200    Specimen:  Blood from Arm, Left Updated:  07/11/18 0949     Blood Culture Abnormal Stain (A)     Gram Stain Result Aerobic Bottle Gram positive cocci in chains    Basic Metabolic Panel [[id number]]  (Abnormal) Collected:  07/11/18 0409    Specimen:  Blood Updated:  07/11/18 0521     Glucose 107 (H) mg/dL      BUN 22 mg/dL      Creatinine 1.03 mg/dL      Sodium 132 (L) mmol/L      Potassium 3.6 mmol/L       Chloride 95 (L) mmol/L      CO2 29.8 (H) mmol/L      Calcium 9.0 mg/dL      eGFR Non African Amer 69 mL/min/1.73      BUN/Creatinine Ratio 21.4     Anion Gap 7.2 mmol/L     Narrative:       The MDRD GFR formula is only valid for adults with stable renal function between ages 18 and 70.    CBC & Differential [[id number]] Collected:  07/11/18 0409    Specimen:  Blood Updated:  07/11/18 0505    Narrative:       The following orders were created for panel order CBC & Differential.  Procedure                               Abnormality         Status                     ---------                               -----------         ------                     Scan Slide[[id number]]                                                                  CBC Auto Differential[[id number]]        Abnormal            Final result                 Please view results for these tests on the individual orders.    CBC Auto Differential [[id number]]  (Abnormal) Collected:  07/11/18 0409    Specimen:  Blood Updated:  07/11/18 0504     WBC 19.67 (H) 10*3/mm3      RBC 3.71 (L) 10*6/mm3      Hemoglobin 9.0 (L) g/dL      Hematocrit 27.7 (L) %      MCV 74.7 (L) fL      MCH 24.3 (L) pg      MCHC 32.5 g/dL      RDW 18.2 (H) %      RDW-SD 49.3 fl      MPV 9.2 fL      Platelets 539 (H) 10*3/mm3      Neutrophil % 75.4 (H) %      Lymphocyte % 13.0 (L) %      Monocyte % 10.6 (H) %      Eosinophil % 0.2 %      Basophil % 0.2 %      Immature Grans % 0.6 (H) %      Neutrophils, Absolute 14.87 (H) 10*3/mm3      Lymphocytes, Absolute 2.55 10*3/mm3      Monocytes, Absolute 2.08 (H) 10*3/mm3      Eosinophils, Absolute 0.03 (L) 10*3/mm3      Basophils, Absolute 0.03 10*3/mm3      Immature Grans, Absolute 0.11 (H) 10*3/mm3      nRBC 0.0 /100 WBC     Urinalysis, Microscopic Only - Urine, Clean Catch [[id number]]  (Abnormal) Collected:  07/10/18 1649    Specimen:  Urine from Urine, Clean Catch Updated:  07/10/18 1758     RBC, UA 13-20 (A) /HPF      WBC, UA 0-2 (A) /HPF       Bacteria, UA None Seen /HPF      Squamous Epithelial Cells, UA 0-2 /HPF      Hyaline Casts, UA 0-2 /LPF      Methodology Manual Light Microscopy    Urinalysis With Culture If Indicated - Urine, Clean Catch [[id number]]  (Abnormal) Collected:  07/10/18 1649    Specimen:  Urine from Urine, Clean Catch Updated:  07/10/18 1743     Color, UA Dark Yellow (A)     Appearance, UA Clear     pH, UA <=5.0     Specific Gravity, UA 1.025     Comment: Result obtained by Refractometer        Glucose, UA Negative     Ketones, UA Trace (A)     Bilirubin, UA Small (1+) (A)     Blood, UA Moderate (2+) (A)     Protein, UA Negative     Leuk Esterase, UA Negative     Nitrite, UA Negative     Urobilinogen, UA 1.0 E.U./dL    Fungus Culture, Blood - Blood, Arm, Left [[id number]] Collected:  07/10/18 1130    Specimen:  Blood from Arm, Left Updated:  07/10/18 1735    Fungus Culture, Blood - Blood, Arm, Left [[id number]] Collected:  07/10/18 1200    Specimen:  Blood from Hand, Left Updated:  07/10/18 1735    TSH [[id number]]  (Normal) Collected:  07/10/18 0411    Specimen:  Blood Updated:  07/10/18 1238     TSH 0.814 mIU/mL     Procalcitonin [[id number]]  (Normal) Collected:  07/10/18 0411    Specimen:  Blood Updated:  07/10/18 1207     Procalcitonin 0.15 ng/mL     Narrative:       As a Marker for Sepsis (Non-Neonates):   1. <0.5 ng/mL represents a low risk of severe sepsis and/or septic shock.  2. >2 ng/mL represents a high risk of severe sepsis and/or septic shock.    As a Marker for Lower Respiratory Tract Infections that require antibiotic therapy:    PCT on Admission     Antibiotic Therapy       6-12 Hrs later  > 0.5                Strongly Recommended             >0.25 - <0.5         Recommended  0.1 - 0.25           Discouraged              Remeasure/reassess PCT  <0.1                 Strongly Discouraged     Remeasure/reassess PCT                     PCT values of < 0.5 ng/mL do not exclude an infection, because localized infections  (without systemic signs) may be associated with such low concentrations, or a systemic infection in its initial stages (< 6 hours). Furthermore, increased PCT can occur without infection. PCT concentrations between 0.5 and 2.0 ng/mL should be interpreted taking into account the patient's history. It is recommended to retest PCT within 6-24 hours if any concentrations < 2 ng/mL are obtained.    C-reactive Protein [[id number]]  (Abnormal) Collected:  07/10/18 0411    Specimen:  Blood Updated:  07/10/18 1207     C-Reactive Protein 9.27 (H) mg/dL     Sedimentation Rate [[id number]]  (Abnormal) Collected:  07/10/18 0411    Specimen:  Blood Updated:  07/10/18 1153     Sed Rate 35 (H) mm/hr     Magnesium [[id number]]  (Normal) Collected:  07/10/18 0411    Specimen:  Blood Updated:  07/10/18 0603     Magnesium 1.7 mg/dL     Basic Metabolic Panel [[id number]]  (Abnormal) Collected:  07/10/18 0411    Specimen:  Blood Updated:  07/10/18 0450     Glucose 102 (H) mg/dL      BUN 19 mg/dL      Creatinine 1.03 mg/dL      Sodium 136 mmol/L      Potassium 2.9 (L) mmol/L      Chloride 97 (L) mmol/L      CO2 26.6 mmol/L      Calcium 9.4 mg/dL      eGFR Non African Amer 69 mL/min/1.73      BUN/Creatinine Ratio 18.4     Anion Gap 12.4 mmol/L     Narrative:       The MDRD GFR formula is only valid for adults with stable renal function between ages 18 and 70.    CBC (No Diff) [[id number]]  (Abnormal) Collected:  07/10/18 0411    Specimen:  Blood Updated:  07/10/18 0423     WBC 17.08 (H) 10*3/mm3      RBC 3.93 (L) 10*6/mm3      Hemoglobin 9.5 (L) g/dL      Hematocrit 29.2 (L) %      MCV 74.3 (L) fL      MCH 24.2 (L) pg      MCHC 32.5 g/dL      RDW 18.4 (H) %      RDW-SD 49.1 fl      MPV 9.0 fL      Platelets 547 (H) 10*3/mm3     Comprehensive Metabolic Panel [[id number]]  (Abnormal) Collected:  07/09/18 1609    Specimen:  Blood Updated:  07/09/18 1631     Glucose 95 mg/dL      BUN 20 mg/dL      Creatinine 1.08 mg/dL      Sodium 136 mmol/L       Potassium 3.7 mmol/L      Chloride 95 (L) mmol/L      CO2 26.0 mmol/L      Calcium 9.9 mg/dL      Total Protein 6.9 g/dL      Albumin 3.40 (L) g/dL      ALT (SGPT) 18 U/L      AST (SGOT) 23 U/L      Alkaline Phosphatase 167 (H) U/L      Total Bilirubin 1.3 (H) mg/dL      eGFR Non African Amer 66 mL/min/1.73      Globulin 3.5 gm/dL      A/G Ratio 1.0 g/dL      BUN/Creatinine Ratio 18.5     Anion Gap 15.0 mmol/L     Narrative:       The MDRD GFR formula is only valid for adults with stable renal function between ages 18 and 70.    Magnesium [[id number]]  (Normal) Collected:  07/09/18 1609    Specimen:  Blood Updated:  07/09/18 1631     Magnesium 1.7 mg/dL     CBC (No Diff) [[id number]]  (Abnormal) Collected:  07/09/18 1609    Specimen:  Blood Updated:  07/09/18 1616     WBC 17.89 (H) 10*3/mm3      RBC 4.13 (L) 10*6/mm3      Hemoglobin 10.2 (L) g/dL      Hematocrit 31.0 (L) %      MCV 75.1 (L) fL      MCH 24.7 (L) pg      MCHC 32.9 g/dL      RDW 18.6 (H) %      RDW-SD 50.3 fl      MPV 9.6 fL      Platelets 547 (H) 10*3/mm3           Imaging Results (last 72 hours)     Procedure Component Value Units Date/Time    MRI Lumbar Spine With & Without Contrast [[id number]] Updated:  07/11/18 1443    US Venous Doppler Lower Extremity Bilateral (duplex) [[id number]] Collected:  07/10/18 1733     Updated:  07/10/18 1735    Narrative:       VENOUS DOPPLER ULTRASOUND, BILATERAL LOWER EXTREMITIES, 7/10/2018     HISTORY:   82-year-old male with two week history of bilateral lower extremity  edema.     TECHNIQUE:   Venous Doppler ultrasound examination of both legs was performed using  grey-scale, spectral Doppler, and color flow Doppler ultrasound imaging.     FINDINGS:   The examination is negative.  There is no evidence of deep venous  thrombosis from the groin to the lower calf bilaterally. The greater  saphenous veins are also patent.       Impression:       Negative examination.  No evidence of lower extremity DVT on the right  or  left.     This report was finalized on 7/10/2018 5:33 PM by Dr. Jefferson Bowen MD.       XR Chest 1 View [[id number]] Collected:  07/09/18 1636     Updated:  07/09/18 1641    Narrative:       CHEST X-RAY, 7/9/2018     HISTORY:   82-year-old male admitted to the hospital today with newly noted  tachycardia and lower extremity swelling. Some shortness of air over the  last two weeks. History of atrial fibrillation.     TECHNIQUE:  AP portable upright chest x-ray.     COMPARISON:  *  Chest x-ray, 12-16.  *  CT chest, 12/8/2016.     FINDINGS:  Lung volumes are chronically low, and there is chronic elevation of the  right hemidiaphragm with scarring or atelectasis in the right lung base.  The lungs are otherwise clear.     Mild to moderate cardiomegaly. Ulnar vascularity is within normal  limits. Ectatic and tortuous thoracic aorta. Postop changes previous  CABG surgery.       Impression:       1. Cardiomegaly. CABG. Pulmonary vascularity is within normal limits.  2. Chronically low lung volumes with chronic elevation of the right  hemidiaphragm and chronic scarring or atelectasis in the right lung  base.  3. Ectatic and tortuous thoracic aorta.     This report was finalized on 7/9/2018 4:39 PM by Dr. Jefferson Bowen MD.               Medication Review:     Hospital Medications (active)       Dose Frequency Start End    acetaminophen (TYLENOL) tablet 650 mg 650 mg Every 4 Hours PRN 7/10/2018     Sig - Route: Take 2 tablets by mouth Every 4 (Four) Hours As Needed for Mild Pain , Headache or Fever. - Oral    aspirin chewable tablet 81 mg 81 mg Daily 7/9/2018     Sig - Route: Chew 1 tablet Daily. - Oral    b complex-C-folic acid capsule 1 mg 1 capsule Daily 7/9/2018     Sig - Route: Take 1 capsule by mouth Daily. - Oral    cetirizine (zyrTEC) tablet 5 mg 5 mg Daily 7/9/2018     Sig - Route: Take 0.5 tablets by mouth Daily. - Oral    diltiaZEM CD (CARDIZEM CD) 24 hr capsule 240 mg 240 mg Every 24 Hours Scheduled  "7/10/2018     Sig - Route: Take 2 capsules by mouth Daily. - Oral    gadobenate dimeglumine (MULTIHANCE) injection 18 mL 18 mL Once in Imaging 7/11/2018 7/11/2018    Sig - Route: Infuse 18 mL into a venous catheter Once. - Intravenous    gentamicin (GARAMYCIN) 100 mg in sodium chloride 0.9 % 100 mL IVPB 100 mg Every 8 Hours 7/11/2018 7/16/2018    Sig - Route: Infuse 100 mg into a venous catheter Every 8 (Eight) Hours. - Intravenous    Linked Group 1:  \"Followed by\" Linked Group Details        guaiFENesin (MUCINEX) 12 hr tablet 600 mg 600 mg Daily 7/9/2018     Sig - Route: Take 1 tablet by mouth Daily. - Oral    HYDROmorphone (DILAUDID) injection 0.25 mg 0.25 mg Every 2 Hours PRN 7/10/2018 7/20/2018    Sig - Route: Infuse 0.25 mL into a venous catheter Every 2 (Two) Hours As Needed for Moderate Pain  or Severe Pain . - Intravenous    HYDROmorphone (DILAUDID) injection 1 mg 1 mg Once 7/11/2018 7/11/2018    Sig - Route: Infuse 1 mL into a venous catheter 1 (One) Time. - Intravenous    lactated ringers infusion 125 mL/hr Continuous 7/11/2018 7/11/2018    Sig - Route: Infuse 125 mL/hr into a venous catheter Continuous. - Intravenous    levETIRAcetam (KEPPRA) tablet 500 mg 500 mg Every 12 Hours Scheduled 7/9/2018     Sig - Route: Take 1 tablet by mouth Every 12 (Twelve) Hours. - Oral    LORazepam (ATIVAN) injection 1 mg 1 mg Once As Needed 7/11/2018     Sig - Route: Infuse 0.5 mL into a venous catheter 1 (One) Time As Needed for Anxiety (for CT scan). - Intravenous    Magnesium Sulfate 2 gram / 50mL Infusion (GIVE X 3 BAGS TO EQUAL 6GM TOTAL DOSE) - Mg 1.1 - 1/5 mg/dl 2 g As Needed 7/10/2018     Sig - Route: Infuse 50 mL into a venous catheter As Needed (See Administration Instructions). - Intravenous    Linked Group 2:  \"Or\" Linked Group Details        Magnesium Sulfate 2 gram Bolus, followed by 8 gram infusion (total Mg dose 10 grams)- Mg less than or equal to 1mg/dL 2 g As Needed 7/10/2018     Sig - Route: Infuse 50 mL " "into a venous catheter As Needed (See Administration Instructions). - Intravenous    Linked Group 2:  \"Or\" Linked Group Details        Magnesium Sulfate 4 gram infusion- Mg 1.6-1.9 mg/dL 4 g As Needed 7/10/2018     Sig - Route: Infuse 100 mL into a venous catheter As Needed (See Administration Instructions). - Intravenous    Linked Group 2:  \"Or\" Linked Group Details        methocarbamol (ROBAXIN) tablet 500 mg 500 mg 2 Times Daily PRN 7/9/2018     Sig - Route: Take 1 tablet by mouth 2 (Two) Times a Day As Needed for Muscle Spasms. - Oral    metoprolol succinate XL (TOPROL-XL) 24 hr tablet 100 mg 100 mg Every 24 Hours Scheduled 7/9/2018     Sig - Route: Take 2 tablets by mouth Daily. - Oral    Pharmacy to Dose gentamicin (GARAMYCIN)  Continuous PRN 7/16/2018 8/13/2018    Sig - Route: Continuous As Needed for Consult (pharmacy to dose gentamicin beginning with second dose). - Does not apply    Linked Group 1:  \"Followed by\" Linked Group Details        Pharmacy to dose vancomycin  Continuous PRN 7/11/2018 8/8/2018    Sig - Route: Continuous As Needed for Consult. - Does not apply    piperacillin-tazobactam (ZOSYN) 3.375 g/100 mL 0.9% NS IVPB (mbp) 3.375 g Once 7/11/2018     Sig - Route: Infuse 100 mL into a venous catheter 1 (One) Time. - Intravenous    piperacillin-tazobactam (ZOSYN) 3.375 g/100 mL 0.9% NS IVPB (mbp) 3.375 g Every 8 Hours 7/11/2018 7/16/2018    Sig - Route: Infuse 100 mL into a venous catheter Every 8 (Eight) Hours. - Intravenous    polyethylene glycol (MIRALAX) powder 17 g 17 g Daily 7/10/2018     Sig - Route: Take 17 g by mouth Daily. - Oral    potassium chloride (K-DUR,KLOR-CON) CR tablet 40 mEq 40 mEq As Needed 7/10/2018     Sig - Route: Take 2 tablets by mouth As Needed (potassium replacement.  see admin instructions). - Oral    Linked Group 3:  \"Or\" Linked Group Details        potassium chloride (K-DUR,KLOR-CON) ER tablet 40 mEq 40 mEq Once 7/11/2018 7/11/2018    Sig - Route: Take 2 tablets by " "mouth 1 (One) Time. - Oral    Notes to Pharmacy: Potassium protocol    Cosign for Ordering: Required by Stan Palacios, DO    potassium chloride (KLOR-CON) packet 40 mEq 40 mEq As Needed 7/10/2018     Sig - Route: Take 40 mEq by mouth As Needed (potassium replacement, see admin instructions). - Oral    Linked Group 3:  \"Or\" Linked Group Details        potassium chloride 10 mEq in 100 mL IVPB 10 mEq Every 1 Hour PRN 7/10/2018     Sig - Route: Infuse 100 mL into a venous catheter Every 1 (One) Hour As Needed (potassium protocol PERIPHERAL - see admin instructions). - Intravenous    Linked Group 3:  \"Or\" Linked Group Details        rivaroxaban (XARELTO) tablet 15 mg 15 mg Daily 7/9/2018     Sig - Route: Take 1 tablet by mouth Daily. - Oral    sennosides-docusate sodium (SENOKOT-S) 8.6-50 MG tablet 2 tablet 2 tablet 2 Times Daily 7/10/2018     Sig - Route: Take 2 tablets by mouth 2 (Two) Times a Day. - Oral    sodium chloride 0.9 % flush 1-10 mL 1-10 mL As Needed 7/9/2018     Sig - Route: Infuse 1-10 mL into a venous catheter As Needed for Line Care. - Intravenous    sodium chloride 0.9 % infusion 40 mL 40 mL As Needed 7/9/2018     Sig - Route: Infuse 40 mL into a venous catheter As Needed for Line Care. - Intravenous    vancomycin 1500 mg/250 mL 0.9% NS IVPB 1,500 mg Every 12 Hours 7/11/2018 7/18/2018    Sig - Route: Infuse 250 mL into a venous catheter Every 12 (Twelve) Hours. - Intravenous    bumetanide (BUMEX) injection 2 mg (Discontinued) 2 mg Every 12 Hours 7/9/2018 7/11/2018    Sig - Route: Infuse 8 mL into a venous catheter Every 12 (Twelve) Hours. - Intravenous    vancomycin 1500 mg/250 mL 0.9% NS IVPB (Discontinued) 1,500 mg Every 8 Hours 7/11/2018 7/11/2018    Sig - Route: Infuse 250 mL into a venous catheter Every 8 (Eight) Hours. - Intravenous          aspirin 81 mg Oral Daily   b complex-C-folic acid 1 capsule Oral Daily   cetirizine 5 mg Oral Daily   diltiaZEM  mg Oral Q24H   gentamicin 100 mg " Intravenous Q8H   guaiFENesin 600 mg Oral Daily   levETIRAcetam 500 mg Oral Q12H   metoprolol succinate  mg Oral Q24H   piperacillin-tazobactam 3.375 g Intravenous Once   piperacillin-tazobactam 3.375 g Intravenous Q8H   polyethylene glycol 17 g Oral Daily   rivaroxaban 15 mg Oral Daily   sennosides-docusate sodium 2 tablet Oral BID   vancomycin 1,500 mg Intravenous Q12H       Assessment/Plan       Active Problems:    Hypertension    Stage 3 chronic kidney disease    Decubitus ulcer of sacral region    Abnormal echocardiogram    Atrial fibrillation and flutter (CMS/HCC)    Leg swelling      Bacteremia  Strep and Ecoli     Plan :    Agree with vanc  Add tomer Cantu MD  07/11/18  2:44 PM

## 2018-07-11 NOTE — PLAN OF CARE
Problem: Patient Care Overview  Goal: Plan of Care Review  Pt assessed and identified with malnutrition. Oral supplement in place to aid increase calorie/protein intake.

## 2018-07-11 NOTE — PROGRESS NOTES
"SERVICE: [facility] HOSPITALIST    CONSULTANTS:  ID  Cards    CHIEF COMPLAINT: Back Pain    SUBJECTIVE:    Pts mood / mobility has improved, but back pain is still present. Worse in the lower thoracic / lumbar region. Pain is better with robaxin. Continues to refuse to take opioid pain medication.     PMH of several 'injections' from [facility]. Wife states they have no records of exactly what and when he was injected. History unclear if they were steroid injections or not.    OBJECTIVE:    BP 97/55 (BP Location: Right arm, Patient Position: Lying)   Pulse 96   Temp 97 °F (36.1 °C) (Oral)   Resp 20   Ht 190.5 cm (75\")   Wt 87.3 kg (192 lb 7.4 oz)   SpO2 95%   BMI 24.06 kg/m²     MEDS/LABS REVIEWED AND ORDERED      aspirin 81 mg Oral Daily   b complex-C-folic acid 1 capsule Oral Daily   bumetanide 2 mg Intravenous Q12H   cetirizine 5 mg Oral Daily   diltiaZEM  mg Oral Q24H   gentamicin 100 mg Intravenous Q8H   guaiFENesin 600 mg Oral Daily   levETIRAcetam 500 mg Oral Q12H   metoprolol succinate  mg Oral Q24H   polyethylene glycol 17 g Oral Daily   rivaroxaban 15 mg Oral Daily   sennosides-docusate sodium 2 tablet Oral BID   vancomycin 1,500 mg Intravenous Q12H       Physical Exam   Constitutional: He appears well-developed and well-nourished.   Eyes: Pupils are equal, round, and reactive to light.   Neck: No JVD present.   Cardiovascular: Normal rate and regular rhythm.    Pulmonary/Chest: Effort normal and breath sounds normal. No respiratory distress.   Abdominal: Soft. Bowel sounds are normal. He exhibits no distension.   Musculoskeletal: He exhibits edema and tenderness.   Neurological: He is alert.   Gait slow, small steps, able to ambulate with walker   Skin: Skin is warm and dry. No erythema.   Psychiatric: He has a normal mood and affect. His behavior is normal.   Vitals reviewed.      LAB/DIAGNOSTICS:    Lab Results (last 24 hours)     Procedure Component " Value Units Date/Time    Blood Culture - Blood, [[id number]] Collected:  07/11/18 1008    Specimen:  Blood from Hand, Right Updated:  07/11/18 1012    Blood Culture - Blood, [[id number]] Collected:  07/11/18 1009    Specimen:  Blood from Hand, Left Updated:  07/11/18 1011    Blood Culture - Blood, Arm, Left [[id number]]  (Abnormal) Collected:  07/10/18 1130    Specimen:  Blood from Arm, Left Updated:  07/11/18 0949     Blood Culture Abnormal Stain (A)     Gram Stain Result Aerobic Bottle Gram positive cocci in chains    Blood Culture ID, PCR - Blood, [[id number]] Collected:  07/10/18 1130    Specimen:  Blood from Arm, Left Updated:  07/11/18 0949    Blood Culture - Blood, Arm, Left [[id number]]  (Abnormal) Collected:  07/10/18 1200    Specimen:  Blood from Arm, Left Updated:  07/11/18 0949     Blood Culture Abnormal Stain (A)     Gram Stain Result Aerobic Bottle Gram positive cocci in chains    Basic Metabolic Panel [[id number]]  (Abnormal) Collected:  07/11/18 0409    Specimen:  Blood Updated:  07/11/18 0521     Glucose 107 (H) mg/dL      BUN 22 mg/dL      Creatinine 1.03 mg/dL      Sodium 132 (L) mmol/L      Potassium 3.6 mmol/L      Chloride 95 (L) mmol/L      CO2 29.8 (H) mmol/L      Calcium 9.0 mg/dL      eGFR Non African Amer 69 mL/min/1.73      BUN/Creatinine Ratio 21.4     Anion Gap 7.2 mmol/L     Narrative:       The MDRD GFR formula is only valid for adults with stable renal function between ages 18 and 70.    CBC & Differential [[id number]] Collected:  07/11/18 0409    Specimen:  Blood Updated:  07/11/18 0505    Narrative:       The following orders were created for panel order CBC & Differential.  Procedure                               Abnormality         Status                     ---------                               -----------         ------                     Scan Slide[[id number]]                                                                  CBC Auto Differential[[id number]]        Abnormal             Final result                 Please view results for these tests on the individual orders.    CBC Auto Differential [[id number]]  (Abnormal) Collected:  07/11/18 0409    Specimen:  Blood Updated:  07/11/18 0504     WBC 19.67 (H) 10*3/mm3      RBC 3.71 (L) 10*6/mm3      Hemoglobin 9.0 (L) g/dL      Hematocrit 27.7 (L) %      MCV 74.7 (L) fL      MCH 24.3 (L) pg      MCHC 32.5 g/dL      RDW 18.2 (H) %      RDW-SD 49.3 fl      MPV 9.2 fL      Platelets 539 (H) 10*3/mm3      Neutrophil % 75.4 (H) %      Lymphocyte % 13.0 (L) %      Monocyte % 10.6 (H) %      Eosinophil % 0.2 %      Basophil % 0.2 %      Immature Grans % 0.6 (H) %      Neutrophils, Absolute 14.87 (H) 10*3/mm3      Lymphocytes, Absolute 2.55 10*3/mm3      Monocytes, Absolute 2.08 (H) 10*3/mm3      Eosinophils, Absolute 0.03 (L) 10*3/mm3      Basophils, Absolute 0.03 10*3/mm3      Immature Grans, Absolute 0.11 (H) 10*3/mm3      nRBC 0.0 /100 WBC     Urinalysis, Microscopic Only - Urine, Clean Catch [[id number]]  (Abnormal) Collected:  07/10/18 1649    Specimen:  Urine from Urine, Clean Catch Updated:  07/10/18 1758     RBC, UA 13-20 (A) /HPF      WBC, UA 0-2 (A) /HPF      Bacteria, UA None Seen /HPF      Squamous Epithelial Cells, UA 0-2 /HPF      Hyaline Casts, UA 0-2 /LPF      Methodology Manual Light Microscopy    Urinalysis With Culture If Indicated - Urine, Clean Catch [[id number]]  (Abnormal) Collected:  07/10/18 1649    Specimen:  Urine from Urine, Clean Catch Updated:  07/10/18 1743     Color, UA Dark Yellow (A)     Appearance, UA Clear     pH, UA <=5.0     Specific Gravity, UA 1.025     Comment: Result obtained by Refractometer        Glucose, UA Negative     Ketones, UA Trace (A)     Bilirubin, UA Small (1+) (A)     Blood, UA Moderate (2+) (A)     Protein, UA Negative     Leuk Esterase, UA Negative     Nitrite, UA Negative     Urobilinogen, UA 1.0 E.U./dL    Fungus Culture, Blood - Blood, Arm, Left [[id number]] Collected:  07/10/18 1130    Specimen:   Blood from Arm, Left Updated:  07/10/18 1735    Fungus Culture, Blood - Blood, Arm, Left [[id number]] Collected:  07/10/18 1200    Specimen:  Blood from Hand, Left Updated:  07/10/18 1735    TSH [[id number]]  (Normal) Collected:  07/10/18 0411    Specimen:  Blood Updated:  07/10/18 1238     TSH 0.814 mIU/mL     Procalcitonin [[id number]]  (Normal) Collected:  07/10/18 0411    Specimen:  Blood Updated:  07/10/18 1207     Procalcitonin 0.15 ng/mL     Narrative:       As a Marker for Sepsis (Non-Neonates):   1. <0.5 ng/mL represents a low risk of severe sepsis and/or septic shock.  2. >2 ng/mL represents a high risk of severe sepsis and/or septic shock.    As a Marker for Lower Respiratory Tract Infections that require antibiotic therapy:    PCT on Admission     Antibiotic Therapy       6-12 Hrs later  > 0.5                Strongly Recommended             >0.25 - <0.5         Recommended  0.1 - 0.25           Discouraged              Remeasure/reassess PCT  <0.1                 Strongly Discouraged     Remeasure/reassess PCT                     PCT values of < 0.5 ng/mL do not exclude an infection, because localized infections (without systemic signs) may be associated with such low concentrations, or a systemic infection in its initial stages (< 6 hours). Furthermore, increased PCT can occur without infection. PCT concentrations between 0.5 and 2.0 ng/mL should be interpreted taking into account the patient's history. It is recommended to retest PCT within 6-24 hours if any concentrations < 2 ng/mL are obtained.    C-reactive Protein [[id number]]  (Abnormal) Collected:  07/10/18 0411    Specimen:  Blood Updated:  07/10/18 1207     C-Reactive Protein 9.27 (H) mg/dL     Sedimentation Rate [[id number]]  (Abnormal) Collected:  07/10/18 0411    Specimen:  Blood Updated:  07/10/18 1153     Sed Rate 35 (H) mm/hr         Results for orders placed during the hospital encounter of 07/09/18   Adult Transthoracic Echo Complete W/  Cont if Necessary Per Protocol    Addendum · Left ventricular systolic function is normal. Calculated EF = 66%.  Estimated EF was in agreement with the calculated EF. Estimated EF = 68%.  Normal left ventricular cavity size noted. All left ventricular wall  segments contract normally. Left ventricular wall thickness is consistent  with mild concentric hypertrophy. Left ventricular diastolic function was  indeterminate. · Left atrial volume is severely increased. · Right atrial cavity size is severely dilated. · The aortic valve is abnormal in structure. There is severe thickening of  the aortic valve. The aortic valve is severely thickened with hazy density  on the ventricular surface of the aortic valve. Cannot rule out vegetative  process. Consider transesophageal echocardiogram to assess further.. · The mitral valve is abnormal in structure. The anterior mitral valve  leaflet is thickened on the ventricular surface. Suspicious for possible  vegetative process. Consider transesophageal echocardiogram if clinically  indicated.. Mild mitral valve regurgitation is present. · Mild tricuspid valve regurgitation is present. Estimated right  ventricular systolic pressure from tricuspid regurgitation is normal (<35  mmHg). · Addendum: Mild dilation of the aortic root is present. Aortic root  measures 4.3 cm.        Alivia Mayo MD 7/10/2018 11:30 AM          Narrative · Left ventricular systolic function is normal. Calculated EF = 66%.   Estimated EF was in agreement with the calculated EF. Estimated EF = 68%.   Normal left ventricular cavity size noted. All left ventricular wall   segments contract normally. Left ventricular wall thickness is consistent   with mild concentric hypertrophy. Left ventricular diastolic function was   indeterminate.  · Left atrial volume is severely increased.  · Right atrial cavity size is severely dilated.  · The aortic valve is abnormal in structure. There is severe thickening of   the  aortic valve. The aortic valve is severely thickened with hazy density   on the ventricular surface of the aortic valve. Cannot rule out vegetative   process. Consider transesophageal echocardiogram to assess further..  · The mitral valve is abnormal in structure. The anterior mitral valve   leaflet is thickened on the ventricular surface. Suspicious for possible   vegetative process. Consider transesophageal echocardiogram if clinically   indicated.. Mild mitral valve regurgitation is present.  · Mild tricuspid valve regurgitation is present. Estimated right   ventricular systolic pressure from tricuspid regurgitation is normal (<35   mmHg).        Xr Chest 1 View    Result Date: 7/9/2018  1. Cardiomegaly. CABG. Pulmonary vascularity is within normal limits. 2. Chronically low lung volumes with chronic elevation of the right hemidiaphragm and chronic scarring or atelectasis in the right lung base. 3. Ectatic and tortuous thoracic aorta.  This report was finalized on 7/9/2018 4:39 PM by Dr. Jefferson Bowen MD.      Us Venous Doppler Lower Extremity Bilateral (duplex)    Result Date: 7/10/2018  Negative examination.  No evidence of lower extremity DVT on the right or left.  This report was finalized on 7/10/2018 5:33 PM by Dr. Jefferson Bowen MD.          ASSESSMENT/PLAN:    Gram Pos Cocci in Chain Bacteremia  - 2 out of 2 aerobic bottles positive, w/ 'abnormal strain' of bacteria  - CRP 9.27, SED  35  - ?Vegetations seen on valve  - Ordering SKIP, and ID consult  - Started Empiric Endocarditis abx (Vanc and gent), after getting second set of blood cultures today  - Cancelled fungal cultures for now, if clinical suspicion for fungal infx remains, will attempt to get records from pain management clinic  - MRI of Lumbar spine to rule out paraspinal infection    Borderline blood pressures  - Starting LR, will limit to 500cc, will monitor   - has significant edema, holding iv bumex for now  - Feel distributive  venodilation from bacteremia >> problem than possible fluid overload at this point    Sacral Ulcer  - Continue wound care    Afib w/ intermittent RVR  - On xarelto  - Usually well controlled, but has short paroxysms of increased rates into the 120's on tele  - 's this AM, will keep with home metop of 100mg, & home dilt of 240mg  - Placing blood pressure hold parameters on metop and dilt    Back Pain  - Continue robaxin

## 2018-07-11 NOTE — SIGNIFICANT NOTE
07/11/18 1051   Rehab Time/Intention   Evaluation Not Performed patient/family declined evaluation  (attempted to see again for evaluation.  Patient continues to report chronic back pain, however states it remains at baseline for him.  pt states he has been up ambulating within the room with nursing staff.  attempted to have patient participate in formal PT Evaluation for approximately 18 minutes, however pt declines to participate in mobility and responses to questions frequently off topic.  At this time, pt reports he has no concerns getting up with nursing staff as needed.  Unable to identify any potential PT needs, as pt is unwilling to actively participate in evaluation.)   Rehab Treatment   Discipline physical therapist

## 2018-07-11 NOTE — CONSULTS
LOS: 1 day   Patient Care Team:  Elisabeth Warren MD as PCP - General (Internal Medicine)  Alexsander Matute MD as Consulting Physician (Neurology)  MD Ry Goff III, MD as Consulting Physician (Cardiology)        Subjective       Attending MD : Stan Palacios DO    Patient Complaints: leg swelling         History of Present Illness  :     Patient Denies:  NVD    PMH : Active Problems:    Hypertension    Stage 3 chronic kidney disease    Decubitus ulcer of sacral region    Abnormal echocardiogram    Atrial fibrillation and flutter (CMS/HCC)    Leg swelling      Review of Systems:    10 point ROS done    Objective     Vital Signs  Temp:  [98.3 °F (36.8 °C)-100.9 °F (38.3 °C)] 100.7 °F (38.2 °C)  Heart Rate:  [] 115  Resp:  [18-19] 18  BP: (107-120)/(59-69) 120/59    Physical Exam:     General Appearance:    Alert, cooperative, in no acute distress   Head:    Normocephalic, without obvious abnormality, atraumatic   Eyes:            Lids and lashes normal, conjunctivae and sclerae normal, no   icterus, no pallor, corneas clear, PERRLA   Ears:    Ears appear intact with no abnormalities noted   Throat:   No oral lesions, no thrush, oral mucosa moist   Neck:   No adenopathy, supple, trachea midline, no thyromegaly, no   carotid bruit, no JVD   Back:     No kyphosis present, no scoliosis present, no skin lesions,      erythema or scars, no tenderness to percussion or                   palpation,   range of motion normal   Lungs:     Clear to auscultation,respirations regular, even and                  unlabored    Heart:    Regular rhythm and normal rate, normal S1 and S2, no            murmur, no gallop, no rub, no click   Chest Wall:    No abnormalities observed   Abdomen:     Normal bowel sounds, no masses, no organomegaly, soft        non-tender, non-distended, no guarding, no rebound                tenderness   Rectal:     Deferred   Extremities:   Moves all extremities well, +  4  edema, no cyanosis, no             redness   Pulses:   Pulses palpable and equal bilaterally   Skin:   No bleeding, bruising or rash   Lymph nodes:   No palpable adenopathy   Neurologic:   Cranial nerves 2 - 12 grossly intact, sensation intact, DTR       present and equal bilaterally          Results Review:    Lab Results (last 72 hours)     Procedure Component Value Units Date/Time    Urinalysis, Microscopic Only - Urine, Clean Catch [[id number]]  (Abnormal) Collected:  07/10/18 1649    Specimen:  Urine from Urine, Clean Catch Updated:  07/10/18 1758     RBC, UA 13-20 (A) /HPF      WBC, UA 0-2 (A) /HPF      Bacteria, UA None Seen /HPF      Squamous Epithelial Cells, UA 0-2 /HPF      Hyaline Casts, UA 0-2 /LPF      Methodology Manual Light Microscopy    Urinalysis With Culture If Indicated - Urine, Clean Catch [[id number]]  (Abnormal) Collected:  07/10/18 1649    Specimen:  Urine from Urine, Clean Catch Updated:  07/10/18 1743     Color, UA Dark Yellow (A)     Appearance, UA Clear     pH, UA <=5.0     Specific Gravity, UA 1.025     Comment: Result obtained by Refractometer        Glucose, UA Negative     Ketones, UA Trace (A)     Bilirubin, UA Small (1+) (A)     Blood, UA Moderate (2+) (A)     Protein, UA Negative     Leuk Esterase, UA Negative     Nitrite, UA Negative     Urobilinogen, UA 1.0 E.U./dL    Fungus Culture, Blood - Blood, Arm, Left [[id number]] Collected:  07/10/18 1130    Specimen:  Blood from Arm, Left Updated:  07/10/18 1735    Fungus Culture, Blood - Blood, Arm, Left [[id number]] Collected:  07/10/18 1200    Specimen:  Blood from Hand, Left Updated:  07/10/18 1735    TSH [[id number]]  (Normal) Collected:  07/10/18 0411    Specimen:  Blood Updated:  07/10/18 1238     TSH 0.814 mIU/mL     Procalcitonin [[id number]]  (Normal) Collected:  07/10/18 0411    Specimen:  Blood Updated:  07/10/18 1207     Procalcitonin 0.15 ng/mL     Narrative:       As a Marker for Sepsis (Non-Neonates):   1. <0.5 ng/mL  represents a low risk of severe sepsis and/or septic shock.  2. >2 ng/mL represents a high risk of severe sepsis and/or septic shock.    As a Marker for Lower Respiratory Tract Infections that require antibiotic therapy:    PCT on Admission     Antibiotic Therapy       6-12 Hrs later  > 0.5                Strongly Recommended             >0.25 - <0.5         Recommended  0.1 - 0.25           Discouraged              Remeasure/reassess PCT  <0.1                 Strongly Discouraged     Remeasure/reassess PCT                     PCT values of < 0.5 ng/mL do not exclude an infection, because localized infections (without systemic signs) may be associated with such low concentrations, or a systemic infection in its initial stages (< 6 hours). Furthermore, increased PCT can occur without infection. PCT concentrations between 0.5 and 2.0 ng/mL should be interpreted taking into account the patient's history. It is recommended to retest PCT within 6-24 hours if any concentrations < 2 ng/mL are obtained.    C-reactive Protein [[id number]]  (Abnormal) Collected:  07/10/18 0411    Specimen:  Blood Updated:  07/10/18 1207     C-Reactive Protein 9.27 (H) mg/dL     Sedimentation Rate [[id number]]  (Abnormal) Collected:  07/10/18 0411    Specimen:  Blood Updated:  07/10/18 1153     Sed Rate 35 (H) mm/hr     Magnesium [[id number]]  (Normal) Collected:  07/10/18 0411    Specimen:  Blood Updated:  07/10/18 0603     Magnesium 1.7 mg/dL     Basic Metabolic Panel [[id number]]  (Abnormal) Collected:  07/10/18 0411    Specimen:  Blood Updated:  07/10/18 0450     Glucose 102 (H) mg/dL      BUN 19 mg/dL      Creatinine 1.03 mg/dL      Sodium 136 mmol/L      Potassium 2.9 (L) mmol/L      Chloride 97 (L) mmol/L      CO2 26.6 mmol/L      Calcium 9.4 mg/dL      eGFR Non African Amer 69 mL/min/1.73      BUN/Creatinine Ratio 18.4     Anion Gap 12.4 mmol/L     Narrative:       The MDRD GFR formula is only valid for adults with stable renal function  between ages 18 and 70.    CBC (No Diff) [[id number]]  (Abnormal) Collected:  07/10/18 0411    Specimen:  Blood Updated:  07/10/18 0423     WBC 17.08 (H) 10*3/mm3      RBC 3.93 (L) 10*6/mm3      Hemoglobin 9.5 (L) g/dL      Hematocrit 29.2 (L) %      MCV 74.3 (L) fL      MCH 24.2 (L) pg      MCHC 32.5 g/dL      RDW 18.4 (H) %      RDW-SD 49.1 fl      MPV 9.0 fL      Platelets 547 (H) 10*3/mm3     Comprehensive Metabolic Panel [[id number]]  (Abnormal) Collected:  07/09/18 1609    Specimen:  Blood Updated:  07/09/18 1631     Glucose 95 mg/dL      BUN 20 mg/dL      Creatinine 1.08 mg/dL      Sodium 136 mmol/L      Potassium 3.7 mmol/L      Chloride 95 (L) mmol/L      CO2 26.0 mmol/L      Calcium 9.9 mg/dL      Total Protein 6.9 g/dL      Albumin 3.40 (L) g/dL      ALT (SGPT) 18 U/L      AST (SGOT) 23 U/L      Alkaline Phosphatase 167 (H) U/L      Total Bilirubin 1.3 (H) mg/dL      eGFR Non African Amer 66 mL/min/1.73      Globulin 3.5 gm/dL      A/G Ratio 1.0 g/dL      BUN/Creatinine Ratio 18.5     Anion Gap 15.0 mmol/L     Narrative:       The MDRD GFR formula is only valid for adults with stable renal function between ages 18 and 70.    Magnesium [[id number]]  (Normal) Collected:  07/09/18 1609    Specimen:  Blood Updated:  07/09/18 1631     Magnesium 1.7 mg/dL     CBC (No Diff) [[id number]]  (Abnormal) Collected:  07/09/18 1609    Specimen:  Blood Updated:  07/09/18 1616     WBC 17.89 (H) 10*3/mm3      RBC 4.13 (L) 10*6/mm3      Hemoglobin 10.2 (L) g/dL      Hematocrit 31.0 (L) %      MCV 75.1 (L) fL      MCH 24.7 (L) pg      MCHC 32.9 g/dL      RDW 18.6 (H) %      RDW-SD 50.3 fl      MPV 9.6 fL      Platelets 547 (H) 10*3/mm3               Imaging Results (last 72 hours)     Procedure Component Value Units Date/Time    US Venous Doppler Lower Extremity Bilateral (duplex) [[id number]] Collected:  07/10/18 1733     Updated:  07/10/18 1735    Narrative:       VENOUS DOPPLER ULTRASOUND, BILATERAL LOWER EXTREMITIES,  7/10/2018     HISTORY:   82-year-old male with two week history of bilateral lower extremity  edema.     TECHNIQUE:   Venous Doppler ultrasound examination of both legs was performed using  grey-scale, spectral Doppler, and color flow Doppler ultrasound imaging.     FINDINGS:   The examination is negative.  There is no evidence of deep venous  thrombosis from the groin to the lower calf bilaterally. The greater  saphenous veins are also patent.       Impression:       Negative examination.  No evidence of lower extremity DVT on the right  or left.     This report was finalized on 7/10/2018 5:33 PM by Dr. Jefferson Bowen MD.       XR Chest 1 View [[id number]] Collected:  07/09/18 1636     Updated:  07/09/18 1641    Narrative:       CHEST X-RAY, 7/9/2018     HISTORY:   82-year-old male admitted to the hospital today with newly noted  tachycardia and lower extremity swelling. Some shortness of air over the  last two weeks. History of atrial fibrillation.     TECHNIQUE:  AP portable upright chest x-ray.     COMPARISON:  *  Chest x-ray, 12-16.  *  CT chest, 12/8/2016.     FINDINGS:  Lung volumes are chronically low, and there is chronic elevation of the  right hemidiaphragm with scarring or atelectasis in the right lung base.  The lungs are otherwise clear.     Mild to moderate cardiomegaly. Ulnar vascularity is within normal  limits. Ectatic and tortuous thoracic aorta. Postop changes previous  CABG surgery.       Impression:       1. Cardiomegaly. CABG. Pulmonary vascularity is within normal limits.  2. Chronically low lung volumes with chronic elevation of the right  hemidiaphragm and chronic scarring or atelectasis in the right lung  base.  3. Ectatic and tortuous thoracic aorta.     This report was finalized on 7/9/2018 4:39 PM by Dr. Jefferson Bowen MD.               Medication Review:      Hospital Medications (active)       Dose Frequency Start End    acetaminophen (TYLENOL) tablet 650 mg 650 mg Every 4  "Hours PRN 7/10/2018     Sig - Route: Take 2 tablets by mouth Every 4 (Four) Hours As Needed for Mild Pain , Headache or Fever. - Oral    aspirin chewable tablet 81 mg 81 mg Daily 7/9/2018     Sig - Route: Chew 1 tablet Daily. - Oral    b complex-C-folic acid capsule 1 mg 1 capsule Daily 7/9/2018     Sig - Route: Take 1 capsule by mouth Daily. - Oral    bumetanide (BUMEX) injection 2 mg 2 mg Every 12 Hours 7/9/2018     Sig - Route: Infuse 8 mL into a venous catheter Every 12 (Twelve) Hours. - Intravenous    cetirizine (zyrTEC) tablet 5 mg 5 mg Daily 7/9/2018     Sig - Route: Take 0.5 tablets by mouth Daily. - Oral    digoxin (LANOXIN) injection 500 mcg 500 mcg Once 7/10/2018 7/10/2018    Sig - Route: Infuse 2 mL into a venous catheter 1 (One) Time. - Intravenous    diltiaZEM CD (CARDIZEM CD) 24 hr capsule 240 mg 240 mg Every 24 Hours Scheduled 7/10/2018     Sig - Route: Take 2 capsules by mouth Daily. - Oral    guaiFENesin (MUCINEX) 12 hr tablet 600 mg 600 mg Daily 7/9/2018     Sig - Route: Take 1 tablet by mouth Daily. - Oral    HYDROmorphone (DILAUDID) injection 0.25 mg 0.25 mg Every 2 Hours PRN 7/10/2018 7/20/2018    Sig - Route: Infuse 0.25 mL into a venous catheter Every 2 (Two) Hours As Needed for Moderate Pain  or Severe Pain . - Intravenous    levETIRAcetam (KEPPRA) tablet 500 mg 500 mg Every 12 Hours Scheduled 7/9/2018     Sig - Route: Take 1 tablet by mouth Every 12 (Twelve) Hours. - Oral    Magnesium Sulfate 2 gram / 50mL Infusion (GIVE X 3 BAGS TO EQUAL 6GM TOTAL DOSE) - Mg 1.1 - 1/5 mg/dl 2 g As Needed 7/10/2018     Sig - Route: Infuse 50 mL into a venous catheter As Needed (See Administration Instructions). - Intravenous    Linked Group 1:  \"Or\" Linked Group Details        Magnesium Sulfate 2 gram Bolus, followed by 8 gram infusion (total Mg dose 10 grams)- Mg less than or equal to 1mg/dL 2 g As Needed 7/10/2018     Sig - Route: Infuse 50 mL into a venous catheter As Needed (See Administration " "Instructions). - Intravenous    Linked Group 1:  \"Or\" Linked Group Details        Magnesium Sulfate 4 gram infusion- Mg 1.6-1.9 mg/dL 4 g As Needed 7/10/2018     Sig - Route: Infuse 100 mL into a venous catheter As Needed (See Administration Instructions). - Intravenous    Linked Group 1:  \"Or\" Linked Group Details        methocarbamol (ROBAXIN) tablet 500 mg 500 mg 2 Times Daily PRN 7/9/2018     Sig - Route: Take 1 tablet by mouth 2 (Two) Times a Day As Needed for Muscle Spasms. - Oral    metoprolol succinate XL (TOPROL-XL) 24 hr tablet 100 mg 100 mg Every 24 Hours Scheduled 7/9/2018     Sig - Route: Take 2 tablets by mouth Daily. - Oral    polyethylene glycol (MIRALAX) powder 17 g 17 g Daily 7/10/2018     Sig - Route: Take 17 g by mouth Daily. - Oral    potassium chloride (K-DUR,KLOR-CON) CR tablet 40 mEq 40 mEq As Needed 7/10/2018     Sig - Route: Take 2 tablets by mouth As Needed (potassium replacement.  see admin instructions). - Oral    Linked Group 2:  \"Or\" Linked Group Details        potassium chloride (KLOR-CON) packet 40 mEq 40 mEq As Needed 7/10/2018     Sig - Route: Take 40 mEq by mouth As Needed (potassium replacement, see admin instructions). - Oral    Linked Group 2:  \"Or\" Linked Group Details        potassium chloride 10 mEq in 100 mL IVPB 10 mEq Every 1 Hour PRN 7/10/2018     Sig - Route: Infuse 100 mL into a venous catheter Every 1 (One) Hour As Needed (potassium protocol PERIPHERAL - see admin instructions). - Intravenous    Linked Group 2:  \"Or\" Linked Group Details        rivaroxaban (XARELTO) tablet 15 mg 15 mg Daily 7/9/2018     Sig - Route: Take 1 tablet by mouth Daily. - Oral    sennosides-docusate sodium (SENOKOT-S) 8.6-50 MG tablet 2 tablet 2 tablet 2 Times Daily 7/10/2018     Sig - Route: Take 2 tablets by mouth 2 (Two) Times a Day. - Oral    sodium chloride 0.9 % flush 1-10 mL 1-10 mL As Needed 7/9/2018     Sig - Route: Infuse 1-10 mL into a venous catheter As Needed for Line Care. - " Intravenous    sodium chloride 0.9 % infusion 40 mL 40 mL As Needed 7/9/2018     Sig - Route: Infuse 40 mL into a venous catheter As Needed for Line Care. - Intravenous    docusate sodium (COLACE) capsule 100 mg (Discontinued) 100 mg As Needed 7/9/2018 7/10/2018    Sig - Route: Take 1 capsule by mouth As Needed for Constipation. - Oral          Assessment/Plan       Active Problems:    Hypertension    Stage 3 chronic kidney disease    Decubitus ulcer of sacral region    Abnormal echocardiogram    Atrial fibrillation and flutter (CMS/HCC)    Leg swelling      Low grade temp and High WBC  No apparent source    Plan :      observe awaiting BC report  Will follow  clinically   thank you       Liborio Cantu MD  07/10/18  8:36 PM

## 2018-07-11 NOTE — SIGNIFICANT NOTE
07/11/18 1140   Rehab Time/Intention   Evaluation Not Performed patient/family declined evaluation  (Pt states he is managing at his baseline status and is not interested in therapy.  Pt states his spouse assists at home with daily tasks as needed. )   Rehab Treatment   Discipline occupational therapist

## 2018-07-11 NOTE — NURSING NOTE
"Pt and pt's wife reports that \"his blood pressure has always been low since when he had his bypass surgery, many years go\"  "

## 2018-07-11 NOTE — PLAN OF CARE
Problem: Patient Care Overview  Goal: Plan of Care Review  Outcome: Ongoing (interventions implemented as appropriate)   07/11/18 0603   Coping/Psychosocial   Plan of Care Reviewed With patient   Plan of Care Review   Progress improving       Problem: Skin Injury Risk (Adult)  Goal: Identify Related Risk Factors and Signs and Symptoms  Outcome: Ongoing (interventions implemented as appropriate)   07/11/18 0603   Skin Injury Risk (Adult)   Related Risk Factors (Skin Injury Risk) advanced age;cognitive impairment;edema;tissue perfusion altered     Goal: Skin Health and Integrity  Outcome: Ongoing (interventions implemented as appropriate)   07/11/18 0603   Skin Injury Risk (Adult)   Skin Health and Integrity making progress toward outcome       Problem: Fall Risk (Adult)  Goal: Identify Related Risk Factors and Signs and Symptoms  Outcome: Ongoing (interventions implemented as appropriate)   07/11/18 0603   Fall Risk (Adult)   Related Risk Factors (Fall Risk) age-related changes;fatigue/slow reaction;fear of falling;gait/mobility problems;history of falls   Signs and Symptoms (Fall Risk) presence of risk factors     Goal: Absence of Fall  Outcome: Ongoing (interventions implemented as appropriate)   07/11/18 0603   Fall Risk (Adult)   Absence of Fall making progress toward outcome       Problem: Pain, Acute (Adult)  Goal: Identify Related Risk Factors and Signs and Symptoms  Outcome: Ongoing (interventions implemented as appropriate)   07/11/18 0603   Pain, Acute (Adult)   Related Risk Factors (Acute Pain) disease process;patient perception   Signs and Symptoms (Acute Pain) verbalization of pain descriptors     Goal: Acceptable Pain Control/Comfort Level  Outcome: Ongoing (interventions implemented as appropriate)   07/11/18 0603   Pain, Acute (Adult)   Acceptable Pain Control/Comfort Level making progress toward outcome

## 2018-07-11 NOTE — PROGRESS NOTES
"    Patient Name: Fahad Muhammad  :1935  82 y.o.      Patient Care Team:  Elisabeth Warren MD as PCP - General (Internal Medicine)  Alexsander Matute MD as Consulting Physician (Neurology)  MD Ry Goff III, MD as Consulting Physician (Cardiology)    Chief Complaint: CAD, CAF    Interval History: c/o back pain, no CP       Objective   Vital Signs  Temp:  [97 °F (36.1 °C)-100.9 °F (38.3 °C)] 97 °F (36.1 °C)  Heart Rate:  [] 96  Resp:  [18-20] 20  BP: ()/(55-69) 97/55    Intake/Output Summary (Last 24 hours) at 18 0911  Last data filed at 18 07   Gross per 24 hour   Intake              600 ml   Output             1075 ml   Net             -475 ml     Flowsheet Rows      First Filed Value   Admission Height  190.5 cm (75\") Documented at 2018 1507   Admission Weight  87.3 kg (192 lb 6.4 oz) Documented at 2018 1507          Physical Exam:   General Appearance:    comfortable, in no acute distress   Lungs:     Clear to auscultation.  Normal respiratory effort and rate.      Heart:    irregular rhythm and normal rate, normal S1 and S2, no  gallops or rubs.  1/6 HSM   Chest Wall:    No abnormalities observed   Abdomen:     Soft, nontender, positive bowel sounds.     Extremities:   no cyanosis, clubbing, + edema.  No marked joint deformities.  Adequate musculoskeletal strength.       Results Review:      Results from last 7 days  Lab Units 18  0409   SODIUM mmol/L 132*   POTASSIUM mmol/L 3.6   CHLORIDE mmol/L 95*   CO2 mmol/L 29.8*   BUN mg/dL 22   CREATININE mg/dL 1.03   GLUCOSE mg/dL 107*   CALCIUM mg/dL 9.0           Results from last 7 days  Lab Units 18  0409   WBC 10*3/mm3 19.67*   HEMOGLOBIN g/dL 9.0*   HEMATOCRIT % 27.7*   PLATELETS 10*3/mm3 539*           Results from last 7 days  Lab Units 07/10/18  0411   MAGNESIUM mg/dL 1.7                   Medication Review:     aspirin 81 mg Oral Daily   b complex-C-folic acid 1 capsule " Oral Daily   bumetanide 2 mg Intravenous Q12H   cetirizine 5 mg Oral Daily   diltiaZEM  mg Oral Q24H   guaiFENesin 600 mg Oral Daily   levETIRAcetam 500 mg Oral Q12H   metoprolol succinate  mg Oral Q24H   polyethylene glycol 17 g Oral Daily   rivaroxaban 15 mg Oral Daily   sennosides-docusate sodium 2 tablet Oral BID             Assessment/Plan   Active Hospital Problems    Diagnosis Date Noted   • Decubitus ulcer of sacral region [L89.159] 07/10/2018   • Abnormal echocardiogram [R93.1] 07/10/2018   • Atrial fibrillation and flutter (CMS/HCC) [I48.91, I48.92] 07/10/2018   • Leg swelling [M79.89] 07/10/2018   • Stage 3 chronic kidney disease [N18.3] 08/25/2016   • Hypertension [I10] 08/16/2016      Resolved Hospital Problems    Diagnosis Date Noted Date Resolved   No resolved problems to display.     1.  Chronic atrial fibrillation and flutter (CMS/HCC) - rate control  He's on renally dosed rivaroxaban.     2.  Leg swelling -      3.  Abnormal echo - thickened MV without overt vegetation.  Per Dr Laughlin it appears unlikely to represent a vegetation/endocarditis, but now with multiple + BCs. We are going to want to perform a SKIP. D/W pt and wife- he is still fairly uncomfortable. We will make NPO and assess in AM to see if he is appropriate to proceed.  Will also review MRI results once available.     4.  HTN - well controlled     5.  CKD     6.  Decubitus ulcer     7.  Hypokalemia         Ry Garcia III, MD  [facility]  07/11/18  9:11 AM

## 2018-07-11 NOTE — NURSING NOTE
Spoke with sophia in lab via phone to verify that blood cultures x2 that was ordered today was completed

## 2018-07-11 NOTE — NURSING NOTE
Spoke with Dr Bustamante to verify that MD did see pt's blood culture results from 07/10/2018 draw

## 2018-07-12 NOTE — NURSING NOTE
"Continued Stay Note  JOLYNN Thibodeaux     Patient Name: Fahad Muhammad  MRN: [id number]  Today's Date: 7/12/2018    Admit Date: 7/9/2018          Discharge Plan     Row Name 07/12/18 1037       Plan    Plan Comments Spoke with Mr Muhammad at bedside.  We discussed him going home with home health.  He has had [facility] in the past and that is his reqest.  He got upset when I mentioned him going home \"Who told you I was going home in a few days?!\"  \"I'm getting ready for a test and I don't want to talk anymore now.\"  Will continue to follow              Discharge Codes    No documentation.           Amina Morel RN    "

## 2018-07-12 NOTE — PLAN OF CARE
Problem: Patient Care Overview  Goal: Plan of Care Review  Outcome: Ongoing (interventions implemented as appropriate)   07/11/18 2021   Coping/Psychosocial   Plan of Care Reviewed With patient;spouse   Plan of Care Review   Progress improving   OTHER   Outcome Summary No falls noted this shift, turn and reposition pt q2 hours and as needed , opto foam intact to pt's buttocks - c/d/i- opti foam applied to pt's spine (red but blanchable) to prevent skin breakdown, CT of abd and pelvis and MRI completed today and results pending, prn pain medicines administered as needed per pt's request, pt's blood culture results abnormal and dr combs aware of the results, call light within reach     Goal: Individualization and Mutuality  Outcome: Ongoing (interventions implemented as appropriate)   07/11/18 2021   Individualization   Patient Specific Preferences pt goes by jayden       Problem: Skin Injury Risk (Adult)  Intervention: Prevent/Manage Excess Moisture   07/11/18 2021   Hygiene Care   Perineal Care skin sealant/barrier applied;absorbent pad changed   Skin Interventions   Skin Protection adhesive use limited;protective footwear used;incontinence pads utilized;skin sealant/moisture barrier applied;skin-to-skin areas padded;tubing/devices free from skin contact     Intervention: Maintain Head of Bed Elevation Less Than 30 Degrees as Tolerated   07/11/18 2021   Positioning   Head of Bed (HOB) HOB at 30 degrees     Intervention: Prevent/Minimize Shear/Friction Injuries   07/11/18 2021   Skin Interventions   Pressure Reduction Devices positioning supports utilized   Positioning   Positioning/Transfer Devices in use;pillows         Problem: Pain, Acute (Adult)  Intervention: Monitor and Manage Analgesia   07/11/18 2021   Promote Effective Bowel Elimination   Bowel Intervention adequate fluid intake promoted;ambulation promoted;privacy promoted   Safety Management   Medication Review/Management high risk medications identified      Intervention: Mutually Develop/Implement Acute Pain Management Plan   07/11/18 2021   Promote Oxygenation/Perfusion   Pain Management Interventions quiet environment facilitated;care clustered;pain management plan reviewed with patient/caregiver     Intervention: Support/Optimize Psychosocial Response to Acute Pain   07/11/18 2021   Coping/Psychosocial Interventions   Supportive Measures active listening utilized;verbalization of feelings encouraged;self-care encouraged   Interventions   Trust Relationship/Rapport care explained;choices provided;emotional support provided;empathic listening provided;questions answered;questions encouraged;reassurance provided;thoughts/feelings acknowledged

## 2018-07-12 NOTE — SIGNIFICANT NOTE
Pt presented to the pre-op area for the SKIP and was found to have a HR of 150. Felt palpitations and some SOB, no CP. No response to vagal maneuvers, HR perfectly at 150 and tele monitor consistent with SVT.    IV diltiazem 20 mg administered- broke back into AFib with HR 90 which is his norm.    SKIP will be cancelled for now with acute SVT; we will adjust the dose of his oral diltiazem dosing.

## 2018-07-12 NOTE — CONSULTS
Patient Care Team:  Elisabeth Warren MD as PCP - General (Internal Medicine)  Alexsander Matute MD as Consulting Physician (Neurology)  MD Ry Goff III, MD as Consulting Physician (Cardiology)    CHIEF COMPLAINT: Back Pain    HISTORY OF PRESENT ILLNESS:    Called to see pt for possible GI source of Bacteremia/ Endocarditis(SKIP Pending) and quick review of present studies shows Diverticulitis on CT done yeterday, no obvious colon Mass which was prob the concern. Pt has had colonoscoy before but search for records has been futile. Presently he has multiple issues including his Decubitus ulcer ( the odds on favorite for the bacteremia source) as well as chronic low back pain for which he has in the past received epidural injections. He is moving his bowels and the CT did not argue for obstipation/Constipation.   Presently on Zosyn and Vancomycin. Also on Xarelto for AFIB      Past Medical History:   Diagnosis Date   • Arthritis    • Benign non-nodular prostatic hyperplasia with lower urinary tract symptoms 8/25/2016   • Chronic atrial fibrillation (CMS/HCC)    • Chronic pain syndrome 3/9/2018   • CKD (chronic kidney disease), stage III 8/25/2016   • Coronary artery disease    • DDD (degenerative disc disease), lumbar    • Heme positive stool    • Hx of colonoscopy     Complete   • Hyperlipidemia    • Hypertension    • Iron deficiency anemia 5/21/2018   • Neck strain    • Osteoarthritis    • Seizure disorder (CMS/HCC) 8/25/2016   • Seizures (CMS/HCC)    • Sepsis (CMS/HCC)    • Stroke (CMS/HCC)    • Vitamin D deficiency    • Wrist fracture, right      Past Surgical History:   Procedure Laterality Date   • CARDIAC SURGERY     • COLONOSCOPY     • CORONARY ARTERY BYPASS GRAFT  1996   • HERNIA REPAIR Right     Inguinal hernia repair     Family History   Problem Relation Age of Onset   • Heart disease Mother    • Cancer Father    • Cancer Sister    • Colon cancer Neg Hx    • Colon polyps Neg Hx       Social History   Substance Use Topics   • Smoking status: Former Smoker     Quit date: 12/3/1993   • Smokeless tobacco: Former User     Quit date: 9/7/1994   • Alcohol use 0.6 oz/week     1 Shots of liquor per week      Comment: occasional      Prescriptions Prior to Admission   Medication Sig Dispense Refill Last Dose   • aspirin 81 MG chewable tablet Chew 81 mg daily.   Taking   • bumetanide (BUMEX) 2 MG tablet Take 1 tablet by mouth Daily. 30 tablet 0    • cetirizine (zyrTEC) 5 MG tablet Take 5 mg by mouth.   Taking   • diltiaZEM CD (CARDIZEM CD) 240 MG 24 hr capsule Take 1 capsule by mouth Daily. Pt needs to contact office to schedule an appt for further refills 90 capsule 0 Taking   • docusate sodium 100 MG capsule Take 100 mg by mouth 2 (Two) Times a Day. (Patient taking differently: Take 100 mg by mouth As Needed.)  0 Taking   • guaiFENesin (MUCINEX) 600 MG 12 hr tablet Take 1 tablet by mouth 2 (Two) Times a Day. (Patient taking differently: Take 600 mg by mouth Daily.) 60 tablet 1 Taking   • Iron-FA-B Fme-X-Zvtn-Probiotic (FUSION PLUS) capsule Take 1 tablet by mouth Daily.   Taking   • levETIRAcetam (KEPPRA) 500 MG tablet TAKE 1 TABLET BY MOUTH 2 (TWO) TIMES A DAY. 180 tablet 2 Taking   • methocarbamol (ROBAXIN) 500 MG tablet TAKE 1 TABLET BY MOUTH TWICE A DAY, PRN  0 Taking   • metoprolol succinate XL (TOPROL-XL) 100 MG 24 hr tablet Take 1 tablet by mouth Daily. 30 tablet 6 Taking   • Misc Natural Products (GLUCOSAMINE CHONDROITIN ADV PO) Take  by mouth.   Taking   • Multiple Vitamin (MULTI-VITAMIN) tablet Take 1 tablet by mouth daily.   Taking   • Omega-3 Fatty Acids (FISH OIL) 1000 MG capsule capsule Take 1,000 mg by mouth daily.   Taking   • Saw Palmetto, Serenoa repens, 450 MG capsule Take  by mouth.   Taking   • XARELTO 15 MG tablet TAKE 1 TABLET BY MOUTH DAILY. 90 tablet 2 Taking     Allergies:  Gabapentin; Levofloxacin; and Statins    REVIEW OF SYSTEMS:  Please see the above history of present  "illness for pertinent positives and negatives.  The remainder of the patient's systems have been reviewed and are negative.     Vital Signs  Temp:  [97.6 °F (36.4 °C)-98.3 °F (36.8 °C)] 97.8 °F (36.6 °C)  Heart Rate:  [76-99] 82  Resp:  [20-22] 20  BP: ()/(52-69) 107/62    Flowsheet Rows      First Filed Value   Admission Height  190.5 cm (75\") Documented at 07/09/2018 1507   Admission Weight  87.3 kg (192 lb 6.4 oz) Documented at 07/09/2018 1507           Physical Exam:  Physical Exam   Constitutional: Patient appears well-developed and well-nourished and in no acute distress   HEENT:   Head: Normocephalic and atraumatic.   Eyes:  Pupils are equal, round, and reactive to light. EOM are intact. Sclera are anicteric and non-injected.  Mouth and Throat: Patient has moist mucous membranes. Oropharynx is clear of any erythema or exudate.     Neck: Neck supple. No JVD present. No thyromegaly present. No lymphadenopathy present.  Cardiovascular: Regular rate, regular rhythm, S1 normal and S2 normal.  Exam reveals no gallop and no friction rub.  No murmur heard.  Pulmonary/Chest: Lungs are clear to auscultation bilaterally. No respiratory distress. No wheezes. No rhonchi. No rales.   Abdominal: Soft. Bowel sounds are normal. No distension and no mass. There is no hepatosplenomegaly. There is Bilateral lower quadrant  tenderness.   Musculoskeletal: Normal Muscle tone  Extremities: No edema. Pulses are palpable in all 4 extremities.  Neurological: Patient is alert and oriented to person, place, and time. Cranial nerves II-XII are grossly intact with no focal deficits.  Skin: Skin is warm. No rash noted. Nails show no clubbing.  No cyanosis or erythema.     Results Review:    I reviewed the patient's new clinical results.  Lab Results (most recent)     Procedure Component Value Units Date/Time    Blood Culture - Blood, [[id number]]  (Abnormal) Collected:  07/11/18 1008    Specimen:  Blood from Hand, Right Updated:  " 07/12/18 0837     Blood Culture Abnormal Stain (A)     Gram Stain Result Aerobic Bottle Gram positive cocci in chains    Blood Culture - Blood, [[id number]]  (Abnormal) Collected:  07/11/18 1009    Specimen:  Blood from Hand, Left Updated:  07/12/18 0830     Blood Culture Abnormal Stain (A)     Gram Stain Result Anaerobic Bottle Gram positive cocci in chains      Aerobic Bottle Gram positive cocci in chains    Blood Culture - Blood, Arm, Left [[id number]]  (Abnormal) Collected:  07/10/18 1130    Specimen:  Blood from Arm, Left Updated:  07/12/18 0827     Blood Culture Enterococcus species (A)     Gram Stain Result Aerobic Bottle Gram positive cocci in chains      Aerobic Bottle Gram negative bacilli    Blood Culture - Blood, Arm, Left [[id number]]  (Abnormal) Collected:  07/10/18 1200    Specimen:  Blood from Arm, Left Updated:  07/12/18 0824     Blood Culture Enterococcus species (A)      Escherichia coli (A)     Gram Stain Result Aerobic Bottle Gram positive cocci in chains    Potassium [[id number]]  (Normal) Collected:  07/11/18 2014    Specimen:  Blood Updated:  07/11/18 2034     Potassium 4.7 mmol/L     Blood Culture ID, PCR - Blood, [[id number]]  (Abnormal) Collected:  07/10/18 1130    Specimen:  Blood from Arm, Left Updated:  07/11/18 1109     BCID, PCR Enterococcus spp. Identification by BCID PCR. (C)     BCID, PCR 2 Escherichia coli. Identification by BCID PCR. (C)    Basic Metabolic Panel [[id number]]  (Abnormal) Collected:  07/11/18 0409    Specimen:  Blood Updated:  07/11/18 0521     Glucose 107 (H) mg/dL      BUN 22 mg/dL      Creatinine 1.03 mg/dL      Sodium 132 (L) mmol/L      Potassium 3.6 mmol/L      Chloride 95 (L) mmol/L      CO2 29.8 (H) mmol/L      Calcium 9.0 mg/dL      eGFR Non African Amer 69 mL/min/1.73      BUN/Creatinine Ratio 21.4     Anion Gap 7.2 mmol/L     Narrative:       The MDRD GFR formula is only valid for adults with stable renal function between ages 18 and 70.    CBC & Differential  [[id number]] Collected:  07/11/18 0409    Specimen:  Blood Updated:  07/11/18 0505    Narrative:       The following orders were created for panel order CBC & Differential.  Procedure                               Abnormality         Status                     ---------                               -----------         ------                     Scan Slide[[id number]]                                                                  CBC Auto Differential[[id number]]        Abnormal            Final result                 Please view results for these tests on the individual orders.    CBC Auto Differential [[id number]]  (Abnormal) Collected:  07/11/18 0409    Specimen:  Blood Updated:  07/11/18 0504     WBC 19.67 (H) 10*3/mm3      RBC 3.71 (L) 10*6/mm3      Hemoglobin 9.0 (L) g/dL      Hematocrit 27.7 (L) %      MCV 74.7 (L) fL      MCH 24.3 (L) pg      MCHC 32.5 g/dL      RDW 18.2 (H) %      RDW-SD 49.3 fl      MPV 9.2 fL      Platelets 539 (H) 10*3/mm3      Neutrophil % 75.4 (H) %      Lymphocyte % 13.0 (L) %      Monocyte % 10.6 (H) %      Eosinophil % 0.2 %      Basophil % 0.2 %      Immature Grans % 0.6 (H) %      Neutrophils, Absolute 14.87 (H) 10*3/mm3      Lymphocytes, Absolute 2.55 10*3/mm3      Monocytes, Absolute 2.08 (H) 10*3/mm3      Eosinophils, Absolute 0.03 (L) 10*3/mm3      Basophils, Absolute 0.03 10*3/mm3      Immature Grans, Absolute 0.11 (H) 10*3/mm3      nRBC 0.0 /100 WBC     Urinalysis, Microscopic Only - Urine, Clean Catch [[id number]]  (Abnormal) Collected:  07/10/18 1649    Specimen:  Urine from Urine, Clean Catch Updated:  07/10/18 1758     RBC, UA 13-20 (A) /HPF      WBC, UA 0-2 (A) /HPF      Bacteria, UA None Seen /HPF      Squamous Epithelial Cells, UA 0-2 /HPF      Hyaline Casts, UA 0-2 /LPF      Methodology Manual Light Microscopy    Urinalysis With Culture If Indicated - Urine, Clean Catch [[id number]]  (Abnormal) Collected:  07/10/18 1649    Specimen:  Urine from Urine, Clean Catch  Updated:  07/10/18 1743     Color, UA Dark Yellow (A)     Appearance, UA Clear     pH, UA <=5.0     Specific Gravity, UA 1.025     Comment: Result obtained by Refractometer        Glucose, UA Negative     Ketones, UA Trace (A)     Bilirubin, UA Small (1+) (A)     Blood, UA Moderate (2+) (A)     Protein, UA Negative     Leuk Esterase, UA Negative     Nitrite, UA Negative     Urobilinogen, UA 1.0 E.U./dL    Fungus Culture, Blood - Blood, Arm, Left [[id number]] Collected:  07/10/18 1130    Specimen:  Blood from Arm, Left Updated:  07/10/18 1735    Fungus Culture, Blood - Blood, Arm, Left [[id number]] Collected:  07/10/18 1200    Specimen:  Blood from Hand, Left Updated:  07/10/18 1735    TSH [[id number]]  (Normal) Collected:  07/10/18 0411    Specimen:  Blood Updated:  07/10/18 1238     TSH 0.814 mIU/mL     Procalcitonin [[id number]]  (Normal) Collected:  07/10/18 0411    Specimen:  Blood Updated:  07/10/18 1207     Procalcitonin 0.15 ng/mL     Narrative:       As a Marker for Sepsis (Non-Neonates):   1. <0.5 ng/mL represents a low risk of severe sepsis and/or septic shock.  2. >2 ng/mL represents a high risk of severe sepsis and/or septic shock.    As a Marker for Lower Respiratory Tract Infections that require antibiotic therapy:    PCT on Admission     Antibiotic Therapy       6-12 Hrs later  > 0.5                Strongly Recommended             >0.25 - <0.5         Recommended  0.1 - 0.25           Discouraged              Remeasure/reassess PCT  <0.1                 Strongly Discouraged     Remeasure/reassess PCT                     PCT values of < 0.5 ng/mL do not exclude an infection, because localized infections (without systemic signs) may be associated with such low concentrations, or a systemic infection in its initial stages (< 6 hours). Furthermore, increased PCT can occur without infection. PCT concentrations between 0.5 and 2.0 ng/mL should be interpreted taking into account the patient's history. It is  recommended to retest PCT within 6-24 hours if any concentrations < 2 ng/mL are obtained.    C-reactive Protein [[id number]]  (Abnormal) Collected:  07/10/18 0411    Specimen:  Blood Updated:  07/10/18 1207     C-Reactive Protein 9.27 (H) mg/dL     Sedimentation Rate [[id number]]  (Abnormal) Collected:  07/10/18 0411    Specimen:  Blood Updated:  07/10/18 1153     Sed Rate 35 (H) mm/hr     Magnesium [[id number]]  (Normal) Collected:  07/10/18 0411    Specimen:  Blood Updated:  07/10/18 0603     Magnesium 1.7 mg/dL     Basic Metabolic Panel [[id number]]  (Abnormal) Collected:  07/10/18 0411    Specimen:  Blood Updated:  07/10/18 0450     Glucose 102 (H) mg/dL      BUN 19 mg/dL      Creatinine 1.03 mg/dL      Sodium 136 mmol/L      Potassium 2.9 (L) mmol/L      Chloride 97 (L) mmol/L      CO2 26.6 mmol/L      Calcium 9.4 mg/dL      eGFR Non African Amer 69 mL/min/1.73      BUN/Creatinine Ratio 18.4     Anion Gap 12.4 mmol/L     Narrative:       The MDRD GFR formula is only valid for adults with stable renal function between ages 18 and 70.    CBC (No Diff) [[id number]]  (Abnormal) Collected:  07/10/18 0411    Specimen:  Blood Updated:  07/10/18 0423     WBC 17.08 (H) 10*3/mm3      RBC 3.93 (L) 10*6/mm3      Hemoglobin 9.5 (L) g/dL      Hematocrit 29.2 (L) %      MCV 74.3 (L) fL      MCH 24.2 (L) pg      MCHC 32.5 g/dL      RDW 18.4 (H) %      RDW-SD 49.1 fl      MPV 9.0 fL      Platelets 547 (H) 10*3/mm3     Comprehensive Metabolic Panel [[id number]]  (Abnormal) Collected:  07/09/18 1609    Specimen:  Blood Updated:  07/09/18 1631     Glucose 95 mg/dL      BUN 20 mg/dL      Creatinine 1.08 mg/dL      Sodium 136 mmol/L      Potassium 3.7 mmol/L      Chloride 95 (L) mmol/L      CO2 26.0 mmol/L      Calcium 9.9 mg/dL      Total Protein 6.9 g/dL      Albumin 3.40 (L) g/dL      ALT (SGPT) 18 U/L      AST (SGOT) 23 U/L      Alkaline Phosphatase 167 (H) U/L      Total Bilirubin 1.3 (H) mg/dL      eGFR Non  Amer 66  mL/min/1.73      Globulin 3.5 gm/dL      A/G Ratio 1.0 g/dL      BUN/Creatinine Ratio 18.5     Anion Gap 15.0 mmol/L     Narrative:       The MDRD GFR formula is only valid for adults with stable renal function between ages 18 and 70.    Magnesium [[id number]]  (Normal) Collected:  07/09/18 1609    Specimen:  Blood Updated:  07/09/18 1631     Magnesium 1.7 mg/dL     CBC (No Diff) [[id number]]  (Abnormal) Collected:  07/09/18 1609    Specimen:  Blood Updated:  07/09/18 1616     WBC 17.89 (H) 10*3/mm3      RBC 4.13 (L) 10*6/mm3      Hemoglobin 10.2 (L) g/dL      Hematocrit 31.0 (L) %      MCV 75.1 (L) fL      MCH 24.7 (L) pg      MCHC 32.9 g/dL      RDW 18.6 (H) %      RDW-SD 50.3 fl      MPV 9.6 fL      Platelets 547 (H) 10*3/mm3           Imaging Results (most recent)     Procedure Component Value Units Date/Time    CT Abdomen Pelvis Without Contrast [[id number]] Collected:  07/12/18 0707     Updated:  07/12/18 0719    Narrative:       CT ABDOMEN AND PELVIS, NONCONTRAST, 7/11/2018     HISTORY:  82-year-old male admitted to the hospital 2 days ago with sacral  decubitus wound, lower extremity edema, low back pain and cardiac  arrhythmia. Bacteremia is noted.     TECHNIQUE:  CT examination of the abdomen and pelvis was performed without IV  contrast due to abnormal renal function. Radiation dose reduction  techniques included automated exposure control or exposure modulation  based on body size. Radiation audit for CT and nuclear cardiology exams  in the last 12 months: 0.     COMPARISON:  *  CT abdomen/pelvis, 12/15/2016.  *  CT chest, 12/8/2016.     ABDOMEN FINDINGS:  Multiple large gallstones are present with thin the gallbladder. There  is no bile duct dilatation. Liver, pancreas and spleen are normal in  size and appearance without contrast.     The kidneys are unobstructed. Suspected small typical and hyperdense  right renal cysts are incompletely evaluated without contrast but are  unchanged since the study of  12/15/2016.     Moderate sigmoid diverticulosis. There is mild wall thickening involving  the mid sigmoid colon with some adjacent mild soft tissue stranding in  the left lower pelvic pericolonic fat. Mild acute diverticulitis is  suspected. No evidence of abscess, bowel perforation or bowel  obstruction. Remaining segments of small bowel and colon are normal in  caliber and appearance. Normal appendix.     PELVIS FINDINGS:  Urinary bladder, prostate and rectum are within normal limits. Tiny  bilateral fat-containing inguinal hernias.     Cutaneous and subcutaneous decubitus wound changes in the midline  posterior and inferior to the lower sacrococcygeal spine. No abscess or  other fluid collection is seen. Underlying osseous structures are  unremarkable.     Lower chest images show low ascending thoracic aortic aneurysm measuring  up to 5.4 cm. This segment is unchanged since CT chest, 12/8/2016.       Impression:       1. CT findings suggesting mild acute diverticulitis involving the mid  sigmoid colon in the left lower pelvis. No evidence of abscess or other  complicating feature.  2. Cholelithiasis. Multiple large gallstones within the gallbladder. No  bile duct dilatation.  3. Cutaneous and subcutaneous decubitus wounds changes posterior  inferior to the lower sacrococcygeal spine. No abscess or additional  complicating feature is visible.  4. Ascending thoracic aortic aneurysm, unchanged since 12/8/2016.     This report was finalized on 7/12/2018 7:17 AM by Dr. Jefferson Bowen MD.       MRI Lumbar Spine With & Without Contrast [[id number]] Updated:  07/11/18 1443    US Venous Doppler Lower Extremity Bilateral (duplex) [[id number]] Collected:  07/10/18 1733     Updated:  07/10/18 1735    Narrative:       VENOUS DOPPLER ULTRASOUND, BILATERAL LOWER EXTREMITIES, 7/10/2018     HISTORY:   82-year-old male with two week history of bilateral lower extremity  edema.     TECHNIQUE:   Venous Doppler ultrasound  examination of both legs was performed using  grey-scale, spectral Doppler, and color flow Doppler ultrasound imaging.     FINDINGS:   The examination is negative.  There is no evidence of deep venous  thrombosis from the groin to the lower calf bilaterally. The greater  saphenous veins are also patent.       Impression:       Negative examination.  No evidence of lower extremity DVT on the right  or left.     This report was finalized on 7/10/2018 5:33 PM by Dr. Jefferson Bowen MD.       XR Chest 1 View [[id number]] Collected:  07/09/18 1636     Updated:  07/09/18 1641    Narrative:       CHEST X-RAY, 7/9/2018     HISTORY:   82-year-old male admitted to the hospital today with newly noted  tachycardia and lower extremity swelling. Some shortness of air over the  last two weeks. History of atrial fibrillation.     TECHNIQUE:  AP portable upright chest x-ray.     COMPARISON:  *  Chest x-ray, 12-16.  *  CT chest, 12/8/2016.     FINDINGS:  Lung volumes are chronically low, and there is chronic elevation of the  right hemidiaphragm with scarring or atelectasis in the right lung base.  The lungs are otherwise clear.     Mild to moderate cardiomegaly. Ulnar vascularity is within normal  limits. Ectatic and tortuous thoracic aorta. Postop changes previous  CABG surgery.       Impression:       1. Cardiomegaly. CABG. Pulmonary vascularity is within normal limits.  2. Chronically low lung volumes with chronic elevation of the right  hemidiaphragm and chronic scarring or atelectasis in the right lung  base.  3. Ectatic and tortuous thoracic aorta.     This report was finalized on 7/9/2018 4:39 PM by Dr. Jefferson Bowen MD.           reviewed    ECG/EMG Results (most recent)     Procedure Component Value Units Date/Time    Adult Transthoracic Echo Complete W/ Cont if Necessary Per Protocol [[id number]] Collected:  07/10/18 0739     Updated:  07/10/18 1130     BSA 2.2 m^2      IVSd 1.0 cm      LVIDd 5.1 cm      LVIDs 3.3 cm       LVPWd 0.84 cm      IVS/LVPW 1.2     FS 35.4 %      EDV(Teich) 126.1 ml      ESV(Teich) 44.8 ml      EF(Teich) 64.5 %      EDV(cubed) 135.8 ml      ESV(cubed) 36.6 ml      EF(cubed) 73.1 %      LV mass(C)d 175.2 grams      LV mass(C)dI 81.2 grams/m^2      SV(Teich) 81.3 ml      SI(Teich) 37.7 ml/m^2      SV(cubed) 99.2 ml      SI(cubed) 46.0 ml/m^2      Ao root diam 4.3 cm      Ao root area 14.5 cm^2      ACS 2.4 cm      LA dimension 4.1 cm      LA/Ao 0.95     LVOT diam 2.3 cm      LVOT area 4.2 cm^2      LVOT area(traced) 4.2 cm^2      RVOT diam 2.7 cm      RVOT area 5.7 cm^2      LVLd ap4 9.0 cm      EDV(MOD-sp4) 132.0 ml      LVLs ap4 7.4 cm      ESV(MOD-sp4) 41.8 ml      EF(MOD-sp4) 68.3 %      LVLd ap2 8.9 cm      EDV(MOD-sp2) 131.0 ml      LVLs ap2 7.8 cm      ESV(MOD-sp2) 45.2 ml      EF(MOD-sp2) 65.5 %      SV(MOD-sp4) 90.2 ml      SI(MOD-sp4) 41.8 ml/m^2      SV(MOD-sp2) 85.8 ml      SI(MOD-sp2) 39.8 ml/m^2      Ao root area (BSA corrected) 2.0     Ao root area (BSA corrected) 65.5 ml/m^2      CONTRAST EF 4CH 68.3 ml/m^2      LV Diastolic corrected for BSA 61.2 ml/m^2      LV Systolic corrected for BSA 19.4 ml/m^2      MV E max pacheco 128.0 cm/sec      MV V2 max 159.0 cm/sec      MV max PG 10.1 mmHg      MV V2 mean 85.1 cm/sec      MV mean PG 4.0 mmHg      MV V2 VTI 20.3 cm      MVA(VTI) 3.7 cm^2      MV P1/2t max pacheco 165.0 cm/sec      MV P1/2t 44.4 msec      MVA(P1/2t) 5.0 cm^2      MV dec slope 1,088 cm/sec^2      MV dec time 0.14 sec      Ao pk pacheco 139.0 cm/sec      Ao max PG 7.1 mmHg      Ao max PG (full) 3.0 mmHg      Ao V2 mean 86.0 cm/sec      Ao mean PG 4.0 mmHg      Ao mean PG (full) 2.0 mmHg      Ao V2 VTI 23.1 cm      DEANDRE(I,A) 3.3 cm^2      DEANDRE(I,D) 3.3 cm^2      DEANDRE(V,A) 3.0 cm^2      DEANDRE(V,D) 3.0 cm^2      AI max pacheco 406.0 cm/sec      AI max PG 65.9 mmHg      AI dec slope 404.0 cm/sec^2      AI P1/2t 294.3 msec      LV V1 max PG 4.2 mmHg      LV V1 mean PG 2.0 mmHg      LV V1 max 102.0 cm/sec       LV V1 mean 59.9 cm/sec      LV V1 VTI 18.1 cm      MR max perico 450.0 cm/sec      MR max PG 81.0 mmHg      SV(Ao) 335.5 ml      SI(Ao) 155.5 ml/m^2      SV(LVOT) 75.2 ml      SV(RVOT) 50.0 ml      SI(LVOT) 34.9 ml/m^2      PA V2 max 93.0 cm/sec      PA max PG 3.5 mmHg      PA max PG (full) 1.8 mmHg       CV ECHO JUAN - PVA(V,A) 3.9 cm^2       CV ECHO JUAN - PVA(V,D) 3.9 cm^2      PA acc time 0.1 sec      RV V1 max PG 1.6 mmHg      RV V1 mean PG 1.0 mmHg      RV V1 max 63.5 cm/sec      RV V1 mean 39.7 cm/sec      RV V1 VTI 8.7 cm      TR max perico 210.0 cm/sec      RVSP(TR) 20.6 mmHg      RAP systole 3.0 mmHg      PA pr(Accel) 36.3 mmHg      Pulm Sys Perico 34.0 cm/sec      Pulm Palmer Perico 65.2 cm/sec      Pulm S/D 0.52     Qp/Qs 0.66     MVA P1/2T LCG 1.3 cm^2       CV ECHO JUAN - BZI_BMI 24.0 kilograms/m^2       CV ECHO JUAN - BSA(HAYCOCK) 2.1 m^2       CV ECHO JUAN - BZI_METRIC_WEIGHT 87.1 kg       CV ECHO JUAN - BZI_METRIC_HEIGHT 190.5 cm      Target HR (85%) 119 bpm      Max. Pred. HR (100%) 140 bpm       CV VAS BP RIGHT /66 mmHg      TDI S' 20.00 cm/sec      RV Base 4.10 cm      LA volume 111.0 cm3      Dimensionless Index 0.8 (DI)      LA Volume Index 54.0 mL/m2      Avg E/e' ratio 9.85     EF(MOD-bp) 66.0 %      Lat Peak E' Perico 15.0 cm/sec      Med Peak E' Perico 11.00 cm/sec      TAPSE (>1.6) 1.80 cm2      Echo EF Estimated 68 %     Addenda:        · Left ventricular systolic function is normal. Calculated EF = 66%.   Estimated EF was in agreement with the calculated EF. Estimated EF = 68%.   Normal left ventricular cavity size noted. All left ventricular wall   segments contract normally. Left ventricular wall thickness is consistent   with mild concentric hypertrophy. Left ventricular diastolic function was   indeterminate.  · Left atrial volume is severely increased.  · Right atrial cavity size is severely dilated.  · The aortic valve is abnormal in structure. There is severe thickening of    the aortic valve. The aortic valve is severely thickened with hazy density   on the ventricular surface of the aortic valve. Cannot rule out vegetative   process. Consider transesophageal echocardiogram to assess further..  · The mitral valve is abnormal in structure. The anterior mitral valve   leaflet is thickened on the ventricular surface. Suspicious for possible   vegetative process. Consider transesophageal echocardiogram if clinically   indicated.. Mild mitral valve regurgitation is present.  · Mild tricuspid valve regurgitation is present. Estimated right   ventricular systolic pressure from tricuspid regurgitation is normal (<35   mmHg).  · Addendum: Mild dilation of the aortic root is present. Aortic root   measures 4.3 cm.     Signed:  07/10/18 1130 by Alivia Mayo MD    Narrative:       · Left ventricular systolic function is normal. Calculated EF = 66%.   Estimated EF was in agreement with the calculated EF. Estimated EF = 68%.   Normal left ventricular cavity size noted. All left ventricular wall   segments contract normally. Left ventricular wall thickness is consistent   with mild concentric hypertrophy. Left ventricular diastolic function was   indeterminate.  · Left atrial volume is severely increased.  · Right atrial cavity size is severely dilated.  · The aortic valve is abnormal in structure. There is severe thickening of   the aortic valve. The aortic valve is severely thickened with hazy density   on the ventricular surface of the aortic valve. Cannot rule out vegetative   process. Consider transesophageal echocardiogram to assess further..  · The mitral valve is abnormal in structure. The anterior mitral valve   leaflet is thickened on the ventricular surface. Suspicious for possible   vegetative process. Consider transesophageal echocardiogram if clinically   indicated.. Mild mitral valve regurgitation is present.  · Mild tricuspid valve regurgitation is present. Estimated right   ventricular  systolic pressure from tricuspid regurgitation is normal (<35   mmHg).       ECG 12 Lead [[id number]] Collected:  07/10/18 0721     Updated:  07/10/18 1532    Narrative:       RR Interval= 571 ms  WV Interval= ms  QRSD Interval= 140 ms  QT Interval= 384 ms  QTc Interval= 508 ms  Heart Rate= 105 ms  P Axis= deg  QRS Axis= -25 deg  T Wave Axis= -26 deg  I: 40 Axis= 13 deg  T: 40 Axis= 141 deg  ST Axis= -22 deg  ATRIAL FLUTTER, A-RATE 258  RIGHT BUNDLE BRANCH BLOCK  NO SIGNIFICANT CHANGE FROM PREVIOUS ECG  Electronically Signed by:  Naeem Laughlin ([facility]) 10-Jul-2018 15:30:48  Date and Time of Study: 2018-07-10 07:21:18        reviewed    Assessment/Plan     Bacteremia- ?Endocarditis?  Decubitus  Diverticulitis  Chronic Low Back Pain    No indication for Endoscopic eval at this time(save SKIP) and even a 2 wk course of abx would treat his diverticulitis as well as a 6 wk course if this is indicated, would recommend Probiotics thru out the course.   No reason to follow, so please feel free to call as needed would be glad to see Mr Muhammad any time and he already has a scheduled f/u appt in the office.    I discussed the patients findings and my recommendations with patient.     Suresh Ng MD  07/12/18  8:38 AM    Time: Not recorded

## 2018-07-12 NOTE — PROGRESS NOTES
"    Patient Name: Fahad Muhammad  :1935  82 y.o.      Patient Care Team:  Elisabeth Warren MD as PCP - General (Internal Medicine)  Alexsander Matute MD as Consulting Physician (Neurology)  MD Ry Goff III, MD as Consulting Physician (Cardiology)    Chief Complaint: CAD, CAF    Interval History: c/o back pain, no CP, no SOB, chills earlier  Reviewed MRI with radiology- no formally read as yet but no evidence of perispinal abscess.  CT abdomen       Objective   Vital Signs  Temp:  [97.6 °F (36.4 °C)-98.3 °F (36.8 °C)] 97.8 °F (36.6 °C)  Heart Rate:  [76-99] 82  Resp:  [20-22] 20  BP: ()/(52-69) 107/62    Intake/Output Summary (Last 24 hours) at 18 0742  Last data filed at 18 0447   Gross per 24 hour   Intake              950 ml   Output              500 ml   Net              450 ml     Flowsheet Rows      First Filed Value   Admission Height  190.5 cm (75\") Documented at 2018 1507   Admission Weight  87.3 kg (192 lb 6.4 oz) Documented at 2018 1507          Physical Exam:   General Appearance:    comfortable, in no acute distress   Lungs:     Clear to auscultation.  Normal respiratory effort and rate.      Heart:    irregular rhythm and normal rate, normal S1 and S2, no  gallops or rubs.  1/6 HSM   Chest Wall:    No abnormalities observed   Abdomen:     Soft, nontender, positive bowel sounds.     Extremities:   no cyanosis, clubbing, + edema.  No marked joint deformities.  Adequate musculoskeletal strength.       Results Review:      Results from last 7 days  Lab Units 18  0409   SODIUM mmol/L  --  132*   POTASSIUM mmol/L 4.7 3.6   CHLORIDE mmol/L  --  95*   CO2 mmol/L  --  29.8*   BUN mg/dL  --  22   CREATININE mg/dL  --  1.03   GLUCOSE mg/dL  --  107*   CALCIUM mg/dL  --  9.0           Results from last 7 days  Lab Units 18  0409   WBC 10*3/mm3 19.67*   HEMOGLOBIN g/dL 9.0*   HEMATOCRIT % 27.7*   PLATELETS 10*3/mm3 539* "           Results from last 7 days  Lab Units 07/10/18  0411   MAGNESIUM mg/dL 1.7                   Medication Review:     aspirin 81 mg Oral Daily   b complex-C-folic acid 1 capsule Oral Daily   cetirizine 5 mg Oral Daily   diltiaZEM  mg Oral Q24H   guaiFENesin 600 mg Oral Daily   levETIRAcetam 500 mg Oral Q12H   metoprolol succinate  mg Oral Q24H   piperacillin-tazobactam 3.375 g Intravenous Q8H   polyethylene glycol 17 g Oral Daily   rivaroxaban 15 mg Oral Daily   sennosides-docusate sodium 2 tablet Oral BID   sodium chloride      vancomycin      vancomycin 1,500 mg Intravenous Q12H          Pharmacy to dose vancomycin        Assessment/Plan   Active Hospital Problems    Diagnosis Date Noted   • Decubitus ulcer of sacral region [L89.159] 07/10/2018   • Abnormal echocardiogram [R93.1] 07/10/2018   • Atrial fibrillation and flutter (CMS/HCC) [I48.91, I48.92] 07/10/2018   • Leg swelling [M79.89] 07/10/2018   • Stage 3 chronic kidney disease [N18.3] 08/25/2016   • Hypertension [I10] 08/16/2016      Resolved Hospital Problems    Diagnosis Date Noted Date Resolved   No resolved problems to display.     1.  Chronic atrial fibrillation and flutter (CMS/HCC) - rate control  He's on renally dosed rivaroxaban.     2.  Leg swelling -      3.  Abnormal echo - thickened MV without overt vegetation.  + BC. MRI does not show anything to preclude a SKIP- will perform today if schedule allows, will call surgery to see if there is availability on the schedule     4.  HTN - well controlled     5.  CKD     6.  Decubitus ulcer     7.  Hypokalemia    8. CT abdomen shows acute diverticulitis         Ry Garcia III, MD  [facility]  07/12/18  7:42 AM

## 2018-07-12 NOTE — PROGRESS NOTES
"Hospitalist Team      Patient Care Team:  Elisabeth Warren MD as PCP - General (Internal Medicine)  Alexsander Matute MD as Consulting Physician (Neurology)  MD Ry Goff III, MD as Consulting Physician (Cardiology)        Chief Complaint:  F/U E-coli and enterococcus Bacteremia and back pain    Subjective    Interval History and ROS:     Patient notes he still feels weak but otherwise ok.  He has pain in his back mainly from muscle spasms and notes the muscle relaxer's help. He denies any CP, SOA or N/V. His SKIP was canceled today as he developed SVT after they took him down to the OR, this resolved with diltiazem but the procedure was canceled. Patient is afebrile. His NB is WNL. He does note a feeling of abdominal bloating and soreness but no true \"pain\". Still with LE edema although his wife notes it is much improved from when he was admitted.       Objective    Vital Signs  Temp:  [97.6 °F (36.4 °C)-98.3 °F (36.8 °C)] 97.8 °F (36.6 °C)  Heart Rate:  [76-99] 82  Resp:  [20-22] 20  BP: ()/(52-69) 107/62  Oxygen Therapy  SpO2: 96 %  Pulse Oximetry Type: Intermittent  Device (Oxygen Therapy): room air   Body mass index is 24.06 kg/m².    Flowsheet Rows      First Filed Value   Admission Height  190.5 cm (75\") Documented at 07/09/2018 1507   Admission Weight  87.3 kg (192 lb 6.4 oz) Documented at 07/09/2018 1507            Physical Exam:  Constitutional: Patient appears well-developed and well-nourished and in no acute distress   HEENT:   Head: Normocephalic and atraumatic.   Eyes:  Pupils are equal, round, and reactive to light. EOM are intact. Sclera are anicteric and non-injected.  Mouth and Throat: Patient has moist mucous membranes.     Neck: Neck supple. No JVD noted.   Cardiovascular: Regular rate, irregular rhythm on ascultation. Exam reveals no gallop and no friction rub.  Faint systolic murmur heard.  Pulmonary/Chest: Lungs are clear to auscultation bilaterally. No " respiratory distress. No wheezes. No rhonchi. No rales.   Abdominal: Soft. Bowel sounds are normal. Possible mild distension There is no tenderness.   Extremities: 2-3+ LE edema.   Neurological: Patient is alert and oriented to person, place, and time.   Skin: Skin is warm.  No cyanosis or erythema.        Results Review:     I reviewed the patient's new clinical results.    Lab Results (last 24 hours)     Procedure Component Value Units Date/Time    Blood Culture - Blood, [[id number]]  (Abnormal) Collected:  07/11/18 1008    Specimen:  Blood from Hand, Right Updated:  07/12/18 0837     Blood Culture Abnormal Stain (A)     Gram Stain Result Aerobic Bottle Gram positive cocci in chains    Blood Culture - Blood, [[id number]]  (Abnormal) Collected:  07/11/18 1009    Specimen:  Blood from Hand, Left Updated:  07/12/18 0830     Blood Culture Abnormal Stain (A)     Gram Stain Result Anaerobic Bottle Gram positive cocci in chains      Aerobic Bottle Gram positive cocci in chains    Blood Culture - Blood, Arm, Left [[id number]]  (Abnormal) Collected:  07/10/18 1130    Specimen:  Blood from Arm, Left Updated:  07/12/18 0827     Blood Culture Enterococcus species (A)     Gram Stain Result Aerobic Bottle Gram positive cocci in chains      Aerobic Bottle Gram negative bacilli    Blood Culture - Blood, Arm, Left [[id number]]  (Abnormal) Collected:  07/10/18 1200    Specimen:  Blood from Arm, Left Updated:  07/12/18 0824     Blood Culture Enterococcus species (A)      Escherichia coli (A)     Gram Stain Result Aerobic Bottle Gram positive cocci in chains    Potassium [[id number]]  (Normal) Collected:  07/11/18 2014    Specimen:  Blood Updated:  07/11/18 2034     Potassium 4.7 mmol/L     Blood Culture ID, PCR - Blood, [[id number]]  (Abnormal) Collected:  07/10/18 1130    Specimen:  Blood from Arm, Left Updated:  07/11/18 1109     BCID, PCR Enterococcus spp. Identification by BCID PCR. (C)     BCID, PCR 2 Escherichia coli. Identification  by BCID PCR. (C)          Imaging Results (last 24 hours)     Procedure Component Value Units Date/Time    CT Abdomen Pelvis Without Contrast [[id number]] Collected:  07/12/18 0707     Updated:  07/12/18 0719    Narrative:       CT ABDOMEN AND PELVIS, NONCONTRAST, 7/11/2018     HISTORY:  82-year-old male admitted to the hospital 2 days ago with sacral  decubitus wound, lower extremity edema, low back pain and cardiac  arrhythmia. Bacteremia is noted.     TECHNIQUE:  CT examination of the abdomen and pelvis was performed without IV  contrast due to abnormal renal function. Radiation dose reduction  techniques included automated exposure control or exposure modulation  based on body size. Radiation audit for CT and nuclear cardiology exams  in the last 12 months: 0.     COMPARISON:  *  CT abdomen/pelvis, 12/15/2016.  *  CT chest, 12/8/2016.     ABDOMEN FINDINGS:  Multiple large gallstones are present with thin the gallbladder. There  is no bile duct dilatation. Liver, pancreas and spleen are normal in  size and appearance without contrast.     The kidneys are unobstructed. Suspected small typical and hyperdense  right renal cysts are incompletely evaluated without contrast but are  unchanged since the study of 12/15/2016.     Moderate sigmoid diverticulosis. There is mild wall thickening involving  the mid sigmoid colon with some adjacent mild soft tissue stranding in  the left lower pelvic pericolonic fat. Mild acute diverticulitis is  suspected. No evidence of abscess, bowel perforation or bowel  obstruction. Remaining segments of small bowel and colon are normal in  caliber and appearance. Normal appendix.     PELVIS FINDINGS:  Urinary bladder, prostate and rectum are within normal limits. Tiny  bilateral fat-containing inguinal hernias.     Cutaneous and subcutaneous decubitus wound changes in the midline  posterior and inferior to the lower sacrococcygeal spine. No abscess or  other fluid collection is seen.  Underlying osseous structures are  unremarkable.     Lower chest images show low ascending thoracic aortic aneurysm measuring  up to 5.4 cm. This segment is unchanged since CT chest, 12/8/2016.       Impression:       1. CT findings suggesting mild acute diverticulitis involving the mid  sigmoid colon in the left lower pelvis. No evidence of abscess or other  complicating feature.  2. Cholelithiasis. Multiple large gallstones within the gallbladder. No  bile duct dilatation.  3. Cutaneous and subcutaneous decubitus wounds changes posterior  inferior to the lower sacrococcygeal spine. No abscess or additional  complicating feature is visible.  4. Ascending thoracic aortic aneurysm, unchanged since 12/8/2016.     This report was finalized on 7/12/2018 7:17 AM by Dr. Jefferson Bowen MD.       MRI Lumbar Spine With & Without Contrast [[id number]] Updated:  07/11/18 1443        ECG/EMG Results (most recent)     Procedure Component Value Units Date/Time    Adult Transthoracic Echo Complete W/ Cont if Necessary Per Protocol [[id number]] Collected:  07/10/18 0739     Updated:  07/10/18 1130     BSA 2.2 m^2      IVSd 1.0 cm      LVIDd 5.1 cm      LVIDs 3.3 cm      LVPWd 0.84 cm      IVS/LVPW 1.2     FS 35.4 %      EDV(Teich) 126.1 ml      ESV(Teich) 44.8 ml      EF(Teich) 64.5 %      EDV(cubed) 135.8 ml      ESV(cubed) 36.6 ml      EF(cubed) 73.1 %      LV mass(C)d 175.2 grams      LV mass(C)dI 81.2 grams/m^2      SV(Teich) 81.3 ml      SI(Teich) 37.7 ml/m^2      SV(cubed) 99.2 ml      SI(cubed) 46.0 ml/m^2      Ao root diam 4.3 cm      Ao root area 14.5 cm^2      ACS 2.4 cm      LA dimension 4.1 cm      LA/Ao 0.95     LVOT diam 2.3 cm      LVOT area 4.2 cm^2      LVOT area(traced) 4.2 cm^2      RVOT diam 2.7 cm      RVOT area 5.7 cm^2      LVLd ap4 9.0 cm      EDV(MOD-sp4) 132.0 ml      LVLs ap4 7.4 cm      ESV(MOD-sp4) 41.8 ml      EF(MOD-sp4) 68.3 %      LVLd ap2 8.9 cm      EDV(MOD-sp2) 131.0 ml      LVLs ap2 7.8 cm       ESV(MOD-sp2) 45.2 ml      EF(MOD-sp2) 65.5 %      SV(MOD-sp4) 90.2 ml      SI(MOD-sp4) 41.8 ml/m^2      SV(MOD-sp2) 85.8 ml      SI(MOD-sp2) 39.8 ml/m^2      Ao root area (BSA corrected) 2.0     Ao root area (BSA corrected) 65.5 ml/m^2      CONTRAST EF 4CH 68.3 ml/m^2      LV Diastolic corrected for BSA 61.2 ml/m^2      LV Systolic corrected for BSA 19.4 ml/m^2      MV E max perico 128.0 cm/sec      MV V2 max 159.0 cm/sec      MV max PG 10.1 mmHg      MV V2 mean 85.1 cm/sec      MV mean PG 4.0 mmHg      MV V2 VTI 20.3 cm      MVA(VTI) 3.7 cm^2      MV P1/2t max perico 165.0 cm/sec      MV P1/2t 44.4 msec      MVA(P1/2t) 5.0 cm^2      MV dec slope 1,088 cm/sec^2      MV dec time 0.14 sec      Ao pk perico 139.0 cm/sec      Ao max PG 7.1 mmHg      Ao max PG (full) 3.0 mmHg      Ao V2 mean 86.0 cm/sec      Ao mean PG 4.0 mmHg      Ao mean PG (full) 2.0 mmHg      Ao V2 VTI 23.1 cm      DEANDRE(I,A) 3.3 cm^2      DEANDRE(I,D) 3.3 cm^2      DEANDRE(V,A) 3.0 cm^2      DEANDRE(V,D) 3.0 cm^2      AI max perico 406.0 cm/sec      AI max PG 65.9 mmHg      AI dec slope 404.0 cm/sec^2      AI P1/2t 294.3 msec      LV V1 max PG 4.2 mmHg      LV V1 mean PG 2.0 mmHg      LV V1 max 102.0 cm/sec      LV V1 mean 59.9 cm/sec      LV V1 VTI 18.1 cm      MR max perico 450.0 cm/sec      MR max PG 81.0 mmHg      SV(Ao) 335.5 ml      SI(Ao) 155.5 ml/m^2      SV(LVOT) 75.2 ml      SV(RVOT) 50.0 ml      SI(LVOT) 34.9 ml/m^2      PA V2 max 93.0 cm/sec      PA max PG 3.5 mmHg      PA max PG (full) 1.8 mmHg      BH CV ECHO JUAN - PVA(V,A) 3.9 cm^2      BH CV ECHO JUAN - PVA(V,D) 3.9 cm^2      PA acc time 0.1 sec      RV V1 max PG 1.6 mmHg      RV V1 mean PG 1.0 mmHg      RV V1 max 63.5 cm/sec      RV V1 mean 39.7 cm/sec      RV V1 VTI 8.7 cm      TR max perico 210.0 cm/sec      RVSP(TR) 20.6 mmHg      RAP systole 3.0 mmHg      PA pr(Accel) 36.3 mmHg      Pulm Sys Perico 34.0 cm/sec      Pulm Palmer Perico 65.2 cm/sec      Pulm S/D 0.52     Qp/Qs 0.66     MVA P1/2T LCG 1.3 cm^2      BH  CV ECHO JUAN - BZI_BMI 24.0 kilograms/m^2      BH CV ECHO JUAN - BSA(HAYCOCK) 2.1 m^2       CV ECHO JUAN - BZI_METRIC_WEIGHT 87.1 kg       CV ECHO JUAN - BZI_METRIC_HEIGHT 190.5 cm      Target HR (85%) 119 bpm      Max. Pred. HR (100%) 140 bpm      BH CV VAS BP RIGHT /66 mmHg      TDI S' 20.00 cm/sec      RV Base 4.10 cm      LA volume 111.0 cm3      Dimensionless Index 0.8 (DI)      LA Volume Index 54.0 mL/m2      Avg E/e' ratio 9.85     EF(MOD-bp) 66.0 %      Lat Peak E' Perico 15.0 cm/sec      Med Peak E' Perico 11.00 cm/sec      TAPSE (>1.6) 1.80 cm2      Echo EF Estimated 68 %     Addenda:        · Left ventricular systolic function is normal. Calculated EF = 66%.   Estimated EF was in agreement with the calculated EF. Estimated EF = 68%.   Normal left ventricular cavity size noted. All left ventricular wall   segments contract normally. Left ventricular wall thickness is consistent   with mild concentric hypertrophy. Left ventricular diastolic function was   indeterminate.  · Left atrial volume is severely increased.  · Right atrial cavity size is severely dilated.  · The aortic valve is abnormal in structure. There is severe thickening of   the aortic valve. The aortic valve is severely thickened with hazy density   on the ventricular surface of the aortic valve. Cannot rule out vegetative   process. Consider transesophageal echocardiogram to assess further..  · The mitral valve is abnormal in structure. The anterior mitral valve   leaflet is thickened on the ventricular surface. Suspicious for possible   vegetative process. Consider transesophageal echocardiogram if clinically   indicated.. Mild mitral valve regurgitation is present.  · Mild tricuspid valve regurgitation is present. Estimated right   ventricular systolic pressure from tricuspid regurgitation is normal (<35   mmHg).  · Addendum: Mild dilation of the aortic root is present. Aortic root   measures 4.3 cm.     Signed:  07/10/18 1130 by Mini  SARAH Mayo MD    Narrative:       · Left ventricular systolic function is normal. Calculated EF = 66%.   Estimated EF was in agreement with the calculated EF. Estimated EF = 68%.   Normal left ventricular cavity size noted. All left ventricular wall   segments contract normally. Left ventricular wall thickness is consistent   with mild concentric hypertrophy. Left ventricular diastolic function was   indeterminate.  · Left atrial volume is severely increased.  · Right atrial cavity size is severely dilated.  · The aortic valve is abnormal in structure. There is severe thickening of   the aortic valve. The aortic valve is severely thickened with hazy density   on the ventricular surface of the aortic valve. Cannot rule out vegetative   process. Consider transesophageal echocardiogram to assess further..  · The mitral valve is abnormal in structure. The anterior mitral valve   leaflet is thickened on the ventricular surface. Suspicious for possible   vegetative process. Consider transesophageal echocardiogram if clinically   indicated.. Mild mitral valve regurgitation is present.  · Mild tricuspid valve regurgitation is present. Estimated right   ventricular systolic pressure from tricuspid regurgitation is normal (<35   mmHg).       ECG 12 Lead [[id number]] Collected:  07/10/18 0721     Updated:  07/10/18 1532    Narrative:       RR Interval= 571 ms  WY Interval= ms  QRSD Interval= 140 ms  QT Interval= 384 ms  QTc Interval= 508 ms  Heart Rate= 105 ms  P Axis= deg  QRS Axis= -25 deg  T Wave Axis= -26 deg  I: 40 Axis= 13 deg  T: 40 Axis= 141 deg  ST Axis= -22 deg  ATRIAL FLUTTER, A-RATE 258  RIGHT BUNDLE BRANCH BLOCK  NO SIGNIFICANT CHANGE FROM PREVIOUS ECG  Electronically Signed by:  Naeem Laughlin ([facility] 10-Jul-2018 15:30:48  Date and Time of Study: 2018-07-10 07:21:18          Medication Review:   I have reviewed the patient's current medication list    Current Facility-Administered Medications:   •  acetaminophen (TYLENOL)  tablet 650 mg, 650 mg, Oral, Q4H PRN, Ry Steele MD, 650 mg at 07/10/18 2024  •  aspirin chewable tablet 81 mg, 81 mg, Oral, Daily, Stan Palacios DO, 81 mg at 07/11/18 0904  •  b complex-C-folic acid capsule 1 mg, 1 capsule, Oral, Daily, Stan Palacios, DO, 1 mg at 07/11/18 0901  •  cetirizine (zyrTEC) tablet 5 mg, 5 mg, Oral, Daily, Stan Palacios, DO, 5 mg at 07/11/18 0904  •  diltiaZEM CD (CARDIZEM CD) 24 hr capsule 240 mg, 240 mg, Oral, Q24H, Nereyda Bustamante MD, 240 mg at 07/11/18 0901  •  guaiFENesin (MUCINEX) 12 hr tablet 600 mg, 600 mg, Oral, Daily, Stan Palacios DO, 600 mg at 07/11/18 0903  •  HYDROmorphone (DILAUDID) injection 0.25 mg, 0.25 mg, Intravenous, Q2H PRN, Nereyda Bustamante MD, 0.25 mg at 07/12/18 0315  •  lactobacillus acidophilus (RISAQUAD) capsule 1 capsule, 1 capsule, Oral, Daily, Suresh Ng MD  •  levETIRAcetam (KEPPRA) tablet 500 mg, 500 mg, Oral, Q12H, Stan Palacios, DO, 500 mg at 07/11/18 2301  •  LORazepam (ATIVAN) injection 1 mg, 1 mg, Intravenous, Once PRN, Nereyda Bustamante MD  •  Magnesium Sulfate 2 gram Bolus, followed by 8 gram infusion (total Mg dose 10 grams)- Mg less than or equal to 1mg/dL, 2 g, Intravenous, PRN **OR** Magnesium Sulfate 2 gram / 50mL Infusion (GIVE X 3 BAGS TO EQUAL 6GM TOTAL DOSE) - Mg 1.1 - 1/5 mg/dl, 2 g, Intravenous, PRN **OR** Magnesium Sulfate 4 gram infusion- Mg 1.6-1.9 mg/dL, 4 g, Intravenous, PRN, Birrilla Roberts, DO, Last Rate: 25 mL/hr at 07/10/18 0939, 4 g at 07/10/18 0939  •  methocarbamol (ROBAXIN) tablet 500 mg, 500 mg, Oral, BID PRN, Stan Palacios, , 500 mg at 07/11/18 0903  •  metoprolol succinate XL (TOPROL-XL) 24 hr tablet 100 mg, 100 mg, Oral, Q24H, Nereyda Bustamante MD, 100 mg at 07/11/18 0901  •  Pharmacy to dose vancomycin, , Does not apply, Continuous PRN, Nereyda Bustamante MD  •  piperacillin-tazobactam (ZOSYN) 3.375 g/100 mL 0.9% NS IVPB (mbp), 3.375 g, Intravenous, Q8H, Liborio Cantu MD, Last Rate: 0  mL/hr at 07/11/18 2349, 3.375 g at 07/12/18 0447  •  polyethylene glycol (MIRALAX) powder 17 g, 17 g, Oral, Daily, Nereyda Bustamante MD, 17 g at 07/10/18 1247  •  potassium chloride (K-DUR,KLOR-CON) CR tablet 40 mEq, 40 mEq, Oral, PRN, 40 mEq at 07/11/18 0549 **OR** potassium chloride (KLOR-CON) packet 40 mEq, 40 mEq, Oral, PRN **OR** potassium chloride 10 mEq in 100 mL IVPB, 10 mEq, Intravenous, Q1H PRN, Elizabeth Roberts, DO  •  rivaroxaban (XARELTO) tablet 15 mg, 15 mg, Oral, Daily, Stan Palacios, DO, 15 mg at 07/11/18 0901  •  sennosides-docusate sodium (SENOKOT-S) 8.6-50 MG tablet 2 tablet, 2 tablet, Oral, BID, Nereyda Bustamante MD, 2 tablet at 07/11/18 2300  •  sodium chloride 0.9 % flush 1-10 mL, 1-10 mL, Intravenous, PRN, Stan Palacios, DO, 10 mL at 07/11/18 0902  •  sodium chloride 0.9 % infusion  - ADS Override Pull, , , ,   •  sodium chloride 0.9 % infusion 40 mL, 40 mL, Intravenous, PRN, Stan Palacios, DO  •  vancomycin (VANCOCIN) 500 MG injection  - ADS Override Pull, , , ,   •  vancomycin 1500 mg/250 mL 0.9% NS IVPB, 1,500 mg, Intravenous, Q12H, Stan Palacios, DO, Stopped at 07/11/18 1247      Assessment/Plan     1. Enterococcus and E-coli Bacteremia:   Initial 2/2 blood cultures + and repeat cultures also with GPC growing, CRP 9.27, ESR  35 (would expect ESR higher if OM or endocarditis). Infectious source not clearly IDed but these are c/w enteric bacteria and CT here suspicious for mild acute diverticulitis (although with a mild uncomplicated diverticulitis I would not expect bacteremia). Dr. Ng saw patient and continue Abx 2 weeks (or longer if indicated based on other W/U) with probiotic and F/U in office outpatient. Patient is on Vanco and zosyn and susceptibility is pending. Echo here with thickened Mitral and aortic valves and could not r/o infective endocarditis. SKIP attempted today but aborted secondary to episode of SVT (Cardiology may attempt again Monday). MRI lumbar spine pending.  Radiology called me today and they are waiting for a CT comparison from [facility] before reading the MRI. (MRI report should be available tomorrow). Dr. Cantu (ID) was consulted and saw patient and added zosyn and D/Dominic gent (as patient was initially on vanc and gent). Will recheck CBC in AM. Patient is afebrile.      2. HTN: Variable blood pressures here, ranging from SBP 90s-161s.  Patient's LE edema per wife has improved some here.  Patient was initially on IV Bumex here but it was held yesterday due to low BP. Will resume home dose po Bumex. Monitor.     3. Sacral Ulcer:  Continue wound care. CT showed no features concerning for abscess, await lumbar spine MRI which may show more.      4. Chronic Afib and flutter w/ intermittent RVR and episode of SVT:  On xarelto, patient received a dose of IV diltiazem from cardiology in OR and rate came down. Home po diltiazem dose increased. Rate now back WNL. Patient is on home dose metoprolol ER. Monitor.       5. Back Pain: Continue robaxin which patient states helps better than any of the patient medications he has previously taken.    6. H/O CVA/seizures: On ASA and home dose keppra. No acute issues currently.     7. CKD stage 2-3: Renal function appears at baseline. Monitor.     8. CAD/Dyslipidemia/h/o CABG: No acute issues here. Patient on home ASA and BB. Not on statin (patient with allergy).    9. Mild Hyponatremia: Recheck BMP in AM. Monitor.     Plan for disposition: Likely home with  when able.     Betzaida Salcedo MD  07/12/18  9:31 AM

## 2018-07-13 PROBLEM — R78.81 BACTEREMIA DUE TO ESCHERICHIA COLI: Status: ACTIVE | Noted: 2018-01-01

## 2018-07-13 PROBLEM — B96.20 BACTEREMIA DUE TO ESCHERICHIA COLI: Status: ACTIVE | Noted: 2018-01-01

## 2018-07-13 PROBLEM — R78.81 BACTEREMIA DUE TO ENTEROCOCCUS: Status: ACTIVE | Noted: 2018-01-01

## 2018-07-13 PROBLEM — B95.2 BACTEREMIA DUE TO ENTEROCOCCUS: Status: ACTIVE | Noted: 2018-01-01

## 2018-07-13 NOTE — PROGRESS NOTES
"    Patient Name: Fahad Muhammad  :1935  82 y.o.      Patient Care Team:  Elisabeth Warren MD as PCP - General (Internal Medicine)  Alexsander Matute MD as Consulting Physician (Neurology)  MD Ry Goff III, MD as Consulting Physician (Cardiology)    Chief Complaint: follow up SVT, possible endocarditis    Interval History: Episode last night of SVT. He had left chest pain and elbow pain at the time. REsolved with nitro, however HR had improved as well. Received IV beta blocker. Trop checked (0.07). EKG this morning without ischemic changes.          Objective   Vital Signs  Temp:  [98 °F (36.7 °C)-98.4 °F (36.9 °C)] 98.1 °F (36.7 °C)  Heart Rate:  [] 97  Resp:  [20] 20  BP: ()/(52-88) 94/52    Intake/Output Summary (Last 24 hours) at 18 1024  Last data filed at 18 1006   Gross per 24 hour   Intake              680 ml   Output              590 ml   Net               90 ml     Flowsheet Rows      First Filed Value   Admission Height  190.5 cm (75\") Documented at 2018 1507   Admission Weight  87.3 kg (192 lb 6.4 oz) Documented at 2018 1507          Physical Exam:   General Appearance:    Alert, cooperative, in no acute distress   Lungs:     Clear to auscultation.  Normal respiratory effort and rate.      Heart:    Regular rhythm and normal rate, normal S1 and S2, no murmurs, gallops or rubs.     Chest Wall:    No abnormalities observed   Abdomen:     Soft, nontender, positive bowel sounds.     Extremities:   no cyanosis, clubbing   2+ pitting edema.  No marked joint deformities.  Adequate musculoskeletal strength.       Results Review:      Results from last 7 days  Lab Units 18  0225   SODIUM mmol/L 134*   POTASSIUM mmol/L 4.0   CHLORIDE mmol/L 97*   CO2 mmol/L 25.0   BUN mg/dL 20   CREATININE mg/dL 0.99   GLUCOSE mg/dL 116*   CALCIUM mg/dL 9.2       Results from last 7 days  Lab Units 18  0551 18  0225 18  2336 "   TROPONIN T ng/mL 0.073* 0.028 <0.010       Results from last 7 days  Lab Units 07/13/18  0225   WBC 10*3/mm3 18.33*   HEMOGLOBIN g/dL 9.0*   HEMATOCRIT % 26.9*   PLATELETS 10*3/mm3 561*           Results from last 7 days  Lab Units 07/10/18  0411   MAGNESIUM mg/dL 1.7                   Medication Review:     ampicillin-sulbactam 3 g Intravenous Q6H   aspirin 81 mg Oral Daily   b complex-C-folic acid 1 capsule Oral Daily   bumetanide 2 mg Oral Daily   cetirizine 5 mg Oral Daily   diltiaZEM  mg Oral Q24H   guaiFENesin 600 mg Oral Daily   lactobacillus acidophilus 1 capsule Oral Daily   levETIRAcetam 500 mg Oral Q12H   metoprolol succinate  mg Oral Q24H   metoprolol tartrate      metoprolol tartrate 5 mg Intravenous Once   nitroglycerin 1 inch Topical Once   polyethylene glycol 17 g Oral Daily   rivaroxaban 15 mg Oral Daily   sennosides-docusate sodium 2 tablet Oral BID          Pharmacy to dose vancomycin        Assessment/Plan   1.   atrial fibrillation and flutter (CMS/HCC) , paroxysmal SVT - He's on renally dosed rivaroxaban. He is currently in SR. Had another episode of SVT last night. Resolved with IV lopressor. BP low today so not able to titrate medications. Will reassess BP this afternoon, would like to add an evening dose of beta blocker if possible.      2.  Troponin above reference - I think this is likely a leak from tachycardia. EKG this morning with out ischemic changes. Repeat trop at noon. No further plans for cardiac testing at this time.       3.  Abnormal echo - thickened MV without overt vegetation.  + BC. MRI does not show anything to preclude a SKIP- He had SVT prior to SKIP yesterday and it was cancelled. Will plan for SKIP on Monday if stable.      4.  HTN - well controlled but on the low side. Continue to monitor.      5. Lower extremity edema. Intravascularly he seems stable. Legs are elevated. He is on oral bumex. Continue same.      6.  History of coronary artery disease s/p  remote CABG (23 years ago). Follow trops as above. Continue medical management.      7.  Hypokalemia - resolved. K 4 today.      8. CT abdomen shows acute diverticulitis - per internal medicine.     Discussed with patient and family. They are agreeable to SKIP on Monday. Discussed with Dr. Garcia, FELI Moore, and RN.     FELI Ibarra  [facility]  07/13/18  10:24 AM

## 2018-07-13 NOTE — PROGRESS NOTES
LOS: 4 days   Patient Care Team:  Elisabeth Warren MD as PCP - General (Internal Medicine)  Alexsander Matute MD as Consulting Physician (Neurology)  MD Ry Goff III, MD as Consulting Physician (Cardiology)        Subjective     Interval History:     Patient Complaints:   Patient Denies:  NV Chills       Review of Systems:    10 point ROS done    Objective     Vital Signs  Temp:  [98 °F (36.7 °C)-98.4 °F (36.9 °C)] 98.1 °F (36.7 °C)  Heart Rate:  [] 94  Resp:  [20] 20  BP: (102-133)/(53-88) 102/65    Physical Exam:     General Appearance:    Alert, cooperative, in no acute distress   Head:    Normocephalic, without obvious abnormality, atraumatic   Eyes:            Lids and lashes normal, conjunctivae and sclerae normal, no   icterus, no pallor, corneas clear, PERRLA   Ears:    Ears appear intact with no abnormalities noted   Throat:   No oral lesions, no thrush, oral mucosa moist   Neck:   No adenopathy, supple, trachea midline, no thyromegaly, no   carotid bruit, no JVD   Back:     No kyphosis present, no scoliosis present, no skin lesions,      erythema or scars, no tenderness to percussion or                   palpation,   range of motion normal   Lungs:     Clear to auscultation,respirations regular, even and                  unlabored    Heart:    Regular rhythm and normal rate, normal S1 and S2, no            murmur, no gallop, no rub, no click   Chest Wall:    No abnormalities observed   Abdomen:     Normal bowel sounds, no masses, no organomegaly, soft        non-tender, non-distended, no guarding, no rebound                tenderness   Rectal:     Deferred   Extremities:   Moves all extremities well, no edema, no cyanosis, no             redness   Pulses:   Pulses palpable and equal bilaterally   Skin:   No bleeding, bruising or rash   Lymph nodes:   No palpable adenopathy   Neurologic:   Cranial nerves 2 - 12 grossly intact, sensation intact, DTR       present and  equal bilaterally          Results Review:      Lab Results (last 72 hours)     Procedure Component Value Units Date/Time    Hemoglobin A1c [[id number]] Collected:  07/13/18 0225    Specimen:  Blood Updated:  07/13/18 0851    Blood Culture - Blood, [[id number]]  (Abnormal) Collected:  07/11/18 1008    Specimen:  Blood from Hand, Right Updated:  07/13/18 0702     Blood Culture Enterococcus faecalis (A)     Gram Stain Result Aerobic Bottle Gram positive cocci in chains    Blood Culture - Blood, [[id number]]  (Abnormal) Collected:  07/11/18 1009    Specimen:  Blood from Hand, Left Updated:  07/13/18 0653     Blood Culture Enterococcus faecalis (A)     Gram Stain Result Anaerobic Bottle Gram positive cocci in chains      Aerobic Bottle Gram positive cocci in chains    Blood Culture - Blood, Arm, Left [[id number]]  (Abnormal) Collected:  07/10/18 1130    Specimen:  Blood from Arm, Left Updated:  07/13/18 0653     Blood Culture Enterococcus faecalis (A)     Gram Stain Result Aerobic Bottle Gram positive cocci in chains      Aerobic Bottle Gram negative bacilli    Blood Culture - Blood, Arm, Left [[id number]]  (Abnormal)  (Susceptibility) Collected:  07/10/18 1200    Specimen:  Blood from Arm, Left Updated:  07/13/18 0650     Blood Culture Escherichia coli (A)      Enterococcus faecalis (A)     Gram Stain Result Aerobic Bottle Gram positive cocci in chains    Susceptibility      Escherichia coli     CRUZ     Ampicillin 4 ug/ml Susceptible     Ampicillin + Sulbactam <=2 ug/ml Susceptible     Cefazolin <=4 ug/ml Susceptible     Cefepime <=1 ug/ml Susceptible     Cefoxitin <=4 ug/ml Susceptible     Ceftriaxone <=1 ug/ml Susceptible     Ertapenem <=0.5 ug/ml Susceptible     Gentamicin <=1 ug/ml Susceptible     Levofloxacin <=0.12 ug/ml Susceptible     Meropenem <=0.25 ug/ml Susceptible     Piperacillin + Tazobactam <=4 ug/ml Susceptible     Trimethoprim + Sulfamethoxazole <=20 ug/ml Susceptible                    Troponin  [[id number]]  (Abnormal) Collected:  07/13/18 0551    Specimen:  Blood Updated:  07/13/18 0624     Troponin T 0.073 (H) ng/mL     Narrative:       Troponin T Reference Ranges:  Less than 0.03 ng/mL:    Negative for AMI  0.03 to 0.09 ng/mL:      Indeterminant for AMI  Greater than 0.09 ng/mL: Positive for AMI    Troponin [[id number]]  (Normal) Collected:  07/13/18 0225    Specimen:  Blood Updated:  07/13/18 0245     Troponin T 0.028 ng/mL     Narrative:       Troponin T Reference Ranges:  Less than 0.03 ng/mL:    Negative for AMI  0.03 to 0.09 ng/mL:      Indeterminant for AMI  Greater than 0.09 ng/mL: Positive for AMI    Basic Metabolic Panel [[id number]]  (Abnormal) Collected:  07/13/18 0225    Specimen:  Blood Updated:  07/13/18 0244     Glucose 116 (H) mg/dL      BUN 20 mg/dL      Creatinine 0.99 mg/dL      Sodium 134 (L) mmol/L      Potassium 4.0 mmol/L      Chloride 97 (L) mmol/L      CO2 25.0 mmol/L      Calcium 9.2 mg/dL      eGFR Non African Amer 73 mL/min/1.73      BUN/Creatinine Ratio 20.2     Anion Gap 12.0 mmol/L     Narrative:       The MDRD GFR formula is only valid for adults with stable renal function between ages 18 and 70.    CBC & Differential [[id number]] Collected:  07/13/18 0225    Specimen:  Blood Updated:  07/13/18 0234    Narrative:       The following orders were created for panel order CBC & Differential.  Procedure                               Abnormality         Status                     ---------                               -----------         ------                     Scan Slide[[id number]]                                                                  CBC Auto Differential[[id number]]        Abnormal            Final result                 Please view results for these tests on the individual orders.    CBC Auto Differential [[id number]]  (Abnormal) Collected:  07/13/18 0225    Specimen:  Blood Updated:  07/13/18 0229     WBC 18.33 (H) 10*3/mm3      RBC 3.67 (L) 10*6/mm3       Hemoglobin 9.0 (L) g/dL      Hematocrit 26.9 (L) %      MCV 73.3 (L) fL      MCH 24.5 (L) pg      MCHC 33.5 g/dL      RDW 18.0 (H) %      RDW-SD 47.5 fl      MPV 9.1 fL      Platelets 561 (H) 10*3/mm3      Neutrophil % 75.2 (H) %      Lymphocyte % 15.3 (L) %      Monocyte % 8.7 (H) %      Eosinophil % 0.2 %      Basophil % 0.2 %      Immature Grans % 0.4 %      Neutrophils, Absolute 13.78 (H) 10*3/mm3      Lymphocytes, Absolute 2.80 10*3/mm3      Monocytes, Absolute 1.60 (H) 10*3/mm3      Eosinophils, Absolute 0.04 (L) 10*3/mm3      Basophils, Absolute 0.03 10*3/mm3      Immature Grans, Absolute 0.08 (H) 10*3/mm3      nRBC 0.0 /100 WBC     Troponin [[id number]]  (Normal) Collected:  07/12/18 2336    Specimen:  Blood Updated:  07/12/18 2358     Troponin T <0.010 ng/mL     Narrative:       Troponin T Reference Ranges:  Less than 0.03 ng/mL:    Negative for AMI  0.03 to 0.09 ng/mL:      Indeterminant for AMI  Greater than 0.09 ng/mL: Positive for AMI    Vancomycin, Random [[id number]]  (Normal) Collected:  07/12/18 2209    Specimen:  Blood Updated:  07/12/18 2241     Vancomycin Random 25.60 mcg/mL     Gentamicin Level, Trough [[id number]]  (Normal) Collected:  07/12/18 1800    Specimen:  Blood Updated:  07/12/18 2120     Gentamicin Trough 0.60 mcg/mL     Potassium [[id number]]  (Normal) Collected:  07/11/18 2014    Specimen:  Blood Updated:  07/11/18 2034     Potassium 4.7 mmol/L     Blood Culture ID, PCR - Blood, [[id number]]  (Abnormal) Collected:  07/10/18 1130    Specimen:  Blood from Arm, Left Updated:  07/11/18 1109     BCID, PCR Enterococcus spp. Identification by BCID PCR. (C)     BCID, PCR 2 Escherichia coli. Identification by BCID PCR. (C)    Basic Metabolic Panel [[id number]]  (Abnormal) Collected:  07/11/18 0409    Specimen:  Blood Updated:  07/11/18 0521     Glucose 107 (H) mg/dL      BUN 22 mg/dL      Creatinine 1.03 mg/dL      Sodium 132 (L) mmol/L      Potassium 3.6 mmol/L      Chloride 95 (L) mmol/L      CO2  29.8 (H) mmol/L      Calcium 9.0 mg/dL      eGFR Non African Amer 69 mL/min/1.73      BUN/Creatinine Ratio 21.4     Anion Gap 7.2 mmol/L     Narrative:       The MDRD GFR formula is only valid for adults with stable renal function between ages 18 and 70.    CBC & Differential [[id number]] Collected:  07/11/18 0409    Specimen:  Blood Updated:  07/11/18 0505    Narrative:       The following orders were created for panel order CBC & Differential.  Procedure                               Abnormality         Status                     ---------                               -----------         ------                     Scan Slide[[id number]]                                                                  CBC Auto Differential[[id number]]        Abnormal            Final result                 Please view results for these tests on the individual orders.    CBC Auto Differential [[id number]]  (Abnormal) Collected:  07/11/18 0409    Specimen:  Blood Updated:  07/11/18 0504     WBC 19.67 (H) 10*3/mm3      RBC 3.71 (L) 10*6/mm3      Hemoglobin 9.0 (L) g/dL      Hematocrit 27.7 (L) %      MCV 74.7 (L) fL      MCH 24.3 (L) pg      MCHC 32.5 g/dL      RDW 18.2 (H) %      RDW-SD 49.3 fl      MPV 9.2 fL      Platelets 539 (H) 10*3/mm3      Neutrophil % 75.4 (H) %      Lymphocyte % 13.0 (L) %      Monocyte % 10.6 (H) %      Eosinophil % 0.2 %      Basophil % 0.2 %      Immature Grans % 0.6 (H) %      Neutrophils, Absolute 14.87 (H) 10*3/mm3      Lymphocytes, Absolute 2.55 10*3/mm3      Monocytes, Absolute 2.08 (H) 10*3/mm3      Eosinophils, Absolute 0.03 (L) 10*3/mm3      Basophils, Absolute 0.03 10*3/mm3      Immature Grans, Absolute 0.11 (H) 10*3/mm3      nRBC 0.0 /100 WBC     Urinalysis, Microscopic Only - Urine, Clean Catch [[id number]]  (Abnormal) Collected:  07/10/18 1649    Specimen:  Urine from Urine, Clean Catch Updated:  07/10/18 1758     RBC, UA 13-20 (A) /HPF      WBC, UA 0-2 (A) /HPF      Bacteria, UA None Seen /HPF       Squamous Epithelial Cells, UA 0-2 /HPF      Hyaline Casts, UA 0-2 /LPF      Methodology Manual Light Microscopy    Urinalysis With Culture If Indicated - Urine, Clean Catch [[id number]]  (Abnormal) Collected:  07/10/18 1649    Specimen:  Urine from Urine, Clean Catch Updated:  07/10/18 1743     Color, UA Dark Yellow (A)     Appearance, UA Clear     pH, UA <=5.0     Specific Gravity, UA 1.025     Comment: Result obtained by Refractometer        Glucose, UA Negative     Ketones, UA Trace (A)     Bilirubin, UA Small (1+) (A)     Blood, UA Moderate (2+) (A)     Protein, UA Negative     Leuk Esterase, UA Negative     Nitrite, UA Negative     Urobilinogen, UA 1.0 E.U./dL    Fungus Culture, Blood - Blood, Arm, Left [[id number]] Collected:  07/10/18 1130    Specimen:  Blood from Arm, Left Updated:  07/10/18 1735    Fungus Culture, Blood - Blood, Arm, Left [[id number]] Collected:  07/10/18 1200    Specimen:  Blood from Hand, Left Updated:  07/10/18 1735    TSH [[id number]]  (Normal) Collected:  07/10/18 0411    Specimen:  Blood Updated:  07/10/18 1238     TSH 0.814 mIU/mL     Procalcitonin [[id number]]  (Normal) Collected:  07/10/18 0411    Specimen:  Blood Updated:  07/10/18 1207     Procalcitonin 0.15 ng/mL     Narrative:       As a Marker for Sepsis (Non-Neonates):   1. <0.5 ng/mL represents a low risk of severe sepsis and/or septic shock.  2. >2 ng/mL represents a high risk of severe sepsis and/or septic shock.    As a Marker for Lower Respiratory Tract Infections that require antibiotic therapy:    PCT on Admission     Antibiotic Therapy       6-12 Hrs later  > 0.5                Strongly Recommended             >0.25 - <0.5         Recommended  0.1 - 0.25           Discouraged              Remeasure/reassess PCT  <0.1                 Strongly Discouraged     Remeasure/reassess PCT                     PCT values of < 0.5 ng/mL do not exclude an infection, because localized infections (without systemic signs) may be  associated with such low concentrations, or a systemic infection in its initial stages (< 6 hours). Furthermore, increased PCT can occur without infection. PCT concentrations between 0.5 and 2.0 ng/mL should be interpreted taking into account the patient's history. It is recommended to retest PCT within 6-24 hours if any concentrations < 2 ng/mL are obtained.    C-reactive Protein [[id number]]  (Abnormal) Collected:  07/10/18 0411    Specimen:  Blood Updated:  07/10/18 1207     C-Reactive Protein 9.27 (H) mg/dL     Sedimentation Rate [[id number]]  (Abnormal) Collected:  07/10/18 0411    Specimen:  Blood Updated:  07/10/18 1153     Sed Rate 35 (H) mm/hr           Imaging Results (last 72 hours)     Procedure Component Value Units Date/Time    SCANNED - IMAGING [[id number]] Resulted:  07/09/18      Updated:  07/12/18 1203    CT Abdomen Pelvis Without Contrast [[id number]] Collected:  07/12/18 0707     Updated:  07/12/18 0719    Narrative:       CT ABDOMEN AND PELVIS, NONCONTRAST, 7/11/2018     HISTORY:  82-year-old male admitted to the hospital 2 days ago with sacral  decubitus wound, lower extremity edema, low back pain and cardiac  arrhythmia. Bacteremia is noted.     TECHNIQUE:  CT examination of the abdomen and pelvis was performed without IV  contrast due to abnormal renal function. Radiation dose reduction  techniques included automated exposure control or exposure modulation  based on body size. Radiation audit for CT and nuclear cardiology exams  in the last 12 months: 0.     COMPARISON:  *  CT abdomen/pelvis, 12/15/2016.  *  CT chest, 12/8/2016.     ABDOMEN FINDINGS:  Multiple large gallstones are present with thin the gallbladder. There  is no bile duct dilatation. Liver, pancreas and spleen are normal in  size and appearance without contrast.     The kidneys are unobstructed. Suspected small typical and hyperdense  right renal cysts are incompletely evaluated without contrast but are  unchanged since the study  of 12/15/2016.     Moderate sigmoid diverticulosis. There is mild wall thickening involving  the mid sigmoid colon with some adjacent mild soft tissue stranding in  the left lower pelvic pericolonic fat. Mild acute diverticulitis is  suspected. No evidence of abscess, bowel perforation or bowel  obstruction. Remaining segments of small bowel and colon are normal in  caliber and appearance. Normal appendix.     PELVIS FINDINGS:  Urinary bladder, prostate and rectum are within normal limits. Tiny  bilateral fat-containing inguinal hernias.     Cutaneous and subcutaneous decubitus wound changes in the midline  posterior and inferior to the lower sacrococcygeal spine. No abscess or  other fluid collection is seen. Underlying osseous structures are  unremarkable.     Lower chest images show low ascending thoracic aortic aneurysm measuring  up to 5.4 cm. This segment is unchanged since CT chest, 12/8/2016.       Impression:       1. CT findings suggesting mild acute diverticulitis involving the mid  sigmoid colon in the left lower pelvis. No evidence of abscess or other  complicating feature.  2. Cholelithiasis. Multiple large gallstones within the gallbladder. No  bile duct dilatation.  3. Cutaneous and subcutaneous decubitus wounds changes posterior  inferior to the lower sacrococcygeal spine. No abscess or additional  complicating feature is visible.  4. Ascending thoracic aortic aneurysm, unchanged since 12/8/2016.     This report was finalized on 7/12/2018 7:17 AM by Dr. Jefferson Bowen MD.       MRI Lumbar Spine With & Without Contrast [[id number]] Updated:  07/11/18 1443    US Venous Doppler Lower Extremity Bilateral (duplex) [[id number]] Collected:  07/10/18 1733     Updated:  07/10/18 1735    Narrative:       VENOUS DOPPLER ULTRASOUND, BILATERAL LOWER EXTREMITIES, 7/10/2018     HISTORY:   82-year-old male with two week history of bilateral lower extremity  edema.     TECHNIQUE:   Venous Doppler ultrasound  examination of both legs was performed using  grey-scale, spectral Doppler, and color flow Doppler ultrasound imaging.     FINDINGS:   The examination is negative.  There is no evidence of deep venous  thrombosis from the groin to the lower calf bilaterally. The greater  saphenous veins are also patent.       Impression:       Negative examination.  No evidence of lower extremity DVT on the right  or left.     This report was finalized on 7/10/2018 5:33 PM by Dr. Jefferson Bowen MD.               Medication Review:     Hospital Medications (active)       Dose Frequency Start End    acetaminophen (TYLENOL) tablet 650 mg 650 mg Every 4 Hours PRN 7/10/2018     Sig - Route: Take 2 tablets by mouth Every 4 (Four) Hours As Needed for Mild Pain , Headache or Fever. - Oral    ampicillin-sulbactam (UNASYN) injection 3 g 3 g Every 6 Hours 7/13/2018 7/18/2018    Sig - Route: Inject 3 g into the shoulder, thigh, or buttocks Every 6 (Six) Hours. - Intramuscular    aspirin chewable tablet 162 mg 162 mg Once 7/13/2018 7/13/2018    Sig - Route: Chew 2 tablets 1 (One) Time. - Oral    aspirin chewable tablet 81 mg 81 mg Daily 7/9/2018     Sig - Route: Chew 1 tablet Daily. - Oral    b complex-C-folic acid capsule 1 mg 1 capsule Daily 7/9/2018     Sig - Route: Take 1 capsule by mouth Daily. - Oral    bumetanide (BUMEX) tablet 2 mg 2 mg Daily 7/13/2018     Sig - Route: Take 2 tablets by mouth Daily. - Oral    cetirizine (zyrTEC) tablet 5 mg 5 mg Daily 7/9/2018     Sig - Route: Take 0.5 tablets by mouth Daily. - Oral    diltiaZEM (CARDIZEM) injection 20 mg 20 mg Once 7/12/2018 7/12/2018    Sig - Route: Infuse 4 mL into a venous catheter 1 (One) Time. - Intravenous    diltiaZEM CD (CARDIZEM CD) 24 hr capsule 300 mg 300 mg Every 24 Hours Scheduled 7/13/2018     Sig - Route: Take 300 mg by mouth Daily. - Oral    guaiFENesin (MUCINEX) 12 hr tablet 600 mg 600 mg Daily 7/9/2018     Sig - Route: Take 1 tablet by mouth Daily. - Oral     "HYDROmorphone (DILAUDID) injection 0.25 mg 0.25 mg Every 2 Hours PRN 7/10/2018 7/20/2018    Sig - Route: Infuse 0.25 mL into a venous catheter Every 2 (Two) Hours As Needed for Moderate Pain  or Severe Pain . - Intravenous    lactated ringers infusion 500 mL 500 mL Once 7/13/2018 7/13/2018    Sig - Route: Infuse 500 mL into a venous catheter 1 (One) Time. - Intravenous    lactobacillus acidophilus (RISAQUAD) capsule 1 capsule 1 capsule Daily 7/12/2018     Sig - Route: Take 1 capsule by mouth Daily. - Oral    levETIRAcetam (KEPPRA) tablet 500 mg 500 mg Every 12 Hours Scheduled 7/9/2018     Sig - Route: Take 1 tablet by mouth Every 12 (Twelve) Hours. - Oral    LORazepam (ATIVAN) injection 1 mg 1 mg Once As Needed 7/11/2018     Sig - Route: Infuse 0.5 mL into a venous catheter 1 (One) Time As Needed for Anxiety (for CT scan). - Intravenous    Magnesium Sulfate 2 gram / 50mL Infusion (GIVE X 3 BAGS TO EQUAL 6GM TOTAL DOSE) - Mg 1.1 - 1/5 mg/dl 2 g As Needed 7/10/2018     Sig - Route: Infuse 50 mL into a venous catheter As Needed (See Administration Instructions). - Intravenous    Linked Group 1:  \"Or\" Linked Group Details        Magnesium Sulfate 2 gram Bolus, followed by 8 gram infusion (total Mg dose 10 grams)- Mg less than or equal to 1mg/dL 2 g As Needed 7/10/2018     Sig - Route: Infuse 50 mL into a venous catheter As Needed (See Administration Instructions). - Intravenous    Linked Group 1:  \"Or\" Linked Group Details        Magnesium Sulfate 4 gram infusion- Mg 1.6-1.9 mg/dL 4 g As Needed 7/10/2018     Sig - Route: Infuse 100 mL into a venous catheter As Needed (See Administration Instructions). - Intravenous    Linked Group 1:  \"Or\" Linked Group Details        methocarbamol (ROBAXIN) tablet 500 mg 500 mg 2 Times Daily PRN 7/9/2018     Sig - Route: Take 1 tablet by mouth 2 (Two) Times a Day As Needed for Muscle Spasms. - Oral    metoprolol succinate XL (TOPROL-XL) 24 hr tablet 100 mg 100 mg Every 24 Hours " "Scheduled 7/9/2018     Sig - Route: Take 2 tablets by mouth Daily. - Oral    metoprolol tartrate (LOPRESSOR) 5 MG/5ML injection  - ADS Override Pull   7/13/2018 7/13/2018    Notes to Pharmacy: Created by cabinet override    metoprolol tartrate (LOPRESSOR) injection 2.5 mg 2.5 mg Once 7/13/2018 7/13/2018    Sig - Route: Infuse 2.5 mL into a venous catheter 1 (One) Time. - Intravenous    metoprolol tartrate (LOPRESSOR) injection 5 mg 5 mg Once 7/13/2018     Sig - Route: Infuse 5 mL into a venous catheter 1 (One) Time. - Intravenous    nitroglycerin (NITROSTAT) 0.4 MG SL tablet  - ADS Override Pull   7/12/2018 7/12/2018    Notes to Pharmacy: Created by cabinet override    nitroglycerin (NITROSTAT) ointment 1 inch 1 inch Once 7/13/2018     Sig - Route: Apply 1 inch topically 1 (One) Time. - Topical    nitroglycerin (NITROSTAT) SL tablet 0.4 mg 0.4 mg Every 5 Minutes PRN 7/12/2018     Sig - Route: Place 1 tablet under the tongue Every 5 (Five) Minutes As Needed for Chest Pain (times 3 as needed). - Sublingual    Pharmacy to dose vancomycin  Continuous PRN 7/11/2018 8/8/2018    Sig - Route: Continuous As Needed for Consult. - Does not apply    polyethylene glycol (MIRALAX) powder 17 g 17 g Daily 7/10/2018     Sig - Route: Take 17 g by mouth Daily. - Oral    potassium chloride (K-DUR,KLOR-CON) CR tablet 40 mEq 40 mEq As Needed 7/10/2018     Sig - Route: Take 2 tablets by mouth As Needed (potassium replacement.  see admin instructions). - Oral    Linked Group 2:  \"Or\" Linked Group Details        potassium chloride (KLOR-CON) packet 40 mEq 40 mEq As Needed 7/10/2018     Sig - Route: Take 40 mEq by mouth As Needed (potassium replacement, see admin instructions). - Oral    Linked Group 2:  \"Or\" Linked Group Details        potassium chloride 10 mEq in 100 mL IVPB 10 mEq Every 1 Hour PRN 7/10/2018     Sig - Route: Infuse 100 mL into a venous catheter Every 1 (One) Hour As Needed (potassium protocol PERIPHERAL - see admin " "instructions). - Intravenous    Linked Group 2:  \"Or\" Linked Group Details        rivaroxaban (XARELTO) tablet 15 mg 15 mg Daily 7/9/2018     Sig - Route: Take 1 tablet by mouth Daily. - Oral    sennosides-docusate sodium (SENOKOT-S) 8.6-50 MG tablet 2 tablet 2 tablet 2 Times Daily 7/10/2018     Sig - Route: Take 2 tablets by mouth 2 (Two) Times a Day. - Oral    sodium chloride 0.9 % flush 1-10 mL 1-10 mL As Needed 7/9/2018     Sig - Route: Infuse 1-10 mL into a venous catheter As Needed for Line Care. - Intravenous    sodium chloride 0.9 % infusion  - ADS Override Pull   7/11/2018 7/12/2018    Notes to Pharmacy: Created by cabinet override    sodium chloride 0.9 % infusion 40 mL 40 mL As Needed 7/9/2018     Sig - Route: Infuse 40 mL into a venous catheter As Needed for Line Care. - Intravenous    vancomycin (VANCOCIN) 500 MG injection  - ADS Override Pull   7/11/2018 7/12/2018    Notes to Pharmacy: Created by [employer] override    diltiaZEM CD (CARDIZEM CD) 24 hr capsule 240 mg (Discontinued) 240 mg Every 24 Hours Scheduled 7/10/2018 7/12/2018    Sig - Route: Take 2 capsules by mouth Daily. - Oral    metoprolol tartrate (LOPRESSOR) tablet 25 mg (Discontinued) 25 mg Once 7/13/2018 7/13/2018    Sig - Route: Take 1 tablet by mouth 1 (One) Time. - Oral    piperacillin-tazobactam (ZOSYN) 3.375 g/100 mL 0.9% NS IVPB (mbp) (Discontinued) 3.375 g Every 8 Hours 7/11/2018 7/13/2018    Sig - Route: Infuse 100 mL into a venous catheter Every 8 (Eight) Hours. - Intravenous    vancomycin 1500 mg/250 mL 0.9% NS IVPB (Discontinued) 1,500 mg Every 12 Hours 7/11/2018 7/13/2018    Sig - Route: Infuse 250 mL into a venous catheter Every 12 (Twelve) Hours. - Intravenous          ampicillin-sulbactam 3 g Intramuscular Q6H   aspirin 81 mg Oral Daily   b complex-C-folic acid 1 capsule Oral Daily   bumetanide 2 mg Oral Daily   cetirizine 5 mg Oral Daily   diltiaZEM  mg Oral Q24H   guaiFENesin 600 mg Oral Daily   lactobacillus " acidophilus 1 capsule Oral Daily   levETIRAcetam 500 mg Oral Q12H   metoprolol succinate  mg Oral Q24H   metoprolol tartrate      metoprolol tartrate 5 mg Intravenous Once   nitroglycerin 1 inch Topical Once   polyethylene glycol 17 g Oral Daily   rivaroxaban 15 mg Oral Daily   sennosides-docusate sodium 2 tablet Oral BID       Assessment/Plan       Principal Problem:    Bacteremia due to Escherichia coli  Active Problems:    Hypertension    Stage 3 chronic kidney disease    Decubitus ulcer of sacral region    Abnormal echocardiogram    Atrial fibrillation and flutter (CMS/HCC)    Leg swelling    Bacteremia due to Enterococcus    Diverticulitis Better      Plan :    Change  To IV Unasyn  No need For SKIP  Change in few days  to PO  Augmentin 875 mg BID for 10 days    Thank you    Liborio Cantu MD  07/13/18  9:12 AM

## 2018-07-13 NOTE — PLAN OF CARE
Problem: Patient Care Overview  Goal: Plan of Care Review  Outcome: Ongoing (interventions implemented as appropriate)   07/13/18 0509   Coping/Psychosocial   Plan of Care Reviewed With patient   Plan of Care Review   Progress improving   OTHER   Outcome Summary 0300Arrived on the floor HR SVT 150s'. LR 500cc bolus given per MD. Metoprolol held due to decrease in Blood pressure md aware. 0430 Heart rate/rhythm improved .        Problem: Skin Injury Risk (Adult)  Goal: Skin Health and Integrity  Outcome: Ongoing (interventions implemented as appropriate)      Problem: Fall Risk (Adult)  Goal: Absence of Fall  Outcome: Ongoing (interventions implemented as appropriate)      Problem: Pain, Acute (Adult)  Goal: Acceptable Pain Control/Comfort Level  Outcome: Ongoing (interventions implemented as appropriate)

## 2018-07-13 NOTE — PROGRESS NOTES
Interium Note:    CC: Called by nurse to eval pt for SVT w/ HR's in the 160's.     S: On exam, pt was sitting up in bed, diaphoretic, sweating in acute distress, complaining of anterior chest pressure radiating down the left arm that awoke him from sleep. Had been on going for about 5 minutes at that point.    Resolved with nitro x2.     Has history of CABG years ago. Does not remember pain characteristics prior to cabg    O:  Vitals:    07/12/18 2345   BP: 126/82   Pulse: (!) 160   Resp:    Temp:    SpO2:        PE:  Diaphoretic, in acute distress  PERRLA, EOMI  No JVD visulaized  Regular, Tachycardiac, Prominent S1, no murmurs, gallops or rubs,   CTAB, no w/r/c  BS x4, soft, ndnt  LE edema similar to prior exam from day before, 2+ pitting to knees  Radial pulse 2+ b/l    EKG independently read and also discussed with cardiology, tachycardiac, with no signs of acute ischemia. Pt has a baseline RBBB.     A/P:  Chest pain with cardiac features in pt w/ risk factors and probable endocarditis  - ISABELLE 4  - Pt's metop and asa has been held in the AM due to being NPO for possible SKIP to diagnosis probable endocarditis.   - Pt given iv metop, 162mg asa under tongue  - On elliquis  - ACS heparin not started 2/2 pt without signs of acute ischemia on ekg  - Initial Trop Neg  - Will trend trops Q3H until AM, with timed ekgs  - Questions by family members answered    TIME: 23:21 to 00:00

## 2018-07-13 NOTE — PLAN OF CARE
Problem: Pain, Acute (Adult)  Goal: Identify Related Risk Factors and Signs and Symptoms  Outcome: Ongoing (interventions implemented as appropriate)   07/13/18 1731   Pain, Acute (Adult)   Signs and Symptoms (Acute Pain) alteration in muscle tone, pt gait weak but able to transfer to chair and BSC with assist x 1 and walker. Pain improved with activity & well controlled with meds this shift.      Goal: Acceptable Pain Control/Comfort Level  Outcome: Ongoing (interventions implemented as appropriate)   07/13/18 1731   Pain, Acute (Adult)   Acceptable Pain Control/Comfort Level making progress toward outcome       Noted elevated troponin, increased on recheck  This shift , notified cardiology spoke with

## 2018-07-13 NOTE — PROGRESS NOTES
"SERVICE: [facility] HOSPITALIST    CONSULTANTS: Cardiology/ID/GI    CHIEF COMPLAINT: f/u sepsis/bacteremia    SUBJECTIVE: The patient reports he \"felt awful\" overnight when his heart rate jumped. He notes \"I felt out of control\". He also reports chest pain radiating to left arm, diaphoresis relieved with nitroglycerin and IV medication. He reports having a BM when this happened. He reports no abdominal pain currently at all. He otherwise denies f/c/cough/soa/chest pain/n/v/d/abdominal pain or other new concerns.    OBJECTIVE:    /64 (BP Location: Right arm, Patient Position: Sitting)   Pulse 94   Temp 98.4 °F (36.9 °C) (Oral)   Resp 20   Ht 190.5 cm (75\")   Wt 87.3 kg (192 lb 7.4 oz)   SpO2 95%   BMI 24.06 kg/m²     MEDS/LABS REVIEWED AND ORDERED    ampicillin-sulbactam 3 g Intravenous Q6H   aspirin 81 mg Oral Daily   b complex-C-folic acid 1 capsule Oral Daily   bumetanide 2 mg Oral Daily   cetirizine 5 mg Oral Daily   diltiaZEM  mg Oral Q24H   guaiFENesin 600 mg Oral Daily   lactobacillus acidophilus 1 capsule Oral Daily   levETIRAcetam 500 mg Oral Q12H   metoprolol succinate  mg Oral Q24H   metoprolol tartrate      metoprolol tartrate 5 mg Intravenous Once   nitroglycerin 1 inch Topical Once   polyethylene glycol 17 g Oral Daily   rivaroxaban 15 mg Oral Daily   sennosides-docusate sodium 2 tablet Oral BID     Physical Exam   Constitutional: He is oriented to person, place, and time. He appears well-developed and well-nourished.   HENT:   Head: Normocephalic and atraumatic.   Eyes: EOM are normal. Pupils are equal, round, and reactive to light.   Cardiovascular: Normal rate and regular rhythm.    Pulmonary/Chest: Effort normal and breath sounds normal.   Abdominal: Soft. Bowel sounds are normal. He exhibits no distension. There is no tenderness. There is no guarding.   Musculoskeletal:   R>L 1+ pitting LE edema   Neurological: He is alert and oriented to person, place, " and time.   Skin: Skin is warm and dry. No erythema.   Psychiatric: He has a normal mood and affect. His behavior is normal.   Vitals reviewed.    LAB/DIAGNOSTICS:    Lab Results (last 24 hours)     Procedure Component Value Units Date/Time    Hemoglobin A1c [[id number]]  (Normal) Collected:  07/13/18 0225    Specimen:  Blood Updated:  07/13/18 0916     Hemoglobin A1C 5.00 %     Narrative:       Hemoglobin A1C Ranges:    Increased Risk for Diabetes  5.7% to 6.4%  Diabetes                     >= 6.5%  Diabetic Goal                < 7.0%    Blood Culture - Blood, [[id number]]  (Abnormal) Collected:  07/11/18 1008    Specimen:  Blood from Hand, Right Updated:  07/13/18 0702     Blood Culture Enterococcus faecalis (A)     Gram Stain Result Aerobic Bottle Gram positive cocci in chains    Blood Culture - Blood, [[id number]]  (Abnormal) Collected:  07/11/18 1009    Specimen:  Blood from Hand, Left Updated:  07/13/18 0653     Blood Culture Enterococcus faecalis (A)     Gram Stain Result Anaerobic Bottle Gram positive cocci in chains      Aerobic Bottle Gram positive cocci in chains    Blood Culture - Blood, Arm, Left [[id number]]  (Abnormal) Collected:  07/10/18 1130    Specimen:  Blood from Arm, Left Updated:  07/13/18 0653     Blood Culture Enterococcus faecalis (A)     Gram Stain Result Aerobic Bottle Gram positive cocci in chains      Aerobic Bottle Gram negative bacilli    Blood Culture - Blood, Arm, Left [[id number]]  (Abnormal)  (Susceptibility) Collected:  07/10/18 1200    Specimen:  Blood from Arm, Left Updated:  07/13/18 0650     Blood Culture Escherichia coli (A)      Enterococcus faecalis (A)     Gram Stain Result Aerobic Bottle Gram positive cocci in chains    Susceptibility      Escherichia coli     CRUZ     Ampicillin 4 ug/ml Susceptible     Ampicillin + Sulbactam <=2 ug/ml Susceptible     Cefazolin <=4 ug/ml Susceptible     Cefepime <=1 ug/ml Susceptible     Cefoxitin <=4 ug/ml Susceptible     Ceftriaxone <=1  ug/ml Susceptible     Ertapenem <=0.5 ug/ml Susceptible     Gentamicin <=1 ug/ml Susceptible     Levofloxacin <=0.12 ug/ml Susceptible     Meropenem <=0.25 ug/ml Susceptible     Piperacillin + Tazobactam <=4 ug/ml Susceptible     Trimethoprim + Sulfamethoxazole <=20 ug/ml Susceptible                    Troponin [[id number]]  (Abnormal) Collected:  07/13/18 0551    Specimen:  Blood Updated:  07/13/18 0624     Troponin T 0.073 (H) ng/mL     Narrative:       Troponin T Reference Ranges:  Less than 0.03 ng/mL:    Negative for AMI  0.03 to 0.09 ng/mL:      Indeterminant for AMI  Greater than 0.09 ng/mL: Positive for AMI    Troponin [[id number]]  (Normal) Collected:  07/13/18 0225    Specimen:  Blood Updated:  07/13/18 0245     Troponin T 0.028 ng/mL     Narrative:       Troponin T Reference Ranges:  Less than 0.03 ng/mL:    Negative for AMI  0.03 to 0.09 ng/mL:      Indeterminant for AMI  Greater than 0.09 ng/mL: Positive for AMI    Basic Metabolic Panel [[id number]]  (Abnormal) Collected:  07/13/18 0225    Specimen:  Blood Updated:  07/13/18 0244     Glucose 116 (H) mg/dL      BUN 20 mg/dL      Creatinine 0.99 mg/dL      Sodium 134 (L) mmol/L      Potassium 4.0 mmol/L      Chloride 97 (L) mmol/L      CO2 25.0 mmol/L      Calcium 9.2 mg/dL      eGFR Non African Amer 73 mL/min/1.73      BUN/Creatinine Ratio 20.2     Anion Gap 12.0 mmol/L     Narrative:       The MDRD GFR formula is only valid for adults with stable renal function between ages 18 and 70.    CBC & Differential [[id number]] Collected:  07/13/18 0225    Specimen:  Blood Updated:  07/13/18 0234    Narrative:       The following orders were created for panel order CBC & Differential.  Procedure                               Abnormality         Status                     ---------                               -----------         ------                     Scan Slide[[id number]]                                                                  CBC Auto  Differential[[id number]]        Abnormal            Final result                 Please view results for these tests on the individual orders.    CBC Auto Differential [[id number]]  (Abnormal) Collected:  07/13/18 0225    Specimen:  Blood Updated:  07/13/18 0229     WBC 18.33 (H) 10*3/mm3      RBC 3.67 (L) 10*6/mm3      Hemoglobin 9.0 (L) g/dL      Hematocrit 26.9 (L) %      MCV 73.3 (L) fL      MCH 24.5 (L) pg      MCHC 33.5 g/dL      RDW 18.0 (H) %      RDW-SD 47.5 fl      MPV 9.1 fL      Platelets 561 (H) 10*3/mm3      Neutrophil % 75.2 (H) %      Lymphocyte % 15.3 (L) %      Monocyte % 8.7 (H) %      Eosinophil % 0.2 %      Basophil % 0.2 %      Immature Grans % 0.4 %      Neutrophils, Absolute 13.78 (H) 10*3/mm3      Lymphocytes, Absolute 2.80 10*3/mm3      Monocytes, Absolute 1.60 (H) 10*3/mm3      Eosinophils, Absolute 0.04 (L) 10*3/mm3      Basophils, Absolute 0.03 10*3/mm3      Immature Grans, Absolute 0.08 (H) 10*3/mm3      nRBC 0.0 /100 WBC     Troponin [[id number]]  (Normal) Collected:  07/12/18 2336    Specimen:  Blood Updated:  07/12/18 2358     Troponin T <0.010 ng/mL     Narrative:       Troponin T Reference Ranges:  Less than 0.03 ng/mL:    Negative for AMI  0.03 to 0.09 ng/mL:      Indeterminant for AMI  Greater than 0.09 ng/mL: Positive for AMI    Vancomycin, Random [[id number]]  (Normal) Collected:  07/12/18 2209    Specimen:  Blood Updated:  07/12/18 2241     Vancomycin Random 25.60 mcg/mL     Gentamicin Level, Trough [[id number]]  (Normal) Collected:  07/12/18 1800    Specimen:  Blood Updated:  07/12/18 2120     Gentamicin Trough 0.60 mcg/mL         ECG 12 Lead   Preliminary Result   RR Interval= 619 ms   PA Interval= 180 ms   QRSD Interval= 102 ms   QT Interval= 360 ms   QTc Interval= 458 ms   Heart Rate= 97 ms   P Axis= 46 deg   QRS Axis= 41 deg   T Wave Axis= 26 deg   I: 40 Axis= 29 deg   T: 40 Axis= 120 deg   ST Axis= 0 deg   SINUS RHYTHM   PROBABLE LEFT ATRIAL ABNORMALITY   LOW VOLTAGE IN  FRONTAL LEADS   PROBABLE POSTERIOR INFARCT   Electronically Signed by:   Date and Time of Study: 2018-07-13 05:41:26      ECG 12 Lead   Preliminary Result   RR Interval= 373 ms   OR Interval= ms   QRSD Interval= 86 ms   QT Interval= 316 ms   QTc Interval= 517 ms   Heart Rate= 161 ms   P Axis= 0 deg   QRS Axis= 31 deg   T Wave Axis= 53 deg   I: 40 Axis= 43 deg   T: 40 Axis= deg   ST Axis= 198 deg   SUPRAVENTRICULAR TACHYCARDIA   VENTRICULAR BIGEMINY   BORDERLINE LOW VOLTAGE IN FRONTAL LEADS   ST DEPRESSION, PROBABLY RATE RELATED   ARTIFACT IN LEAD(S) I,II,III,aVR,aVL,V1   Electronically Signed by:   Date and Time of Study: 2018-07-13 02:28:25      ECG 12 Lead   Preliminary Result   RR Interval= 373 ms   OR Interval= ms   QRSD Interval= 94 ms   QT Interval= 308 ms   QTc Interval= 504 ms   Heart Rate= 161 ms   P Axis= 0 deg   QRS Axis= 43 deg   T Wave Axis= 69 deg   I: 40 Axis= 34 deg   T: 40 Axis= 185 deg   ST Axis= 160 deg   SUPRAVENTRICULAR TACHYCARDIA   VENTRICULAR BIGEMINY   ABERRANT COMPLEX   BORDERLINE LOW VOLTAGE IN FRONTAL LEADS   REPOLARIZATION ABNORMALITY, PROB RATE RELATED   BASELINE WANDER IN LEAD(S) V1   Electronically Signed by:   Date and Time of Study: 2018-07-12 23:36:29      ECG 12 Lead   Final Result   RR Interval= 405 ms   OR Interval= ms   QRSD Interval= 94 ms   QT Interval= 304 ms   QTc Interval= 478 ms   Heart Rate= 148 ms   P Axis= deg   QRS Axis= 36 deg   T Wave Axis= 72 deg   I: 40 Axis= 28 deg   T: 40 Axis= 47 deg   ST Axis= 178 deg   JUNCTIONAL TACHYCARDIA   LOW VOLTAGE IN FRONTAL LEADS   REPOLARIZATION ABNORMALITY, PROB RATE RELATED   BORDERLINE PROLONGED QT INTERVAL   NO PRIOR TRACING AVAILABLE FOR COMPARISON   Electronically Signed by:  Gonzalo Collins ([facility]) 12-Jul-2018 20:20:06   Date and Time of Study: 2018-07-12 11:32:45      ECG 12 Lead   Final Result   RR Interval= 594 ms   OR Interval= 168 ms   QRSD Interval= 120 ms   QT Interval= 368 ms   QTc Interval= 477 ms   Heart Rate= 101  ms   P Axis= 16 deg   QRS Axis= 19 deg   T Wave Axis= 3 deg   I: 40 Axis= 5 deg   T: 40 Axis= 113 deg   ST Axis= -14 deg   SINUS TACHYCARDIA   IVCD, CONSIDER ATYPICAL RBBB   CONSIDER ANTERIOR INFARCT   NO SIGNIFICANT CHANGE FROM PREVIOUS ECG   Electronically Signed by:  Gonzalo Collins ([facility]) 12-Jul-2018 20:19:43   Date and Time of Study: 2018-07-12 12:15:46      ECG 12 Lead   Final Result   RR Interval= 571 ms   OR Interval= ms   QRSD Interval= 140 ms   QT Interval= 384 ms   QTc Interval= 508 ms   Heart Rate= 105 ms   P Axis= deg   QRS Axis= -25 deg   T Wave Axis= -26 deg   I: 40 Axis= 13 deg   T: 40 Axis= 141 deg   ST Axis= -22 deg   ATRIAL FLUTTER, A-RATE 258   RIGHT BUNDLE BRANCH BLOCK   NO SIGNIFICANT CHANGE FROM PREVIOUS ECG   Electronically Signed by:  Naeem Laughlin ([facility]) 10-Jul-2018 15:30:48   Date and Time of Study: 2018-07-10 07:21:18        Results for orders placed during the hospital encounter of 07/09/18   Adult Transthoracic Echo Complete W/ Cont if Necessary Per Protocol    Addendum · Left ventricular systolic function is normal. Calculated EF = 66%.  Estimated EF was in agreement with the calculated EF. Estimated EF = 68%.  Normal left ventricular cavity size noted. All left ventricular wall  segments contract normally. Left ventricular wall thickness is consistent  with mild concentric hypertrophy. Left ventricular diastolic function was  indeterminate. · Left atrial volume is severely increased. · Right atrial cavity size is severely dilated. · The aortic valve is abnormal in structure. There is severe thickening of  the aortic valve. The aortic valve is severely thickened with hazy density  on the ventricular surface of the aortic valve. Cannot rule out vegetative  process. Consider transesophageal echocardiogram to assess further.. · The mitral valve is abnormal in structure. The anterior mitral valve  leaflet is thickened on the ventricular surface. Suspicious for possible  vegetative process.  Consider transesophageal echocardiogram if clinically  indicated.. Mild mitral valve regurgitation is present. · Mild tricuspid valve regurgitation is present. Estimated right  ventricular systolic pressure from tricuspid regurgitation is normal (<35  mmHg). · Addendum: Mild dilation of the aortic root is present. Aortic root  measures 4.3 cm.        Alivia Mayo MD 7/10/2018 11:30 AM          Narrative · Left ventricular systolic function is normal. Calculated EF = 66%.   Estimated EF was in agreement with the calculated EF. Estimated EF = 68%.   Normal left ventricular cavity size noted. All left ventricular wall   segments contract normally. Left ventricular wall thickness is consistent   with mild concentric hypertrophy. Left ventricular diastolic function was   indeterminate.  · Left atrial volume is severely increased.  · Right atrial cavity size is severely dilated.  · The aortic valve is abnormal in structure. There is severe thickening of   the aortic valve. The aortic valve is severely thickened with hazy density   on the ventricular surface of the aortic valve. Cannot rule out vegetative   process. Consider transesophageal echocardiogram to assess further..  · The mitral valve is abnormal in structure. The anterior mitral valve   leaflet is thickened on the ventricular surface. Suspicious for possible   vegetative process. Consider transesophageal echocardiogram if clinically   indicated.. Mild mitral valve regurgitation is present.  · Mild tricuspid valve regurgitation is present. Estimated right   ventricular systolic pressure from tricuspid regurgitation is normal (<35   mmHg).        Ct Abdomen Pelvis Without Contrast    Result Date: 7/12/2018  1. CT findings suggesting mild acute diverticulitis involving the mid sigmoid colon in the left lower pelvis. No evidence of abscess or other complicating feature. 2. Cholelithiasis. Multiple large gallstones within the gallbladder. No bile duct dilatation. 3.  Cutaneous and subcutaneous decubitus wounds changes posterior inferior to the lower sacrococcygeal spine. No abscess or additional complicating feature is visible. 4. Ascending thoracic aortic aneurysm, unchanged since 12/8/2016.  This report was finalized on 7/12/2018 7:17 AM by Dr. Jefferson Bowen MD.      ASSESSMENT/PLAN:  1. Chest pain r/o ACS  2. Chronic Afib and flutter w/intermittent RVR/SVT:   3. CAD/Dyslipidemia/h/o CABG: cardiology following  SVT overnight, on xarelto, diltiazem, aspirin, bumex (allergic to statins)  Got IV metoprolol last night, spontaneously converted  Trop slightly elevated at 0.073, likely secondary to rate, EKG unchanged, repeat pending at noon  TSH normal    4. Sepsis secondary to Enterococcus and Pan-sensitive E-coli Bacteremia, r/o endocarditis vs diverticulitis vs buttock wound sources??: GI/ID/Cardiology following   Initial 2/2 blood cultures + and repeat cultures also with GPC growing  Trend sed rate/crp  WBCC remains elevated at 18.33, changed to Unasyn today per ID, then recommends change to augmentin x 10 days  Unclear source, suspect buttock wound/diverticulitis possible  Echo=possible vegetation?  SKIP pending possibly 7/16/18, cancelled today due to SVT  MRI L-spine pending  Monitor closely, possible transfer??    5. Chronic iron deficiency anemia/thrombocytosis:   Hemoglobin stable at 9.0    6. HTN: BP at/low goal on metoprolol succinate 100 mg daily/diltiazem  mg daily/bumex 2 mg daily     7. Sacral Ulcer: wound RN following  Continue wound care. CT showed no features concerning for abscess, await lumbar spine MRI , done 7/11/18, no report yet      8. Chronic back pain:   Robaxin helps, continue    9. H/O CVA/seizures:   No acute issues on ASA and home dose keppra. No acute issues currently.      10. CKD stage 2-3:   Creatinine baseline 1.0, currently 0.99, no acute issues      11. Hyponatremia/electrolyte imbalance: mild, asymptomatic at 134,  Electrolytes stable  after replacement, monitor    12. Cholelithiasis: no active abdominal pain, may explain    13. Ascending thoracic aortic aneurysm: unchanged by CT, no acute issues here

## 2018-07-13 NOTE — PROGRESS NOTES
Adult Nutrition  Assessment/PES    Patient Name:  Fahad Muhammad  YOB: 1935  MRN: [id number]  Admit Date:  7/9/2018    Assessment Date:  7/13/2018    Comments:  Pt with poor po intake & poor supplement intake per staff. Provided snack at visit (ok'd by RN)   Will initiate calorie count to assess po adequacy. Pt already with malnutrition, may need to consider alternate route of nutrition if pt unable to consume enough with po/supplement.           Reason for Assessment     Row Name 07/13/18 1525          Reason for Assessment    Reason For Assessment follow-up protocol     Diagnosis --   Transfer to ICU, Unable do SKIP, Sepsis due to bactermia r/o endocarditis vs diviriticulits, vs wound ? tranfer noted             Nutrition/Diet History     Row Name 07/13/18 1529          Nutrition/Diet History    Typical Food/Fluid Intake Spoke w pt & presumed spouse at bedside. Pt c/o b/c he got turkey sandwich for lunch & thought was chicken salad. Call to diet office confirmed chicken salad was selected for tonight. Pt admits frustration. Provided PB/Crackers (after ok with RN) at visit. Staff reports not eaten much of anything today or drank ENSURE.               Labs/Tests/Procedures/Meds     Row Name 07/13/18 1530          Labs/Procedures/Meds    Lab Results Reviewed reviewed        Diagnostic Tests/Procedures    Diagnostic Test/Procedure Reviewed reviewed     Diagnostic Test/Procedures Comments abd ct: acute divirticulitis per notes        Medications    Pertinent Medications Reviewed reviewed     Pertinent Medications Comments b/c/folic acid complex, bumex, risaquad, miralax, senekot             Physical Findings     Row Name 07/13/18 1530          Physical Findings    Skin pressure injury   gluteal               Nutrition Prescription Ordered     Row Name 07/13/18 1531          Nutrition Prescription PO    Supplement Ensure Plus     Supplement Frequency 3 times a day     Common Modifiers Low Sodium              Evaluation of Received Nutrient/Fluid Intake     Row Name 07/13/18 1531          Fluid Intake Evaluation    Oral Fluid (mL) 480   ave x 3 days, 25% (of CHF needs)         PO Evaluation    Number of Meals 7     % PO Intake 41             Problem/Interventions:        Problem 1     Row Name 07/13/18 1532          Nutrition Diagnoses Problem 1    Problem 1 Malnutrition     Etiology (related to) Factors Affecting Nutrition     Signs/Symptoms (evidenced by) Unintended Weight Change;Report/Observation;Report of Mnimal PO Intake   Pressure injury & physical signs of malnutrition     Unintended Weight Change Loss     Number of Pounds Lost 35.6#      Weight loss time period 3-4 months                     Intervention Goal     Row Name 07/13/18 1533          Intervention Goal    PO Increase intake;PO intake (%)     PO Intake % 75 %   or greater     Weight Appropriate weight loss   as indicated, noted bumex              Nutrition Intervention     Row Name 07/13/18 1533          Nutrition Intervention    RD/Tech Action Interview for preference;Encourage intake;Follow Tx progress               Education/Evaluation     Row Name 07/13/18 1533          Education    Education Other (comment)   Pt & family aware of low Na diet. Pt frustrated about lunch.         Monitor/Evaluation    Monitor Per protocol;I&O;PO intake;Supplement intake;Pertinent labs;Weight;Symptoms     Education Follow-up Other (comment)   Compliance as feasible.          Electronically signed by:  Keri Cleaning RD  07/13/18 3:34 PM

## 2018-07-13 NOTE — PLAN OF CARE
Problem: Patient Care Overview  Goal: Plan of Care Review  Outcome: Ongoing (interventions implemented as appropriate)  Pt has been sitting up in bedside chair most of the day, has been AFIB HR 's O2 sat 95% RA . Pt has not had CP or SOA, pt reports improvement on edema to BLE. Pt on vanc & unasyn.   Goal: Individualization and Mutuality  Outcome: Ongoing (interventions implemented as appropriate)      Problem: Skin Injury Risk (Adult)  Goal: Identify Related Risk Factors and Signs and Symptoms  Outcome: Ongoing (interventions implemented as appropriate)    Goal: Skin Health and Integrity  Outcome: Ongoing (interventions implemented as appropriate)      Problem: Fall Risk (Adult)  Goal: Identify Related Risk Factors and Signs and Symptoms  Outcome: Ongoing (interventions implemented as appropriate)    Goal: Absence of Fall  Outcome: Ongoing (interventions implemented as appropriate)      Problem: Pain, Acute (Adult)  Goal: Identify Related Risk Factors and Signs and Symptoms  Outcome: Ongoing (interventions implemented as appropriate)    Goal: Acceptable Pain Control/Comfort Level  Outcome: Ongoing (interventions implemented as appropriate)

## 2018-07-14 NOTE — PLAN OF CARE
Problem: Patient Care Overview  Goal: Plan of Care Review  Outcome: Ongoing (interventions implemented as appropriate)   07/14/18 1907   Coping/Psychosocial   Plan of Care Reviewed With patient;spouse   Plan of Care Review   Progress improving   OTHER   Outcome Summary see below for details. pt improved with activity level. case review with Dr Mayo/cardiology and Dr Cantu w/ID . SKIP to be done monday per md reccomendations. Pt had tachycardic episode in am with 's, responded well to am Cardizem, & metoprolol given. Hr 80;s and back in SR within 30 mins. Cardiology at bedside to assess and evaluate meds. xaralto increased to recc dose as renal dosing no-longer needed due to RF Improved  ( per dr Mayo).        Problem: Skin Injury Risk (Adult)  Goal: Skin Health and Integrity  Outcome: Ongoing (interventions implemented as appropriate)   07/14/18 1907   Skin Injury Risk (Adult)   Skin Health and Integrity making progress toward outcome       Problem: Fall Risk (Adult)  Goal: Absence of Fall  Outcome: Ongoing (interventions implemented as appropriate)   07/14/18 1907   Fall Risk (Adult)   Absence of Fall making progress toward outcome  (up to chair w/ assist x 1 and walker,, gait unsteady initially but improving daily )       Problem: Pain, Acute (Adult)  Goal: Acceptable Pain Control/Comfort Level  Outcome: Ongoing (interventions implemented as appropriate)   07/14/18 1907   Pain, Acute (Adult)   Acceptable Pain Control/Comfort Level making progress toward outcome  (pt waiting for MRI results from 7/11, dr valdez paged to unit this evening to review results with pt & family for better understanding of back pain.) patient states his  Pain is best controlled with muscle relaxer's . At rest he is generally at 2-3 on pain scale. Increases with activity        Problem: Infection, Risk/Actual (Adult)  Goal: Infection Prevention/Resolution  Outcome: Ongoing (interventions implemented as appropriate)   07/14/18 1907    Infection, Risk/Actual (Adult)   Infection Prevention/Resolution making progress toward outcome  (on Iv ampicillin, tolerating well )       Problem: Wound (Includes Pressure Injury) (Adult)  Goal: Signs and Symptoms of Listed Potential Problems Will be Absent, Minimized or Managed (Wound)  Outcome: Ongoing (interventions implemented as appropriate)   07/14/18 1907   Goal/Outcome Evaluation   Problems Assessed (Wound) all   Problems Present (Wound) situational response  (pt w/ open area on rt buttock inner fold) red, painful but pr refusing turns or pillow support due to back pain . mepilex in pace on bony areas, mandeep applied

## 2018-07-14 NOTE — NURSING NOTE
Pt very anxious and irritable this am with -160's.  Am meds given with focus on his Cardizem , metoprolol & xaralto. Denies any cp or soa though sats are 88-90% on room air. Placed on 2 liters NC, Notified cardiology, meds adjusted. wlil give digoxin as per verbal order from Dr Pedraza. Noted 's/60's, will monitor due to meds poss causing hypotension. K replaced per protocol. Relaxation advised.

## 2018-07-14 NOTE — PROGRESS NOTES
"    HOSPITALIST SERVICES  @ [facility], [place]            [facility] HOSPITALIST TEAM   PROGRESS NOTE      Patient Care Team:  Elisabeth Warren MD as PCP - General (Internal Medicine)  Alexsander Matute MD as Consulting Physician (Neurology)  MD Ry Goff III, MD as Consulting Physician (Cardiology)        Chief Complaint:        Increasing lower extremity swelling and shortness of breath      Subjective:      Mr. Fahad Muhammad is an 82 year old  male who was admitted on 7/9/2018 with above symptoms and CHF due to uncontrolled Atrial fibrillation      Interval History and ROS:     Patient States He has chronic backache which is chronic and MRI was done. Patient and his wife are very anxious to know the MRI report  Patient Complaints: Moderately severe backache  Patient Denies:  Any chest pain, shortness of breath, abdominal pain or n/v  History taken from: Patient, his wife and his nurse      Objective    Vital Signs  Temp:  [98.1 °F (36.7 °C)-98.7 °F (37.1 °C)] 98.1 °F (36.7 °C)  Heart Rate:  [] 82  Resp:  [18-22] 20  BP: ()/(55-97) 112/62    Flowsheet Rows      First Filed Value   Admission Height  190.5 cm (75\") Documented at 07/09/2018 1507   Admission Weight  87.3 kg (192 lb 6.4 oz) Documented at 07/09/2018 1507              PHYSICAL EXAMINATION:    Vital Signs - As above  General - Pleasant 82 year old male currently in no acute distress resting comfortably in bed but has been complaining of backache  Skin - Warm and dry, Decubitus ulcer sacral region      HEENT - Normocephalic, No scleral icterus.    Neck - Supple. Trachea midline. Thyroid gland normal and nontender. No carotid bruits. No JVD.   Chest - Chest symmetric. Lung clear to auscultation bilaterally.   Heart - Normal S1 and S2. No evidence of murmur/ gallop or rub.   Abdomen - Soft, no tenderness. Nondistended. Normal bowel sounds. No organomegally noted.   Extremities " Peripheral pulses - 2+ bilaterally.   Peripheral edema - 2+ ankle/pedal edema in lower extremities.   Musculoskeletal - Normal passive and active ROM in upper and lower extremities. No focal joint inflammation or abnormalities were noted.   Nervous System Mental status - Alert and oriented x 3. Cranial nerves - 2-12 intact. No focal sensory or motor deficits present.      Results Review:     I reviewed the patient's new clinical results.    Lab Results (last 24 hours)     Procedure Component Value Units Date/Time    Potassium [[id number]]  (Normal) Collected:  07/14/18 1655    Specimen:  Blood Updated:  07/14/18 1725     Potassium 3.9 mmol/L     Blood Culture - Blood, [[id number]]  (Abnormal) Collected:  07/11/18 1008    Specimen:  Blood from Hand, Right Updated:  07/14/18 0625     Blood Culture Enterococcus faecalis (A)     Gram Stain Result Aerobic Bottle Gram positive cocci in chains    Narrative:       Refer to previous blood culture collected on 07/10 at 1200 for CRUZ's.     Blood Culture - Blood, [[id number]]  (Abnormal) Collected:  07/11/18 1009    Specimen:  Blood from Hand, Left Updated:  07/14/18 0624     Blood Culture Enterococcus faecalis (A)     Gram Stain Result Anaerobic Bottle Gram positive cocci in chains      Aerobic Bottle Gram positive cocci in chains    Narrative:       Refer to previous blood culture collected on 07/10 at 1200 for CRUZ's.     Blood Culture - Blood, Arm, Left [[id number]]  (Abnormal) Collected:  07/10/18 1130    Specimen:  Blood from Arm, Left Updated:  07/14/18 0624     Blood Culture Enterococcus faecalis (A)     Gram Stain Result Aerobic Bottle Gram positive cocci in chains      Aerobic Bottle Gram negative bacilli    Narrative:       Refer to previous blood culture collected on 07/10 at 1200 for CRUZ's.     Blood Culture - Blood, Arm, Left [[id number]]  (Abnormal)  (Susceptibility) Collected:  07/10/18 1200    Specimen:  Blood from Arm, Left Updated:  07/14/18 0623     Blood Culture  Escherichia coli (A)      Enterococcus faecalis (A)     Gram Stain Result Aerobic Bottle Gram positive cocci in chains    Susceptibility      Escherichia coli    Enterococcus faecalis       CRUZ    CRUZ       Ampicillin 4 ug/ml Susceptible    <=2 ug/ml Susceptible       Ampicillin + Sulbactam <=2 ug/ml Susceptible             Cefazolin <=4 ug/ml Susceptible             Cefepime <=1 ug/ml Susceptible             Cefoxitin <=4 ug/ml Susceptible             Ceftriaxone <=1 ug/ml Susceptible             Ertapenem <=0.5 ug/ml Susceptible             Gentamicin <=1 ug/ml Susceptible             Gentamicin High Level Synergy       SYN-S  Susceptible       Levofloxacin <=0.12 ug/ml Susceptible             Meropenem <=0.25 ug/ml Susceptible             Piperacillin + Tazobactam <=4 ug/ml Susceptible             Trimethoprim + Sulfamethoxazole <=20 ug/ml Susceptible             Vancomycin       1 ug/ml Susceptible                      Comprehensive Metabolic Panel [[id number]]  (Abnormal) Collected:  07/14/18 0447    Specimen:  Blood from Arm, Right Updated:  07/14/18 0559     Glucose 103 (H) mg/dL      BUN 15 mg/dL      Creatinine 0.92 mg/dL      Sodium 140 mmol/L      Potassium 3.2 (L) mmol/L      Chloride 98 mmol/L      CO2 30.5 (H) mmol/L      Calcium 9.2 mg/dL      Total Protein 5.9 (L) g/dL      Albumin 2.70 (L) g/dL      ALT (SGPT) 16 U/L      AST (SGOT) 28 U/L      Alkaline Phosphatase 146 (H) U/L      Total Bilirubin 0.9 mg/dL      eGFR Non African Amer 79 mL/min/1.73      Globulin 3.2 gm/dL      A/G Ratio 0.8 g/dL      BUN/Creatinine Ratio 16.3     Anion Gap 11.5 mmol/L     Narrative:       The MDRD GFR formula is only valid for adults with stable renal function between ages 18 and 70.    Troponin [[id number]]  (Abnormal) Collected:  07/14/18 0447    Specimen:  Blood from Arm, Right Updated:  07/14/18 0552     Troponin T 0.107 (C) ng/mL     Narrative:       Troponin T Reference Ranges:  Less than 0.03 ng/mL:    Negative  for AMI  0.03 to 0.09 ng/mL:      Indeterminant for AMI  Greater than 0.09 ng/mL: Positive for AMI    Procalcitonin [[id number]]  (Normal) Collected:  07/14/18 0447    Specimen:  Blood from Arm, Right Updated:  07/14/18 0548     Procalcitonin 0.11 ng/mL     Narrative:       As a Marker for Sepsis (Non-Neonates):   1. <0.5 ng/mL represents a low risk of severe sepsis and/or septic shock.  2. >2 ng/mL represents a high risk of severe sepsis and/or septic shock.    As a Marker for Lower Respiratory Tract Infections that require antibiotic therapy:    PCT on Admission     Antibiotic Therapy       6-12 Hrs later  > 0.5                Strongly Recommended             >0.25 - <0.5         Recommended  0.1 - 0.25           Discouraged              Remeasure/reassess PCT  <0.1                 Strongly Discouraged     Remeasure/reassess PCT                     PCT values of < 0.5 ng/mL do not exclude an infection, because localized infections (without systemic signs) may be associated with such low concentrations, or a systemic infection in its initial stages (< 6 hours). Furthermore, increased PCT can occur without infection. PCT concentrations between 0.5 and 2.0 ng/mL should be interpreted taking into account the patient's history. It is recommended to retest PCT within 6-24 hours if any concentrations < 2 ng/mL are obtained.    Sedimentation Rate [[id number]]  (Normal) Collected:  07/14/18 0447    Specimen:  Blood from Arm, Right Updated:  07/14/18 0540     Sed Rate 10 mm/hr     CBC & Differential [[id number]] Collected:  07/14/18 0447    Specimen:  Blood Updated:  07/14/18 0539    Narrative:       The following orders were created for panel order CBC & Differential.  Procedure                               Abnormality         Status                     ---------                               -----------         ------                     Scan Slide[[id number]]                                                                   CBC Auto Differential[[id number]]        Abnormal            Final result                 Please view results for these tests on the individual orders.    CBC Auto Differential [[id number]]  (Abnormal) Collected:  07/14/18 0447    Specimen:  Blood from Arm, Right Updated:  07/14/18 0539     WBC 15.32 (H) 10*3/mm3      RBC 3.74 (L) 10*6/mm3      Hemoglobin 9.1 (L) g/dL      Hematocrit 27.6 (L) %      MCV 73.8 (L) fL      MCH 24.3 (L) pg      MCHC 33.0 g/dL      RDW 18.0 (H) %      RDW-SD 47.8 fl      MPV 9.5 fL      Platelets 619 (H) 10*3/mm3      Neutrophil % 75.7 (H) %      Lymphocyte % 11.9 (L) %      Monocyte % 11.0 (H) %      Eosinophil % 0.5 %      Basophil % 0.4 %      Immature Grans % 0.5 %      Neutrophils, Absolute 11.61 (H) 10*3/mm3      Lymphocytes, Absolute 1.82 10*3/mm3      Monocytes, Absolute 1.69 (H) 10*3/mm3      Eosinophils, Absolute 0.07 (L) 10*3/mm3      Basophils, Absolute 0.06 10*3/mm3      Immature Grans, Absolute 0.07 (H) 10*3/mm3      nRBC 0.0 /100 WBC     Narrative:       Appended report. These results have been appended to a previously verified report.    C-reactive Protein [[id number]]  (Abnormal) Collected:  07/14/18 0447    Specimen:  Blood from Arm, Right Updated:  07/14/18 0538     C-Reactive Protein 7.28 (H) mg/dL           Imaging Results (last 24 hours)     ** No results found for the last 24 hours. **          Xray not reviewed personally by physician.      ECG reviewed personally by physician  ECG/EMG Results (most recent)     Procedure Component Value Units Date/Time    Adult Transthoracic Echo Complete W/ Cont if Necessary Per Protocol [[id number]] Collected:  07/10/18 0739     Updated:  07/10/18 1130     BSA 2.2 m^2      IVSd 1.0 cm      LVIDd 5.1 cm      LVIDs 3.3 cm      LVPWd 0.84 cm      IVS/LVPW 1.2     FS 35.4 %      EDV(Teich) 126.1 ml      ESV(Teich) 44.8 ml      EF(Teich) 64.5 %      EDV(cubed) 135.8 ml      ESV(cubed) 36.6 ml      EF(cubed) 73.1 %      LV mass(C)d  175.2 grams      LV mass(C)dI 81.2 grams/m^2      SV(Teich) 81.3 ml      SI(Teich) 37.7 ml/m^2      SV(cubed) 99.2 ml      SI(cubed) 46.0 ml/m^2      Ao root diam 4.3 cm      Ao root area 14.5 cm^2      ACS 2.4 cm      LA dimension 4.1 cm      LA/Ao 0.95     LVOT diam 2.3 cm      LVOT area 4.2 cm^2      LVOT area(traced) 4.2 cm^2      RVOT diam 2.7 cm      RVOT area 5.7 cm^2      LVLd ap4 9.0 cm      EDV(MOD-sp4) 132.0 ml      LVLs ap4 7.4 cm      ESV(MOD-sp4) 41.8 ml      EF(MOD-sp4) 68.3 %      LVLd ap2 8.9 cm      EDV(MOD-sp2) 131.0 ml      LVLs ap2 7.8 cm      ESV(MOD-sp2) 45.2 ml      EF(MOD-sp2) 65.5 %      SV(MOD-sp4) 90.2 ml      SI(MOD-sp4) 41.8 ml/m^2      SV(MOD-sp2) 85.8 ml      SI(MOD-sp2) 39.8 ml/m^2      Ao root area (BSA corrected) 2.0     Ao root area (BSA corrected) 65.5 ml/m^2      CONTRAST EF 4CH 68.3 ml/m^2      LV Diastolic corrected for BSA 61.2 ml/m^2      LV Systolic corrected for BSA 19.4 ml/m^2      MV E max pacheco 128.0 cm/sec      MV V2 max 159.0 cm/sec      MV max PG 10.1 mmHg      MV V2 mean 85.1 cm/sec      MV mean PG 4.0 mmHg      MV V2 VTI 20.3 cm      MVA(VTI) 3.7 cm^2      MV P1/2t max pacheco 165.0 cm/sec      MV P1/2t 44.4 msec      MVA(P1/2t) 5.0 cm^2      MV dec slope 1,088 cm/sec^2      MV dec time 0.14 sec      Ao pk pacheco 139.0 cm/sec      Ao max PG 7.1 mmHg      Ao max PG (full) 3.0 mmHg      Ao V2 mean 86.0 cm/sec      Ao mean PG 4.0 mmHg      Ao mean PG (full) 2.0 mmHg      Ao V2 VTI 23.1 cm      DEANDRE(I,A) 3.3 cm^2      DEANDRE(I,D) 3.3 cm^2      DEANDRE(V,A) 3.0 cm^2      DEANDRE(V,D) 3.0 cm^2      AI max pacheco 406.0 cm/sec      AI max PG 65.9 mmHg      AI dec slope 404.0 cm/sec^2      AI P1/2t 294.3 msec      LV V1 max PG 4.2 mmHg      LV V1 mean PG 2.0 mmHg      LV V1 max 102.0 cm/sec      LV V1 mean 59.9 cm/sec      LV V1 VTI 18.1 cm      MR max pacheco 450.0 cm/sec      MR max PG 81.0 mmHg      SV(Ao) 335.5 ml      SI(Ao) 155.5 ml/m^2      SV(LVOT) 75.2 ml      SV(RVOT) 50.0 ml      SI(LVOT)  34.9 ml/m^2      PA V2 max 93.0 cm/sec      PA max PG 3.5 mmHg      PA max PG (full) 1.8 mmHg       CV ECHO JUAN - PVA(V,A) 3.9 cm^2       CV ECHO JUAN - PVA(V,D) 3.9 cm^2      PA acc time 0.1 sec      RV V1 max PG 1.6 mmHg      RV V1 mean PG 1.0 mmHg      RV V1 max 63.5 cm/sec      RV V1 mean 39.7 cm/sec      RV V1 VTI 8.7 cm      TR max perico 210.0 cm/sec      RVSP(TR) 20.6 mmHg      RAP systole 3.0 mmHg      PA pr(Accel) 36.3 mmHg      Pulm Sys Perico 34.0 cm/sec      Pulm Palmer Perico 65.2 cm/sec      Pulm S/D 0.52     Qp/Qs 0.66     MVA P1/2T LCG 1.3 cm^2       CV ECHO JUAN - BZI_BMI 24.0 kilograms/m^2       CV ECHO JUAN - BSA(HAYCOCK) 2.1 m^2       CV ECHO JUAN - BZI_METRIC_WEIGHT 87.1 kg       CV ECHO JUAN - BZI_METRIC_HEIGHT 190.5 cm      Target HR (85%) 119 bpm      Max. Pred. HR (100%) 140 bpm       CV VAS BP RIGHT /66 mmHg      TDI S' 20.00 cm/sec      RV Base 4.10 cm      LA volume 111.0 cm3      Dimensionless Index 0.8 (DI)      LA Volume Index 54.0 mL/m2      Avg E/e' ratio 9.85     EF(MOD-bp) 66.0 %      Lat Peak E' Perico 15.0 cm/sec      Med Peak E' Perico 11.00 cm/sec      TAPSE (>1.6) 1.80 cm2      Echo EF Estimated 68 %     Addenda:        · Left ventricular systolic function is normal. Calculated EF = 66%.   Estimated EF was in agreement with the calculated EF. Estimated EF = 68%.   Normal left ventricular cavity size noted. All left ventricular wall   segments contract normally. Left ventricular wall thickness is consistent   with mild concentric hypertrophy. Left ventricular diastolic function was   indeterminate.  · Left atrial volume is severely increased.  · Right atrial cavity size is severely dilated.  · The aortic valve is abnormal in structure. There is severe thickening of   the aortic valve. The aortic valve is severely thickened with hazy density   on the ventricular surface of the aortic valve. Cannot rule out vegetative   process. Consider transesophageal echocardiogram to  assess further..  · The mitral valve is abnormal in structure. The anterior mitral valve   leaflet is thickened on the ventricular surface. Suspicious for possible   vegetative process. Consider transesophageal echocardiogram if clinically   indicated.. Mild mitral valve regurgitation is present.  · Mild tricuspid valve regurgitation is present. Estimated right   ventricular systolic pressure from tricuspid regurgitation is normal (<35   mmHg).  · Addendum: Mild dilation of the aortic root is present. Aortic root   measures 4.3 cm.     Signed:  07/10/18 1130 by Alivia Mayo MD    Narrative:       · Left ventricular systolic function is normal. Calculated EF = 66%.   Estimated EF was in agreement with the calculated EF. Estimated EF = 68%.   Normal left ventricular cavity size noted. All left ventricular wall   segments contract normally. Left ventricular wall thickness is consistent   with mild concentric hypertrophy. Left ventricular diastolic function was   indeterminate.  · Left atrial volume is severely increased.  · Right atrial cavity size is severely dilated.  · The aortic valve is abnormal in structure. There is severe thickening of   the aortic valve. The aortic valve is severely thickened with hazy density   on the ventricular surface of the aortic valve. Cannot rule out vegetative   process. Consider transesophageal echocardiogram to assess further..  · The mitral valve is abnormal in structure. The anterior mitral valve   leaflet is thickened on the ventricular surface. Suspicious for possible   vegetative process. Consider transesophageal echocardiogram if clinically   indicated.. Mild mitral valve regurgitation is present.  · Mild tricuspid valve regurgitation is present. Estimated right   ventricular systolic pressure from tricuspid regurgitation is normal (<35   mmHg).       ECG 12 Lead [[id number]] Collected:  07/10/18 0721     Updated:  07/10/18 1532    Narrative:       RR Interval= 571 ms  ND  Interval= ms  QRSD Interval= 140 ms  QT Interval= 384 ms  QTc Interval= 508 ms  Heart Rate= 105 ms  P Axis= deg  QRS Axis= -25 deg  T Wave Axis= -26 deg  I: 40 Axis= 13 deg  T: 40 Axis= 141 deg  ST Axis= -22 deg  ATRIAL FLUTTER, A-RATE 258  RIGHT BUNDLE BRANCH BLOCK  NO SIGNIFICANT CHANGE FROM PREVIOUS ECG  Electronically Signed by:  Naeem Laughlin ([facility]) 10-Jul-2018 15:30:48  Date and Time of Study: 2018-07-10 07:21:18    Adult Transesophageal Echo (SKIP) W/ Cont if Necessary Per Protocol [[id number]] Resulted:  07/12/18 1206     Updated:  07/12/18 1206    ECG 12 Lead [[id number]] Collected:  07/12/18 1215     Updated:  07/12/18 2021    Narrative:       RR Interval= 594 ms  NC Interval= 168 ms  QRSD Interval= 120 ms  QT Interval= 368 ms  QTc Interval= 477 ms  Heart Rate= 101 ms  P Axis= 16 deg  QRS Axis= 19 deg  T Wave Axis= 3 deg  I: 40 Axis= 5 deg  T: 40 Axis= 113 deg  ST Axis= -14 deg  SINUS TACHYCARDIA  IVCD, CONSIDER ATYPICAL RBBB  CONSIDER ANTERIOR INFARCT  NO SIGNIFICANT CHANGE FROM PREVIOUS ECG  Electronically Signed by:  Gonzalo Collins ([facility]) 12-Jul-2018 20:19:43  Date and Time of Study: 2018-07-12 12:15:46    ECG 12 Lead [[id number]] Collected:  07/12/18 1132     Updated:  07/12/18 2021    Narrative:       RR Interval= 405 ms  NC Interval= ms  QRSD Interval= 94 ms  QT Interval= 304 ms  QTc Interval= 478 ms  Heart Rate= 148 ms  P Axis= deg  QRS Axis= 36 deg  T Wave Axis= 72 deg  I: 40 Axis= 28 deg  T: 40 Axis= 47 deg  ST Axis= 178 deg  JUNCTIONAL TACHYCARDIA  LOW VOLTAGE IN FRONTAL LEADS  REPOLARIZATION ABNORMALITY, PROB RATE RELATED  BORDERLINE PROLONGED QT INTERVAL  NO PRIOR TRACING AVAILABLE FOR COMPARISON  Electronically Signed by:  Gonzalo Collins ([facility]) 12-Jul-2018 20:20:06  Date and Time of Study: 2018-07-12 11:32:45    ECG 12 Lead [[id number]] Collected:  07/13/18 0541     Updated:  07/13/18 1311    Narrative:       RR Interval= 619 ms  NC Interval= 180 ms  QRSD Interval= 102 ms  QT Interval= 360 ms  QTc  Interval= 458 ms  Heart Rate= 97 ms  P Axis= 46 deg  QRS Axis= 41 deg  T Wave Axis= 26 deg  I: 40 Axis= 29 deg  T: 40 Axis= 120 deg  ST Axis= 0 deg  SINUS RHYTHM  PROBABLE LEFT ATRIAL ABNORMALITY  LOW VOLTAGE IN FRONTAL LEADS  atypical rbbb no major change  Electronically Signed by:  Rocky Briceño) ([facility]) 13-Jul-2018 13:10:16  Date and Time of Study: 2018-07-13 05:41:26    ECG 12 Lead [[id number]] Collected:  07/12/18 2336     Updated:  07/13/18 1311    Narrative:       RR Interval= 373 ms  KS Interval= ms  QRSD Interval= 94 ms  QT Interval= 308 ms  QTc Interval= 504 ms  Heart Rate= 161 ms  P Axis= 0 deg  QRS Axis= 43 deg  T Wave Axis= 69 deg  I: 40 Axis= 34 deg  T: 40 Axis= 185 deg  ST Axis= 160 deg  SUPRAVENTRICULAR TACHYCARDIA  VENTRICULAR BIGEMINY  ABERRANT COMPLEX  BORDERLINE LOW VOLTAGE IN FRONTAL LEADS  REPOLARIZATION ABNORMALITY, PROB RATE RELATED  pqt repeat  Electronically Signed by:  Rocky BriceñoYAQUELIN) ([facility]) 13-Jul-2018 13:09:50  Date and Time of Study: 2018-07-12 23:36:29    ECG 12 Lead [[id number]] Collected:  07/13/18 0228     Updated:  07/13/18 1313    Narrative:       RR Interval= 373 ms  KS Interval= ms  QRSD Interval= 86 ms  QT Interval= 316 ms  QTc Interval= 517 ms  Heart Rate= 161 ms  P Axis= 0 deg  QRS Axis= 31 deg  T Wave Axis= 53 deg  I: 40 Axis= 43 deg  T: 40 Axis= deg  ST Axis= 198 deg  SUPRAVENTRICULAR TACHYCARDIA  VENTRICULAR BIGEMINY  BORDERLINE LOW VOLTAGE IN FRONTAL LEADS  ST DEPRESSION, PROBABLY RATE RELATED  pqt repeat  Electronically Signed by:  Rocky Briceño) ([facility]) 13-Jul-2018 13:10:52  Date and Time of Study: 2018-07-13 02:28:25    ECG 12 Lead [[id number]] Collected:  07/14/18 0842     Updated:  07/14/18 1640    Narrative:       RR Interval= 380 ms  KS Interval= ms  QRSD Interval= 92 ms  QT Interval= 296 ms  QTc Interval= 480 ms  Heart Rate= 158 ms  P Axis= 0 deg  QRS Axis= 17 deg  T Wave Axis= 15 deg  I: 40 Axis= -4 deg  T: 40 Axis= 47 deg  ST Axis= 228  deg  SUPRAVENTRICULAR TACHYCARDIA - new  VENTRICULAR BIGEMINY  BORDERLINE LOW VOLTAGE IN FRONTAL LEADS  REPOLARIZATION ABNORMALITY, PROB RATE RELATED  Electronically Signed by:  Ry Garcia ([facility]) 14-Jul-2018 16:38:26  Date and Time of Study: 2018-07-14 08:42:38    ECG 12 Lead [[id number]] Collected:  07/14/18 0854     Updated:  07/14/18 1641    Narrative:       RR Interval= 667 ms  TN Interval= 148 ms  QRSD Interval= 140 ms  QT Interval= 412 ms  QTc Interval= 504 ms  Heart Rate= 90 ms  P Axis= 48 deg  QRS Axis= 14 deg  T Wave Axis= -8 deg  I: 40 Axis= 15 deg  T: 40 Axis= deg  ST Axis= -58 deg  ATRIAL FIBRILLATION - new  LEFT ATRIAL ABNORMALITY  RIGHT BUNDLE BRANCH BLOCK  Electronically Signed by:  Ry Garcia ([facility]) 14-Jul-2018 16:38:55  Date and Time of Study: 2018-07-14 08:54:00          Medication Review:   I have reviewed the patient's current medication list    Current Facility-Administered Medications:   •  acetaminophen (TYLENOL) tablet 650 mg, 650 mg, Oral, Q4H PRN, Ry Steele MD, 650 mg at 07/10/18 2024  •  ampicillin-sulbactam 3 g/100 mL 0.9% NS (MBP), 3 g, Intravenous, Q6H, Nereyda Bustamante MD, Last Rate: 0 mL/hr at 07/14/18 0511, 3 g at 07/14/18 1638  •  aspirin chewable tablet 81 mg, 81 mg, Oral, Daily, Stan Palacios DO, 81 mg at 07/14/18 0801  •  b complex-C-folic acid capsule 1 mg, 1 capsule, Oral, Daily, Stan Palacios DO, 1 mg at 07/14/18 0800  •  bumetanide (BUMEX) tablet 2 mg, 2 mg, Oral, Daily, Betzaida Salcedo MD, 2 mg at 07/14/18 0800  •  cetirizine (zyrTEC) tablet 5 mg, 5 mg, Oral, Daily, Stan Palacios DO, 5 mg at 07/14/18 0801  •  [START ON 7/15/2018] digoxin (LANOXIN) tablet 125 mcg, 125 mcg, Oral, Daily, Alivia Mayo MD  •  diltiaZEM CD (CARDIZEM CD) 24 hr capsule 300 mg, 300 mg, Oral, Q24H, Ry Garcia III, MD, 300 mg at 07/14/18 0800  •  guaiFENesin (MUCINEX) 12 hr tablet 600 mg, 600 mg, Oral, Daily, Stan Palacios DO, 600 mg at 07/14/18 0801  •  HYDROmorphone  (DILAUDID) injection 0.25 mg, 0.25 mg, Intravenous, Q2H PRN, Nereyda Bustamante MD, 0.25 mg at 07/13/18 0927  •  lactobacillus acidophilus (RISAQUAD) capsule 1 capsule, 1 capsule, Oral, Daily, Suresh Ng MD, 1 capsule at 07/14/18 0801  •  levETIRAcetam (KEPPRA) tablet 500 mg, 500 mg, Oral, Q12H, Stan Palacios, DO, 500 mg at 07/14/18 0801  •  LORazepam (ATIVAN) injection 1 mg, 1 mg, Intravenous, Once PRN, Nereyda Bustamante MD  •  Magnesium Sulfate 2 gram Bolus, followed by 8 gram infusion (total Mg dose 10 grams)- Mg less than or equal to 1mg/dL, 2 g, Intravenous, PRN **OR** Magnesium Sulfate 2 gram / 50mL Infusion (GIVE X 3 BAGS TO EQUAL 6GM TOTAL DOSE) - Mg 1.1 - 1/5 mg/dl, 2 g, Intravenous, PRN **OR** Magnesium Sulfate 4 gram infusion- Mg 1.6-1.9 mg/dL, 4 g, Intravenous, PRN, Elizabeth Weldondox, DO, Last Rate: 25 mL/hr at 07/10/18 0939, 4 g at 07/10/18 0939  •  methocarbamol (ROBAXIN) tablet 500 mg, 500 mg, Oral, BID PRN, Stan Palacios, DO, 500 mg at 07/14/18 0753  •  metoprolol succinate XL (TOPROL-XL) 24 hr tablet 100 mg, 100 mg, Oral, Q24H, Nereyda Bustamante MD, 100 mg at 07/14/18 0754  •  metoprolol tartrate (LOPRESSOR) injection 5 mg, 5 mg, Intravenous, Once, Nereyda Bustamante MD, Stopped at 07/13/18 0319  •  nitroglycerin (NITROSTAT) ointment 1 inch, 1 inch, Topical, Once, Nereyda Bustamante MD, Stopped at 07/13/18 0319  •  nitroglycerin (NITROSTAT) SL tablet 0.4 mg, 0.4 mg, Sublingual, Q5 Min PRN, Nereyda Bustamante MD, 0.4 mg at 07/13/18 0214  •  polyethylene glycol (MIRALAX) powder 17 g, 17 g, Oral, Daily, Nereyda Bustamante MD, Stopped at 07/14/18 0900  •  potassium chloride (K-DUR,KLOR-CON) CR tablet 40 mEq, 40 mEq, Oral, PRN, 40 mEq at 07/14/18 1432 **OR** potassium chloride (KLOR-CON) packet 40 mEq, 40 mEq, Oral, PRN **OR** potassium chloride 10 mEq in 100 mL IVPB, 10 mEq, Intravenous, Q1H PRN, Elizabeth Roberts DO  •  [START ON 7/15/2018] rivaroxaban (XARELTO) tablet 20 mg,  20 mg, Oral, Daily With Dinner, Alivia Mayo MD  •  sennosides-docusate sodium (SENOKOT-S) 8.6-50 MG tablet 2 tablet, 2 tablet, Oral, BID, Nereyda Bustamante MD, 2 tablet at 07/12/18 2208  •  sodium chloride 0.9 % flush 1-10 mL, 1-10 mL, Intravenous, PRN, Stan NELA [place], DO, 10 mL at 07/11/18 0902  •  sodium chloride 0.9 % infusion 40 mL, 40 mL, Intravenous, PRN, Stan NELA [place], DO, 40 mL at 07/13/18 0556      Assessment/Plan       ASSESSMENT AND PLAN:       SUMMARY:    ?   PROPHYLAXIS:   -DVT Prophylaxis: On Xarelto and SCDs   -Amin Catheter: Not indicated at this time    NUTRITION AND FLUIDS:  -Diet/ Nutrition: Regular, cardiac diet with nutritional supplements  -Fluid Status/Electrolytes: Saline Lock       SOCIAL ISSUES:    -Behavioral/ Agitation Issues: NONE   -Social Issues: Lives at home with his family.       THERAPEUTIC:    -ANTIBIOTICS: Inj Ampicillin-sulbactam  -PAIN MANAGEMENT: Inj Dilaudid              PLAN:    -Labs and diagnostic tests reviewed: Troponin T 0.107, Gluc 103, Na 140, K 3.2, CO2 30.5, AG 11.5, Alk Phos 146, CRP 7.28, Procal 0.11, WBC 15.32, Hb 9.1, Plat 619, 75.7 N, 11.9 L    -Diagnostic tests reviewed:      CT SCAN Abdomen  IMPRESSION:  1. CT findings suggesting mild acute diverticulitis involving the mid  sigmoid colon in the left lower pelvis. No evidence of abscess or other  complicating feature.  2. Cholelithiasis. Multiple large gallstones within the gallbladder. No  bile duct dilatation.  3. Cutaneous and subcutaneous decubitus wounds changes posterior  inferior to the lower sacrococcygeal spine. No abscess or additional  complicating feature is visible.  4. Ascending thoracic aortic aneurysm, unchanged since 12/8/2016.     This report was finalized on 7/12/2018 7:17 AM by Dr. Jefferson Bowen MD.               MRI Lumbar Spine    IMPRESSION:   1.  Multilevel degenerative changes are noted, worse from L2-3 to L4-5 as described above.    2.  Interval new severe compression deformity  of T12 with bone edema and displacement of fragments, both anterior and posteriorly.  New since 2016.  There is more than 90% loss of T12 vertebral body height in the mid body.   3.  New superior endplate compression deformity of L4 with edema in endplates and adjacent disks.  Given the lack of enhancement, it is less likely to be diskitis and is probably related to compression deformity.    4.  Chronic stable compression inferior endplate deformity of L2.      VENOUS DOPPLER Lower Extremities  IMPRESSION:  Negative examination.  No evidence of lower extremity DVT on the right  or left.     This report was finalized on 7/10/2018 5:33 PM by Dr. Jefferson Bowen MD.      Transesophageal ECHO done on 7/12/2018  Official report not available yet      -Consultations: Cardio and ID and GI    -Any new recommendations: AS per Cardio, ID an GI    -New Labs ordered: CBC and CMP in AM    -New diagnostic tests ordered: N/A    -Any changes in medications: N/A    -Discharge planning issues: Patient should be able to go back home once ready for discharge    -Placement issues: N/A    -Patient is clinically and hemodynamically stable    -To continue current management and supportive care    -Will follow patient closely    -Nothing new to add for right now      DIAGNOSES:      PRIMARY DIAGNOSES:    CHF: On Inj Bumex. Being followed by Cardio    Fever secondary to Diverticulitis and Sacral Decubitus: Last Temp is 98.1    Enterococcal bactremia: On IV Ampicillin-Silbactam. 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 Patient has appointment with [facility] Specialist at [facility] in August. On Inj Dilaudid for pain management    HTN: Last /54. Will monitor    HLD: Not on any statins    Paroxysmal atrial fibrillation: On Diltiazem and Digoxin. May need Amiodarone of tachycardia persists. Being followed by Cardio    Seizure disorder: On Keppra. No  acute issues at this time    Chronic microcytic anemia: Kast Hb 9.1. Stable. Being worked up by his PCP    CKD III: Last creatinine 0.92    DVT Prophylaxis: On Xarelto and SCDS  ?     SECONDARY DIAGNOSES:  ?     As above      SURGICAL DIAGNOSES:      As per Problem List      Plan for disposition: Patient should be able to go home once ready for discharge    Dani Robles MD  07/14/18  6:36 PM            Dani Robles M.D., FACP  Internal Medicine/ Hospitalist          Time:

## 2018-07-14 NOTE — PLAN OF CARE
Problem: Wound (Includes Pressure Injury) (Adult)  Goal: Signs and Symptoms of Listed Potential Problems Will be Absent, Minimized or Managed (Wound)  Outcome: Ongoing (interventions implemented as appropriate)   07/14/18 6292   Goal/Outcome Evaluation   Problems Assessed (Wound) all   Problems Present (Wound) delayed wound healing;situational response

## 2018-07-14 NOTE — PROGRESS NOTES
[facility]  Progress note: 2018    Patient Identification:  Name:Fahad Muhammad  Age:82 y.o.  Sex: male  :  1935  MRN: [id number]           CC:  Follow-up of paroxysmal supraventricular tachycardia, possible endocarditis, coronary artery disease    Interval history:  Hypokalemic this morning.  Anxious and tachycardic this morning with a heart rate in the 150s.  Has received oral dose of metoprolol and diltiazem this morning.  Blood pressure borderline low.  No chest pain yesterday though uncomfortable all over this morning in the setting of tachycardia.    Vital Signs:   Temp:  [98.3 °F (36.8 °C)-98.7 °F (37.1 °C)] 98.4 °F (36.9 °C)  Heart Rate:  [] 158  Resp:  [18-20] 18  BP: ()/(52-97) 109/60    Intake/Output Summary (Last 24 hours) at 18 0814  Last data filed at 18 0715   Gross per 24 hour   Intake              900 ml   Output             1600 ml   Net             -700 ml       Physical Examination:    General Appearance No acute distress   Neck No adenopathy, supple, trachea midline, no thyromegaly, no carotid bruit, no JVD   Lungs Clear to auscultation,respirations regular, even and unlabored   Heart Regular rhythm and tachycardia, normal S1 and S2, no murmur, no gallop, no rub, no click   Chest wall No abnormalities observed   Abdomen Normal bowel sounds, no masses, no hepatomegaly, soft   Extremities Moves all extremities well, no edema, no cyanosis, no redness   Neurological Alert and oriented x 3     Lab Review:  Personally reviewed the labs, radiology imaging and other cardiac procedures.   Results from last 7 days  Lab Units 18  0447   SODIUM mmol/L 140   POTASSIUM mmol/L 3.2*   CHLORIDE mmol/L 98   CO2 mmol/L 30.5*   BUN mg/dL 15   CREATININE mg/dL 0.92   CALCIUM mg/dL 9.2   BILIRUBIN mg/dL 0.9   ALK PHOS U/L 146*   ALT (SGPT) U/L 16   AST (SGOT) U/L 28   GLUCOSE mg/dL 103*       Results from last 7 days  Lab Units 18  0447 18  6991  07/13/18  0551   TROPONIN T ng/mL 0.107* 0.135* 0.073*     )  Results from last 7 days  Lab Units 07/14/18  0447 07/13/18  0225 07/11/18  0409   WBC 10*3/mm3 15.32* 18.33* 19.67*   HEMOGLOBIN g/dL 9.1* 9.0* 9.0*   HEMATOCRIT % 27.6* 26.9* 27.7*   PLATELETS 10*3/mm3 619* 561* 539*       Medication Review:   Meds reviewed  Scheduled Meds:  ampicillin-sulbactam 3 g Intravenous Q6H   aspirin 81 mg Oral Daily   b complex-C-folic acid 1 capsule Oral Daily   bumetanide 2 mg Oral Daily   cetirizine 5 mg Oral Daily   diltiaZEM  mg Oral Q24H   guaiFENesin 600 mg Oral Daily   lactobacillus acidophilus 1 capsule Oral Daily   levETIRAcetam 500 mg Oral Q12H   metoprolol succinate  mg Oral Q24H   metoprolol tartrate 5 mg Intravenous Once   nitroglycerin 1 inch Topical Once   polyethylene glycol 17 g Oral Daily   rivaroxaban 15 mg Oral Daily   sennosides-docusate sodium 2 tablet Oral BID     I personally viewed and interpreted the patient's EKG/Telemetry data    Assessment and Plan  1.  Paroxysmal superventricular tachycardia, atrial fibrillation and flutter.  Appears to be an atrial tachycardia this morning.  We'll give digoxin this morning and may need to resort to amiodarone if tachycardia persists and blood pressure remains low which I suspect it will with a.m. meds given.  Ask pharmacy to review his Xarelto doses for creatinine is normal.  2.  Elevated troponin and has history of coronary artery disease but also has valvular lesions treat tachycardia, reassess Xarelto dose and an SKIP on Monday possible further coronary evaluation to follow.  3.  Abnormal echocardiogram with questionable vegetation on the mitral valve with bacteremia.  Still plan to proceed with SKIP on Monday in the setting of elevated troponin and bacteremia.  4.  Enterococcal bacteremia.  On 3 days of IV antibiotics (7/11-7/13).  Would prefer to keep on IV antibiotics until after SKIP if okay with ID.  5.  Coronary artery disease with history of  coronary artery bypass grafting.,  As above  6.  Hypokalemia, being treated  7.  Acute diverticulitis   8.  History of renal insufficiency though renal labs normal past several days    Alivia Mayo  7/14/20188:14 AM  35min spent in reviewing records, discussion and examination of the patient and discussion with other members of the patient's medical team.     Dictated utilizing Dragon dictation

## 2018-07-14 NOTE — PLAN OF CARE
Problem: Patient Care Overview  Goal: Plan of Care Review  Outcome: Ongoing (interventions implemented as appropriate)   07/14/18 0511   Coping/Psychosocial   Plan of Care Reviewed With patient   Plan of Care Review   Progress improving   OTHER   Outcome Summary VSS; pt denies pain overnight. no c/o CP overnight. am labs pending.      Goal: Individualization and Mutuality  Outcome: Ongoing (interventions implemented as appropriate)    Goal: Discharge Needs Assessment  Outcome: Ongoing (interventions implemented as appropriate)      Problem: Skin Injury Risk (Adult)  Goal: Identify Related Risk Factors and Signs and Symptoms  Outcome: Outcome(s) achieved Date Met: 07/14/18 07/14/18 0511   Skin Injury Risk (Adult)   Related Risk Factors (Skin Injury Risk) edema;infection;mobility impaired;nutritional deficiencies;tissue perfusion altered     Goal: Skin Health and Integrity  Outcome: Ongoing (interventions implemented as appropriate)   07/14/18 0511   Skin Injury Risk (Adult)   Skin Health and Integrity making progress toward outcome       Problem: Fall Risk (Adult)  Goal: Identify Related Risk Factors and Signs and Symptoms  Outcome: Outcome(s) achieved Date Met: 07/14/18 07/14/18 0511   Fall Risk (Adult)   Related Risk Factors (Fall Risk) age-related changes;fatigue/slow reaction;gait/mobility problems;environment unfamiliar   Signs and Symptoms (Fall Risk) presence of risk factors     Goal: Absence of Fall  Outcome: Ongoing (interventions implemented as appropriate)   07/14/18 0511   Fall Risk (Adult)   Absence of Fall making progress toward outcome       Problem: Pain, Acute (Adult)  Goal: Identify Related Risk Factors and Signs and Symptoms  Outcome: Outcome(s) achieved Date Met: 07/14/18 07/14/18 0511   Pain, Acute (Adult)   Related Risk Factors (Acute Pain) disease process;positioning;persistent pain   Signs and Symptoms (Acute Pain) fatigue/weakness;verbalization of pain descriptors     Goal: Acceptable  Pain Control/Comfort Level  Outcome: Ongoing (interventions implemented as appropriate)   07/14/18 0511   Pain, Acute (Adult)   Acceptable Pain Control/Comfort Level making progress toward outcome       Problem: Infection, Risk/Actual (Adult)  Goal: Identify Related Risk Factors and Signs and Symptoms  Outcome: Outcome(s) achieved Date Met: 07/14/18 07/14/18 0511   Infection, Risk/Actual (Adult)   Related Risk Factors (Infection, Risk/Actual) skin integrity impairment;tissue perfusion altered;other (see comments)  (positive blood cultures)   Signs and Symptoms (Infection, Risk/Actual) edema;feeding/eating intolerance;lab value changes;cultures positive;pain;weakness     Goal: Infection Prevention/Resolution  Outcome: Ongoing (interventions implemented as appropriate)   07/14/18 0511   Infection, Risk/Actual (Adult)   Infection Prevention/Resolution making progress toward outcome

## 2018-07-15 NOTE — PROGRESS NOTES
[facility]  Progress note: 7/15/2018    Patient Identification:  Name:Fahad Muhammad  Age:82 y.o.  Sex: male  :  1935  MRN: [id number]           CC:  Follow-up of paroxysmal supraventricular tachycardia, possible endocarditis, coronary artery disease    Interval history:  Vitals stable overnight.  Sleepy this afternoon though is up in chair for a short period of time not to commode several times.  No recurrent chest pain shortness of breath tachycardia or agitation.  To his regimen yesterday and stable overnight    Vital Signs:   Temp:  [98.1 °F (36.7 °C)-99.5 °F (37.5 °C)] 98.1 °F (36.7 °C)  Heart Rate:  [77-96] 85  Resp:  [16-24] 20  BP: (100-127)/(54-72) 122/59    Intake/Output Summary (Last 24 hours) at 07/15/18 1146  Last data filed at 07/15/18 1100   Gross per 24 hour   Intake             1720 ml   Output             1750 ml   Net              -30 ml       Physical Examination:    General Appearance No acute distress   Neck No adenopathy, supple, trachea midline, no thyromegaly, no carotid bruit, no JVD   Lungs Clear to auscultation,respirations regular, even and unlabored   Heart Regular rhythm and normal rate, normal S1 and S2, 2/6 murmur, no gallop, no rub, no click   Chest wall No abnormalities observed   Abdomen Normal bowel sounds, no masses, no hepatomegaly, soft   Extremities Moves all extremities well, no edema, no cyanosis, no redness   Neurological Alert and oriented x 3     Lab Review:  Personally reviewed the labs, radiology imaging and other cardiac procedures.   Results from last 7 days  Lab Units 07/15/18  0446  18  0447   SODIUM mmol/L 140  --  140   POTASSIUM mmol/L 4.4  < > 3.2*   CHLORIDE mmol/L 98  --  98   CO2 mmol/L 31.1*  --  30.5*   BUN mg/dL 15  --  15   CREATININE mg/dL 0.96  --  0.92   CALCIUM mg/dL 9.2  --  9.2   BILIRUBIN mg/dL  --   --  0.9   ALK PHOS U/L  --   --  146*   ALT (SGPT) U/L  --   --  16   AST (SGOT) U/L  --   --  28   GLUCOSE mg/dL 100*  --   103*   < > = values in this interval not displayed.    Results from last 7 days  Lab Units 07/14/18  0447 07/13/18  1259 07/13/18  0551   TROPONIN T ng/mL 0.107* 0.135* 0.073*     )  Results from last 7 days  Lab Units 07/14/18  0447 07/13/18  0225 07/11/18  0409   WBC 10*3/mm3 15.32* 18.33* 19.67*   HEMOGLOBIN g/dL 9.1* 9.0* 9.0*   HEMATOCRIT % 27.6* 26.9* 27.7*   PLATELETS 10*3/mm3 619* 561* 539*       Medication Review:   Meds reviewed  Scheduled Meds:  ampicillin-sulbactam 3 g Intravenous Q6H   aspirin 81 mg Oral Daily   b complex-C-folic acid 1 capsule Oral Daily   bumetanide 2 mg Oral Daily   cetirizine 5 mg Oral Daily   digoxin 125 mcg Oral Daily   diltiaZEM  mg Oral Q24H   guaiFENesin 600 mg Oral Daily   lactobacillus acidophilus 1 capsule Oral Daily   levETIRAcetam 500 mg Oral Q12H   metoprolol succinate  mg Oral Q24H   metoprolol tartrate 5 mg Intravenous Once   nitroglycerin 1 inch Topical Once   polyethylene glycol 17 g Oral Daily   potassium chloride 20 mEq Oral Daily   rivaroxaban 20 mg Oral Daily With Dinner   sennosides-docusate sodium 2 tablet Oral BID     I personally viewed and interpreted the patient's EKG/Telemetry data    Assessment and Plan    1.  Paroxysmal superventricular tachycardia, atrial fibrillation and flutter. Rhythm controlledOn metoprolol, digoxin and diltiazem.  Remains on Xarelto full dose  2.  Elevated troponin and has history of coronary artery disease but also has valvular lesions.  SKIP on Monday with possible further coronary evaluation to follow.  3.  Abnormal echocardiogram with questionable vegetation on the mitral valve with bacteremia.  Still plan to proceed with SKIP on Monday in the setting of elevated troponin and bacteremia.  4.  Enterococcal bacteremia. ON IV antibiotics  5.  Coronary artery disease with history of coronary artery bypass grafting.,  As above  6.  Acute diverticulitis   7.  History of renal insufficiency though renal labs normal past  several days      Mini Mayo  7/15/740760:46 AM  25min spent in reviewing records, discussion and examination of the patient and discussion with other members of the patient's medical team.     Dictated utilizing Dragon dictation

## 2018-07-15 NOTE — PROGRESS NOTES
LOS: 6 days   Patient Care Team:  Elisabeth Warren MD as PCP - General (Internal Medicine)  Alexsander Matute MD as Consulting Physician (Neurology)  MD Ry Goff III, MD as Consulting Physician (Cardiology)        Subjective     Interval History:     Patient Complaints:   Patient Denies:         Review of Systems:      Called His SBP 90s , Confused   Objective     Vital Signs  Temp:  [98 °F (36.7 °C)-101 °F (38.3 °C)] 98 °F (36.7 °C)  Heart Rate:  [77-95] 88  Resp:  [16-24] 18  BP: ()/(43-79) 98/48    Physical Exam:                                                                                Results Review:      Lab Results (last 72 hours)     Procedure Component Value Units Date/Time    Hemoglobin A1c [[id number]] Collected:  07/13/18 0225    Specimen:  Blood Updated:  07/13/18 0851    Blood Culture - Blood, [[id number]]  (Abnormal) Collected:  07/11/18 1008    Specimen:  Blood from Hand, Right Updated:  07/13/18 0702     Blood Culture Enterococcus faecalis (A)     Gram Stain Result Aerobic Bottle Gram positive cocci in chains    Blood Culture - Blood, [[id number]]  (Abnormal) Collected:  07/11/18 1009    Specimen:  Blood from Hand, Left Updated:  07/13/18 0653     Blood Culture Enterococcus faecalis (A)     Gram Stain Result Anaerobic Bottle Gram positive cocci in chains      Aerobic Bottle Gram positive cocci in chains    Blood Culture - Blood, Arm, Left [[id number]]  (Abnormal) Collected:  07/10/18 1130    Specimen:  Blood from Arm, Left Updated:  07/13/18 0653     Blood Culture Enterococcus faecalis (A)     Gram Stain Result Aerobic Bottle Gram positive cocci in chains      Aerobic Bottle Gram negative bacilli    Blood Culture - Blood, Arm, Left [[id number]]  (Abnormal)  (Susceptibility) Collected:  07/10/18 1200    Specimen:  Blood from Arm, Left Updated:  07/13/18 0650     Blood Culture Escherichia coli (A)      Enterococcus faecalis (A)     Gram Stain Result Aerobic  Bottle Gram positive cocci in chains    Susceptibility      Escherichia coli     CRUZ     Ampicillin 4 ug/ml Susceptible     Ampicillin + Sulbactam <=2 ug/ml Susceptible     Cefazolin <=4 ug/ml Susceptible     Cefepime <=1 ug/ml Susceptible     Cefoxitin <=4 ug/ml Susceptible     Ceftriaxone <=1 ug/ml Susceptible     Ertapenem <=0.5 ug/ml Susceptible     Gentamicin <=1 ug/ml Susceptible     Levofloxacin <=0.12 ug/ml Susceptible     Meropenem <=0.25 ug/ml Susceptible     Piperacillin + Tazobactam <=4 ug/ml Susceptible     Trimethoprim + Sulfamethoxazole <=20 ug/ml Susceptible                    Troponin [[id number]]  (Abnormal) Collected:  07/13/18 0551    Specimen:  Blood Updated:  07/13/18 0624     Troponin T 0.073 (H) ng/mL     Narrative:       Troponin T Reference Ranges:  Less than 0.03 ng/mL:    Negative for AMI  0.03 to 0.09 ng/mL:      Indeterminant for AMI  Greater than 0.09 ng/mL: Positive for AMI    Troponin [[id number]]  (Normal) Collected:  07/13/18 0225    Specimen:  Blood Updated:  07/13/18 0245     Troponin T 0.028 ng/mL     Narrative:       Troponin T Reference Ranges:  Less than 0.03 ng/mL:    Negative for AMI  0.03 to 0.09 ng/mL:      Indeterminant for AMI  Greater than 0.09 ng/mL: Positive for AMI    Basic Metabolic Panel [[id number]]  (Abnormal) Collected:  07/13/18 0225    Specimen:  Blood Updated:  07/13/18 0244     Glucose 116 (H) mg/dL      BUN 20 mg/dL      Creatinine 0.99 mg/dL      Sodium 134 (L) mmol/L      Potassium 4.0 mmol/L      Chloride 97 (L) mmol/L      CO2 25.0 mmol/L      Calcium 9.2 mg/dL      eGFR Non African Amer 73 mL/min/1.73      BUN/Creatinine Ratio 20.2     Anion Gap 12.0 mmol/L     Narrative:       The MDRD GFR formula is only valid for adults with stable renal function between ages 18 and 70.    CBC & Differential [[id number]] Collected:  07/13/18 0225    Specimen:  Blood Updated:  07/13/18 0234    Narrative:       The following orders were created for panel order CBC &  Differential.  Procedure                               Abnormality         Status                     ---------                               -----------         ------                     Scan Slide[[id number]]                                                                  CBC Auto Differential[[id number]]        Abnormal            Final result                 Please view results for these tests on the individual orders.    CBC Auto Differential [[id number]]  (Abnormal) Collected:  07/13/18 0225    Specimen:  Blood Updated:  07/13/18 0229     WBC 18.33 (H) 10*3/mm3      RBC 3.67 (L) 10*6/mm3      Hemoglobin 9.0 (L) g/dL      Hematocrit 26.9 (L) %      MCV 73.3 (L) fL      MCH 24.5 (L) pg      MCHC 33.5 g/dL      RDW 18.0 (H) %      RDW-SD 47.5 fl      MPV 9.1 fL      Platelets 561 (H) 10*3/mm3      Neutrophil % 75.2 (H) %      Lymphocyte % 15.3 (L) %      Monocyte % 8.7 (H) %      Eosinophil % 0.2 %      Basophil % 0.2 %      Immature Grans % 0.4 %      Neutrophils, Absolute 13.78 (H) 10*3/mm3      Lymphocytes, Absolute 2.80 10*3/mm3      Monocytes, Absolute 1.60 (H) 10*3/mm3      Eosinophils, Absolute 0.04 (L) 10*3/mm3      Basophils, Absolute 0.03 10*3/mm3      Immature Grans, Absolute 0.08 (H) 10*3/mm3      nRBC 0.0 /100 WBC     Troponin [[id number]]  (Normal) Collected:  07/12/18 2336    Specimen:  Blood Updated:  07/12/18 2358     Troponin T <0.010 ng/mL     Narrative:       Troponin T Reference Ranges:  Less than 0.03 ng/mL:    Negative for AMI  0.03 to 0.09 ng/mL:      Indeterminant for AMI  Greater than 0.09 ng/mL: Positive for AMI    Vancomycin, Random [[id number]]  (Normal) Collected:  07/12/18 2209    Specimen:  Blood Updated:  07/12/18 2241     Vancomycin Random 25.60 mcg/mL     Gentamicin Level, Trough [[id number]]  (Normal) Collected:  07/12/18 1800    Specimen:  Blood Updated:  07/12/18 2120     Gentamicin Trough 0.60 mcg/mL     Potassium [[id number]]  (Normal) Collected:  07/11/18 2014    Specimen:   Blood Updated:  07/11/18 2034     Potassium 4.7 mmol/L     Blood Culture ID, PCR - Blood, [[id number]]  (Abnormal) Collected:  07/10/18 1130    Specimen:  Blood from Arm, Left Updated:  07/11/18 1109     BCID, PCR Enterococcus spp. Identification by BCID PCR. (C)     BCID, PCR 2 Escherichia coli. Identification by BCID PCR. (C)    Basic Metabolic Panel [[id number]]  (Abnormal) Collected:  07/11/18 0409    Specimen:  Blood Updated:  07/11/18 0521     Glucose 107 (H) mg/dL      BUN 22 mg/dL      Creatinine 1.03 mg/dL      Sodium 132 (L) mmol/L      Potassium 3.6 mmol/L      Chloride 95 (L) mmol/L      CO2 29.8 (H) mmol/L      Calcium 9.0 mg/dL      eGFR Non African Amer 69 mL/min/1.73      BUN/Creatinine Ratio 21.4     Anion Gap 7.2 mmol/L     Narrative:       The MDRD GFR formula is only valid for adults with stable renal function between ages 18 and 70.    CBC & Differential [[id number]] Collected:  07/11/18 0409    Specimen:  Blood Updated:  07/11/18 0505    Narrative:       The following orders were created for panel order CBC & Differential.  Procedure                               Abnormality         Status                     ---------                               -----------         ------                     Scan Slide[[id number]]                                                                  CBC Auto Differential[[id number]]        Abnormal            Final result                 Please view results for these tests on the individual orders.    CBC Auto Differential [[id number]]  (Abnormal) Collected:  07/11/18 0409    Specimen:  Blood Updated:  07/11/18 0504     WBC 19.67 (H) 10*3/mm3      RBC 3.71 (L) 10*6/mm3      Hemoglobin 9.0 (L) g/dL      Hematocrit 27.7 (L) %      MCV 74.7 (L) fL      MCH 24.3 (L) pg      MCHC 32.5 g/dL      RDW 18.2 (H) %      RDW-SD 49.3 fl      MPV 9.2 fL      Platelets 539 (H) 10*3/mm3      Neutrophil % 75.4 (H) %      Lymphocyte % 13.0 (L) %      Monocyte % 10.6 (H) %       Eosinophil % 0.2 %      Basophil % 0.2 %      Immature Grans % 0.6 (H) %      Neutrophils, Absolute 14.87 (H) 10*3/mm3      Lymphocytes, Absolute 2.55 10*3/mm3      Monocytes, Absolute 2.08 (H) 10*3/mm3      Eosinophils, Absolute 0.03 (L) 10*3/mm3      Basophils, Absolute 0.03 10*3/mm3      Immature Grans, Absolute 0.11 (H) 10*3/mm3      nRBC 0.0 /100 WBC     Urinalysis, Microscopic Only - Urine, Clean Catch [[id number]]  (Abnormal) Collected:  07/10/18 1649    Specimen:  Urine from Urine, Clean Catch Updated:  07/10/18 1758     RBC, UA 13-20 (A) /HPF      WBC, UA 0-2 (A) /HPF      Bacteria, UA None Seen /HPF      Squamous Epithelial Cells, UA 0-2 /HPF      Hyaline Casts, UA 0-2 /LPF      Methodology Manual Light Microscopy    Urinalysis With Culture If Indicated - Urine, Clean Catch [[id number]]  (Abnormal) Collected:  07/10/18 1649    Specimen:  Urine from Urine, Clean Catch Updated:  07/10/18 1743     Color, UA Dark Yellow (A)     Appearance, UA Clear     pH, UA <=5.0     Specific Gravity, UA 1.025     Comment: Result obtained by Refractometer        Glucose, UA Negative     Ketones, UA Trace (A)     Bilirubin, UA Small (1+) (A)     Blood, UA Moderate (2+) (A)     Protein, UA Negative     Leuk Esterase, UA Negative     Nitrite, UA Negative     Urobilinogen, UA 1.0 E.U./dL    Fungus Culture, Blood - Blood, Arm, Left [[id number]] Collected:  07/10/18 1130    Specimen:  Blood from Arm, Left Updated:  07/10/18 1735    Fungus Culture, Blood - Blood, Arm, Left [[id number]] Collected:  07/10/18 1200    Specimen:  Blood from Hand, Left Updated:  07/10/18 1735    TSH [[id number]]  (Normal) Collected:  07/10/18 0411    Specimen:  Blood Updated:  07/10/18 1238     TSH 0.814 mIU/mL     Procalcitonin [[id number]]  (Normal) Collected:  07/10/18 0411    Specimen:  Blood Updated:  07/10/18 1207     Procalcitonin 0.15 ng/mL     Narrative:       As a Marker for Sepsis (Non-Neonates):   1. <0.5 ng/mL represents a low risk of severe  sepsis and/or septic shock.  2. >2 ng/mL represents a high risk of severe sepsis and/or septic shock.    As a Marker for Lower Respiratory Tract Infections that require antibiotic therapy:    PCT on Admission     Antibiotic Therapy       6-12 Hrs later  > 0.5                Strongly Recommended             >0.25 - <0.5         Recommended  0.1 - 0.25           Discouraged              Remeasure/reassess PCT  <0.1                 Strongly Discouraged     Remeasure/reassess PCT                     PCT values of < 0.5 ng/mL do not exclude an infection, because localized infections (without systemic signs) may be associated with such low concentrations, or a systemic infection in its initial stages (< 6 hours). Furthermore, increased PCT can occur without infection. PCT concentrations between 0.5 and 2.0 ng/mL should be interpreted taking into account the patient's history. It is recommended to retest PCT within 6-24 hours if any concentrations < 2 ng/mL are obtained.    C-reactive Protein [[id number]]  (Abnormal) Collected:  07/10/18 0411    Specimen:  Blood Updated:  07/10/18 1207     C-Reactive Protein 9.27 (H) mg/dL     Sedimentation Rate [[id number]]  (Abnormal) Collected:  07/10/18 0411    Specimen:  Blood Updated:  07/10/18 1153     Sed Rate 35 (H) mm/hr           Imaging Results (last 72 hours)     Procedure Component Value Units Date/Time    SCANNED - IMAGING [[id number]] Resulted:  07/09/18      Updated:  07/12/18 1203    CT Abdomen Pelvis Without Contrast [[id number]] Collected:  07/12/18 0707     Updated:  07/12/18 0719    Narrative:       CT ABDOMEN AND PELVIS, NONCONTRAST, 7/11/2018     HISTORY:  82-year-old male admitted to the hospital 2 days ago with sacral  decubitus wound, lower extremity edema, low back pain and cardiac  arrhythmia. Bacteremia is noted.     TECHNIQUE:  CT examination of the abdomen and pelvis was performed without IV  contrast due to abnormal renal function. Radiation dose  reduction  techniques included automated exposure control or exposure modulation  based on body size. Radiation audit for CT and nuclear cardiology exams  in the last 12 months: 0.     COMPARISON:  *  CT abdomen/pelvis, 12/15/2016.  *  CT chest, 12/8/2016.     ABDOMEN FINDINGS:  Multiple large gallstones are present with thin the gallbladder. There  is no bile duct dilatation. Liver, pancreas and spleen are normal in  size and appearance without contrast.     The kidneys are unobstructed. Suspected small typical and hyperdense  right renal cysts are incompletely evaluated without contrast but are  unchanged since the study of 12/15/2016.     Moderate sigmoid diverticulosis. There is mild wall thickening involving  the mid sigmoid colon with some adjacent mild soft tissue stranding in  the left lower pelvic pericolonic fat. Mild acute diverticulitis is  suspected. No evidence of abscess, bowel perforation or bowel  obstruction. Remaining segments of small bowel and colon are normal in  caliber and appearance. Normal appendix.     PELVIS FINDINGS:  Urinary bladder, prostate and rectum are within normal limits. Tiny  bilateral fat-containing inguinal hernias.     Cutaneous and subcutaneous decubitus wound changes in the midline  posterior and inferior to the lower sacrococcygeal spine. No abscess or  other fluid collection is seen. Underlying osseous structures are  unremarkable.     Lower chest images show low ascending thoracic aortic aneurysm measuring  up to 5.4 cm. This segment is unchanged since CT chest, 12/8/2016.       Impression:       1. CT findings suggesting mild acute diverticulitis involving the mid  sigmoid colon in the left lower pelvis. No evidence of abscess or other  complicating feature.  2. Cholelithiasis. Multiple large gallstones within the gallbladder. No  bile duct dilatation.  3. Cutaneous and subcutaneous decubitus wounds changes posterior  inferior to the lower sacrococcygeal spine. No  abscess or additional  complicating feature is visible.  4. Ascending thoracic aortic aneurysm, unchanged since 12/8/2016.     This report was finalized on 7/12/2018 7:17 AM by Dr. Jefferson Bowen MD.       MRI Lumbar Spine With & Without Contrast [[id number]] Updated:  07/11/18 1443    US Venous Doppler Lower Extremity Bilateral (duplex) [[id number]] Collected:  07/10/18 1733     Updated:  07/10/18 1735    Narrative:       VENOUS DOPPLER ULTRASOUND, BILATERAL LOWER EXTREMITIES, 7/10/2018     HISTORY:   82-year-old male with two week history of bilateral lower extremity  edema.     TECHNIQUE:   Venous Doppler ultrasound examination of both legs was performed using  grey-scale, spectral Doppler, and color flow Doppler ultrasound imaging.     FINDINGS:   The examination is negative.  There is no evidence of deep venous  thrombosis from the groin to the lower calf bilaterally. The greater  saphenous veins are also patent.       Impression:       Negative examination.  No evidence of lower extremity DVT on the right  or left.     This report was finalized on 7/10/2018 5:33 PM by Dr. Jefferson Bowen MD.               Medication Review:     Hospital Medications (active)       Dose Frequency Start End    acetaminophen (TYLENOL) tablet 650 mg 650 mg Every 4 Hours PRN 7/10/2018     Sig - Route: Take 2 tablets by mouth Every 4 (Four) Hours As Needed for Mild Pain , Headache or Fever. - Oral    ampicillin-sulbactam (UNASYN) injection 3 g 3 g Every 6 Hours 7/13/2018 7/18/2018    Sig - Route: Inject 3 g into the shoulder, thigh, or buttocks Every 6 (Six) Hours. - Intramuscular    aspirin chewable tablet 162 mg 162 mg Once 7/13/2018 7/13/2018    Sig - Route: Chew 2 tablets 1 (One) Time. - Oral    aspirin chewable tablet 81 mg 81 mg Daily 7/9/2018     Sig - Route: Chew 1 tablet Daily. - Oral    b complex-C-folic acid capsule 1 mg 1 capsule Daily 7/9/2018     Sig - Route: Take 1 capsule by mouth Daily. - Oral    bumetanide  "(BUMEX) tablet 2 mg 2 mg Daily 7/13/2018     Sig - Route: Take 2 tablets by mouth Daily. - Oral    cetirizine (zyrTEC) tablet 5 mg 5 mg Daily 7/9/2018     Sig - Route: Take 0.5 tablets by mouth Daily. - Oral    diltiaZEM (CARDIZEM) injection 20 mg 20 mg Once 7/12/2018 7/12/2018    Sig - Route: Infuse 4 mL into a venous catheter 1 (One) Time. - Intravenous    diltiaZEM CD (CARDIZEM CD) 24 hr capsule 300 mg 300 mg Every 24 Hours Scheduled 7/13/2018     Sig - Route: Take 300 mg by mouth Daily. - Oral    guaiFENesin (MUCINEX) 12 hr tablet 600 mg 600 mg Daily 7/9/2018     Sig - Route: Take 1 tablet by mouth Daily. - Oral    HYDROmorphone (DILAUDID) injection 0.25 mg 0.25 mg Every 2 Hours PRN 7/10/2018 7/20/2018    Sig - Route: Infuse 0.25 mL into a venous catheter Every 2 (Two) Hours As Needed for Moderate Pain  or Severe Pain . - Intravenous    lactated ringers infusion 500 mL 500 mL Once 7/13/2018 7/13/2018    Sig - Route: Infuse 500 mL into a venous catheter 1 (One) Time. - Intravenous    lactobacillus acidophilus (RISAQUAD) capsule 1 capsule 1 capsule Daily 7/12/2018     Sig - Route: Take 1 capsule by mouth Daily. - Oral    levETIRAcetam (KEPPRA) tablet 500 mg 500 mg Every 12 Hours Scheduled 7/9/2018     Sig - Route: Take 1 tablet by mouth Every 12 (Twelve) Hours. - Oral    LORazepam (ATIVAN) injection 1 mg 1 mg Once As Needed 7/11/2018     Sig - Route: Infuse 0.5 mL into a venous catheter 1 (One) Time As Needed for Anxiety (for CT scan). - Intravenous    Magnesium Sulfate 2 gram / 50mL Infusion (GIVE X 3 BAGS TO EQUAL 6GM TOTAL DOSE) - Mg 1.1 - 1/5 mg/dl 2 g As Needed 7/10/2018     Sig - Route: Infuse 50 mL into a venous catheter As Needed (See Administration Instructions). - Intravenous    Linked Group 1:  \"Or\" Linked Group Details        Magnesium Sulfate 2 gram Bolus, followed by 8 gram infusion (total Mg dose 10 grams)- Mg less than or equal to 1mg/dL 2 g As Needed 7/10/2018     Sig - Route: Infuse 50 mL into a " "venous catheter As Needed (See Administration Instructions). - Intravenous    Linked Group 1:  \"Or\" Linked Group Details        Magnesium Sulfate 4 gram infusion- Mg 1.6-1.9 mg/dL 4 g As Needed 7/10/2018     Sig - Route: Infuse 100 mL into a venous catheter As Needed (See Administration Instructions). - Intravenous    Linked Group 1:  \"Or\" Linked Group Details        methocarbamol (ROBAXIN) tablet 500 mg 500 mg 2 Times Daily PRN 7/9/2018     Sig - Route: Take 1 tablet by mouth 2 (Two) Times a Day As Needed for Muscle Spasms. - Oral    metoprolol succinate XL (TOPROL-XL) 24 hr tablet 100 mg 100 mg Every 24 Hours Scheduled 7/9/2018     Sig - Route: Take 2 tablets by mouth Daily. - Oral    metoprolol tartrate (LOPRESSOR) 5 MG/5ML injection  - ADS Override Pull   7/13/2018 7/13/2018    Notes to Pharmacy: Created by cabinet override    metoprolol tartrate (LOPRESSOR) injection 2.5 mg 2.5 mg Once 7/13/2018 7/13/2018    Sig - Route: Infuse 2.5 mL into a venous catheter 1 (One) Time. - Intravenous    metoprolol tartrate (LOPRESSOR) injection 5 mg 5 mg Once 7/13/2018     Sig - Route: Infuse 5 mL into a venous catheter 1 (One) Time. - Intravenous    nitroglycerin (NITROSTAT) 0.4 MG SL tablet  - ADS Override Pull   7/12/2018 7/12/2018    Notes to Pharmacy: Created by cabinet override    nitroglycerin (NITROSTAT) ointment 1 inch 1 inch Once 7/13/2018     Sig - Route: Apply 1 inch topically 1 (One) Time. - Topical    nitroglycerin (NITROSTAT) SL tablet 0.4 mg 0.4 mg Every 5 Minutes PRN 7/12/2018     Sig - Route: Place 1 tablet under the tongue Every 5 (Five) Minutes As Needed for Chest Pain (times 3 as needed). - Sublingual    Pharmacy to dose vancomycin  Continuous PRN 7/11/2018 8/8/2018    Sig - Route: Continuous As Needed for Consult. - Does not apply    polyethylene glycol (MIRALAX) powder 17 g 17 g Daily 7/10/2018     Sig - Route: Take 17 g by mouth Daily. - Oral    potassium chloride (K-DUR,KLOR-CON) CR tablet 40 mEq 40 " "mEq As Needed 7/10/2018     Sig - Route: Take 2 tablets by mouth As Needed (potassium replacement.  see admin instructions). - Oral    Linked Group 2:  \"Or\" Linked Group Details        potassium chloride (KLOR-CON) packet 40 mEq 40 mEq As Needed 7/10/2018     Sig - Route: Take 40 mEq by mouth As Needed (potassium replacement, see admin instructions). - Oral    Linked Group 2:  \"Or\" Linked Group Details        potassium chloride 10 mEq in 100 mL IVPB 10 mEq Every 1 Hour PRN 7/10/2018     Sig - Route: Infuse 100 mL into a venous catheter Every 1 (One) Hour As Needed (potassium protocol PERIPHERAL - see admin instructions). - Intravenous    Linked Group 2:  \"Or\" Linked Group Details        rivaroxaban (XARELTO) tablet 15 mg 15 mg Daily 7/9/2018     Sig - Route: Take 1 tablet by mouth Daily. - Oral    sennosides-docusate sodium (SENOKOT-S) 8.6-50 MG tablet 2 tablet 2 tablet 2 Times Daily 7/10/2018     Sig - Route: Take 2 tablets by mouth 2 (Two) Times a Day. - Oral    sodium chloride 0.9 % flush 1-10 mL 1-10 mL As Needed 7/9/2018     Sig - Route: Infuse 1-10 mL into a venous catheter As Needed for Line Care. - Intravenous    sodium chloride 0.9 % infusion  - ADS Override Pull   7/11/2018 7/12/2018    Notes to Pharmacy: Created by cabinet override    sodium chloride 0.9 % infusion 40 mL 40 mL As Needed 7/9/2018     Sig - Route: Infuse 40 mL into a venous catheter As Needed for Line Care. - Intravenous    vancomycin (VANCOCIN) 500 MG injection  - ADS Override Pull   7/11/2018 7/12/2018    Notes to Pharmacy: Created by cabinet override    diltiaZEM CD (CARDIZEM CD) 24 hr capsule 240 mg (Discontinued) 240 mg Every 24 Hours Scheduled 7/10/2018 7/12/2018    Sig - Route: Take 2 capsules by mouth Daily. - Oral    metoprolol tartrate (LOPRESSOR) tablet 25 mg (Discontinued) 25 mg Once 7/13/2018 7/13/2018    Sig - Route: Take 1 tablet by mouth 1 (One) Time. - Oral    piperacillin-tazobactam (ZOSYN) 3.375 g/100 mL 0.9% NS IVPB " (mbp) (Discontinued) 3.375 g Every 8 Hours 7/11/2018 7/13/2018    Sig - Route: Infuse 100 mL into a venous catheter Every 8 (Eight) Hours. - Intravenous    vancomycin 1500 mg/250 mL 0.9% NS IVPB (Discontinued) 1,500 mg Every 12 Hours 7/11/2018 7/13/2018    Sig - Route: Infuse 250 mL into a venous catheter Every 12 (Twelve) Hours. - Intravenous          ampicillin-sulbactam 3 g Intravenous Q6H   aspirin 81 mg Oral Daily   b complex-C-folic acid 1 capsule Oral Daily   bumetanide 2 mg Oral Daily   cetirizine 5 mg Oral Daily   digoxin 125 mcg Oral Daily   diltiaZEM  mg Oral Q24H   fentaNYL 1 patch Transdermal Q72H   guaiFENesin 600 mg Oral Daily   lactobacillus acidophilus 1 capsule Oral Daily   levETIRAcetam 500 mg Oral Q12H   methocarbamol 500 mg Oral Q8H   metoprolol succinate  mg Oral Q24H   metoprolol tartrate 5 mg Intravenous Once   nitroglycerin 1 inch Topical Once   polyethylene glycol 17 g Oral Daily   potassium chloride 20 mEq Oral Daily   rivaroxaban 20 mg Oral Daily With Dinner   sennosides-docusate sodium 2 tablet Oral BID       Assessment/Plan       Principal Problem:    Bacteremia due to Escherichia coli  Active Problems:    Hypertension    Stage 3 chronic kidney disease    Decubitus ulcer of sacral region    Abnormal echocardiogram    Atrial fibrillation and flutter (CMS/HCC)    Leg swelling    Bacteremia due to Enterococcus    Diverticulitis   Sepsis  TME  Low BP ?       Plan :    Cont  IV Unasyn  His Ecoli and Enterococ is sensitive to PCN  Echo not   Repeat Abd CT with contrast R/O Perforation    Thank you    Liborio Cantu MD  07/15/18  6:51 PM

## 2018-07-15 NOTE — NURSING NOTE
Spoke w/ Dr Cantu orders for stat Blood cx this pm and 2 nd set in am obtained. No chg in ABX tx per ID. Recommend IVF bous due to hypotension this pm as MAP is now 60-66. Also received order for CT abdomin.

## 2018-07-15 NOTE — PLAN OF CARE
Problem: Patient Care Overview  Goal: Plan of Care Review  Outcome: Ongoing (interventions implemented as appropriate)   07/15/18 1832   Coping/Psychosocial   Plan of Care Reviewed With patient;spouse   Plan of Care Review   Progress declining   OTHER   Outcome Summary Paged Dr Langston for case review due to pt increased weakness, confusion, fever this shift. Instructed to do stat labs & call ID Dr Cantu for reevaluation of abx tx . Noted increased pain, fentanyl patch started and muscle relaxer increased to q 8 hours. Plan for stat labs this evening and monitor for sepsis as pt is becoming hypotensive        Problem: Fall Risk (Adult)  Goal: Absence of Fall  Outcome: Ongoing (interventions implemented as appropriate)   07/15/18 1832   Fall Risk (Adult)   Absence of Fall making progress toward outcome  (no falls this shift amb w/assist x 1 & walker )       Problem: Pain, Acute (Adult)  Goal: Acceptable Pain Control/Comfort Level  Outcome: Ongoing (interventions implemented as appropriate)   07/15/18 1832   Pain, Acute (Adult)   Acceptable Pain Control/Comfort Level unable to achieve outcome  (pt with increased c/o pain this evening,md notified new orde)       Problem: Infection, Risk/Actual (Adult)  Goal: Infection Prevention/Resolution  Outcome: Ongoing (interventions implemented as appropriate)   07/15/18 1832   Infection, Risk/Actual (Adult)   Infection Prevention/Resolution making progress toward outcome

## 2018-07-15 NOTE — PLAN OF CARE
Problem: Patient Care Overview  Goal: Plan of Care Review  Outcome: Ongoing (interventions implemented as appropriate)   07/15/18 0455   Coping/Psychosocial   Plan of Care Reviewed With patient   Plan of Care Review   Progress improving   OTHER   Outcome Summary VSS overnight; pt c/o back pain overnight & treated with IV dilaudid per MAR. AM labs pending. pt plans for SKIP monday.     Goal: Individualization and Mutuality  Outcome: Ongoing (interventions implemented as appropriate)    Goal: Discharge Needs Assessment  Outcome: Ongoing (interventions implemented as appropriate)      Problem: Skin Injury Risk (Adult)  Goal: Skin Health and Integrity  Outcome: Ongoing (interventions implemented as appropriate)   07/15/18 0455   Skin Injury Risk (Adult)   Skin Health and Integrity making progress toward outcome       Problem: Fall Risk (Adult)  Goal: Absence of Fall  Outcome: Ongoing (interventions implemented as appropriate)   07/15/18 0455   Fall Risk (Adult)   Absence of Fall making progress toward outcome       Problem: Pain, Acute (Adult)  Goal: Acceptable Pain Control/Comfort Level  Outcome: Ongoing (interventions implemented as appropriate)   07/15/18 0455   Pain, Acute (Adult)   Acceptable Pain Control/Comfort Level making progress toward outcome       Problem: Infection, Risk/Actual (Adult)  Goal: Infection Prevention/Resolution  Outcome: Ongoing (interventions implemented as appropriate)   07/15/18 0455   Infection, Risk/Actual (Adult)   Infection Prevention/Resolution making progress toward outcome       Problem: Wound (Includes Pressure Injury) (Adult)  Goal: Signs and Symptoms of Listed Potential Problems Will be Absent, Minimized or Managed (Wound)  Outcome: Ongoing (interventions implemented as appropriate)   07/15/18 0455   Goal/Outcome Evaluation   Problems Assessed (Wound) all   Problems Present (Wound) situational response

## 2018-07-15 NOTE — PROGRESS NOTES
"Hospitalist Team      Patient Care Team:  Elisabeth Warren MD as PCP - General (Internal Medicine)  Alexsander Matute MD as Consulting Physician (Neurology)  MD Ry Goff III, MD as Consulting Physician (Cardiology)        Chief Complaint:  Swelling and SOA    Subjective    Interval History and ROS:     Patient has had bumex and is urinating constantly.  Family is very concerned about this.  Dr. Mayo and Dr. Cantu have had a conversation and they have agreed that patient should have SKIP tomorrow.  There is enterococcus and E. Coli growing in blood cultures.  Patient appears \"miserable\" .  He is having a lot of back pain per Mayra the nurse.  She was wondering about a pill instead of the dilaudid for more uniform pain control  What I have done is order fentanyl patch at 12.5 mg Q 72 hours and stopped the dilaudid because it seems too strong and does not last long enough.  I have increased the robaxin because it seems to help him with the back pain more than anything else.  I have discontinued ativan as well.    Objective    Vital Signs  Temp:  [98.1 °F (36.7 °C)-99.5 °F (37.5 °C)] 98.1 °F (36.7 °C)  Heart Rate:  [77-93] 79  Resp:  [16-24] 18  BP: (105-127)/(51-72) 123/51  Oxygen Therapy  SpO2: 96 %  Pulse Oximetry Type: Continuous  Device (Oxygen Therapy): nasal cannula  Flow (L/min): 1}  Flowsheet Rows      First Filed Value   Admission Height  190.5 cm (75\") Documented at 07/09/2018 1507   Admission Weight  87.3 kg (192 lb 6.4 oz) Documented at 07/09/2018 1507        Body mass index is 23.71 kg/m².        Physical Exam:    Physical Exam   Constitutional: Patient appears weak and tired and very ill.  He has bruising all over his arms.  He has subcutaneous swelling   HEENT:   Head: Normocephalic and atraumatic.   Eyes:  Pupils are equal, round, and reactive to light. EOM are intact. Sclera are anicteric and non-injected.  Mouth and Throat: Patient has moist mucous membranes. Oropharynx " is clear of any erythema or exudate.     Neck: Neck supple. No JVD present. No thyromegaly present. No lymphadenopathy present.  Cardiovascular: Irregular irregularity , S1 normal and S2 normal.  Exam reveals no gallop and no friction rub.  No murmur heard.  Pulmonary/Chest: Lungs are clear to auscultation bilaterally. No respiratory distress. No wheezes. No rhonchi. No rales.   Abdominal: Soft. Bowel sounds are normal. No distension and no mass. There is no hepatosplenomegaly. There is no tenderness.   Musculoskeletal: Normal Muscle tone  Extremities: No edema. Pulses are palpable in all 4 extremities. Swollen and bruised  Neurological: Patient is alert and oriented to person, place, and time. He seems agitated secondary to the frequent urination after a bumex dose.        Results Review:     I reviewed the patient's new clinical results.    Lab Results (last 24 hours)     Procedure Component Value Units Date/Time    Basic Metabolic Panel [[id number]]  (Abnormal) Collected:  07/15/18 0446    Specimen:  Blood from Arm, Right Updated:  07/15/18 0545     Glucose 100 (H) mg/dL      BUN 15 mg/dL      Creatinine 0.96 mg/dL      Sodium 140 mmol/L      Potassium 4.4 mmol/L      Chloride 98 mmol/L      CO2 31.1 (H) mmol/L      Calcium 9.2 mg/dL      eGFR Non African Amer 75 mL/min/1.73      BUN/Creatinine Ratio 15.6     Anion Gap 10.9 mmol/L     Narrative:       The MDRD GFR formula is only valid for adults with stable renal function between ages 18 and 70.    Potassium [[id number]]  (Normal) Collected:  07/14/18 1655    Specimen:  Blood Updated:  07/14/18 1725     Potassium 3.9 mmol/L           Imaging Results (last 24 hours)     ** No results found for the last 24 hours. **          Xray not reviewed personally by physician.      ECG not reviewed personally by physician  ECG/EMG Results (most recent)     Procedure Component Value Units Date/Time    Adult Transthoracic Echo Complete W/ Cont if Necessary Per Protocol  [[id number]] Collected:  07/10/18 0739     Updated:  07/10/18 1130     BSA 2.2 m^2      IVSd 1.0 cm      LVIDd 5.1 cm      LVIDs 3.3 cm      LVPWd 0.84 cm      IVS/LVPW 1.2     FS 35.4 %      EDV(Teich) 126.1 ml      ESV(Teich) 44.8 ml      EF(Teich) 64.5 %      EDV(cubed) 135.8 ml      ESV(cubed) 36.6 ml      EF(cubed) 73.1 %      LV mass(C)d 175.2 grams      LV mass(C)dI 81.2 grams/m^2      SV(Teich) 81.3 ml      SI(Teich) 37.7 ml/m^2      SV(cubed) 99.2 ml      SI(cubed) 46.0 ml/m^2      Ao root diam 4.3 cm      Ao root area 14.5 cm^2      ACS 2.4 cm      LA dimension 4.1 cm      LA/Ao 0.95     LVOT diam 2.3 cm      LVOT area 4.2 cm^2      LVOT area(traced) 4.2 cm^2      RVOT diam 2.7 cm      RVOT area 5.7 cm^2      LVLd ap4 9.0 cm      EDV(MOD-sp4) 132.0 ml      LVLs ap4 7.4 cm      ESV(MOD-sp4) 41.8 ml      EF(MOD-sp4) 68.3 %      LVLd ap2 8.9 cm      EDV(MOD-sp2) 131.0 ml      LVLs ap2 7.8 cm      ESV(MOD-sp2) 45.2 ml      EF(MOD-sp2) 65.5 %      SV(MOD-sp4) 90.2 ml      SI(MOD-sp4) 41.8 ml/m^2      SV(MOD-sp2) 85.8 ml      SI(MOD-sp2) 39.8 ml/m^2      Ao root area (BSA corrected) 2.0     Ao root area (BSA corrected) 65.5 ml/m^2      CONTRAST EF 4CH 68.3 ml/m^2      LV Diastolic corrected for BSA 61.2 ml/m^2      LV Systolic corrected for BSA 19.4 ml/m^2      MV E max pacheco 128.0 cm/sec      MV V2 max 159.0 cm/sec      MV max PG 10.1 mmHg      MV V2 mean 85.1 cm/sec      MV mean PG 4.0 mmHg      MV V2 VTI 20.3 cm      MVA(VTI) 3.7 cm^2      MV P1/2t max pacheco 165.0 cm/sec      MV P1/2t 44.4 msec      MVA(P1/2t) 5.0 cm^2      MV dec slope 1,088 cm/sec^2      MV dec time 0.14 sec      Ao pk pacheco 139.0 cm/sec      Ao max PG 7.1 mmHg      Ao max PG (full) 3.0 mmHg      Ao V2 mean 86.0 cm/sec      Ao mean PG 4.0 mmHg      Ao mean PG (full) 2.0 mmHg      Ao V2 VTI 23.1 cm      DEANDRE(I,A) 3.3 cm^2      DEANDRE(I,D) 3.3 cm^2      DEANDRE(V,A) 3.0 cm^2      DEANDRE(V,D) 3.0 cm^2      AI max pacheco 406.0 cm/sec      AI max PG 65.9 mmHg       AI dec slope 404.0 cm/sec^2      AI P1/2t 294.3 msec      LV V1 max PG 4.2 mmHg      LV V1 mean PG 2.0 mmHg      LV V1 max 102.0 cm/sec      LV V1 mean 59.9 cm/sec      LV V1 VTI 18.1 cm      MR max perico 450.0 cm/sec      MR max PG 81.0 mmHg      SV(Ao) 335.5 ml      SI(Ao) 155.5 ml/m^2      SV(LVOT) 75.2 ml      SV(RVOT) 50.0 ml      SI(LVOT) 34.9 ml/m^2      PA V2 max 93.0 cm/sec      PA max PG 3.5 mmHg      PA max PG (full) 1.8 mmHg       CV ECHO JUAN - PVA(V,A) 3.9 cm^2       CV ECHO JUAN - PVA(V,D) 3.9 cm^2      PA acc time 0.1 sec      RV V1 max PG 1.6 mmHg      RV V1 mean PG 1.0 mmHg      RV V1 max 63.5 cm/sec      RV V1 mean 39.7 cm/sec      RV V1 VTI 8.7 cm      TR max perico 210.0 cm/sec      RVSP(TR) 20.6 mmHg      RAP systole 3.0 mmHg      PA pr(Accel) 36.3 mmHg      Pulm Sys Perico 34.0 cm/sec      Pulm Palmer Perico 65.2 cm/sec      Pulm S/D 0.52     Qp/Qs 0.66     MVA P1/2T LCG 1.3 cm^2       CV ECHO JUAN - BZI_BMI 24.0 kilograms/m^2       CV ECHO JUAN - BSA(HAYCOCK) 2.1 m^2       CV ECHO JUAN - BZI_METRIC_WEIGHT 87.1 kg       CV ECHO JUAN - BZI_METRIC_HEIGHT 190.5 cm      Target HR (85%) 119 bpm      Max. Pred. HR (100%) 140 bpm       CV VAS BP RIGHT /66 mmHg      TDI S' 20.00 cm/sec      RV Base 4.10 cm      LA volume 111.0 cm3      Dimensionless Index 0.8 (DI)      LA Volume Index 54.0 mL/m2      Avg E/e' ratio 9.85     EF(MOD-bp) 66.0 %      Lat Peak E' Perico 15.0 cm/sec      Med Peak E' Perico 11.00 cm/sec      TAPSE (>1.6) 1.80 cm2      Echo EF Estimated 68 %     Addenda:        · Left ventricular systolic function is normal. Calculated EF = 66%.   Estimated EF was in agreement with the calculated EF. Estimated EF = 68%.   Normal left ventricular cavity size noted. All left ventricular wall   segments contract normally. Left ventricular wall thickness is consistent   with mild concentric hypertrophy. Left ventricular diastolic function was   indeterminate.  · Left atrial volume is severely  increased.  · Right atrial cavity size is severely dilated.  · The aortic valve is abnormal in structure. There is severe thickening of   the aortic valve. The aortic valve is severely thickened with hazy density   on the ventricular surface of the aortic valve. Cannot rule out vegetative   process. Consider transesophageal echocardiogram to assess further..  · The mitral valve is abnormal in structure. The anterior mitral valve   leaflet is thickened on the ventricular surface. Suspicious for possible   vegetative process. Consider transesophageal echocardiogram if clinically   indicated.. Mild mitral valve regurgitation is present.  · Mild tricuspid valve regurgitation is present. Estimated right   ventricular systolic pressure from tricuspid regurgitation is normal (<35   mmHg).  · Addendum: Mild dilation of the aortic root is present. Aortic root   measures 4.3 cm.     Signed:  07/10/18 1130 by Alivia Mayo MD    Narrative:       · Left ventricular systolic function is normal. Calculated EF = 66%.   Estimated EF was in agreement with the calculated EF. Estimated EF = 68%.   Normal left ventricular cavity size noted. All left ventricular wall   segments contract normally. Left ventricular wall thickness is consistent   with mild concentric hypertrophy. Left ventricular diastolic function was   indeterminate.  · Left atrial volume is severely increased.  · Right atrial cavity size is severely dilated.  · The aortic valve is abnormal in structure. There is severe thickening of   the aortic valve. The aortic valve is severely thickened with hazy density   on the ventricular surface of the aortic valve. Cannot rule out vegetative   process. Consider transesophageal echocardiogram to assess further..  · The mitral valve is abnormal in structure. The anterior mitral valve   leaflet is thickened on the ventricular surface. Suspicious for possible   vegetative process. Consider transesophageal echocardiogram if clinically    indicated.. Mild mitral valve regurgitation is present.  · Mild tricuspid valve regurgitation is present. Estimated right   ventricular systolic pressure from tricuspid regurgitation is normal (<35   mmHg).       ECG 12 Lead [[id number]] Collected:  07/10/18 0721     Updated:  07/10/18 1532    Narrative:       RR Interval= 571 ms  AL Interval= ms  QRSD Interval= 140 ms  QT Interval= 384 ms  QTc Interval= 508 ms  Heart Rate= 105 ms  P Axis= deg  QRS Axis= -25 deg  T Wave Axis= -26 deg  I: 40 Axis= 13 deg  T: 40 Axis= 141 deg  ST Axis= -22 deg  ATRIAL FLUTTER, A-RATE 258  RIGHT BUNDLE BRANCH BLOCK  NO SIGNIFICANT CHANGE FROM PREVIOUS ECG  Electronically Signed by:  Naeem Laughlin ([facility]) 10-Jul-2018 15:30:48  Date and Time of Study: 2018-07-10 07:21:18    Adult Transesophageal Echo (SKIP) W/ Cont if Necessary Per Protocol [[id number]] Resulted:  07/12/18 1206     Updated:  07/12/18 1206    ECG 12 Lead [[id number]] Collected:  07/12/18 1215     Updated:  07/12/18 2021    Narrative:       RR Interval= 594 ms  AL Interval= 168 ms  QRSD Interval= 120 ms  QT Interval= 368 ms  QTc Interval= 477 ms  Heart Rate= 101 ms  P Axis= 16 deg  QRS Axis= 19 deg  T Wave Axis= 3 deg  I: 40 Axis= 5 deg  T: 40 Axis= 113 deg  ST Axis= -14 deg  SINUS TACHYCARDIA  IVCD, CONSIDER ATYPICAL RBBB  CONSIDER ANTERIOR INFARCT  NO SIGNIFICANT CHANGE FROM PREVIOUS ECG  Electronically Signed by:  Gonzalo Clolins ([facility]) 12-Jul-2018 20:19:43  Date and Time of Study: 2018-07-12 12:15:46    ECG 12 Lead [[id number]] Collected:  07/12/18 1132     Updated:  07/12/18 2021    Narrative:       RR Interval= 405 ms  AL Interval= ms  QRSD Interval= 94 ms  QT Interval= 304 ms  QTc Interval= 478 ms  Heart Rate= 148 ms  P Axis= deg  QRS Axis= 36 deg  T Wave Axis= 72 deg  I: 40 Axis= 28 deg  T: 40 Axis= 47 deg  ST Axis= 178 deg  JUNCTIONAL TACHYCARDIA  LOW VOLTAGE IN FRONTAL LEADS  REPOLARIZATION ABNORMALITY, PROB RATE RELATED  BORDERLINE PROLONGED QT INTERVAL  NO PRIOR  TRACING AVAILABLE FOR COMPARISON  Electronically Signed by:  [facility] 12-Jul-2018 20:20:06  Date and Time of Study: 2018-07-12 11:32:45    ECG 12 Lead [[id number]] Collected:  07/13/18 0541     Updated:  07/13/18 1311    Narrative:       RR Interval= 619 ms  LA Interval= 180 ms  QRSD Interval= 102 ms  QT Interval= 360 ms  QTc Interval= 458 ms  Heart Rate= 97 ms  P Axis= 46 deg  QRS Axis= 41 deg  T Wave Axis= 26 deg  I: 40 Axis= 29 deg  T: 40 Axis= 120 deg  ST Axis= 0 deg  SINUS RHYTHM  PROBABLE LEFT ATRIAL ABNORMALITY  LOW VOLTAGE IN FRONTAL LEADS  atypical rbbb no major change  Electronically Signed by:  Rocky BriceñoYAQUELIN) ([facility]) 13-Jul-2018 13:10:16  Date and Time of Study: 2018-07-13 05:41:26    ECG 12 Lead [[id number]] Collected:  07/12/18 2336     Updated:  07/13/18 1311    Narrative:       RR Interval= 373 ms  LA Interval= ms  QRSD Interval= 94 ms  QT Interval= 308 ms  QTc Interval= 504 ms  Heart Rate= 161 ms  P Axis= 0 deg  QRS Axis= 43 deg  T Wave Axis= 69 deg  I: 40 Axis= 34 deg  T: 40 Axis= 185 deg  ST Axis= 160 deg  SUPRAVENTRICULAR TACHYCARDIA  VENTRICULAR BIGEMINY  ABERRANT COMPLEX  BORDERLINE LOW VOLTAGE IN FRONTAL LEADS  REPOLARIZATION ABNORMALITY, PROB RATE RELATED  pqt repeat  Electronically Signed by:  Rocky Briceño) ([facility]) 13-Jul-2018 13:09:50  Date and Time of Study: 2018-07-12 23:36:29    ECG 12 Lead [[id number]] Collected:  07/13/18 0228     Updated:  07/13/18 1313    Narrative:       RR Interval= 373 ms  LA Interval= ms  QRSD Interval= 86 ms  QT Interval= 316 ms  QTc Interval= 517 ms  Heart Rate= 161 ms  P Axis= 0 deg  QRS Axis= 31 deg  T Wave Axis= 53 deg  I: 40 Axis= 43 deg  T: 40 Axis= deg  ST Axis= 198 deg  SUPRAVENTRICULAR TACHYCARDIA  VENTRICULAR BIGEMINY  BORDERLINE LOW VOLTAGE IN FRONTAL LEADS  ST DEPRESSION, PROBABLY RATE RELATED  pqt repeat  Electronically Signed by:  Rocky BriceñoYAQUELIN) ([facility]) 13-Jul-2018 13:10:52  Date and Time of Study: 2018-07-13 02:28:25     ECG 12 Lead [[id number]] Collected:  07/14/18 0842     Updated:  07/14/18 1640    Narrative:       RR Interval= 380 ms  IL Interval= ms  QRSD Interval= 92 ms  QT Interval= 296 ms  QTc Interval= 480 ms  Heart Rate= 158 ms  P Axis= 0 deg  QRS Axis= 17 deg  T Wave Axis= 15 deg  I: 40 Axis= -4 deg  T: 40 Axis= 47 deg  ST Axis= 228 deg  SUPRAVENTRICULAR TACHYCARDIA - new  VENTRICULAR BIGEMINY  BORDERLINE LOW VOLTAGE IN FRONTAL LEADS  REPOLARIZATION ABNORMALITY, PROB RATE RELATED  Electronically Signed by:  Ry Garcia ([facility]) 14-Jul-2018 16:38:26  Date and Time of Study: 2018-07-14 08:42:38    ECG 12 Lead [[id number]] Collected:  07/14/18 0854     Updated:  07/14/18 1641    Narrative:       RR Interval= 667 ms  IL Interval= 148 ms  QRSD Interval= 140 ms  QT Interval= 412 ms  QTc Interval= 504 ms  Heart Rate= 90 ms  P Axis= 48 deg  QRS Axis= 14 deg  T Wave Axis= -8 deg  I: 40 Axis= 15 deg  T: 40 Axis= deg  ST Axis= -58 deg  ATRIAL FIBRILLATION - new  LEFT ATRIAL ABNORMALITY  RIGHT BUNDLE BRANCH BLOCK  Electronically Signed by:  Ry Garcia ([facility]) 14-Jul-2018 16:38:55  Date and Time of Study: 2018-07-14 08:54:00          Medication Review:   I have reviewed the patient's current medication list    Current Facility-Administered Medications:   •  acetaminophen (TYLENOL) tablet 650 mg, 650 mg, Oral, Q4H PRN, Ry Steele MD, 650 mg at 07/10/18 2024  •  ampicillin-sulbactam 3 g/100 mL 0.9% NS (MBP), 3 g, Intravenous, Q6H, Nereyda Bustamante MD, Last Rate: 0 mL/hr at 07/15/18 0545, 3 g at 07/15/18 1044  •  aspirin chewable tablet 81 mg, 81 mg, Oral, Daily, Stan Palacios DO, 81 mg at 07/15/18 0835  •  b complex-C-folic acid capsule 1 mg, 1 capsule, Oral, Daily, Stan Palacios DO, 1 mg at 07/15/18 0836  •  bumetanide (BUMEX) tablet 2 mg, 2 mg, Oral, Daily, Betzaida Salcedo MD, 2 mg at 07/15/18 0835  •  cetirizine (zyrTEC) tablet 5 mg, 5 mg, Oral, Daily, Stan Palacios DO, 5 mg at 07/15/18 0836  •  digoxin (LANOXIN)  tablet 125 mcg, 125 mcg, Oral, Daily, Alivia Mayo MD  •  diltiaZEM CD (CARDIZEM CD) 24 hr capsule 300 mg, 300 mg, Oral, Q24H, Ry Garcia III, MD, 300 mg at 07/15/18 0836  •  guaiFENesin (MUCINEX) 12 hr tablet 600 mg, 600 mg, Oral, Daily, Stan Palacios, DO, 600 mg at 07/14/18 0801  •  HYDROmorphone (DILAUDID) injection 0.25 mg, 0.25 mg, Intravenous, Q2H PRN, Nereyda Bustamante MD, 0.25 mg at 07/15/18 0435  •  lactobacillus acidophilus (RISAQUAD) capsule 1 capsule, 1 capsule, Oral, Daily, Suresh Ng MD, 1 capsule at 07/15/18 0836  •  levETIRAcetam (KEPPRA) tablet 500 mg, 500 mg, Oral, Q12H, Stan Palacios, DO, 500 mg at 07/15/18 0836  •  LORazepam (ATIVAN) injection 1 mg, 1 mg, Intravenous, Once PRN, Nereyda Bustamante MD  •  Magnesium Sulfate 2 gram Bolus, followed by 8 gram infusion (total Mg dose 10 grams)- Mg less than or equal to 1mg/dL, 2 g, Intravenous, PRN **OR** Magnesium Sulfate 2 gram / 50mL Infusion (GIVE X 3 BAGS TO EQUAL 6GM TOTAL DOSE) - Mg 1.1 - 1/5 mg/dl, 2 g, Intravenous, PRN **OR** Magnesium Sulfate 4 gram infusion- Mg 1.6-1.9 mg/dL, 4 g, Intravenous, PRN, Elizabeth Roberts DO, Last Rate: 25 mL/hr at 07/10/18 0939, 4 g at 07/10/18 0939  •  methocarbamol (ROBAXIN) tablet 500 mg, 500 mg, Oral, BID PRN, Stan Palacios DO, 500 mg at 07/15/18 0836  •  metoprolol succinate XL (TOPROL-XL) 24 hr tablet 100 mg, 100 mg, Oral, Q24H, Nereyda Bustamante MD, 100 mg at 07/15/18 0835  •  metoprolol tartrate (LOPRESSOR) injection 5 mg, 5 mg, Intravenous, Once, Nereyda Bustamante MD, Stopped at 07/13/18 0319  •  nitroglycerin (NITROSTAT) ointment 1 inch, 1 inch, Topical, Once, Nereyda Bustamante MD, Stopped at 07/13/18 0319  •  nitroglycerin (NITROSTAT) SL tablet 0.4 mg, 0.4 mg, Sublingual, Q5 Min PRN, Nereyda Bustamante MD, 0.4 mg at 07/13/18 0214  •  polyethylene glycol (MIRALAX) powder 17 g, 17 g, Oral, Daily, Nereyda Bustamante MD, Stopped at 07/14/18 0900  •  potassium chloride  (K-DUR,KLOR-CON) CR tablet 20 mEq, 20 mEq, Oral, Daily, Dani Robles MD, 20 mEq at 07/15/18 0836  •  potassium chloride (K-DUR,KLOR-CON) CR tablet 40 mEq, 40 mEq, Oral, PRN, 40 mEq at 07/14/18 1432 **OR** potassium chloride (KLOR-CON) packet 40 mEq, 40 mEq, Oral, PRN **OR** potassium chloride 10 mEq in 100 mL IVPB, 10 mEq, Intravenous, Q1H PRN, Elizabeth Roberts DO  •  rivaroxaban (XARELTO) tablet 20 mg, 20 mg, Oral, Daily With Dinner, Alivia Mayo MD  •  sennosides-docusate sodium (SENOKOT-S) 8.6-50 MG tablet 2 tablet, 2 tablet, Oral, BID, Nereyda Bustamante MD, 2 tablet at 07/12/18 2208  •  sodium chloride 0.9 % flush 1-10 mL, 1-10 mL, Intravenous, PRN, Stan Palacios, DO, 10 mL at 07/15/18 1054  •  sodium chloride 0.9 % infusion 40 mL, 40 mL, Intravenous, PRN, Stan Palacios, DO, 40 mL at 07/13/18 0556      Assessment/Plan     CHF    Fever secondary to diverticulitis and sacral decubitus    Enterococcal bacteremia and E. Coli Bacteremia    Hypokalemia    Chronic Low back pain    Essential Hypertension    HLD    PAF    Seizure disorder    CKD3    Chronic Microcytic Anemia    DVT Prophylaxis        Plan for disposition:  Continue ICU care    Shante Langston DO  07/15/18  1:37 PM      Time: 20 min

## 2018-07-15 NOTE — NURSING NOTE
Paged Dr Langston for case review due to pt increased weakness, confusion, fever this shift. Instructed to do stat labs & call ID Dr Cantu for reevaluation of abx tx .

## 2018-07-15 NOTE — PROGRESS NOTES
Adult Nutrition  Assessment/PES    Patient Name:  Fahad Muhammad  YOB: 1935  MRN: [id number]  Admit Date:  7/9/2018    Assessment Date:  7/15/2018    Comments:  Strongly encourage pt to drink Ensure Enlive & record even when pt saves for later to fully assess intake.           Reason for Assessment     Row Name 07/15/18 1427          Reason for Assessment    Reason For Assessment calorie count order                 Nutrition Prescription Ordered     Row Name 07/15/18 1427          Nutrition Prescription PO    Supplement Ensure Enlive   house for Plus     Supplement Frequency 3 times a day     Common Modifiers Low Sodium         Estimated needs:  2365-1306 kcal, 87 gm pro    Day one:  508 kcal, 22 gm pro ( 3 meals, 1 supplement- 2 others marked as saved but never recorded if drank)    25% kcal, 25%                  Electronically signed by:  Keri Cleaning RD  07/15/18 2:28 PM

## 2018-07-16 NOTE — NURSING NOTE
Continued Stay Note  JOLYNN Thibodeaux     Patient Name: Fahad Muhammad  MRN: [id number]  Today's Date: 7/16/2018    Admit Date: 7/9/2018          Discharge Plan     Row Name 07/16/18 5810       Plan    Plan plan home when stable, continue to assess needs    Patient/Family in Agreement with Plan yes    Plan Comments Spoke with patients wife outside patient room as she returned from cafeteria. She denies needs at this time, appears overwhelmed. Emotional support given, enc to contact CM if any needs. Will continue to follow.    Row Name 07/16/18 5889       Plan    Plan plan home when stable, continue to assess needs    Patient/Family in Agreement with Plan yes    Plan Comments Follow up visit with patient. He is restless and confused, asking for help. Celi KEARNEY arrives to bedside, B/P cuff inflating, reassurance given and explanation of obtaining b/p. CM# placed on white board. Per RN, patients wife has gone to cafeteria. CM will return to talk with patients wife this afternoon. Will continue to follow.              Discharge Codes    No documentation.           Ricardo Storey RN

## 2018-07-16 NOTE — NURSING NOTE
Continued Stay Note  JOLYNN Thibodeaux     Patient Name: Fahad Muhammad  MRN: [id number]  Today's Date: 7/16/2018    Admit Date: 7/9/2018          Discharge Plan     Row Name 07/16/18 1303       Plan    Plan plan home when stable, continue to assess needs    Patient/Family in Agreement with Plan yes    Plan Comments Follow up visit with patient. He is restless and confused, asking for help. Celi KEARNEY arrives to bedside, B/P cuff inflating, reassurance given and explanation of obtaining b/p. CM# placed on white board. Per RN, patients wife has gone to cafeteria. CM will return to talk with patients wife this afternoon. Will continue to follow.              Discharge Codes    No documentation.           Ricardo Storey RN

## 2018-07-16 NOTE — PROGRESS NOTES
Adult Nutrition  Assessment/PES    Patient Name:  Fahad Muhammad  YOB: 1935  MRN: [id number]  Admit Date:  7/9/2018    Assessment Date:  7/16/2018    Comments:  Noted change in MS per notes, no SKIP today due to confusion/status.   Clarify if care goals include nutrition support via feeding tube if pt continues to be unable to meet nutrition needs with po.           Reason for Assessment     Row Name 07/16/18 0929          Reason for Assessment    Reason For Assessment calorie count order               Nutrition Prescription Ordered     Row Name 07/16/18 0930          Nutrition Prescription PO    Supplement Ensure Enlive   house for Ensure Plus     Supplement Frequency 3 times a day     Common Modifiers Low Sodium             Estimated needs:  1668 kcal, 87 gm pro    Day 1:   508 kcal, 22 gm pro (3 meals)  Day 2:   627 kcal, 27 gm pro (2 meals)                         Electronically signed by:  Keri Cleaning RD  07/16/18 9:30 AM

## 2018-07-16 NOTE — PROGRESS NOTES
After reviewing the transthoracic echo, I would treat him as if he has endocarditis.  His mitral and aortic valves are both thickened and abnormal.  Both of them are incompetent to at least a moderate degree.  I do not think that doing a SKIP is going to give a lot more information.  Would treat him as if he has endocarditis.

## 2018-07-16 NOTE — PLAN OF CARE
Problem: Patient Care Overview  Goal: Plan of Care Review  Outcome: Ongoing (interventions implemented as appropriate)   07/16/18 0440   Coping/Psychosocial   Plan of Care Reviewed With patient   Plan of Care Review   Progress declining   OTHER   Outcome Summary pt given 500cc ns bolus and placed on continuous iv fluids 1/2ns @ 75. Pt spiked temp overnight of 101.6 MD aware and tylenol supp was ordered. Pt refusing to take po meds overnight. Pt continues to be increasingly confused. Pt c/o inability to void overnight with increased pain/ pressure. MD was notified and unger cath was ordered/placed. AM labs ordered CBC, CMP, and 1set of bld cultures. Pt scheduled for SKIP in am.      Goal: Individualization and Mutuality  Outcome: Ongoing (interventions implemented as appropriate)    Goal: Discharge Needs Assessment  Outcome: Ongoing (interventions implemented as appropriate)      Problem: Skin Injury Risk (Adult)  Goal: Skin Health and Integrity  Outcome: Ongoing (interventions implemented as appropriate)   07/16/18 0440   Skin Injury Risk (Adult)   Skin Health and Integrity making progress toward outcome       Problem: Fall Risk (Adult)  Goal: Absence of Fall  Outcome: Ongoing (interventions implemented as appropriate)   07/16/18 0440   Fall Risk (Adult)   Absence of Fall making progress toward outcome       Problem: Pain, Acute (Adult)  Goal: Acceptable Pain Control/Comfort Level  Outcome: Ongoing (interventions implemented as appropriate)   07/16/18 0440   Pain, Acute (Adult)   Acceptable Pain Control/Comfort Level unable to achieve out   07/16/18 0440   Pain, Acute (Adult)   Acceptable Pain Control/Comfort Level making progress toward outcome   come       Problem: Infection, Risk/Actual (Adult)  Goal: Infection Prevention/Resolution  Outcome: Ongoing (interventions implemented as appropriate)   07/16/18 0440   Infection, Risk/Actual (Adult)   Infection Prevention/Resolution making progress toward outcome        Problem: Wound (Includes Pressure Injury) (Adult)  Goal: Signs and Symptoms of Listed Potential Problems Will be Absent, Minimized or Managed (Wound)  Outcome: Ongoing (interventions implemented as appropriate)   07/16/18 0440   Goal/Outcome Evaluation   Problems Assessed (Wound) all   Problems Present (Wound) delayed wound healing;situational response;pain       Problem: Sepsis/Septic Shock (Adult)  Goal: Signs and Symptoms of Listed Potential Problems Will be Absent, Minimized or Managed (Sepsis/Septic Shock)  Outcome: Ongoing (interventions implemented as appropriate)   07/16/18 0440   Goal/Outcome Evaluation   Problems Assessed (Sepsis) all   Problems Present (Sepsis) infection progression;situational response

## 2018-07-16 NOTE — PROGRESS NOTES
"SERVICE: [facility] HOSPITALIST    CONSULTANTS: ID, cardiology, GI,     CHIEF COMPLAINT:  Follow up metabolic encephalopathy, bacteremia    SUBJECTIVE: The patient has had a mental status change over the weekend and is arousable to verbal stimuli but not conversant and unable to answer questions. Staff notes recurrent fevers, toxic appearance. Some mention of right sided pain, none on my exam, apparently varies. The patient seems much worse since last seen by me on 7/13/18. Family is not at the bedside to discuss. Patient is taking medications in applesauce for nursing staff. He denies chest pain/soa/n/v/d or other pain concerns, although unsure he is awake enough to say.     OBJECTIVE:    /61 (BP Location: Left arm, Patient Position: Lying)   Pulse 90   Temp 100.5 °F (38.1 °C) (Axillary)   Resp 24   Ht 190.5 cm (75\")   Wt 86 kg (189 lb 11.2 oz)   SpO2 93%   BMI 23.71 kg/m²     MEDS/LABS REVIEWED AND ORDERED    ampicillin-sulbactam 3 g Intravenous Q6H   aspirin 81 mg Oral Daily   b complex-C-folic acid 1 capsule Oral Daily   bumetanide 2 mg Oral Daily   cetirizine 5 mg Oral Daily   digoxin 125 mcg Oral Daily   diltiaZEM  mg Oral Q24H   guaiFENesin 600 mg Oral Daily   lactobacillus acidophilus 1 capsule Oral Daily   levETIRAcetam 500 mg Oral Q12H   methocarbamol 500 mg Oral Q8H   metoprolol succinate  mg Oral Q24H   metoprolol tartrate 5 mg Intravenous Once   naloxone 0.4 mg Intravenous Once   nitroglycerin 1 inch Topical Once   polyethylene glycol 17 g Oral Daily   potassium chloride 20 mEq Oral Daily   sennosides-docusate sodium 2 tablet Oral BID   vancomycin 1,500 mg Intravenous Q18H     Physical Exam   Constitutional: He appears well-developed and well-nourished.   Ill-appearance, pale   HENT:   Head: Normocephalic and atraumatic.   Eyes: EOM are normal. Pupils are equal, round, and reactive to light.   Cardiovascular: Normal rate.    Irregular rhythm, grade 2/6 systolic " murmur   Pulmonary/Chest: Effort normal and breath sounds normal.   Abdominal: Soft. Bowel sounds are normal. He exhibits no distension. There is no tenderness. There is no guarding.   Musculoskeletal:   1+ bilateral LE edema   Neurological:   Moves all extremities, arousable to verbal stimuli, sleepy, lethargic. Unable to determine orientation   Skin: Skin is warm and dry.   Sacral wound not visualized   Psychiatric:   Sleepy, arousable to verbal stimuli   Vitals reviewed.    LAB/DIAGNOSTICS:    Lab Results (last 24 hours)     Procedure Component Value Units Date/Time    Blood Culture - Blood, [[id number]]  (Normal) Collected:  07/15/18 1910    Specimen:  Blood from Arm, Right Updated:  07/16/18 0715     Blood Culture No growth at less than 24 hours    Blood Culture - Blood, [[id number]] Collected:  07/16/18 0425    Specimen:  Blood from Arm, Left Updated:  07/16/18 0635    Comprehensive Metabolic Panel [[id number]]  (Abnormal) Collected:  07/16/18 0425    Specimen:  Blood from Arm, Left Updated:  07/16/18 0618     Glucose 93 mg/dL      BUN 16 mg/dL      Creatinine 0.98 mg/dL      Sodium 139 mmol/L      Potassium 3.9 mmol/L      Chloride 97 (L) mmol/L      CO2 32.1 (H) mmol/L      Calcium 9.2 mg/dL      Total Protein 6.3 g/dL      Albumin 2.90 (L) g/dL      ALT (SGPT) 18 U/L      AST (SGOT) 28 U/L      Alkaline Phosphatase 152 (H) U/L      Total Bilirubin 0.9 mg/dL      eGFR Non African Amer 74 mL/min/1.73      Globulin 3.4 gm/dL      A/G Ratio 0.9 g/dL      BUN/Creatinine Ratio 16.3     Anion Gap 9.9 mmol/L     Narrative:       The MDRD GFR formula is only valid for adults with stable renal function between ages 18 and 70.    CBC (No Diff) [[id number]]  (Abnormal) Collected:  07/16/18 0425    Specimen:  Blood from Arm, Left Updated:  07/16/18 0610     WBC 15.53 (H) 10*3/mm3      RBC 3.87 (L) 10*6/mm3      Hemoglobin 9.4 (L) g/dL      Hematocrit 28.6 (L) %      MCV 73.9 (L) fL      MCH 24.3 (L) pg      MCHC 32.9 g/dL       RDW 18.3 (H) %      RDW-SD 47.9 fl      MPV 9.7 fL      Platelets 654 (H) 10*3/mm3     Comprehensive Metabolic Panel [[id number]]  (Abnormal) Collected:  07/15/18 1902    Specimen:  Blood Updated:  07/15/18 1929     Glucose 114 (H) mg/dL      BUN 17 mg/dL      Creatinine 1.08 mg/dL      Sodium 133 (L) mmol/L      Potassium 3.5 mmol/L      Chloride 92 (L) mmol/L      CO2 32.4 (H) mmol/L      Calcium 9.3 mg/dL      Total Protein 6.4 g/dL      Albumin 3.10 (L) g/dL      ALT (SGPT) 20 U/L      AST (SGOT) 29 U/L      Alkaline Phosphatase 149 (H) U/L      Total Bilirubin 1.0 mg/dL      eGFR Non African Amer 66 mL/min/1.73      Globulin 3.3 gm/dL      A/G Ratio 0.9 g/dL      BUN/Creatinine Ratio 15.7     Anion Gap 8.6 mmol/L     Narrative:       The MDRD GFR formula is only valid for adults with stable renal function between ages 18 and 70.    CBC (No Diff) [[id number]]  (Abnormal) Collected:  07/15/18 1901    Specimen:  Blood Updated:  07/15/18 1914     WBC 14.75 (H) 10*3/mm3      RBC 3.96 (L) 10*6/mm3      Hemoglobin 9.7 (L) g/dL      Hematocrit 29.4 (L) %      MCV 74.2 (L) fL      MCH 24.5 (L) pg      MCHC 33.0 g/dL      RDW 18.0 (H) %      RDW-SD 47.3 fl      MPV 9.4 fL      Platelets 664 (H) 10*3/mm3     Fungus Culture, Blood - Blood, Arm, Left [[id number]] Collected:  07/10/18 1130    Specimen:  Blood from Arm, Left Updated:  07/15/18 1745     Fungus Culture No fungus isolated at less than 1 week    Fungus Culture, Blood - Blood, Arm, Left [[id number]] Collected:  07/10/18 1200    Specimen:  Blood from Hand, Left Updated:  07/15/18 1745     Fungus Culture No fungus isolated at less than 1 week        ECG 12 Lead   Final Result   RR Interval= 667 ms   NC Interval= 148 ms   QRSD Interval= 140 ms   QT Interval= 412 ms   QTc Interval= 504 ms   Heart Rate= 90 ms   P Axis= 48 deg   QRS Axis= 14 deg   T Wave Axis= -8 deg   I: 40 Axis= 15 deg   T: 40 Axis= deg   ST Axis= -58 deg   ATRIAL FIBRILLATION - new   LEFT ATRIAL  ABNORMALITY   RIGHT BUNDLE BRANCH BLOCK   Electronically Signed by:  Ry Garcia ([facility]) 14-Jul-2018 16:38:55   Date and Time of Study: 2018-07-14 08:54:00      ECG 12 Lead   Final Result   RR Interval= 380 ms   NH Interval= ms   QRSD Interval= 92 ms   QT Interval= 296 ms   QTc Interval= 480 ms   Heart Rate= 158 ms   P Axis= 0 deg   QRS Axis= 17 deg   T Wave Axis= 15 deg   I: 40 Axis= -4 deg   T: 40 Axis= 47 deg   ST Axis= 228 deg   SUPRAVENTRICULAR TACHYCARDIA - new   VENTRICULAR BIGEMINY   BORDERLINE LOW VOLTAGE IN FRONTAL LEADS   REPOLARIZATION ABNORMALITY, PROB RATE RELATED   Electronically Signed by:  Ry Garcia ([facility]) 14-Jul-2018 16:38:26   Date and Time of Study: 2018-07-14 08:42:38      ECG 12 Lead   Final Result   RR Interval= 373 ms   NH Interval= ms   QRSD Interval= 86 ms   QT Interval= 316 ms   QTc Interval= 517 ms   Heart Rate= 161 ms   P Axis= 0 deg   QRS Axis= 31 deg   T Wave Axis= 53 deg   I: 40 Axis= 43 deg   T: 40 Axis= deg   ST Axis= 198 deg   SUPRAVENTRICULAR TACHYCARDIA   VENTRICULAR BIGEMINY   BORDERLINE LOW VOLTAGE IN FRONTAL LEADS   ST DEPRESSION, PROBABLY RATE RELATED   pqt repeat   Electronically Signed by:  Rocky Briceño) ([facility]) 13-Jul-2018    13:10:52   Date and Time of Study: 2018-07-13 02:28:25      ECG 12 Lead   Final Result   RR Interval= 373 ms   NH Interval= ms   QRSD Interval= 94 ms   QT Interval= 308 ms   QTc Interval= 504 ms   Heart Rate= 161 ms   P Axis= 0 deg   QRS Axis= 43 deg   T Wave Axis= 69 deg   I: 40 Axis= 34 deg   T: 40 Axis= 185 deg   ST Axis= 160 deg   SUPRAVENTRICULAR TACHYCARDIA   VENTRICULAR BIGEMINY   ABERRANT COMPLEX   BORDERLINE LOW VOLTAGE IN FRONTAL LEADS   REPOLARIZATION ABNORMALITY, PROB RATE RELATED   pqt repeat   Electronically Signed by:  Rocky BriceñoYAQUELIN) ([facility]) 13-Jul-2018    13:09:50   Date and Time of Study: 2018-07-12 23:36:29      ECG 12 Lead   Final Result   RR Interval= 619 ms   NH Interval= 180 ms   QRSD Interval= 102 ms   QT Interval=  360 ms   QTc Interval= 458 ms   Heart Rate= 97 ms   P Axis= 46 deg   QRS Axis= 41 deg   T Wave Axis= 26 deg   I: 40 Axis= 29 deg   T: 40 Axis= 120 deg   ST Axis= 0 deg   SINUS RHYTHM   PROBABLE LEFT ATRIAL ABNORMALITY   LOW VOLTAGE IN FRONTAL LEADS   atypical rbbb no major change   Electronically Signed by:  Rocky Briceño) ([facility]) 13-Jul-2018    13:10:16   Date and Time of Study: 2018-07-13 05:41:26      ECG 12 Lead   Final Result   RR Interval= 405 ms   NH Interval= ms   QRSD Interval= 94 ms   QT Interval= 304 ms   QTc Interval= 478 ms   Heart Rate= 148 ms   P Axis= deg   QRS Axis= 36 deg   T Wave Axis= 72 deg   I: 40 Axis= 28 deg   T: 40 Axis= 47 deg   ST Axis= 178 deg   JUNCTIONAL TACHYCARDIA   LOW VOLTAGE IN FRONTAL LEADS   REPOLARIZATION ABNORMALITY, PROB RATE RELATED   BORDERLINE PROLONGED QT INTERVAL   NO PRIOR TRACING AVAILABLE FOR COMPARISON   Electronically Signed by:  Gonzalo Collins ([facility]) 12-Jul-2018 20:20:06   Date and Time of Study: 2018-07-12 11:32:45      ECG 12 Lead   Final Result   RR Interval= 594 ms   NH Interval= 168 ms   QRSD Interval= 120 ms   QT Interval= 368 ms   QTc Interval= 477 ms   Heart Rate= 101 ms   P Axis= 16 deg   QRS Axis= 19 deg   T Wave Axis= 3 deg   I: 40 Axis= 5 deg   T: 40 Axis= 113 deg   ST Axis= -14 deg   SINUS TACHYCARDIA   IVCD, CONSIDER ATYPICAL RBBB   CONSIDER ANTERIOR INFARCT   NO SIGNIFICANT CHANGE FROM PREVIOUS ECG   Electronically Signed by:  Gonzalo Collins ([facility]) 12-Jul-2018 20:19:43   Date and Time of Study: 2018-07-12 12:15:46      ECG 12 Lead   Final Result   RR Interval= 571 ms   NH Interval= ms   QRSD Interval= 140 ms   QT Interval= 384 ms   QTc Interval= 508 ms   Heart Rate= 105 ms   P Axis= deg   QRS Axis= -25 deg   T Wave Axis= -26 deg   I: 40 Axis= 13 deg   T: 40 Axis= 141 deg   ST Axis= -22 deg   ATRIAL FLUTTER, A-RATE 258   RIGHT BUNDLE BRANCH BLOCK   NO SIGNIFICANT CHANGE FROM PREVIOUS ECG   Electronically Signed by:  Naeem Laughlin ([facility])  10-Jul-2018 15:30:48   Date and Time of Study: 2018-07-10 07:21:18        Results for orders placed during the hospital encounter of 07/09/18   Adult Transthoracic Echo Complete W/ Cont if Necessary Per Protocol    Addendum · Left ventricular systolic function is normal. Calculated EF = 66%.  Estimated EF was in agreement with the calculated EF. Estimated EF = 68%.  Normal left ventricular cavity size noted. All left ventricular wall  segments contract normally. Left ventricular wall thickness is consistent  with mild concentric hypertrophy. Left ventricular diastolic function was  indeterminate. · Left atrial volume is severely increased. · Right atrial cavity size is severely dilated. · The aortic valve is abnormal in structure. There is severe thickening of  the aortic valve. The aortic valve is severely thickened with hazy density  on the ventricular surface of the aortic valve. Cannot rule out vegetative  process. Consider transesophageal echocardiogram to assess further.. · The mitral valve is abnormal in structure. The anterior mitral valve  leaflet is thickened on the ventricular surface. Suspicious for possible  vegetative process. Consider transesophageal echocardiogram if clinically  indicated.. Mild mitral valve regurgitation is present. · Mild tricuspid valve regurgitation is present. Estimated right  ventricular systolic pressure from tricuspid regurgitation is normal (<35  mmHg). · Addendum: Mild dilation of the aortic root is present. Aortic root  measures 4.3 cm.        Alivia Mayo MD 7/10/2018 11:30 AM          Narrative · Left ventricular systolic function is normal. Calculated EF = 66%.   Estimated EF was in agreement with the calculated EF. Estimated EF = 68%.   Normal left ventricular cavity size noted. All left ventricular wall   segments contract normally. Left ventricular wall thickness is consistent   with mild concentric hypertrophy. Left ventricular diastolic function was    indeterminate.  · Left atrial volume is severely increased.  · Right atrial cavity size is severely dilated.  · The aortic valve is abnormal in structure. There is severe thickening of   the aortic valve. The aortic valve is severely thickened with hazy density   on the ventricular surface of the aortic valve. Cannot rule out vegetative   process. Consider transesophageal echocardiogram to assess further..  · The mitral valve is abnormal in structure. The anterior mitral valve   leaflet is thickened on the ventricular surface. Suspicious for possible   vegetative process. Consider transesophageal echocardiogram if clinically   indicated.. Mild mitral valve regurgitation is present.  · Mild tricuspid valve regurgitation is present. Estimated right   ventricular systolic pressure from tricuspid regurgitation is normal (<35   mmHg).        Ct Abdomen Pelvis With Contrast    Result Date: 7/16/2018  1. Motion degraded exam. 2. Improving sigmoid diverticulitis, see above. 3. Developing infiltrate and atelectasis in the lung bases, greater on the right. 4. Cholelithiasis. 5. Mild decubitus changes region of the upper gluteal crease as noted above. 6. Multilevel vertebral compression fracture deformities as noted above. Advanced lumbar spine degenerative changes. 7. Initial stat report to the ordering service from Dr. Ck Wylie at 2235 hours on 7/15/2018.  This report was finalized on 7/16/2018 6:46 AM by Dr. Jefferson Bowen MD.      ASSESSMENT/PLAN:   Acute metabolic encephalopathy: secondary to below  Consider ??fungal??Viral??, d/w Dr. Cantu  Remove fentanyl patch, monitor for resolution, gave 1 time dose narcan without enough change to decide cause of encephalopathy is medication related  Consult neurology  Contacted [facility], see below    Sepsis secondary to Enterococcus and Pan-sensitive E-coli Bacteremia, r/o diskitis vs endocarditis vs diverticulitis vs buttock wound sources??: GI/ID/Cardiology following   -Talk  to [facility], ????diskitis????  -add back vancomycin, continue Unasyn  -multiple blood cultures done, monitor  -initial echo with possible vegetation, repeat pending now  -SKIP possibly in am  -MRI L-spine report noted, d/w [facility]  -change to therapeutic lovenox per [facility] direction, will require 5 days off xarelto if needs surgery  -stat repeat MRI with and without per [facility] direction to determine increase or change as this would warrant transfer to [facility] for possible surgical intervention. Per Dr. Haskins, possible right psoas abscess. Await repeat MRI result findings to contact them back, transfer patient     Cholelithiasis/right sided abdominal pain:   Staff notes patient c/o right abdominal pain, none on my exam  stones are large, no CBD dilatation, GB non-distended, doubtful acute concern    Chest pain r/o ACS  Chronic Afib and flutter w/intermittent RVR/SVT:   CAD/Dyslipidemia/h/o CABG: cardiology following  SVT on and off, ?vegetation?  Continues diltiazem, metoprolol, aspirin, bumex (allergic to statins)  EKG unchanged, ACS ruled out by cardiology  TSH normal  Changed to therapeutic dose lovenox as above     Chronic iron deficiency anemia/thrombocytosis:   Hemoglobin stable at 9.4, no active blood loss noted  Platelets 654,000     HTN: BP at/low goal on metoprolol succinate 100 mg daily/diltiazem  mg daily/bumex 2 mg daily     Sacral Ulcer: wound RN following  Continue wound care      Chronic back pain:   Robaxin helps, continue     H/O CVA/seizures:   No acute issues on ASA and home dose keppra. No acute issues currently.      CKD stage 2-3:   Creatinine baseline 1.0, currently 0.98, no acute issues      Hyponatremia/electrolyte imbalance: resolved at 139  Remains on electrolyte replacementprotocol     Urinary retention: unger in place, large amount of urine     Ascending thoracic aortic aneurysm: unchanged by CT, no acute issues here

## 2018-07-16 NOTE — PROGRESS NOTES
LOS: 7 days   Patient Care Team:  Elisabeth Warren MD as PCP - General (Internal Medicine)  Alexsander Matute MD as Consulting Physician (Neurology)  MD Ry Goff III, MD as Consulting Physician (Cardiology)    Chief Complaint:     F/u CAD, valve vegetations.     Interval History:     He is unable to answer questions.     Objective   Vital Signs  Temp:  [98 °F (36.7 °C)-101.6 °F (38.7 °C)] 99.5 °F (37.5 °C)  Heart Rate:  [] 91  Resp:  [16-26] 24  BP: ()/(43-96) 130/70    Intake/Output Summary (Last 24 hours) at 07/16/18 0816  Last data filed at 07/16/18 0635   Gross per 24 hour   Intake             1533 ml   Output             2185 ml   Net             -652 ml       Comfortable, mildy distressed.   Neck supple, no JVD or thyromegaly appreciated  S1/S2 tachy and irregular, no m/r/g  Lungs CTA B, normal effort  Abdomen tender at RUQ (+) BS, no HSM appreciated  Extremities warm, no clubbing, cyanosis, or edema  No visible or palpable skin lesions  Very confused and lethargic.     Results Review:        Results from last 7 days  Lab Units 07/16/18  0425 07/15/18  1902 07/15/18  0446   SODIUM mmol/L 139 133* 140   POTASSIUM mmol/L 3.9 3.5 4.4   CHLORIDE mmol/L 97* 92* 98   CO2 mmol/L 32.1* 32.4* 31.1*   BUN mg/dL 16 17 15   CREATININE mg/dL 0.98 1.08 0.96   GLUCOSE mg/dL 93 114* 100*   CALCIUM mg/dL 9.2 9.3 9.2       Results from last 7 days  Lab Units 07/14/18  0447 07/13/18  1259 07/13/18  0551   TROPONIN T ng/mL 0.107* 0.135* 0.073*       Results from last 7 days  Lab Units 07/16/18  0425 07/15/18  1901 07/14/18  0447   WBC 10*3/mm3 15.53* 14.75* 15.32*   HEMOGLOBIN g/dL 9.4* 9.7* 9.1*   HEMATOCRIT % 28.6* 29.4* 27.6*   PLATELETS 10*3/mm3 654* 664* 619*               Results from last 7 days  Lab Units 07/10/18  0411   MAGNESIUM mg/dL 1.7           I reviewed the patient's new clinical results.  I personally viewed and interpreted the patient's EKG/Telemetry data         Medication Review:     ampicillin-sulbactam 3 g Intravenous Q6H   aspirin 81 mg Oral Daily   b complex-C-folic acid 1 capsule Oral Daily   bumetanide 2 mg Oral Daily   cetirizine 5 mg Oral Daily   digoxin 125 mcg Oral Daily   diltiaZEM  mg Oral Q24H   fentaNYL 1 patch Transdermal Q72H   guaiFENesin 600 mg Oral Daily   lactobacillus acidophilus 1 capsule Oral Daily   levETIRAcetam 500 mg Oral Q12H   methocarbamol 500 mg Oral Q8H   metoprolol succinate  mg Oral Q24H   metoprolol tartrate 5 mg Intravenous Once   nitroglycerin 1 inch Topical Once   polyethylene glycol 17 g Oral Daily   potassium chloride 20 mEq Oral Daily   rivaroxaban 20 mg Oral Daily With Dinner   sennosides-docusate sodium 2 tablet Oral BID         sodium chloride 75 mL/hr Last Rate: 75 mL/hr (07/15/18 2231)       Assessment/Plan     Principal Problem:    Bacteremia due to Escherichia coli  Active Problems:    Hypertension    Stage 3 chronic kidney disease    Decubitus ulcer of sacral region    Abnormal echocardiogram    Atrial fibrillation and flutter (CMS/HCC)    Leg swelling    Bacteremia due to Enterococcus    1.  Paroxysmal superventricular tachycardia, atrial fibrillation and flutter. Is tachy today.  On metoprolol, digoxin and diltiazem.  Remains on Xarelto full dose  2.  Elevated troponin and has history of coronary artery disease but also has valvular lesions.    3.  Abnormal echocardiogram with questionable vegetation on the mitral valve with bacteremia.    4.  Enterococcal bacteremia. ON IV antibiotics, he is still having fevers.   5.  Coronary artery disease with history of coronary artery bypass grafting.,  As above  6.  Acute diverticulitis - very tender on the right side today.   7.  History of renal insufficiency though renal labs normal past several days    The patient has clearly had a mental status change.  This was first noted yesterday.  I don't think it's a good idea to do SKIP today.  I'm not sure that she is all  that beneficial.  If long-term antibiotics are planned anyway, that she is probably not provide a lot more information.  If it is unclear how long he needs to be on antibiotics, and maybe would consider SKIP at that point.  For today, continue current management.  Overall, he is a very chronically ill-appearing patient.  Certainly if anything on the SKIP points to severe valvular problems, he is not a candidate for surgery.  The SKIP would be strictly for antibiotic duration.    Spoke with Marii Queen.     America Vega MD  07/16/18  8:16 AM

## 2018-07-17 PROBLEM — G93.41 METABOLIC ENCEPHALOPATHY: Status: ACTIVE | Noted: 2018-01-01

## 2018-07-17 PROBLEM — R48.8 PERSEVERATION: Status: ACTIVE | Noted: 2018-01-01

## 2018-07-17 NOTE — NURSING NOTE
Spoke with Bertha EDMOND regarding pts current status and concern about the patients pain control. NP notified pt was currently unable to safely take oral medications and wouldn't be able to take his scheduled robaxin which he takes for his back pain. After reviewing pts current status and overall condition Bertha EDMOND ordered Voltaren gel 4 times a day and Lidoderm patches (2) daily for patients back pain. NP also recommended to continue 0.25mg IV dilaudid q2hr prn pain.

## 2018-07-17 NOTE — NURSING NOTE
Dr. Bustamante called unit to review patients current condition and review the recommendations/orders given by Bertha EDMOND. MD agreed with the orders that were given and added an order for IV Methocarbamol 1gm IV Q8hr, with a 3gm max in 24hr x3days. MD requested pharmacy be contacted to obtain both the Voltaren gel and IV Methocarbamol. MD also ordered a Cardizem gtt to be started if patients HR sustains greater than 140 and is unable to take po medications.  notified of need to speak with pharmacy regarding Voltaren gel and IV Methocarbamol.

## 2018-07-17 NOTE — NURSING NOTE
spoke with the on call pharmacist who contacted with the pharmacist at [facility] regarding the IV Methocarbamol. The pharmacy at [facility] had 2 doses of the IV Methocarbamol and could send 1 dose through [facility]. Order was placed for [facility] to deliver 1 dose of IV Methocarbamol and Voltaren Gel.

## 2018-07-17 NOTE — NURSING NOTE
Notified Dr. Bustamante pt was not alert enough or cooperative enough to safely take po medication. Orders given for IV keppra 500mg IV q12 & 0.25mg IV dilaudid q2hr prn pain.

## 2018-07-17 NOTE — CONSULTS
Patient Identification:  NAME:  Fahad Muhammad  Age:  82 y.o.   Sex:  male   :  1935   MRN:  [id number]       Chief complaint: He does not have one, reason for consult change in mental status confusion    History of present illness:  This patient is an 82-year-old right-handed white male with a history of degenerative disc disease seizure disorder previous history of stroke who comes to the hospital with sepsis and evidence of discitis in the back.  He is been very confused context patient was given a fentanyl patch and then it was discontinued today he is more alert according to nurses so modifying factor would be discontinuing the fentanyl patch.  Location was not pertinent quality was lethargy and confusion today he is much more awake alert still confused and very perseverative he will not really cooperate for anything location stop pertinent duration is been the last 48 hours as noted      Past medical history:  Past Medical History:   Diagnosis Date   • Arthritis    • Benign non-nodular prostatic hyperplasia with lower urinary tract symptoms 2016   • Chronic atrial fibrillation (CMS/HCC)    • Chronic pain syndrome 3/9/2018   • CKD (chronic kidney disease), stage III 2016   • Coronary artery disease    • DDD (degenerative disc disease), lumbar    • Heme positive stool    • Hx of colonoscopy     Complete   • Hyperlipidemia    • Hypertension    • Iron deficiency anemia 2018   • Neck strain    • Osteoarthritis    • Seizure disorder (CMS/HCC) 2016   • Seizures (CMS/HCC)    • Sepsis (CMS/HCC)    • Stroke (CMS/HCC)    • Vitamin D deficiency    • Wrist fracture, right        Past surgical history:  Past Surgical History:   Procedure Laterality Date   • CARDIAC SURGERY     • COLONOSCOPY     • CORONARY ARTERY BYPASS GRAFT     • HERNIA REPAIR Right     Inguinal hernia repair       Allergies:  Gabapentin; Levofloxacin; and Statins    Home medications:  Prescriptions Prior to Admission    Medication Sig Dispense Refill Last Dose   • aspirin 81 MG chewable tablet Chew 81 mg daily.      • bumetanide (BUMEX) 2 MG tablet Take 1 tablet by mouth Daily. 30 tablet 0    • cetirizine (zyrTEC) 5 MG tablet Take 5 mg by mouth.   Taking   • diltiaZEM CD (CARDIZEM CD) 240 MG 24 hr capsule Take 1 capsule by mouth Daily. Pt needs to contact office to schedule an appt for further refills 90 capsule 0 Taking   • docusate sodium 100 MG capsule Take 100 mg by mouth 2 (Two) Times a Day. (Patient taking differently: Take 100 mg by mouth As Needed.)  0 Taking   • guaiFENesin (MUCINEX) 600 MG 12 hr tablet Take 1 tablet by mouth 2 (Two) Times a Day. (Patient taking differently: Take 600 mg by mouth Daily.) 60 tablet 1 Taking   • Iron-FA-B Ldg-B-Rlcx-Probiotic (FUSION PLUS) capsule Take 1 tablet by mouth Daily.   Taking   • levETIRAcetam (KEPPRA) 500 MG tablet TAKE 1 TABLET BY MOUTH 2 (TWO) TIMES A DAY. 180 tablet 2 Taking   • methocarbamol (ROBAXIN) 500 MG tablet TAKE 1 TABLET BY MOUTH TWICE A DAY, PRN  0 Taking   • metoprolol succinate XL (TOPROL-XL) 100 MG 24 hr tablet Take 1 tablet by mouth Daily. 30 tablet 6 Taking   • Misc Natural Products (GLUCOSAMINE CHONDROITIN ADV PO) Take  by mouth.   Taking   • Multiple Vitamin (MULTI-VITAMIN) tablet Take 1 tablet by mouth daily.   Taking   • Omega-3 Fatty Acids (FISH OIL) 1000 MG capsule capsule Take 1,000 mg by mouth daily.   Taking   • Saw Palmetto, Serenoa repens, 450 MG capsule Take  by mouth.   Taking   • XARELTO 15 MG tablet TAKE 1 TABLET BY MOUTH DAILY. 90 tablet 2 Taking        Hospital medications:    ampicillin-sulbactam 3 g Intravenous Q6H   aspirin 81 mg Oral Daily   b complex-C-folic acid 1 capsule Oral Daily   cetirizine 5 mg Oral Daily   diclofenac 4 g Topical 4x Daily   digoxin 125 mcg Oral Daily   diltiaZEM 45 mg Oral Q6H   enoxaparin 1 mg/kg Subcutaneous Q12H   guaiFENesin 600 mg Oral Daily   lactobacillus acidophilus 1 capsule Oral Daily   levETIRAcetam 500 mg  Intravenous Q12H   lidocaine 2 patch Transdermal Q24H   Methocarbamol 1,000 mg Intravenous Q8H   methocarbamol 750 mg Oral 4x Daily   metoprolol succinate  mg Oral Q24H   polyethylene glycol 17 g Oral Daily   potassium chloride 20 mEq Oral Daily   sennosides-docusate sodium 2 tablet Oral BID   sodium chloride      vancomycin 1,500 mg Intravenous Q18H       diltiaZEM 5-15 mg/hr Last Rate: Stopped (07/16/18 2217)   Pharmacy to Dose enoxaparin (LOVENOX)     Pharmacy to dose vancomycin     sodium chloride 100 mL/hr Last Rate: 100 mL/hr (07/17/18 4910)     •  acetaminophen  •  acetaminophen  •  HYDROmorphone  •  magnesium sulfate **OR** magnesium sulfate **OR** magnesium sulfate  •  nitroglycerin  •  Pharmacy to Dose enoxaparin (LOVENOX)  •  Pharmacy to dose vancomycin  •  potassium chloride **OR** potassium chloride **OR** potassium chloride  •  sodium chloride  •  sodium chloride    Family history:  Family History   Problem Relation Age of Onset   • Heart disease Mother    • Cancer Father    • Cancer Sister    • Colon cancer Neg Hx    • Colon polyps Neg Hx        Social history:  Social History   Substance Use Topics   • Smoking status: Former Smoker     Quit date: 12/3/1993   • Smokeless tobacco: Former User     Quit date: 9/7/1994   • Alcohol use 0.6 oz/week     1 Shots of liquor per week      Comment: occasional        Review of systems:    He denies being in any pain having any paralysis or any numbness he then will not say anything else he is confused but apparently according to nurses much more awake and alert today doing better.  He has no other review of systems possible no family present I have reviewed the chart    Objective:  Vitals Ranges:   Temp:  [96.8 °F (36 °C)-102.2 °F (39 °C)] 97.4 °F (36.3 °C)  Heart Rate:  [] 92  Resp:  [18-24] 18  BP: ()/(49-77) 103/61      Physical Exam:  Patient is lethargic and perseverative he will not answer questions orientation fund of knowledge attention  span and concentration are recent remote memory he is avoidant and looks away from a when a look of from the right side of the bed or the left side of the bed.  He would not count fingers he denies being in any pain at all.  Speech is subtle dysarthria but no evidence of a aphasia he is in no distress but is rambling and confused pupils 1-1/2 with some constriction bilaterally but he closed his eyes and turned away decreased left nasolabial fold he would not follow any cranial nerve testing area and motor very poor effort but he does weekly gives  bilaterally and does withdraw in the in the legs.  He would not follow any commands no fasciculations some distal atrophy reflexes trace throughout symmetrical toes are definitely downgoing bilaterally sensation coordination station and gait completely impossible for this gentleman heart regular without murmur neck is rigid without bruits extremities some edema some clubbing of the toes.  Visual acuity he would not count fingers he would not answer any questions orientation    Results review:   I reviewed the patient's new clinical results.    Data review:  Lab Results (last 24 hours)     Procedure Component Value Units Date/Time    Procalcitonin [[id number]]  (Abnormal) Collected:  07/17/18 0722    Specimen:  Blood Updated:  07/17/18 1029     Procalcitonin 0.08 (L) ng/mL     Narrative:       As a Marker for Sepsis (Non-Neonates):   1. <0.5 ng/mL represents a low risk of severe sepsis and/or septic shock.  2. >2 ng/mL represents a high risk of severe sepsis and/or septic shock.    As a Marker for Lower Respiratory Tract Infections that require antibiotic therapy:    PCT on Admission     Antibiotic Therapy       6-12 Hrs later  > 0.5                Strongly Recommended             >0.25 - <0.5         Recommended  0.1 - 0.25           Discouraged              Remeasure/reassess PCT  <0.1                 Strongly Discouraged     Remeasure/reassess PCT                      PCT values of < 0.5 ng/mL do not exclude an infection, because localized infections (without systemic signs) may be associated with such low concentrations, or a systemic infection in its initial stages (< 6 hours). Furthermore, increased PCT can occur without infection. PCT concentrations between 0.5 and 2.0 ng/mL should be interpreted taking into account the patient's history. It is recommended to retest PCT within 6-24 hours if any concentrations < 2 ng/mL are obtained.    Digoxin Level [[id number]]  (Abnormal) Collected:  07/17/18 0722    Specimen:  Blood Updated:  07/17/18 0954     Digoxin 0.55 (L) ng/mL     Sedimentation Rate [[id number]]  (Abnormal) Collected:  07/17/18 0722    Specimen:  Blood Updated:  07/17/18 0836     Sed Rate 52 (H) mm/hr     Comprehensive Metabolic Panel [[id number]]  (Abnormal) Collected:  07/17/18 0722    Specimen:  Blood Updated:  07/17/18 0816     Glucose 87 mg/dL      BUN 23 mg/dL      Creatinine 1.14 mg/dL      Sodium 137 mmol/L      Potassium 3.5 mmol/L      Chloride 97 (L) mmol/L      CO2 29.6 (H) mmol/L      Calcium 8.7 (L) mg/dL      Total Protein 5.8 (L) g/dL      Albumin 2.60 (L) g/dL      ALT (SGPT) 15 U/L      AST (SGOT) 20 U/L      Alkaline Phosphatase 125 U/L      Total Bilirubin 0.8 mg/dL      eGFR Non African Amer 62 mL/min/1.73      Globulin 3.2 gm/dL      A/G Ratio 0.8 g/dL      BUN/Creatinine Ratio 20.2     Anion Gap 10.4 mmol/L     Narrative:       The MDRD GFR formula is only valid for adults with stable renal function between ages 18 and 70.    C-reactive Protein [[id number]]  (Abnormal) Collected:  07/17/18 0722    Specimen:  Blood Updated:  07/17/18 0815     C-Reactive Protein 3.99 (H) mg/dL     CBC & Differential [[id number]] Collected:  07/17/18 0722    Specimen:  Blood Updated:  07/17/18 0805    Narrative:       The following orders were created for panel order CBC & Differential.  Procedure                               Abnormality         Status                      ---------                               -----------         ------                     CBC Auto Differential[[id number]]        Abnormal            Final result                 Please view results for these tests on the individual orders.    CBC Auto Differential [[id number]]  (Abnormal) Collected:  07/17/18 0722    Specimen:  Blood Updated:  07/17/18 0805     WBC 14.43 (H) 10*3/mm3      RBC 3.80 (L) 10*6/mm3      Hemoglobin 9.3 (L) g/dL      Hematocrit 28.5 (L) %      MCV 75.0 (L) fL      MCH 24.5 (L) pg      MCHC 32.6 g/dL      RDW 18.6 (H) %      RDW-SD 49.7 fl      MPV 9.1 fL      Platelets 562 (H) 10*3/mm3      Neutrophil % 67.2 %      Lymphocyte % 19.4 (L) %      Monocyte % 11.9 (H) %      Eosinophil % 0.3 %      Basophil % 0.6 %      Immature Grans % 0.6 (H) %      Neutrophils, Absolute 9.70 (H) 10*3/mm3      Lymphocytes, Absolute 2.80 10*3/mm3      Monocytes, Absolute 1.71 (H) 10*3/mm3      Eosinophils, Absolute 0.04 (L) 10*3/mm3      Basophils, Absolute 0.09 10*3/mm3      Immature Grans, Absolute 0.09 (H) 10*3/mm3      nRBC 0.0 /100 WBC     Blood Culture - Blood, [[id number]]  (Normal) Collected:  07/16/18 0425    Specimen:  Blood from Arm, Left Updated:  07/17/18 0645     Blood Culture No growth at 24 hours    Blood Culture - Blood, [[id number]]  (Normal) Collected:  07/15/18 1910    Specimen:  Blood from Arm, Right Updated:  07/16/18 1915     Blood Culture No growth at 24 hours           Imaging:  Imaging Results (last 24 hours)     Procedure Component Value Units Date/Time    MRI Lumbar Spine With & Without Contrast [[id number]] Resulted:  07/16/18 1912     Updated:  07/16/18 1923    Narrative:         MRI OF THE LUMBAR SPINE WITH AND WITHOUT CONTRAST, 07/16/2018.    COMPARISON: MRI of the lumbar spine without contrast dated 07/11/2018.     HISTORY: Severe back pain. Evaluate for discitis, psoas abscess. Follow-up   prior MRI.    FINDINGS: Multisequence, multiplanar imaging of the lumbar spine was    obtained with and without contrast. MultiHance 17 mL was administered   intravenously. Redemonstrated is edema at L4 superior endplate and to a   lesser extent in the adjacent disc. It does not appear to have worsened in   the interval. No adjacent enhancing disc or psoas abscess is seen. The   compression fractures and degenerative changes have not changed in the   last 5 days. Gallstone is noted near the neck of the gallbladder measuring   1.9 x 1.8 cm. It was not seen in the current position in the prior study.       Impression:       1. There is no interval worsening of superior endplate compression   deformity or the associated edema. No enhancing components are noted in   the discs nor is there any obvious psoas abscess. Findings favor   degenerative change or acute aggressive discitis, osteomyelitis.   2. Degenerative changes in the compression fractures are stable when   compared to the prior study from 5 days ago.   3. Not mentioned above, minimal faint enhancement of the nerve roots of   the cauda equina in the lower lumbar spine cannot be completely excluded.    Nonspecific. It could be artifactual or related to very early   arachnoiditis. No clumping or nerve roots or mass is seen. Correlate for   polyneuropathy. No tumor. It is slightly more prominent when compared to   the prior study and hence reported.              Assessment and Plan:     Principal Problem:    Bacteremia due to Escherichia coli  Active Problems:    Hypertension    Stage 3 chronic kidney disease    Decubitus ulcer of sacral region    Abnormal echocardiogram    Atrial fibrillation and flutter (CMS/HCC)    Leg swelling    Bacteremia due to Enterococcus    Severe metabolic encephalopathy he has a very subtle decrease left nasolabial fold but other than that has a nonfocal exam and I do not see evidence of stroke TIA or central nervous system infection.  I cannot absolutely rule out a brain abscess but feel that it is unlikely I would  like to check an MRI of the brain to rule out any focal abnormality if this is without acute abnormality his metabolic encephalopathy and confusion should improve with continued antibiotic treatment.  Despite his acute discitis, I do not see evidence of myelopathy.  Also given his encephalopathy he denies any pain at all.  According to all nurses he is doing much better today mental status liu.      Brian Yap MD  07/17/18  10:53 AM

## 2018-07-17 NOTE — PROGRESS NOTES
"SERVICE: [facility] HOSPITALIST    CONSULTANTS: ID/Cardiology/GI/neurology    CHIEF COMPLAINT: f/u sepsis/TME/diskitis    SUBJECTIVE: Staff notes difficult night with pain control but on exam this am, much improved. Patient hollars \"I am NOT having any pain\". His speech is clear and intelligible and although he is likely not oriented, his mentation has improved since last night and yesterday. Staff note being able to obtain IV dose of robaxin since patient's swallow and mentation were not at baseline last night, seems improved clinically. Wife does not want transfer unless necessary at this time. Fevers overnight noted again as well.     OBJECTIVE:    /59 (BP Location: Right arm, Patient Position: Lying)   Pulse 86   Temp 97.4 °F (36.3 °C) (Axillary)   Resp 20   Ht 190.5 cm (75\")   Wt 85.7 kg (188 lb 15 oz)   SpO2 91%   BMI 23.62 kg/m²     MEDS/LABS REVIEWED AND ORDERED    ampicillin-sulbactam 3 g Intravenous Q6H   aspirin 81 mg Oral Daily   b complex-C-folic acid 1 capsule Oral Daily   cetirizine 5 mg Oral Daily   diclofenac 4 g Topical 4x Daily   digoxin 125 mcg Oral Daily   diltiaZEM 45 mg Oral Q6H   enoxaparin 1 mg/kg Subcutaneous Q12H   guaiFENesin 600 mg Oral Daily   HYDROmorphone 1 mg Intravenous Once   lactobacillus acidophilus 1 capsule Oral Daily   levETIRAcetam 500 mg Intravenous Q12H   lidocaine 2 patch Transdermal Q24H   Methocarbamol 1,000 mg Intravenous Q8H   methocarbamol 750 mg Oral 4x Daily   metoprolol succinate  mg Oral Q24H   polyethylene glycol 17 g Oral Daily   potassium chloride 20 mEq Oral Daily   sennosides-docusate sodium 2 tablet Oral BID   sodium chloride      vancomycin 1,500 mg Intravenous Q18H     Physical Exam   Constitutional: He appears well-developed and well-nourished.   pale   HENT:   Head: Normocephalic and atraumatic.   Oropharynx and tongue dry   Eyes: EOM are normal. Pupils are equal, round, and reactive to light. Left eye exhibits " discharge.   Cardiovascular: Normal rate.    irregular   Pulmonary/Chest: Effort normal and breath sounds normal.   Abdominal: Soft. Bowel sounds are normal. He exhibits no distension. There is no tenderness. There is no guarding.   Genitourinary:   Genitourinary Comments: Amin catheter with sarah color urine in bag   Musculoskeletal:   Decreased muscle bulk   Neurological: He is alert.   Unable to determine orientation. Patient now speaking in full sentences, clearly, answering questions but very irritable, threatened to hit this NP   Skin: Skin is warm and dry.   Sacral area not visualized   Psychiatric:   Calm but easily irritated   Vitals reviewed.    LAB/DIAGNOSTICS:    Lab Results (last 24 hours)     Procedure Component Value Units Date/Time    Procalcitonin [[id number]]  (Abnormal) Collected:  07/17/18 0722    Specimen:  Blood Updated:  07/17/18 1029     Procalcitonin 0.08 (L) ng/mL     Narrative:       As a Marker for Sepsis (Non-Neonates):   1. <0.5 ng/mL represents a low risk of severe sepsis and/or septic shock.  2. >2 ng/mL represents a high risk of severe sepsis and/or septic shock.    As a Marker for Lower Respiratory Tract Infections that require antibiotic therapy:    PCT on Admission     Antibiotic Therapy       6-12 Hrs later  > 0.5                Strongly Recommended             >0.25 - <0.5         Recommended  0.1 - 0.25           Discouraged              Remeasure/reassess PCT  <0.1                 Strongly Discouraged     Remeasure/reassess PCT                     PCT values of < 0.5 ng/mL do not exclude an infection, because localized infections (without systemic signs) may be associated with such low concentrations, or a systemic infection in its initial stages (< 6 hours). Furthermore, increased PCT can occur without infection. PCT concentrations between 0.5 and 2.0 ng/mL should be interpreted taking into account the patient's history. It is recommended to retest PCT within 6-24 hours if  any concentrations < 2 ng/mL are obtained.    Digoxin Level [[id number]]  (Abnormal) Collected:  07/17/18 0722    Specimen:  Blood Updated:  07/17/18 0954     Digoxin 0.55 (L) ng/mL     Sedimentation Rate [[id number]]  (Abnormal) Collected:  07/17/18 0722    Specimen:  Blood Updated:  07/17/18 0836     Sed Rate 52 (H) mm/hr     Comprehensive Metabolic Panel [[id number]]  (Abnormal) Collected:  07/17/18 0722    Specimen:  Blood Updated:  07/17/18 0816     Glucose 87 mg/dL      BUN 23 mg/dL      Creatinine 1.14 mg/dL      Sodium 137 mmol/L      Potassium 3.5 mmol/L      Chloride 97 (L) mmol/L      CO2 29.6 (H) mmol/L      Calcium 8.7 (L) mg/dL      Total Protein 5.8 (L) g/dL      Albumin 2.60 (L) g/dL      ALT (SGPT) 15 U/L      AST (SGOT) 20 U/L      Alkaline Phosphatase 125 U/L      Total Bilirubin 0.8 mg/dL      eGFR Non African Amer 62 mL/min/1.73      Globulin 3.2 gm/dL      A/G Ratio 0.8 g/dL      BUN/Creatinine Ratio 20.2     Anion Gap 10.4 mmol/L     Narrative:       The MDRD GFR formula is only valid for adults with stable renal function between ages 18 and 70.    C-reactive Protein [[id number]]  (Abnormal) Collected:  07/17/18 0722    Specimen:  Blood Updated:  07/17/18 0815     C-Reactive Protein 3.99 (H) mg/dL     CBC & Differential [[id number]] Collected:  07/17/18 0722    Specimen:  Blood Updated:  07/17/18 0805    Narrative:       The following orders were created for panel order CBC & Differential.  Procedure                               Abnormality         Status                     ---------                               -----------         ------                     CBC Auto Differential[[id number]]        Abnormal            Final result                 Please view results for these tests on the individual orders.    CBC Auto Differential [[id number]]  (Abnormal) Collected:  07/17/18 0722    Specimen:  Blood Updated:  07/17/18 0805     WBC 14.43 (H) 10*3/mm3      RBC 3.80 (L) 10*6/mm3      Hemoglobin 9.3  (L) g/dL      Hematocrit 28.5 (L) %      MCV 75.0 (L) fL      MCH 24.5 (L) pg      MCHC 32.6 g/dL      RDW 18.6 (H) %      RDW-SD 49.7 fl      MPV 9.1 fL      Platelets 562 (H) 10*3/mm3      Neutrophil % 67.2 %      Lymphocyte % 19.4 (L) %      Monocyte % 11.9 (H) %      Eosinophil % 0.3 %      Basophil % 0.6 %      Immature Grans % 0.6 (H) %      Neutrophils, Absolute 9.70 (H) 10*3/mm3      Lymphocytes, Absolute 2.80 10*3/mm3      Monocytes, Absolute 1.71 (H) 10*3/mm3      Eosinophils, Absolute 0.04 (L) 10*3/mm3      Basophils, Absolute 0.09 10*3/mm3      Immature Grans, Absolute 0.09 (H) 10*3/mm3      nRBC 0.0 /100 WBC     Blood Culture - Blood, [[id number]]  (Normal) Collected:  07/16/18 0425    Specimen:  Blood from Arm, Left Updated:  07/17/18 0645     Blood Culture No growth at 24 hours    Blood Culture - Blood, [[id number]]  (Normal) Collected:  07/15/18 1910    Specimen:  Blood from Arm, Right Updated:  07/16/18 1915     Blood Culture No growth at 24 hours        ECG 12 Lead   Final Result   RR Interval= 667 ms   LA Interval= 148 ms   QRSD Interval= 140 ms   QT Interval= 412 ms   QTc Interval= 504 ms   Heart Rate= 90 ms   P Axis= 48 deg   QRS Axis= 14 deg   T Wave Axis= -8 deg   I: 40 Axis= 15 deg   T: 40 Axis= deg   ST Axis= -58 deg   ATRIAL FIBRILLATION - new   LEFT ATRIAL ABNORMALITY   RIGHT BUNDLE BRANCH BLOCK   Electronically Signed by:  Ry Garcia ([facility]) 14-Jul-2018 16:38:55   Date and Time of Study: 2018-07-14 08:54:00      ECG 12 Lead   Final Result   RR Interval= 380 ms   LA Interval= ms   QRSD Interval= 92 ms   QT Interval= 296 ms   QTc Interval= 480 ms   Heart Rate= 158 ms   P Axis= 0 deg   QRS Axis= 17 deg   T Wave Axis= 15 deg   I: 40 Axis= -4 deg   T: 40 Axis= 47 deg   ST Axis= 228 deg   SUPRAVENTRICULAR TACHYCARDIA - new   VENTRICULAR BIGEMINY   BORDERLINE LOW VOLTAGE IN FRONTAL LEADS   REPOLARIZATION ABNORMALITY, PROB RATE RELATED   Electronically Signed by:  Ry Garcia ([facility]) 14-Jul-2018  16:38:26   Date and Time of Study: 2018-07-14 08:42:38      ECG 12 Lead   Final Result   RR Interval= 373 ms   TX Interval= ms   QRSD Interval= 86 ms   QT Interval= 316 ms   QTc Interval= 517 ms   Heart Rate= 161 ms   P Axis= 0 deg   QRS Axis= 31 deg   T Wave Axis= 53 deg   I: 40 Axis= 43 deg   T: 40 Axis= deg   ST Axis= 198 deg   SUPRAVENTRICULAR TACHYCARDIA   VENTRICULAR BIGEMINY   BORDERLINE LOW VOLTAGE IN FRONTAL LEADS   ST DEPRESSION, PROBABLY RATE RELATED   pqt repeat   Electronically Signed by:  Rocky Briceño) ([facility]) 13-Jul-2018    13:10:52   Date and Time of Study: 2018-07-13 02:28:25      ECG 12 Lead   Final Result   RR Interval= 373 ms   TX Interval= ms   QRSD Interval= 94 ms   QT Interval= 308 ms   QTc Interval= 504 ms   Heart Rate= 161 ms   P Axis= 0 deg   QRS Axis= 43 deg   T Wave Axis= 69 deg   I: 40 Axis= 34 deg   T: 40 Axis= 185 deg   ST Axis= 160 deg   SUPRAVENTRICULAR TACHYCARDIA   VENTRICULAR BIGEMINY   ABERRANT COMPLEX   BORDERLINE LOW VOLTAGE IN FRONTAL LEADS   REPOLARIZATION ABNORMALITY, PROB RATE RELATED   pqt repeat   Electronically Signed by:  Rocky Briceño) ([facility]) 13-Jul-2018    13:09:50   Date and Time of Study: 2018-07-12 23:36:29      ECG 12 Lead   Final Result   RR Interval= 619 ms   TX Interval= 180 ms   QRSD Interval= 102 ms   QT Interval= 360 ms   QTc Interval= 458 ms   Heart Rate= 97 ms   P Axis= 46 deg   QRS Axis= 41 deg   T Wave Axis= 26 deg   I: 40 Axis= 29 deg   T: 40 Axis= 120 deg   ST Axis= 0 deg   SINUS RHYTHM   PROBABLE LEFT ATRIAL ABNORMALITY   LOW VOLTAGE IN FRONTAL LEADS   atypical rbbb no major change   Electronically Signed by:  Rocky Briceño) ([facility]) 13-Jul-2018    13:10:16   Date and Time of Study: 2018-07-13 05:41:26      ECG 12 Lead   Final Result   RR Interval= 405 ms   TX Interval= ms   QRSD Interval= 94 ms   QT Interval= 304 ms   QTc Interval= 478 ms   Heart Rate= 148 ms   P Axis= deg   QRS Axis= 36 deg   T Wave Axis= 72 deg   I: 40 Axis= 28 deg    T: 40 Axis= 47 deg   ST Axis= 178 deg   JUNCTIONAL TACHYCARDIA   LOW VOLTAGE IN FRONTAL LEADS   REPOLARIZATION ABNORMALITY, PROB RATE RELATED   BORDERLINE PROLONGED QT INTERVAL   NO PRIOR TRACING AVAILABLE FOR COMPARISON   Electronically Signed by:  Gonzalo Collins ([facility]) 12-Jul-2018 20:20:06   Date and Time of Study: 2018-07-12 11:32:45      ECG 12 Lead   Final Result   RR Interval= 594 ms   WA Interval= 168 ms   QRSD Interval= 120 ms   QT Interval= 368 ms   QTc Interval= 477 ms   Heart Rate= 101 ms   P Axis= 16 deg   QRS Axis= 19 deg   T Wave Axis= 3 deg   I: 40 Axis= 5 deg   T: 40 Axis= 113 deg   ST Axis= -14 deg   SINUS TACHYCARDIA   IVCD, CONSIDER ATYPICAL RBBB   CONSIDER ANTERIOR INFARCT   NO SIGNIFICANT CHANGE FROM PREVIOUS ECG   Electronically Signed by:  Gonzalo Collins ([facility]) 12-Jul-2018 20:19:43   Date and Time of Study: 2018-07-12 12:15:46      ECG 12 Lead   Final Result   RR Interval= 571 ms   WA Interval= ms   QRSD Interval= 140 ms   QT Interval= 384 ms   QTc Interval= 508 ms   Heart Rate= 105 ms   P Axis= deg   QRS Axis= -25 deg   T Wave Axis= -26 deg   I: 40 Axis= 13 deg   T: 40 Axis= 141 deg   ST Axis= -22 deg   ATRIAL FLUTTER, A-RATE 258   RIGHT BUNDLE BRANCH BLOCK   NO SIGNIFICANT CHANGE FROM PREVIOUS ECG   Electronically Signed by:  Naeem Laughlin ([facility]) 10-Jul-2018 15:30:48   Date and Time of Study: 2018-07-10 07:21:18        Results for orders placed during the hospital encounter of 07/09/18   Adult Transthoracic Echo Limited W/ Cont if Necessary Per Protocol    Narrative · There is calcification of the aortic valve.  · Moderate aortic valve regurgitation is present.  · Moderate-to-severe mitral valve regurgitation is present  · Mild tricuspid valve regurgitation is present.  · Left atrial cavity size is dilated.  · Left ventricular systolic function is normal.        Ct Abdomen Pelvis With Contrast    Result Date: 7/16/2018  1. Motion degraded exam. 2. Improving sigmoid diverticulitis, see  above. 3. Developing infiltrate and atelectasis in the lung bases, greater on the right. 4. Cholelithiasis. 5. Mild decubitus changes region of the upper gluteal crease as noted above. 6. Multilevel vertebral compression fracture deformities as noted above. Advanced lumbar spine degenerative changes. 7. Initial stat report to the ordering service from Dr. Ck Wylie at 2235 hours on 7/15/2018.  This report was finalized on 7/16/2018 6:46 AM by Dr. Jefferson Bowen MD.      Xr Chest 1 View    Result Date: 7/17/2018  PICC in good position.  This report was finalized on 7/17/2018 11:47 AM by Dr. Jefferson Bowen MD.      Mri Lumbar Spine With & Without Contrast    Result Date: 7/16/2018  1. There is no interval worsening of superior endplate compression deformity or the associated edema. No enhancing components are noted in the discs nor is there any obvious psoas abscess. Findings favor degenerative change or acute aggressive discitis, osteomyelitis. 2. Degenerative changes in the compression fractures are stable when compared to the prior study from 5 days ago. 3. Not mentioned above, minimal faint enhancement of the nerve roots of the cauda equina in the lower lumbar spine cannot be completely excluded.  Nonspecific. It could be artifactual or related to very early arachnoiditis. No clumping or nerve roots or mass is seen. Correlate for polyneuropathy. No tumor. It is slightly more prominent when compared to the prior study and hence reported.     ASSESSMENT/PLAN:   Sepsis secondary to Enterococcus and Pan-sensitive E-coli Bacteremia, acute diskitis/acute endocarditis/acute mild diverticulitis/sacral wound: GI/ID/Cardiology following   -Repeat MRI yesterday confirms diskitis/osteomyelitis  -Talked to MATTEO (Paola NP for Dr. Haskins who reviewed MRIs) at [facility], not a surgical candidate at this time secondary to Xarelto, last dose 7/15/18 at 1811 pm.   -Changed to therapeutic dose lovenox secondary to potential for  surgery  -They will NOT operate unless he is proven to fail antibiotic therapy.   -multiple blood cultures done, most recent set 7/15/18 shows NG at 24 hours  -sed rate higher at 52 but CRP trending down  -WBCC trending down at 14.43 today, platelets closer to normal today, procalcitonin pending  -Continue vancomycin/Unasyn  -No SKIP per cardiology, no benefit, treat as endocarditis  -continue therapeutic lovenox until sure of no surgery   -possible transfer if fails antibiotics and off xarelto x 5 days     Acute metabolic encephalopathy:   Suspect secondary to fentanyl patch placed 7/15/18, d/c'd 7/16/18 am  Resume previous regimen of dilaudid as needed  Add IV robaxin (patient notes this is only medication that has helped, he is currently not taking po safely)  Extremely sensitive to narcotics!! Monitor closely  -SLP to reevaluate swallow  -change meds to IV as needed     Cholelithiasis/right sided abdominal pain:   Staff notes patient c/o right abdominal pain, none on my exam  stones are large, no CBD dilatation, GB non-distended, doubtful acute concern     Chest pain r/o ACS  Chronic Afib and flutter w/intermittent RVR/SVT:   CAD/Dyslipidemia/h/o CABG: cardiology following  SVT on and off secondary to endocarditis possibly  Continues diltiazem, metoprolol, aspirin, bumex (allergic to statins)  EKG unchanged, ACS ruled out by cardiology  TSH normal  Changed to therapeutic dose lovenox as above  Change meds to IV as needed  Monitor      Chronic iron deficiency anemia/thrombocytosis:   Hemoglobin stable at 9.3, no active blood loss noted  Platelets trending down to normal at 562,000     HTN: BP at/low goal on metoprolol succinate 100 mg daily/diltiazem  mg daily  D/C bumex for today on IVFs     Sacral wound: wound RN following  Continue wound care   See CT report     H/O CVA/seizures:   No acute issues on ASA and home dose keppra. No acute issues currently.      CKD stage 2-3:   Creatinine baseline 1.0,  currently 1.14  Increase IVFs to 100 ml/hr for now while not taking much po  D/C Bumex for today     Lower extremity edema: as above, clinically dry, giving IVFs, hold bumex for today, resume once tolerating po better     Hyponatremia/electrolyte imbalance: resolved at 139  Remains on electrolyte replacement protocol as needed     Urinary retention: unger in place, strict I&O, monitor     Ascending thoracic aortic aneurysm: unchanged by CT, no acute issues here

## 2018-07-17 NOTE — SIGNIFICANT NOTE
07/17/18 1258   Rehab Time/Intention   Evaluation Not Performed patient unavailable for evaluation  (Pt off the floor.)   Rehab Treatment   Discipline speech language pathologist

## 2018-07-17 NOTE — PROGRESS NOTES
LOS: 8 days   Patient Care Team:  Elisabeth Warren MD as PCP - General (Internal Medicine)  Alexsander Matute MD as Consulting Physician (Neurology)  MD Ry Goff III, MD as Consulting Physician (Cardiology)    Chief Complaint: following for SBE     Interval History:  He remains markedly encephalopathic.  Tele shows atrial fibrillation, rate controlled.      Objective   Vital Signs  Temp:  [96.8 °F (36 °C)-102.2 °F (39 °C)] 97.4 °F (36.3 °C)  Heart Rate:  [] 92  Resp:  [18-24] 18  BP: ()/(49-77) 103/61    Intake/Output Summary (Last 24 hours) at 07/17/18 0935  Last data filed at 07/17/18 0917   Gross per 24 hour   Intake          1500.75 ml   Output             3975 ml   Net         -2474.25 ml           Physical Exam   Constitutional: He appears lethargic. He has a sickly appearance.   HENT:   Head: Normocephalic.   Nose: Nose normal.   Eyes: Conjunctivae are normal.   Neck: No JVD present.   Cardiovascular: Normal rate.  An irregularly irregular rhythm present.   Murmur heard.   Systolic murmur is present with a grade of 2/6   Pulmonary/Chest: Effort normal.   Abdominal: Soft.   Musculoskeletal: He exhibits edema (doughy edema with varicosities).   Neurological: He appears lethargic. He is disoriented. He displays tremor.   Skin: Skin is warm and dry. No erythema.   Vitals reviewed.      Results Review:        Results from last 7 days  Lab Units 07/17/18  0722 07/16/18  0425 07/15/18  1902   SODIUM mmol/L 137 139 133*   POTASSIUM mmol/L 3.5 3.9 3.5   CHLORIDE mmol/L 97* 97* 92*   CO2 mmol/L 29.6* 32.1* 32.4*   BUN mg/dL 23 16 17   CREATININE mg/dL 1.14 0.98 1.08   GLUCOSE mg/dL 87 93 114*   CALCIUM mg/dL 8.7* 9.2 9.3       Results from last 7 days  Lab Units 07/14/18  0447 07/13/18  1259 07/13/18  0551   TROPONIN T ng/mL 0.107* 0.135* 0.073*       Results from last 7 days  Lab Units 07/17/18  0722 07/16/18  0425 07/15/18  1901   WBC 10*3/mm3 14.43* 15.53* 14.75*    HEMOGLOBIN g/dL 9.3* 9.4* 9.7*   HEMATOCRIT % 28.5* 28.6* 29.4*   PLATELETS 10*3/mm3 562* 654* 664*                       I reviewed the patient's new clinical results.  I personally viewed and interpreted the patient's EKG/Telemetry data        Medication Review:     ampicillin-sulbactam 3 g Intravenous Q6H   aspirin 81 mg Oral Daily   b complex-C-folic acid 1 capsule Oral Daily   cetirizine 5 mg Oral Daily   diclofenac 4 g Topical 4x Daily   digoxin 125 mcg Oral Daily   diltiaZEM  mg Oral Q24H   enoxaparin 1 mg/kg Subcutaneous Q12H   guaiFENesin 600 mg Oral Daily   lactobacillus acidophilus 1 capsule Oral Daily   levETIRAcetam 500 mg Intravenous Q12H   lidocaine 2 patch Transdermal Q24H   Methocarbamol 1,000 mg Intravenous Q8H   metoprolol succinate  mg Oral Q24H   metoprolol tartrate 5 mg Intravenous Once   naloxone 0.4 mg Intravenous Once   polyethylene glycol 17 g Oral Daily   potassium chloride 20 mEq Oral Daily   sennosides-docusate sodium 2 tablet Oral BID   sodium chloride      vancomycin 1,500 mg Intravenous Q18H         diltiaZEM 5-15 mg/hr Last Rate: Stopped (07/16/18 2217)   Pharmacy to Dose enoxaparin (LOVENOX)     Pharmacy to dose vancomycin     sodium chloride 100 mL/hr Last Rate: 100 mL/hr (07/17/18 6041)       Assessment/Plan     1.  PSVT/PAF/atrial flutter -- in atrial fibrillation at present but rate controlled; continue BB/digoxin.  He's on enoxaparin instead of rivaroxaban (unclear to me why this is the case).  His meds have to be crushed, so I will change the diltiazem to a short-acting formula.      2.  Elevated troponin -- known history of CAD, not a candidate for ischemic workup, continue medical therapy    3.  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  Acute diverticulitis --  per primary team    5.  Altered mental status -- still extremely encephalopathic and tremulous    Will check a digoxin level given altered mental status.    Naeem Laughlin MD  07/17/18  9:35 AM

## 2018-07-17 NOTE — NURSING NOTE
Dr. Bustamante called unit to review patients status after discussing patients plan of care with Bertha EDMOND. Per MD after discussing case with NP there were concerns regarding giving the patient any opioids that may further alter his mental status. MD requested RN to contact NP to discuss the patients current condition.

## 2018-07-17 NOTE — PLAN OF CARE
Problem: Patient Care Overview  Goal: Plan of Care Review  Outcome: Ongoing (interventions implemented as appropriate)   07/17/18 0559   Coping/Psychosocial   Plan of Care Reviewed With patient   Plan of Care Review   Progress declining   OTHER   Outcome Summary pt continued to run fever overnight, treated with tylenol supp. per MAR. Pt confused with inability to follow commands appropriately. Pt was unable to take po meds overnight and MD was notified. MD changed po keppra to IV and .25mg iv dilaudid was ordered for pain control since pt was unable to take oral robaxin. MD conferred with Bertha HENDERSON regarding pts overall status and pain control options. MD requested RN to call NP and discuss pts current condition and options for pain control. NP ordered Lidoderm patches and Voltaren gel. NP as recommended continuing IV dilaudid prn for pain. MD also ordered IV Methocarbamol 1gm IV Q8hr, with max dose of 3gm in 24hr X3days. [facility] was able to [facility] 1 dose of IV Methocarbamol overnight and pharmacy will have to address obtaining further doses today. Cardizem gtt was also ordered if pt became tachycardic and had a sustained HR greater than 140, and was unable to take PO medications. Pt continued to have difficulty with pain control overnight and MD increased IV dilaudid to 0.5mg Q2hr PRN. No am labs ordered.      Goal: Individualization and Mutuality  Outcome: Ongoing (interventions implemented as appropriate)   07/17/18 0559   Individualization   Patient Specific Preferences goes by Dereck     Goal: Discharge Needs Assessment  Outcome: Ongoing (interventions implemented as appropriate)      Problem: Skin Injury Risk (Adult)  Goal: Skin Health and Integrity  Outcome: Ongoing (interventions implemented as appropriate)   07/17/18 0559   Skin Injury Risk (Adult)   Skin Health and Integrity making progress toward outcome       Problem: Fall Risk (Adult)  Goal: Absence of Fall  Outcome: Ongoing (interventions  implemented as appropriate)   07/17/18 0559   Fall Risk (Adult)   Absence of Fall making progress toward outcome       Problem: Pain, Acute (Adult)  Goal: Acceptable Pain Control/Comfort Level  Outcome: Ongoing (interventions implemented as appropriate)   07/17/18 0559   Pain, Acute (Adult)   Acceptable Pain Control/Comfort Level making progress toward outcome       Problem: Infection, Risk/Actual (Adult)  Goal: Infection Prevention/Resolution  Outcome: Ongoing (interventions implemented as appropriate)   07/17/18 0559   Infection, Risk/Actual (Adult)   Infection Prevention/Resolution making progress toward outcome       Problem: Wound (Includes Pressure Injury) (Adult)  Goal: Signs and Symptoms of Listed Potential Problems Will be Absent, Minimized or Managed (Wound)  Outcome: Ongoing (interventions implemented as appropriate)   07/17/18 0559   Goal/Outcome Evaluation   Problems Assessed (Wound) all   Problems Present (Wound) delayed wound healing;situational response       Problem: Sepsis/Septic Shock (Adult)  Goal: Signs and Symptoms of Listed Potential Problems Will be Absent, Minimized or Managed (Sepsis/Septic Shock)  Outcome: Ongoing (interventions implemented as appropriate)   07/17/18 0559   Goal/Outcome Evaluation   Problems Assessed (Sepsis) all   Problems Present (Sepsis) infection progression;situational response;undernutrition

## 2018-07-17 NOTE — PROGRESS NOTES
Adult Nutrition  Assessment/PES    Patient Name:  Fahad Muhammad  YOB: 1935  MRN: [id number]  Admit Date:  7/9/2018    Assessment Date:  7/17/2018    Comments:  Pt with minimal intake for most of admission (8 days) & had malnutrition on admission.  Metal status and divirticulitis noted.   Recommend- clarify if care goals include nutrition support via feeding tube pt would benefit from nutrition support at this time.    Day 1  508 kcal, 22 gm pro  Day 2   627 kcal, 27 gm pro  Day 3  45 kcal , 0 pro    Osmolite 1.2 goal 71 ml/hr would provide 1704 ml, 2045 kcal, 94 gm pro, 268 gm CHO, 1397 ml free water, 170% DRI   *as long as ok to cont to use Gi tract with divirticulitis          Reason for Assessment     Row Name 07/17/18 1007          Reason for Assessment    Reason For Assessment calorie count order;follow-up protocol     Diagnosis infection/sepsis   sepsis, bacteremia, acute metabolic encephalopathy, cholelithisiasis, sacral wound, CKD, improving acute divirticulitis , compression fx vertebrae             Nutrition/Diet History     Row Name 07/17/18 1008          Nutrition/Diet History    Typical Food/Fluid Intake Pt asleep in ICU, no family at visit. Per rounds pt more alert today but remains confused and agitated.              Anthropometrics     Row Name 07/17/18 1014          Anthropometrics    Weight 85.7 kg (188 lb 15 oz)        Usual Body Weight (UBW)    Weight Loss --   15.4# down since admit, pt has been on diuretics but also had not been eating well         Body Mass Index (BMI)    BMI Assessment BMI 18.5-24.9: normal             Labs/Tests/Procedures/Meds     Row Name 07/17/18 1015          Labs/Procedures/Meds    Lab Results Reviewed reviewed        Diagnostic Tests/Procedures    Diagnostic Test/Procedure Reviewed reviewed        Medications    Pertinent Medications Reviewed reviewed     Pertinent Medications Comments b/c/folic complex, risaquad, kcl, bumex              Physical  Findings     Row Name 07/17/18 1015          Physical Findings    Overall Physical Appearance generalized wasting     Skin pressure injury   gluteal pressure injury             Estimated/Assessed Needs     Row Name 07/17/18 1016          Calculation Measurements    Weight Used For Calculations 85.7 kg (188 lb 15 oz)        Estimated/Assessed Needs    Additional Documentation Calorie Requirements (Group);Fluid Requirements (Group);Protein Requirements (Group)        Calorie Requirements    Estimated Calorie Need Method Berwind-St Jeor     Estimated Calorie Requirement Comment 2068 kcal (mifflin 1.25 )                                                                            Berwind-St. Jeor Equation    RMR (Berwind-St. Jeor Equation) 1652.63        Protein Requirements    Est Protein Requirement Amount (gms/kg) 1.1 gm protein     Estimated Protein Requirements (gms/day) 94.27        Fluid Requirements    Estimated Fluid Requirements (mL/day) 2068     Estimated Fluid Requirement Method RDA Method     RDA Method (mL) 2068                   Nutrition Prescription Ordered     Row Name 07/17/18 1020          Nutrition Prescription PO    Current PO Diet Soft Texture     Texture Ground     Supplement Ensure Enlive     Supplement Frequency 3 times a day     Common Modifiers Cardiac             Evaluation of Received Nutrient/Fluid Intake     Row Name 07/17/18 1020 07/17/18 1016       Calculation Measurements    Weight Used For Calculations  -- 85.7 kg (188 lb 15 oz)       Fluid Intake Evaluation    Oral Fluid (mL) 713   ave x 3,35%  --       PO Evaluation    Number of Days PO Intake Evaluated Insufficient Data  --    % PO Intake NPO most of day yesterday due to mental status  --            Evaluation of Prescribed Nutrient/Fluid Intake     Row Name 07/17/18 1016          Calculation Measurements    Weight Used For Calculations 85.7 kg (188 lb 15 oz)           Problem/Interventions:        Problem 1     [facility] Name 07/17/18  1026          Nutrition Diagnoses Problem 1    Problem 1 Malnutrition     Etiology (related to) Factors Affecting Nutrition     Signs/Symptoms (evidenced by) Unintended Weight Change;Report/Observation;PO Intake     Unintended Weight Change Loss     Number of Pounds Lost 35.6 #     Weight loss time period 3-4 months                      Intervention Goal     Row Name 07/17/18 1028          Intervention Goal    PO Increase intake;PO intake (%)     PO Intake % 50 %   or greater     Weight Appropriate weight loss   pt has been on diuretics             Nutrition Intervention     Row Name 07/17/18 1029          Nutrition Intervention    RD/Tech Action Follow Tx progress             Nutrition Prescription     Row Name 07/17/18 1029          Other Orders    Other Clarify if care goals include nutrition support such as tube feeding              Education/Evaluation     Row Name 07/17/18 1029          Education    Education Education not appropriate at this time        Monitor/Evaluation    Monitor Per protocol;I&O;PO intake;Pertinent labs;Weight;Symptoms         Electronically signed by:  Keri Cleaning RD  07/17/18 10:29 AM

## 2018-07-17 NOTE — PROGRESS NOTES
LOS: 8 days   Patient Care Team:  Elisabeth Warren MD as PCP - General (Internal Medicine)  Alexsander Matute MD as Consulting Physician (Neurology)  MD Ry Goff III, MD as Consulting Physician (Cardiology)        Subjective     Interval History:     Patient Complaints:  Confused ICU  Patient Denies:  NV Chills       Review of Systems:    10 point ROS done    Objective     Vital Signs  Temp:  [96.1 °F (35.6 °C)-102.2 °F (39 °C)] 97.4 °F (36.3 °C)  Heart Rate:  [] 86  Resp:  [18-22] 20  BP: ()/(49-77) 109/59    Physical Exam:     General Appearance:    Alert, cconfused   Head:    Normocephalic, without obvious abnormality, atraumatic   Eyes:            Lids and lashes normal, conjunctivae and sclerae normal, no   icterus, no pallor, corneas clear, PERRLA   Ears:    Ears appear intact with no abnormalities noted   Throat:   No oral lesions, no thrush, oral mucosa moist   Neck:   No adenopathy, supple, trachea midline, no thyromegaly, no   carotid bruit, no JVD   Back:     No kyphosis present, no scoliosis present, no skin lesions,      erythema or scars, no tenderness to percussion or                   palpation,   range of motion normal   Lungs:     Clear to auscultation,respirations regular, even and                  unlabored    Heart:    Regular rhythm and normal rate, normal S1 and S2, no            murmur, no gallop, no rub, no click   Chest Wall:    No abnormalities observed   Abdomen:     Normal bowel sounds, no masses, no organomegaly, soft        non-tender, non-distended, no guarding, no rebound                tenderness   Rectal:     Deferred   Extremities:   Moves all extremities well, no edema, no cyanosis, no             redness   Pulses:   Pulses palpable and equal bilaterally   Skin:   No bleeding, bruising or rash   Lymph nodes:   No palpable adenopathy   Neurologic:   Cranial nerves 2 - 12 grossly intact, sensation intact, DTR       present and equal  bilaterally          Results Review:      Lab Results (last 72 hours)     Procedure Component Value Units Date/Time    Hemoglobin A1c [[id number]] Collected:  07/13/18 0225    Specimen:  Blood Updated:  07/13/18 0851    Blood Culture - Blood, [[id number]]  (Abnormal) Collected:  07/11/18 1008    Specimen:  Blood from Hand, Right Updated:  07/13/18 0702     Blood Culture Enterococcus faecalis (A)     Gram Stain Result Aerobic Bottle Gram positive cocci in chains    Blood Culture - Blood, [[id number]]  (Abnormal) Collected:  07/11/18 1009    Specimen:  Blood from Hand, Left Updated:  07/13/18 0653     Blood Culture Enterococcus faecalis (A)     Gram Stain Result Anaerobic Bottle Gram positive cocci in chains      Aerobic Bottle Gram positive cocci in chains    Blood Culture - Blood, Arm, Left [[id number]]  (Abnormal) Collected:  07/10/18 1130    Specimen:  Blood from Arm, Left Updated:  07/13/18 0653     Blood Culture Enterococcus faecalis (A)     Gram Stain Result Aerobic Bottle Gram positive cocci in chains      Aerobic Bottle Gram negative bacilli    Blood Culture - Blood, Arm, Left [[id number]]  (Abnormal)  (Susceptibility) Collected:  07/10/18 1200    Specimen:  Blood from Arm, Left Updated:  07/13/18 0650     Blood Culture Escherichia coli (A)      Enterococcus faecalis (A)     Gram Stain Result Aerobic Bottle Gram positive cocci in chains    Susceptibility      Escherichia coli     CRUZ     Ampicillin 4 ug/ml Susceptible     Ampicillin + Sulbactam <=2 ug/ml Susceptible     Cefazolin <=4 ug/ml Susceptible     Cefepime <=1 ug/ml Susceptible     Cefoxitin <=4 ug/ml Susceptible     Ceftriaxone <=1 ug/ml Susceptible     Ertapenem <=0.5 ug/ml Susceptible     Gentamicin <=1 ug/ml Susceptible     Levofloxacin <=0.12 ug/ml Susceptible     Meropenem <=0.25 ug/ml Susceptible     Piperacillin + Tazobactam <=4 ug/ml Susceptible     Trimethoprim + Sulfamethoxazole <=20 ug/ml Susceptible                    Troponin [[id number]]   (Abnormal) Collected:  07/13/18 0551    Specimen:  Blood Updated:  07/13/18 0624     Troponin T 0.073 (H) ng/mL     Narrative:       Troponin T Reference Ranges:  Less than 0.03 ng/mL:    Negative for AMI  0.03 to 0.09 ng/mL:      Indeterminant for AMI  Greater than 0.09 ng/mL: Positive for AMI    Troponin [[id number]]  (Normal) Collected:  07/13/18 0225    Specimen:  Blood Updated:  07/13/18 0245     Troponin T 0.028 ng/mL     Narrative:       Troponin T Reference Ranges:  Less than 0.03 ng/mL:    Negative for AMI  0.03 to 0.09 ng/mL:      Indeterminant for AMI  Greater than 0.09 ng/mL: Positive for AMI    Basic Metabolic Panel [[id number]]  (Abnormal) Collected:  07/13/18 0225    Specimen:  Blood Updated:  07/13/18 0244     Glucose 116 (H) mg/dL      BUN 20 mg/dL      Creatinine 0.99 mg/dL      Sodium 134 (L) mmol/L      Potassium 4.0 mmol/L      Chloride 97 (L) mmol/L      CO2 25.0 mmol/L      Calcium 9.2 mg/dL      eGFR Non African Amer 73 mL/min/1.73      BUN/Creatinine Ratio 20.2     Anion Gap 12.0 mmol/L     Narrative:       The MDRD GFR formula is only valid for adults with stable renal function between ages 18 and 70.    CBC & Differential [[id number]] Collected:  07/13/18 0225    Specimen:  Blood Updated:  07/13/18 0234    Narrative:       The following orders were created for panel order CBC & Differential.  Procedure                               Abnormality         Status                     ---------                               -----------         ------                     Scan Slide[[id number]]                                                                  CBC Auto Differential[[id number]]        Abnormal            Final result                 Please view results for these tests on the individual orders.    CBC Auto Differential [[id number]]  (Abnormal) Collected:  07/13/18 0225    Specimen:  Blood Updated:  07/13/18 0229     WBC 18.33 (H) 10*3/mm3      RBC 3.67 (L) 10*6/mm3      Hemoglobin 9.0 (L)  g/dL      Hematocrit 26.9 (L) %      MCV 73.3 (L) fL      MCH 24.5 (L) pg      MCHC 33.5 g/dL      RDW 18.0 (H) %      RDW-SD 47.5 fl      MPV 9.1 fL      Platelets 561 (H) 10*3/mm3      Neutrophil % 75.2 (H) %      Lymphocyte % 15.3 (L) %      Monocyte % 8.7 (H) %      Eosinophil % 0.2 %      Basophil % 0.2 %      Immature Grans % 0.4 %      Neutrophils, Absolute 13.78 (H) 10*3/mm3      Lymphocytes, Absolute 2.80 10*3/mm3      Monocytes, Absolute 1.60 (H) 10*3/mm3      Eosinophils, Absolute 0.04 (L) 10*3/mm3      Basophils, Absolute 0.03 10*3/mm3      Immature Grans, Absolute 0.08 (H) 10*3/mm3      nRBC 0.0 /100 WBC     Troponin [[id number]]  (Normal) Collected:  07/12/18 2336    Specimen:  Blood Updated:  07/12/18 2358     Troponin T <0.010 ng/mL     Narrative:       Troponin T Reference Ranges:  Less than 0.03 ng/mL:    Negative for AMI  0.03 to 0.09 ng/mL:      Indeterminant for AMI  Greater than 0.09 ng/mL: Positive for AMI    Vancomycin, Random [[id number]]  (Normal) Collected:  07/12/18 2209    Specimen:  Blood Updated:  07/12/18 2241     Vancomycin Random 25.60 mcg/mL     Gentamicin Level, Trough [[id number]]  (Normal) Collected:  07/12/18 1800    Specimen:  Blood Updated:  07/12/18 2120     Gentamicin Trough 0.60 mcg/mL     Potassium [[id number]]  (Normal) Collected:  07/11/18 2014    Specimen:  Blood Updated:  07/11/18 2034     Potassium 4.7 mmol/L     Blood Culture ID, PCR - Blood, [[id number]]  (Abnormal) Collected:  07/10/18 1130    Specimen:  Blood from Arm, Left Updated:  07/11/18 1109     BCID, PCR Enterococcus spp. Identification by BCID PCR. (C)     BCID, PCR 2 Escherichia coli. Identification by BCID PCR. (C)    Basic Metabolic Panel [[id number]]  (Abnormal) Collected:  07/11/18 0409    Specimen:  Blood Updated:  07/11/18 0521     Glucose 107 (H) mg/dL      BUN 22 mg/dL      Creatinine 1.03 mg/dL      Sodium 132 (L) mmol/L      Potassium 3.6 mmol/L      Chloride 95 (L) mmol/L      CO2 29.8 (H) mmol/L       Calcium 9.0 mg/dL      eGFR Non African Amer 69 mL/min/1.73      BUN/Creatinine Ratio 21.4     Anion Gap 7.2 mmol/L     Narrative:       The MDRD GFR formula is only valid for adults with stable renal function between ages 18 and 70.    CBC & Differential [[id number]] Collected:  07/11/18 0409    Specimen:  Blood Updated:  07/11/18 0505    Narrative:       The following orders were created for panel order CBC & Differential.  Procedure                               Abnormality         Status                     ---------                               -----------         ------                     Scan Slide[[id number]]                                                                  CBC Auto Differential[[id number]]        Abnormal            Final result                 Please view results for these tests on the individual orders.    CBC Auto Differential [[id number]]  (Abnormal) Collected:  07/11/18 0409    Specimen:  Blood Updated:  07/11/18 0504     WBC 19.67 (H) 10*3/mm3      RBC 3.71 (L) 10*6/mm3      Hemoglobin 9.0 (L) g/dL      Hematocrit 27.7 (L) %      MCV 74.7 (L) fL      MCH 24.3 (L) pg      MCHC 32.5 g/dL      RDW 18.2 (H) %      RDW-SD 49.3 fl      MPV 9.2 fL      Platelets 539 (H) 10*3/mm3      Neutrophil % 75.4 (H) %      Lymphocyte % 13.0 (L) %      Monocyte % 10.6 (H) %      Eosinophil % 0.2 %      Basophil % 0.2 %      Immature Grans % 0.6 (H) %      Neutrophils, Absolute 14.87 (H) 10*3/mm3      Lymphocytes, Absolute 2.55 10*3/mm3      Monocytes, Absolute 2.08 (H) 10*3/mm3      Eosinophils, Absolute 0.03 (L) 10*3/mm3      Basophils, Absolute 0.03 10*3/mm3      Immature Grans, Absolute 0.11 (H) 10*3/mm3      nRBC 0.0 /100 WBC     Urinalysis, Microscopic Only - Urine, Clean Catch [[id number]]  (Abnormal) Collected:  07/10/18 1649    Specimen:  Urine from Urine, Clean Catch Updated:  07/10/18 0318     RBC, UA 13-20 (A) /HPF      WBC, UA 0-2 (A) /HPF      Bacteria, UA None Seen /HPF      Squamous  Epithelial Cells, UA 0-2 /HPF      Hyaline Casts, UA 0-2 /LPF      Methodology Manual Light Microscopy    Urinalysis With Culture If Indicated - Urine, Clean Catch [[id number]]  (Abnormal) Collected:  07/10/18 1649    Specimen:  Urine from Urine, Clean Catch Updated:  07/10/18 1743     Color, UA Dark Yellow (A)     Appearance, UA Clear     pH, UA <=5.0     Specific Gravity, UA 1.025     Comment: Result obtained by Refractometer        Glucose, UA Negative     Ketones, UA Trace (A)     Bilirubin, UA Small (1+) (A)     Blood, UA Moderate (2+) (A)     Protein, UA Negative     Leuk Esterase, UA Negative     Nitrite, UA Negative     Urobilinogen, UA 1.0 E.U./dL    Fungus Culture, Blood - Blood, Arm, Left [[id number]] Collected:  07/10/18 1130    Specimen:  Blood from Arm, Left Updated:  07/10/18 1735    Fungus Culture, Blood - Blood, Arm, Left [[id number]] Collected:  07/10/18 1200    Specimen:  Blood from Hand, Left Updated:  07/10/18 1735    TSH [[id number]]  (Normal) Collected:  07/10/18 0411    Specimen:  Blood Updated:  07/10/18 1238     TSH 0.814 mIU/mL     Procalcitonin [[id number]]  (Normal) Collected:  07/10/18 0411    Specimen:  Blood Updated:  07/10/18 1207     Procalcitonin 0.15 ng/mL     Narrative:       As a Marker for Sepsis (Non-Neonates):   1. <0.5 ng/mL represents a low risk of severe sepsis and/or septic shock.  2. >2 ng/mL represents a high risk of severe sepsis and/or septic shock.    As a Marker for Lower Respiratory Tract Infections that require antibiotic therapy:    PCT on Admission     Antibiotic Therapy       6-12 Hrs later  > 0.5                Strongly Recommended             >0.25 - <0.5         Recommended  0.1 - 0.25           Discouraged              Remeasure/reassess PCT  <0.1                 Strongly Discouraged     Remeasure/reassess PCT                     PCT values of < 0.5 ng/mL do not exclude an infection, because localized infections (without systemic signs) may be associated with  such low concentrations, or a systemic infection in its initial stages (< 6 hours). Furthermore, increased PCT can occur without infection. PCT concentrations between 0.5 and 2.0 ng/mL should be interpreted taking into account the patient's history. It is recommended to retest PCT within 6-24 hours if any concentrations < 2 ng/mL are obtained.    C-reactive Protein [[id number]]  (Abnormal) Collected:  07/10/18 0411    Specimen:  Blood Updated:  07/10/18 1207     C-Reactive Protein 9.27 (H) mg/dL     Sedimentation Rate [[id number]]  (Abnormal) Collected:  07/10/18 0411    Specimen:  Blood Updated:  07/10/18 1153     Sed Rate 35 (H) mm/hr           Imaging Results (last 72 hours)     Procedure Component Value Units Date/Time    SCANNED - IMAGING [[id number]] Resulted:  07/09/18      Updated:  07/12/18 1203    CT Abdomen Pelvis Without Contrast [[id number]] Collected:  07/12/18 0707     Updated:  07/12/18 0719    Narrative:       CT ABDOMEN AND PELVIS, NONCONTRAST, 7/11/2018     HISTORY:  82-year-old male admitted to the hospital 2 days ago with sacral  decubitus wound, lower extremity edema, low back pain and cardiac  arrhythmia. Bacteremia is noted.     TECHNIQUE:  CT examination of the abdomen and pelvis was performed without IV  contrast due to abnormal renal function. Radiation dose reduction  techniques included automated exposure control or exposure modulation  based on body size. Radiation audit for CT and nuclear cardiology exams  in the last 12 months: 0.     COMPARISON:  *  CT abdomen/pelvis, 12/15/2016.  *  CT chest, 12/8/2016.     ABDOMEN FINDINGS:  Multiple large gallstones are present with thin the gallbladder. There  is no bile duct dilatation. Liver, pancreas and spleen are normal in  size and appearance without contrast.     The kidneys are unobstructed. Suspected small typical and hyperdense  right renal cysts are incompletely evaluated without contrast but are  unchanged since the study of 12/15/2016.      Moderate sigmoid diverticulosis. There is mild wall thickening involving  the mid sigmoid colon with some adjacent mild soft tissue stranding in  the left lower pelvic pericolonic fat. Mild acute diverticulitis is  suspected. No evidence of abscess, bowel perforation or bowel  obstruction. Remaining segments of small bowel and colon are normal in  caliber and appearance. Normal appendix.     PELVIS FINDINGS:  Urinary bladder, prostate and rectum are within normal limits. Tiny  bilateral fat-containing inguinal hernias.     Cutaneous and subcutaneous decubitus wound changes in the midline  posterior and inferior to the lower sacrococcygeal spine. No abscess or  other fluid collection is seen. Underlying osseous structures are  unremarkable.     Lower chest images show low ascending thoracic aortic aneurysm measuring  up to 5.4 cm. This segment is unchanged since CT chest, 12/8/2016.       Impression:       1. CT findings suggesting mild acute diverticulitis involving the mid  sigmoid colon in the left lower pelvis. No evidence of abscess or other  complicating feature.  2. Cholelithiasis. Multiple large gallstones within the gallbladder. No  bile duct dilatation.  3. Cutaneous and subcutaneous decubitus wounds changes posterior  inferior to the lower sacrococcygeal spine. No abscess or additional  complicating feature is visible.  4. Ascending thoracic aortic aneurysm, unchanged since 12/8/2016.     This report was finalized on 7/12/2018 7:17 AM by Dr. Jefferson Bowen MD.       MRI Lumbar Spine With & Without Contrast [[id number]] Updated:  07/11/18 1443    US Venous Doppler Lower Extremity Bilateral (duplex) [[id number]] Collected:  07/10/18 1733     Updated:  07/10/18 1735    Narrative:       VENOUS DOPPLER ULTRASOUND, BILATERAL LOWER EXTREMITIES, 7/10/2018     HISTORY:   82-year-old male with two week history of bilateral lower extremity  edema.     TECHNIQUE:   Venous Doppler ultrasound examination of both  legs was performed using  grey-scale, spectral Doppler, and color flow Doppler ultrasound imaging.     FINDINGS:   The examination is negative.  There is no evidence of deep venous  thrombosis from the groin to the lower calf bilaterally. The greater  saphenous veins are also patent.       Impression:       Negative examination.  No evidence of lower extremity DVT on the right  or left.     This report was finalized on 7/10/2018 5:33 PM by Dr. Jefferson Bowen MD.               Medication Review:     Hospital Medications (active)       Dose Frequency Start End    acetaminophen (TYLENOL) tablet 650 mg 650 mg Every 4 Hours PRN 7/10/2018     Sig - Route: Take 2 tablets by mouth Every 4 (Four) Hours As Needed for Mild Pain , Headache or Fever. - Oral    ampicillin-sulbactam (UNASYN) injection 3 g 3 g Every 6 Hours 7/13/2018 7/18/2018    Sig - Route: Inject 3 g into the shoulder, thigh, or buttocks Every 6 (Six) Hours. - Intramuscular    aspirin chewable tablet 162 mg 162 mg Once 7/13/2018 7/13/2018    Sig - Route: Chew 2 tablets 1 (One) Time. - Oral    aspirin chewable tablet 81 mg 81 mg Daily 7/9/2018     Sig - Route: Chew 1 tablet Daily. - Oral    b complex-C-folic acid capsule 1 mg 1 capsule Daily 7/9/2018     Sig - Route: Take 1 capsule by mouth Daily. - Oral    bumetanide (BUMEX) tablet 2 mg 2 mg Daily 7/13/2018     Sig - Route: Take 2 tablets by mouth Daily. - Oral    cetirizine (zyrTEC) tablet 5 mg 5 mg Daily 7/9/2018     Sig - Route: Take 0.5 tablets by mouth Daily. - Oral    diltiaZEM (CARDIZEM) injection 20 mg 20 mg Once 7/12/2018 7/12/2018    Sig - Route: Infuse 4 mL into a venous catheter 1 (One) Time. - Intravenous    diltiaZEM CD (CARDIZEM CD) 24 hr capsule 300 mg 300 mg Every 24 Hours Scheduled 7/13/2018     Sig - Route: Take 300 mg by mouth Daily. - Oral    guaiFENesin (MUCINEX) 12 hr tablet 600 mg 600 mg Daily 7/9/2018     Sig - Route: Take 1 tablet by mouth Daily. - Oral    HYDROmorphone (DILAUDID)  "injection 0.25 mg 0.25 mg Every 2 Hours PRN 7/10/2018 7/20/2018    Sig - Route: Infuse 0.25 mL into a venous catheter Every 2 (Two) Hours As Needed for Moderate Pain  or Severe Pain . - Intravenous    lactated ringers infusion 500 mL 500 mL Once 7/13/2018 7/13/2018    Sig - Route: Infuse 500 mL into a venous catheter 1 (One) Time. - Intravenous    lactobacillus acidophilus (RISAQUAD) capsule 1 capsule 1 capsule Daily 7/12/2018     Sig - Route: Take 1 capsule by mouth Daily. - Oral    levETIRAcetam (KEPPRA) tablet 500 mg 500 mg Every 12 Hours Scheduled 7/9/2018     Sig - Route: Take 1 tablet by mouth Every 12 (Twelve) Hours. - Oral    LORazepam (ATIVAN) injection 1 mg 1 mg Once As Needed 7/11/2018     Sig - Route: Infuse 0.5 mL into a venous catheter 1 (One) Time As Needed for Anxiety (for CT scan). - Intravenous    Magnesium Sulfate 2 gram / 50mL Infusion (GIVE X 3 BAGS TO EQUAL 6GM TOTAL DOSE) - Mg 1.1 - 1/5 mg/dl 2 g As Needed 7/10/2018     Sig - Route: Infuse 50 mL into a venous catheter As Needed (See Administration Instructions). - Intravenous    Linked Group 1:  \"Or\" Linked Group Details        Magnesium Sulfate 2 gram Bolus, followed by 8 gram infusion (total Mg dose 10 grams)- Mg less than or equal to 1mg/dL 2 g As Needed 7/10/2018     Sig - Route: Infuse 50 mL into a venous catheter As Needed (See Administration Instructions). - Intravenous    Linked Group 1:  \"Or\" Linked Group Details        Magnesium Sulfate 4 gram infusion- Mg 1.6-1.9 mg/dL 4 g As Needed 7/10/2018     Sig - Route: Infuse 100 mL into a venous catheter As Needed (See Administration Instructions). - Intravenous    Linked Group 1:  \"Or\" Linked Group Details        methocarbamol (ROBAXIN) tablet 500 mg 500 mg 2 Times Daily PRN 7/9/2018     Sig - Route: Take 1 tablet by mouth 2 (Two) Times a Day As Needed for Muscle Spasms. - Oral    metoprolol succinate XL (TOPROL-XL) 24 hr tablet 100 mg 100 mg Every 24 Hours Scheduled 7/9/2018     Sig - " "Route: Take 2 tablets by mouth Daily. - Oral    metoprolol tartrate (LOPRESSOR) 5 MG/5ML injection  - ADS Override Pull   7/13/2018 7/13/2018    Notes to Pharmacy: Created by cabinet ancelmoide    metoprolol tartrate (LOPRESSOR) injection 2.5 mg 2.5 mg Once 7/13/2018 7/13/2018    Sig - Route: Infuse 2.5 mL into a venous catheter 1 (One) Time. - Intravenous    metoprolol tartrate (LOPRESSOR) injection 5 mg 5 mg Once 7/13/2018     Sig - Route: Infuse 5 mL into a venous catheter 1 (One) Time. - Intravenous    nitroglycerin (NITROSTAT) 0.4 MG SL tablet  - ADS Override Pull   7/12/2018 7/12/2018    Notes to Pharmacy: Created by cabinet override    nitroglycerin (NITROSTAT) ointment 1 inch 1 inch Once 7/13/2018     Sig - Route: Apply 1 inch topically 1 (One) Time. - Topical    nitroglycerin (NITROSTAT) SL tablet 0.4 mg 0.4 mg Every 5 Minutes PRN 7/12/2018     Sig - Route: Place 1 tablet under the tongue Every 5 (Five) Minutes As Needed for Chest Pain (times 3 as needed). - Sublingual    Pharmacy to dose vancomycin  Continuous PRN 7/11/2018 8/8/2018    Sig - Route: Continuous As Needed for Consult. - Does not apply    polyethylene glycol (MIRALAX) powder 17 g 17 g Daily 7/10/2018     Sig - Route: Take 17 g by mouth Daily. - Oral    potassium chloride (K-DUR,KLOR-CON) CR tablet 40 mEq 40 mEq As Needed 7/10/2018     Sig - Route: Take 2 tablets by mouth As Needed (potassium replacement.  see admin instructions). - Oral    Linked Group 2:  \"Or\" Linked Group Details        potassium chloride (KLOR-CON) packet 40 mEq 40 mEq As Needed 7/10/2018     Sig - Route: Take 40 mEq by mouth As Needed (potassium replacement, see admin instructions). - Oral    Linked Group 2:  \"Or\" Linked Group Details        potassium chloride 10 mEq in 100 mL IVPB 10 mEq Every 1 Hour PRN 7/10/2018     Sig - Route: Infuse 100 mL into a venous catheter Every 1 (One) Hour As Needed (potassium protocol PERIPHERAL - see admin instructions). - Intravenous    " "Linked Group 2:  \"Or\" Linked Group Details        rivaroxaban (XARELTO) tablet 15 mg 15 mg Daily 7/9/2018     Sig - Route: Take 1 tablet by mouth Daily. - Oral    sennosides-docusate sodium (SENOKOT-S) 8.6-50 MG tablet 2 tablet 2 tablet 2 Times Daily 7/10/2018     Sig - Route: Take 2 tablets by mouth 2 (Two) Times a Day. - Oral    sodium chloride 0.9 % flush 1-10 mL 1-10 mL As Needed 7/9/2018     Sig - Route: Infuse 1-10 mL into a venous catheter As Needed for Line Care. - Intravenous    sodium chloride 0.9 % infusion  - ADS Override Pull   7/11/2018 7/12/2018    Notes to Pharmacy: Created by cabinet override    sodium chloride 0.9 % infusion 40 mL 40 mL As Needed 7/9/2018     Sig - Route: Infuse 40 mL into a venous catheter As Needed for Line Care. - Intravenous    vancomycin (VANCOCIN) 500 MG injection  - ADS Override Pull   7/11/2018 7/12/2018    Notes to Pharmacy: Created by cabinet override    diltiaZEM CD (CARDIZEM CD) 24 hr capsule 240 mg (Discontinued) 240 mg Every 24 Hours Scheduled 7/10/2018 7/12/2018    Sig - Route: Take 2 capsules by mouth Daily. - Oral    metoprolol tartrate (LOPRESSOR) tablet 25 mg (Discontinued) 25 mg Once 7/13/2018 7/13/2018    Sig - Route: Take 1 tablet by mouth 1 (One) Time. - Oral    piperacillin-tazobactam (ZOSYN) 3.375 g/100 mL 0.9% NS IVPB (mbp) (Discontinued) 3.375 g Every 8 Hours 7/11/2018 7/13/2018    Sig - Route: Infuse 100 mL into a venous catheter Every 8 (Eight) Hours. - Intravenous    vancomycin 1500 mg/250 mL 0.9% NS IVPB (Discontinued) 1,500 mg Every 12 Hours 7/11/2018 7/13/2018    Sig - Route: Infuse 250 mL into a venous catheter Every 12 (Twelve) Hours. - Intravenous          ampicillin-sulbactam 3 g Intravenous Q6H   aspirin 81 mg Oral Daily   b complex-C-folic acid 1 capsule Oral Daily   cetirizine 5 mg Oral Daily   diclofenac 4 g Topical 4x Daily   digoxin 125 mcg Oral Daily   diltiaZEM 45 mg Oral Q6H   enoxaparin 1 mg/kg Subcutaneous Q12H   guaiFENesin 600 mg " Oral Daily   HYDROmorphone 1 mg Intravenous Once   lactobacillus acidophilus 1 capsule Oral Daily   levETIRAcetam 500 mg Intravenous Q12H   lidocaine 2 patch Transdermal Q24H   Methocarbamol 1,000 mg Intravenous Q8H   methocarbamol 750 mg Oral 4x Daily   metoprolol succinate  mg Oral Q24H   polyethylene glycol 17 g Oral Daily   potassium chloride 20 mEq Oral Daily   sennosides-docusate sodium 2 tablet Oral BID   sodium chloride      vancomycin 1,500 mg Intravenous Q18H       Assessment/Plan       Principal Problem:    Bacteremia due to Escherichia coli  Active Problems:    Hypertension    Stage 3 chronic kidney disease    Decubitus ulcer of sacral region    Abnormal echocardiogram    Atrial fibrillation and flutter (CMS/HCC)    Leg swelling    Bacteremia due to Enterococcus    Metabolic encephalopathy    Perseveration    Diverticulitis Better  TME  ? Discitis  ? SBE      Plan :    Change  To IV ampicillin 2 gm IV Q 4 H  No need for Vanc  His Enterococci is PCN sensitive    MRI Brain  Repeat BC    SKIP noted   Thank you    Liborio Cantu MD  07/17/18  12:12 PM

## 2018-07-17 NOTE — PROGRESS NOTES
The MRI scan shows areas of early chronic or very late subacute infarct in the right hemisphere with tiny areas of petechial hemorrhage.  This is not a raza bleed and I am okay with continuing the current Lovenox.  Specifically this does not appear to be a brand-new/acute stroke.  This stroke would explain some of his recent confusion weakness and decreased left nasolabial fold.  Otherwise there is not a lot to change regarding his treatment as this all will certainly will be a cardiac source of embolism.

## 2018-07-18 NOTE — PROGRESS NOTES
LOS: 9 days   Patient Care Team:  Elisabeth Warren MD as PCP - General (Internal Medicine)  Alexsander Matute MD as Consulting Physician (Neurology)  MD Ry Goff III, MD as Consulting Physician (Cardiology)    Chief Complaint: following for SBE     Interval History:  Confused/encephalopathic.    MRI yesterday by Dr Yap :  IMPRESSION:    1.  Areas of late subacute or early chronic infarct as detailed above,   largest area is in the posterior right temporoparietal region.  There is   some evidence for petechial hemorrhage and reactive enhancement.   2.  No significant mass effect.    3.  No evidence for an acute or early subacute large territory infarct.     Objective   Vital Signs  Temp:  [96.1 °F (35.6 °C)-98.9 °F (37.2 °C)] 98.2 °F (36.8 °C)  Heart Rate:  [51-94] 71  Resp:  [16-20] 20  BP: ()/(43-91) 103/67    Intake/Output Summary (Last 24 hours) at 07/18/18 0830  Last data filed at 07/18/18 0659   Gross per 24 hour   Intake          3588.34 ml   Output             1150 ml   Net          2438.34 ml           Physical Exam   Constitutional: He appears lethargic. He has a sickly appearance.   HENT:   Head: Normocephalic.   Nose: Nose normal.   Eyes: Conjunctivae are normal.   Neck: No JVD present.   Cardiovascular: Normal rate.  An irregularly irregular rhythm present.   Murmur heard.   Systolic murmur is present with a grade of 2/6   Pulmonary/Chest: Effort normal.   Abdominal: Soft.   Musculoskeletal: He exhibits edema (doughy edema with varicosities).   Neurological: He appears lethargic. He is disoriented. He displays tremor.   Skin: Skin is warm and dry. No erythema.   Vitals reviewed.      Results Review:        Results from last 7 days  Lab Units 07/18/18  0426 07/17/18  0722 07/16/18  0425   SODIUM mmol/L 141 137 139   POTASSIUM mmol/L 3.5 3.5 3.9   CHLORIDE mmol/L 98 97* 97*   CO2 mmol/L 30.0* 29.6* 32.1*   BUN mg/dL 26* 23 16   CREATININE mg/dL 1.04 1.14 0.98    GLUCOSE mg/dL 66 87 93   CALCIUM mg/dL 8.6* 8.7* 9.2       Results from last 7 days  Lab Units 07/14/18  0447 07/13/18  1259 07/13/18  0551   TROPONIN T ng/mL 0.107* 0.135* 0.073*       Results from last 7 days  Lab Units 07/18/18  0426 07/17/18  0722 07/16/18  0425   WBC 10*3/mm3 11.24* 14.43* 15.53*   HEMOGLOBIN g/dL 8.8* 9.3* 9.4*   HEMATOCRIT % 28.1* 28.5* 28.6*   PLATELETS 10*3/mm3 573* 562* 654*                       I reviewed the patient's new clinical results.  I personally viewed and interpreted the patient's EKG/Telemetry data        Medication Review:     ampicillin 2 g Intravenous Q4H   aspirin 81 mg Oral Daily   b complex-C-folic acid 1 capsule Oral Daily   cetirizine 5 mg Oral Daily   diclofenac 4 g Topical 4x Daily   digoxin 125 mcg Oral Daily   diltiaZEM 45 mg Oral Q6H   enoxaparin 1 mg/kg Subcutaneous Q12H   guaiFENesin 600 mg Oral Daily   lactobacillus acidophilus 1 capsule Oral Daily   levETIRAcetam 500 mg Intravenous Q12H   lidocaine 2 patch Transdermal Q24H   Methocarbamol 1,000 mg Intravenous Q8H   methocarbamol 750 mg Oral 4x Daily   metoprolol succinate  mg Oral Q24H   polyethylene glycol 17 g Oral Daily   potassium chloride 20 mEq Oral Daily   sennosides-docusate sodium 2 tablet Oral BID         diltiaZEM 5-15 mg/hr Last Rate: Stopped (07/16/18 2217)   Pharmacy to Dose enoxaparin (LOVENOX)     sodium chloride 100 mL/hr Last Rate: 100 mL/hr (07/18/18 0700)       Assessment/Plan     1.  PSVT/PAF/atrial flutter - in atrial fibrillation at present but rate controlled; continue BB/digoxin.  Digoxin is 0.55. He's on enoxaparin instead of rivaroxaban   2.  Elevated troponin - known history of CAD, not a candidate for ischemic workup, continue medical therapy    3.  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    4.  Acute diverticulitis - per primary team.     5.  Altered mental status - still extremely encephalopathic    6. CVA- late subacute infarct        Ry Garcia III, MD  07/18/18  8:30 AM

## 2018-07-18 NOTE — PLAN OF CARE
Problem: Patient Care Overview  Goal: Plan of Care Review  Outcome: Ongoing (interventions implemented as appropriate)   07/18/18 1608   Coping/Psychosocial   Plan of Care Reviewed With patient   Plan of Care Review   Progress declining   OTHER   Outcome Summary Patient unable/unwilling to take ice chips today like he did yesterday. Barely able to get patient to take PO meds crushed in applesauce. Only most important meds given at this time. Spoke with FELI Moore about switching meds to IV and stopping unimportant meds. Unimportant meds discontinued. No important PO meds changed to IV at this time. Awaiting consult with Dr. Yap before determining if patient would be appropriate for a dobhoff for nutrition purposes. Patient's speech is mostly incomprehensible. Patient not moving RLE much. Even when stimulated, RLE movement is significantly less than LLE movement. Patient moans with pain when BLE are touched and when is is moved at all. SCDs left off due to patient not tolerating pain in legs. K+ replaced today. Will recheck K+ at 1730. Robaxin given as scheduled. Dilaudid given intermittently between Robaxin doses. Speech saw patient today, see note. Pt off Xarelto still and on Lovenox due to possible Neuro intervention. Per spouse, left facial droop isn't new. 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 Pt is responsive to antibiotics at this time as his WBC count has decreased to 11. Off Bumex at this time. BLE edema noted only in ankles. Labs to be redrawn in the AM. Will continue to monitor closely in ICU.      Goal: Individualization and Mutuality  Outcome: Ongoing (interventions implemented as appropriate)   07/18/18 1608   Individualization   Patient Specific Goals (Include Timeframe) decrease WBC count to normal within next 48 hours   Patient Specific Interventions patient on ampicillin 200 mg q4h. WBC count slowly decreasing      Goal: Discharge Needs Assessment  Outcome: Ongoing (interventions implemented as appropriate)   07/18/18 1608   Discharge Needs Assessment   Concerns to be Addressed denies needs/concerns at this time     Goal: Interprofessional Rounds/Family Conf  Outcome: Ongoing (interventions implemented as appropriate)      Problem: Skin Injury Risk (Adult)  Goal: Skin Health and Integrity  Outcome: Ongoing (interventions implemented as appropriate)   07/18/18 1608   Skin Injury Risk (Adult)   Skin Health and Integrity making progress toward outcome       Problem: Fall Risk (Adult)  Goal: Absence of Fall  Outcome: Ongoing (interventions implemented as appropriate)   07/18/18 1608   Fall Risk (Adult)   Absence of Fall making progress toward outcome       Problem: Pain, Acute (Adult)  Goal: Acceptable Pain Control/Comfort Level  Outcome: Ongoing (interventions implemented as appropriate)   07/18/18 1608   Pain, Acute (Adult)   Acceptable Pain Control/Comfort Level making progress toward outcome       Problem: Wound (Includes Pressure Injury) (Adult)  Goal: Signs and Symptoms of Listed Potential Problems Will be Absent, Minimized or Managed (Wound)  Outcome: Ongoing (interventions implemented as appropriate)   07/18/18 1608   Goal/Outcome Evaluation   Problems Assessed (Wound) all   Problems Present (Wound) delayed wound healing;pain;situational response       Problem: Sepsis/Septic Shock (Adult)  Goal: Signs and Symptoms of Listed Potential Problems Will be Absent, Minimized or Managed (Sepsis/Septic Shock)  Outcome: Ongoing (interventions implemented as appropriate)   07/18/18 1608   Goal/Outcome Evaluation   Problems Assessed (Sepsis) all   Problems Present (Sepsis) hypoxia/hypoxemia;situational response;undernutrition       Problem: Nutrition, Imbalanced: Inadequate Oral Intake (Adult)  Goal: Identify Related Risk Factors and Signs and Symptoms  Outcome: Outcome(s) achieved Date Met: 07/18/18 07/18/18 1608   Nutrition,  Imbalanced: Inadequate Oral Intake (Adult)   Related Risk Factors (Nutrition Imbalance, Inadequate Oral Intake) appetite decreased;functional disability;knowledge deficit   Signs and Symptoms (Nutrition Imbalance, Inadequate Oral Intake: Signs and Symptoms) functional impairment;irritability/confusion;muscle mass/strength decreased;wound healing poor;weakness/lethargy;physical symptoms     Goal: Improved Oral Intake  Outcome: Ongoing (interventions implemented as appropriate)   07/18/18 1608   Nutrition, Imbalanced: Inadequate Oral Intake (Adult)   Improved Oral Intake making progress toward outcome     Goal: Prevent Further Weight Loss  Outcome: Ongoing (interventions implemented as appropriate)   07/18/18 1608   Nutrition, Imbalanced: Inadequate Oral Intake (Adult)   Prevent Further Weight Loss making progress toward outcome       Problem: Skin and Soft Tissue Infection (Adult)  Goal: Signs and Symptoms of Listed Potential Problems Will be Absent, Minimized or Managed (Skin and Soft Tissue Infection)  Outcome: Ongoing (interventions implemented as appropriate)   07/18/18 1608   Goal/Outcome Evaluation   Problems Assessed (Skin and Soft Tissue Infection) all   Problems Present (Skin/Soft Tissue Inf) pain;situational response

## 2018-07-18 NOTE — SIGNIFICANT NOTE
Called to unit by RN  Patient is very ill with septic emboli  Is not taking oral food nor meds  Will change his Cardizem, metoprolol and dig over to IV    Nithin Huizar MD

## 2018-07-18 NOTE — PROGRESS NOTES
"SERVICE: [facility] HOSPITALIST    CONSULTANTS: ID/Cardiology/GI/neurology    CHIEF COMPLAINT: f/u septic emboli with stroke/sepsis/TME/diskitis/endocarditis/diverticulitis/sacral wound    SUBJECTIVE: The patient is mumbling unintelligible speech and is unable to answer questions at this time. Staff notes a relatively calm night overnight and that patient did speak on and off, follow commands on and off. NO fevers overnight for first time since admit. No other new concerns mentioned    OBJECTIVE:    /61   Pulse 82   Temp 98.2 °F (36.8 °C) (Oral)   Resp 20   Ht 190.5 cm (75\")   Wt 87.4 kg (192 lb 9.6 oz)   SpO2 96%   BMI 24.07 kg/m²     MEDS/LABS REVIEWED AND ORDERED    ampicillin 2 g Intravenous Q4H   aspirin 81 mg Oral Daily   b complex-C-folic acid 1 capsule Oral Daily   cetirizine 5 mg Oral Daily   diclofenac 4 g Topical 4x Daily   digoxin 125 mcg Oral Daily   diltiaZEM 45 mg Oral Q6H   enoxaparin 1 mg/kg Subcutaneous Q12H   guaiFENesin 600 mg Oral Daily   lactobacillus acidophilus 1 capsule Oral Daily   levETIRAcetam 500 mg Intravenous Q12H   lidocaine 2 patch Transdermal Q24H   Methocarbamol 1,000 mg Intravenous Q8H   methocarbamol 750 mg Oral 4x Daily   metoprolol succinate  mg Oral Q24H   polyethylene glycol 17 g Oral Daily   potassium chloride 20 mEq Oral Daily   sennosides-docusate sodium 2 tablet Oral BID     Physical Exam   Constitutional: He appears well-developed and well-nourished.   pale   HENT:   Head: Normocephalic and atraumatic.   edentulous   Cardiovascular: Normal rate.    Irregular rhythm   Abdominal: Soft. Bowel sounds are normal. He exhibits no distension. There is no tenderness. There is no guarding.   Genitourinary:   Genitourinary Comments: Amin in place   Musculoskeletal: He exhibits no edema.   Neurological:   Awake, mumbling    Psychiatric:   Confused, mumbling, moving all extremities, mild left facial droop   Vitals " reviewed.    LAB/DIAGNOSTICS:    Lab Results (last 24 hours)     Procedure Component Value Units Date/Time    Magnesium [[id number]]  (Normal) Collected:  07/18/18 0426    Specimen:  Blood Updated:  07/18/18 0847     Magnesium 2.1 mg/dL     Basic Metabolic Panel [[id number]]  (Abnormal) Collected:  07/18/18 0426    Specimen:  Blood from Arm, Left Updated:  07/18/18 0650     Glucose 66 mg/dL      BUN 26 (H) mg/dL      Creatinine 1.04 mg/dL      Sodium 141 mmol/L      Potassium 3.5 mmol/L      Chloride 98 mmol/L      CO2 30.0 (H) mmol/L      Calcium 8.6 (L) mg/dL      eGFR Non African Amer 69 mL/min/1.73      BUN/Creatinine Ratio 25.0     Anion Gap 13.0 mmol/L     Narrative:       The MDRD GFR formula is only valid for adults with stable renal function between ages 18 and 70.    Blood Culture - Blood, [[id number]]  (Normal) Collected:  07/16/18 0425    Specimen:  Blood from Arm, Left Updated:  07/18/18 0645     Blood Culture No growth at 2 days    CBC & Differential [[id number]] Collected:  07/18/18 0426    Specimen:  Blood Updated:  07/18/18 0628    Narrative:       The following orders were created for panel order CBC & Differential.  Procedure                               Abnormality         Status                     ---------                               -----------         ------                     CBC Auto Differential[[id number]]        Abnormal            Final result                 Please view results for these tests on the individual orders.    CBC Auto Differential [[id number]]  (Abnormal) Collected:  07/18/18 0426    Specimen:  Blood from Arm, Left Updated:  07/18/18 0628     WBC 11.24 (H) 10*3/mm3      RBC 3.70 (L) 10*6/mm3      Hemoglobin 8.8 (L) g/dL      Hematocrit 28.1 (L) %      MCV 75.9 (L) fL      MCH 23.8 (L) pg      MCHC 31.3 g/dL      RDW 18.9 (H) %      RDW-SD 51.3 fl      MPV 9.8 fL      Platelets 573 (H) 10*3/mm3      Neutrophil % 68.4 %      Lymphocyte % 20.4 %      Monocyte % 9.4 (H) %       Eosinophil % 1.0 %      Basophil % 0.4 %      Immature Grans % 0.4 %      Neutrophils, Absolute 7.69 10*3/mm3      Lymphocytes, Absolute 2.29 10*3/mm3      Monocytes, Absolute 1.06 (H) 10*3/mm3      Eosinophils, Absolute 0.11 10*3/mm3      Basophils, Absolute 0.05 10*3/mm3      Immature Grans, Absolute 0.04 (H) 10*3/mm3      nRBC 0.0 /100 WBC     Blood Culture - Blood, [[id number]]  (Normal) Collected:  07/15/18 1910    Specimen:  Blood from Arm, Right Updated:  07/17/18 1915     Blood Culture No growth at 2 days    Fungus Culture, Blood - Blood, Arm, Left [[id number]] Collected:  07/10/18 1130    Specimen:  Blood from Arm, Left Updated:  07/17/18 1745     Fungus Culture No fungus isolated at 1 week    Fungus Culture, Blood - Blood, Arm, Left [[id number]] Collected:  07/10/18 1200    Specimen:  Blood from Hand, Left Updated:  07/17/18 1745     Fungus Culture No fungus isolated at 1 week    Procalcitonin [[id number]]  (Abnormal) Collected:  07/17/18 0722    Specimen:  Blood Updated:  07/17/18 1029     Procalcitonin 0.08 (L) ng/mL     Narrative:       As a Marker for Sepsis (Non-Neonates):   1. <0.5 ng/mL represents a low risk of severe sepsis and/or septic shock.  2. >2 ng/mL represents a high risk of severe sepsis and/or septic shock.    As a Marker for Lower Respiratory Tract Infections that require antibiotic therapy:    PCT on Admission     Antibiotic Therapy       6-12 Hrs later  > 0.5                Strongly Recommended             >0.25 - <0.5         Recommended  0.1 - 0.25           Discouraged              Remeasure/reassess PCT  <0.1                 Strongly Discouraged     Remeasure/reassess PCT                     PCT values of < 0.5 ng/mL do not exclude an infection, because localized infections (without systemic signs) may be associated with such low concentrations, or a systemic infection in its initial stages (< 6 hours). Furthermore, increased PCT can occur without infection. PCT concentrations between  0.5 and 2.0 ng/mL should be interpreted taking into account the patient's history. It is recommended to retest PCT within 6-24 hours if any concentrations < 2 ng/mL are obtained.        ECG 12 Lead   Final Result   RR Interval= 667 ms   FL Interval= 148 ms   QRSD Interval= 140 ms   QT Interval= 412 ms   QTc Interval= 504 ms   Heart Rate= 90 ms   P Axis= 48 deg   QRS Axis= 14 deg   T Wave Axis= -8 deg   I: 40 Axis= 15 deg   T: 40 Axis= deg   ST Axis= -58 deg   ATRIAL FIBRILLATION - new   LEFT ATRIAL ABNORMALITY   RIGHT BUNDLE BRANCH BLOCK   Electronically Signed by:  Ry Garcia ([facility]) 14-Jul-2018 16:38:55   Date and Time of Study: 2018-07-14 08:54:00      ECG 12 Lead   Final Result   RR Interval= 380 ms   FL Interval= ms   QRSD Interval= 92 ms   QT Interval= 296 ms   QTc Interval= 480 ms   Heart Rate= 158 ms   P Axis= 0 deg   QRS Axis= 17 deg   T Wave Axis= 15 deg   I: 40 Axis= -4 deg   T: 40 Axis= 47 deg   ST Axis= 228 deg   SUPRAVENTRICULAR TACHYCARDIA - new   VENTRICULAR BIGEMINY   BORDERLINE LOW VOLTAGE IN FRONTAL LEADS   REPOLARIZATION ABNORMALITY, PROB RATE RELATED   Electronically Signed by:  Ry Garcia ([facility]) 14-Jul-2018 16:38:26   Date and Time of Study: 2018-07-14 08:42:38      ECG 12 Lead   Final Result   RR Interval= 373 ms   FL Interval= ms   QRSD Interval= 86 ms   QT Interval= 316 ms   QTc Interval= 517 ms   Heart Rate= 161 ms   P Axis= 0 deg   QRS Axis= 31 deg   T Wave Axis= 53 deg   I: 40 Axis= 43 deg   T: 40 Axis= deg   ST Axis= 198 deg   SUPRAVENTRICULAR TACHYCARDIA   VENTRICULAR BIGEMINY   BORDERLINE LOW VOLTAGE IN FRONTAL LEADS   ST DEPRESSION, PROBABLY RATE RELATED   pqt repeat   Electronically Signed by:  [facility] ([facility]) 13-Jul-2018    13:10:52   Date and Time of Study: 2018-07-13 02:28:25      ECG 12 Lead   Final Result   RR Interval= 373 ms   FL Interval= ms   QRSD Interval= 94 ms   QT Interval= 308 ms   QTc Interval= 504 ms   Heart Rate= 161 ms   P Axis= 0 deg   QRS Axis= 43 deg    T Wave Axis= 69 deg   I: 40 Axis= 34 deg   T: 40 Axis= 185 deg   ST Axis= 160 deg   SUPRAVENTRICULAR TACHYCARDIA   VENTRICULAR BIGEMINY   ABERRANT COMPLEX   BORDERLINE LOW VOLTAGE IN FRONTAL LEADS   REPOLARIZATION ABNORMALITY, PROB RATE RELATED   pqt repeat   Electronically Signed by:  Rocky Briceño) ([facility]) 13-Jul-2018    13:09:50   Date and Time of Study: 2018-07-12 23:36:29      ECG 12 Lead   Final Result   RR Interval= 619 ms   SD Interval= 180 ms   QRSD Interval= 102 ms   QT Interval= 360 ms   QTc Interval= 458 ms   Heart Rate= 97 ms   P Axis= 46 deg   QRS Axis= 41 deg   T Wave Axis= 26 deg   I: 40 Axis= 29 deg   T: 40 Axis= 120 deg   ST Axis= 0 deg   SINUS RHYTHM   PROBABLE LEFT ATRIAL ABNORMALITY   LOW VOLTAGE IN FRONTAL LEADS   atypical rbbb no major change   Electronically Signed by:  Rocky Briceño) ([facility]) 13-Jul-2018    13:10:16   Date and Time of Study: 2018-07-13 05:41:26      ECG 12 Lead   Final Result   RR Interval= 405 ms   SD Interval= ms   QRSD Interval= 94 ms   QT Interval= 304 ms   QTc Interval= 478 ms   Heart Rate= 148 ms   P Axis= deg   QRS Axis= 36 deg   T Wave Axis= 72 deg   I: 40 Axis= 28 deg   T: 40 Axis= 47 deg   ST Axis= 178 deg   JUNCTIONAL TACHYCARDIA   LOW VOLTAGE IN FRONTAL LEADS   REPOLARIZATION ABNORMALITY, PROB RATE RELATED   BORDERLINE PROLONGED QT INTERVAL   NO PRIOR TRACING AVAILABLE FOR COMPARISON   Electronically Signed by:  Gonzalo Collins ([facility]) 12-Jul-2018 20:20:06   Date and Time of Study: 2018-07-12 11:32:45      ECG 12 Lead   Final Result   RR Interval= 594 ms   SD Interval= 168 ms   QRSD Interval= 120 ms   QT Interval= 368 ms   QTc Interval= 477 ms   Heart Rate= 101 ms   P Axis= 16 deg   QRS Axis= 19 deg   T Wave Axis= 3 deg   I: 40 Axis= 5 deg   T: 40 Axis= 113 deg   ST Axis= -14 deg   SINUS TACHYCARDIA   IVCD, CONSIDER ATYPICAL RBBB   CONSIDER ANTERIOR INFARCT   NO SIGNIFICANT CHANGE FROM PREVIOUS ECG   Electronically Signed by:  Gonzalo Collins ([facility])  12-Jul-2018 20:19:43   Date and Time of Study: 2018-07-12 12:15:46      ECG 12 Lead   Final Result   RR Interval= 571 ms   [place] Interval= ms   QRSD Interval= 140 ms   QT Interval= 384 ms   QTc Interval= 508 ms   Heart Rate= 105 ms   P Axis= deg   QRS Axis= -25 deg   T Wave Axis= -26 deg   I: 40 Axis= 13 deg   T: 40 Axis= 141 deg   ST Axis= -22 deg   ATRIAL FLUTTER, A-RATE 258   RIGHT BUNDLE BRANCH BLOCK   NO SIGNIFICANT CHANGE FROM PREVIOUS ECG   Electronically Signed by:  Naeem Laughlin ([facility]) 10-Jul-2018 15:30:48   Date and Time of Study: 2018-07-10 07:21:18        Results for orders placed during the hospital encounter of 07/09/18   Adult Transthoracic Echo Limited W/ Cont if Necessary Per Protocol    Narrative · There is calcification of the aortic valve.  · Moderate aortic valve regurgitation is present.  · Moderate-to-severe mitral valve regurgitation is present  · Mild tricuspid valve regurgitation is present.  · Left atrial cavity size is dilated.  · Left ventricular systolic function is normal.        Mri Brain With & Without Contrast    Result Date: 7/17/2018  BRAIN WITHOUT CONTRAST:  Study is moderately motion degraded.  There is faint T2 signal and restricted diffusion, predominately centered in the posterior right temporal and parietal region.  This area shows some petechial hemorrhage.  There is also a small area of petechial hemorrhage in the posterior right frontal lobe and high posterior left frontoparietal junction.  No associated mass effect.  No hydrocephalus or extra-axial fluid collection.  There is no evidence for an acute or early subacute large territory infarct.  When allowing for motion, major intracranial flow voids appear to remain intact.  Paranasal sinuses and the mastoid air cells are clear.  BRAIN WITH CONTRAST:  The areas of suspected late subacute infarct do show some reactive enhancement after the administration of contrast.  IMPRESSION:  1.  Areas of late subacute or early chronic  infarct as detailed above, largest area is in the posterior right temporoparietal region.  There is some evidence for petechial hemorrhage and reactive enhancement. 2.  No significant mass effect.  3.  No evidence for an acute or early subacute large territory infarct.      Xr Chest 1 View    Result Date: 7/17/2018  PICC in good position.  This report was finalized on 7/17/2018 11:47 AM by Dr. Jefferson Bowen MD.      Mri Lumbar Spine With & Without Contrast    Result Date: 7/16/2018  1. There is no interval worsening of superior endplate compression deformity or the associated edema. No enhancing components are noted in the discs nor is there any obvious psoas abscess. Findings favor degenerative change or acute aggressive discitis, osteomyelitis. 2. Degenerative changes in the compression fractures are stable when compared to the prior study from 5 days ago. 3. Not mentioned above, minimal faint enhancement of the nerve roots of the cauda equina in the lower lumbar spine cannot be completely excluded.  Nonspecific. It could be artifactual or related to very early arachnoiditis. No clumping or nerve roots or mass is seen. Correlate for polyneuropathy. No tumor. It is slightly more prominent when compared to the prior study and hence reported.     ASSESSMENT/PLAN:  Sepsis secondary to Enterococcus and Pan-sensitive E-coli Bacteremia secondary to septic emboli/acute diskitis/acute endocarditis/acute mild diverticulitis/sacral wound: GI/ID/Cardiology following   -Repeat MRI confirmed diskitis/osteomyelitis  -Talked to Neurosurgery (Paola NP for Dr. Haskins who reviewed MRIs) at [facility], not a surgical candidate at this time secondary to Xarelto, last dose 7/15/18 at 1811 pm and must be off x 5 days prior to any surgery.   -For this reason he was changed to therapeutic dose Lovenox for easy reversibility until no surgical intervention potential.   -Per neurosurgery, the only indication for surgery would be failing  antibiotic therapy   -Lab improvement and afebrile would not require surgery at this time  -BC done 7/16/18 =NG x 2 days   -sed rate higher at 52 but CRP trending down yesterday  -WBCC trending down at 11.24 today, platelets similar at 573,000, procalcitonin negative  -IV robaxin (patient notes this is only medication that has helped, he is currently not taking po safely)  -now on ampicillin per Dr. Cantu  -No SKIP per cardiology, no benefit, treat as endocarditis  -D/W Dr. Yap regarding need for other brain monitoring (ICP monitoring/cerebral infarction etc) not available at this facility  -still NPO, will need to place dobhoff soon (agitation will make this difficult to maintain)  -recheck labs, sed rate/CRP in am  -Continue ICU monitoring      Late subacute/early chronic right temporal/parietal:  Acute metabolic encephalopathy:   Suspect secondary to septic emboli/subacute infarcts  Limit narcotics, continue robaxin  -change meds to IV as needed  -IV haldol per Dr. Yap for agitation     Cholelithiasis/right sided abdominal pain:   Staff notes patient c/o right abdominal pain, none on my exam  stones are large, no CBD dilatation, GB non-distended, doubtful acute concern     Chest pain r/o ACS  Chronic Afib and flutter w/intermittent RVR/SVT:   CAD/Dyslipidemia/h/o CABG: cardiology following  SVT on and off secondary to endocarditis possibly, none in several days  Continues diltiazem, metoprolol  EKG unchanged, ACS ruled out by cardiology  TSH normal  Changed to therapeutic dose lovenox as above  Change meds to IV as needed  Monitor      Chronic iron deficiency anemia/thrombocytosis:   Hemoglobin stable at 8.8, no active blood loss noted  Platelets trending down to normal at 562,000     HTN: BP at/low goal on metoprolol succinate 100 mg daily/diltiazem  mg daily  Off bumex     H/O seizures/stroke:   No acute issues on ASA and keppra.   No seizures noted      CKD stage 2-3:   Creatinine baseline 1.0,  currently 1.04  Continue IVFs at 100 ml/hr for now while not taking much po  Continue to HOLD bumex     Lower extremity edema: resolved, currently no edema     Hyponatremia/electrolyte imbalance: resolved at 141  Remains on electrolyte replacement protocol as needed     Urinary retention: unger in place, strict I&O, monitor     Ascending thoracic aortic aneurysm: unchanged by CT, no acute issues here

## 2018-07-18 NOTE — PLAN OF CARE
Problem: Patient Care Overview  Goal: Plan of Care Review  Outcome: Ongoing (interventions implemented as appropriate)   07/18/18 0453   Coping/Psychosocial   Plan of Care Reviewed With patient   Plan of Care Review   Progress no change   OTHER   Outcome Summary VSS; labs pending, pt continues to c/o back pain overnight. pt remains confused with intermittent periods of appropriateness. PICC placed yesterday. pt continues on IV ampicillin 2gm IV q4hr. IV Methocarbamol expected to be available today per phamacy.      Goal: Individualization and Mutuality  Outcome: Ongoing (interventions implemented as appropriate)    Goal: Discharge Needs Assessment  Outcome: Ongoing (interventions implemented as appropriate)      Problem: Skin Injury Risk (Adult)  Goal: Skin Health and Integrity  Outcome: Ongoing (interventions implemented as appropriate)   07/18/18 0453   Skin Injury Risk (Adult)   Skin Health and Integrity making progress toward outcome       Problem: Fall Risk (Adult)  Goal: Absence of Fall  Outcome: Ongoing (interventions implemented as appropriate)   07/18/18 0453   Fall Risk (Adult)   Absence of Fall making progress toward outcome       Problem: Pain, Acute (Adult)  Goal: Acceptable Pain Control/Comfort Level  Outcome: Ongoing (interventions implemented as appropriate)   07/18/18 0453   Pain, Acute (Adult)   Acceptable Pain Control/Comfort Level making progress toward outcome       Problem: Infection, Risk/Actual (Adult)  Goal: Infection Prevention/Resolution  Outcome: Ongoing (interventions implemented as appropriate)   07/18/18 0453   Infection, Risk/Actual (Adult)   Infection Prevention/Resolution making progress toward outcome       Problem: Wound (Includes Pressure Injury) (Adult)  Goal: Signs and Symptoms of Listed Potential Problems Will be Absent, Minimized or Managed (Wound)  Outcome: Ongoing (interventions implemented as appropriate)   07/18/18 0453   Goal/Outcome Evaluation   Problems Assessed  (Wound) all   Problems Present (Wound) delayed wound healing       Problem: Sepsis/Septic Shock (Adult)  Goal: Signs and Symptoms of Listed Potential Problems Will be Absent, Minimized or Managed (Sepsis/Septic Shock)  Outcome: Ongoing (interventions implemented as appropriate)   07/18/18 5479   Goal/Outcome Evaluation   Problems Assessed (Sepsis) all   Problems Present (Sepsis) infection progression;situational response

## 2018-07-18 NOTE — PLAN OF CARE
Problem: Patient Care Overview  Goal: Plan of Care Review  Outcome: Ongoing (interventions implemented as appropriate)   07/18/18 1136   Coping/Psychosocial   Plan of Care Reviewed With patient   OTHER   Outcome Summary Clinical Swallow Eval/SLP: Pt with extremely limited ability to participate due to confusion and presumed pain. Attempted po crushed medication in applesauce. Pt not wiilingly taking medication. Able to get single bites in his mouth with some oral holding and then pt swallows. No clinical signs of aspiration noted on several trials but he consistently avoids the po intake. SLP also provide with trials of water by holding straw and placing the liquid in his mouth. He swallowing with less holding with no clinical signs of aspiration. No significant oral residue of puree/crushed pills or water. some material cleared from the lips. No further attempts were made due to confusion/resistance. Recommend re-eval when pt better able to participate, meds via IV when possible otherwise crushed in applesauce in fully upright positioning followed by oral care. Oral swabs for moisture and oral care. Please reorder when pt more alert and cooperative.

## 2018-07-18 NOTE — THERAPY DISCHARGE NOTE
Long Term Care - Speech Language Pathology   Swallow Eval/Discharge JOLYNN Thibodeaux     Patient Name: Fahad Muhammad  : 1935  MRN: [id number]  Today's Date: 2018               Admit Date: 2018    Visit Dx:  No diagnosis found.  Patient Active Problem List   Diagnosis   • Osteoarthrosis, localized, primary, knee   • Hyperlipidemia   • Hypertension   • Seizure disorder (CMS/HCC)   • Vitamin D deficiency   • Coronary artery disease involving native coronary artery of native heart without angina pectoris   • Benign non-nodular prostatic hyperplasia with lower urinary tract symptoms   • Seasonal allergic rhinitis   • Stage 3 chronic kidney disease   • Closed wedge compression fracture of lumbar vertebra with routine healing   • DDD (degenerative disc disease), lumbar   • Closed stable burst fracture of lumbar vertebra (CMS/HCC)   • Chronic pain syndrome   • Weight loss, abnormal   • Iron deficiency anemia   • Decubitus ulcer of sacral region   • Abnormal echocardiogram   • Atrial fibrillation and flutter (CMS/HCC)   • Leg swelling   • Bacteremia due to Enterococcus   • Bacteremia due to Escherichia coli   • Metabolic encephalopathy   • Perseveration     Past Medical History:   Diagnosis Date   • Arthritis    • Benign non-nodular prostatic hyperplasia with lower urinary tract symptoms 2016   • Chronic atrial fibrillation (CMS/HCC)    • Chronic pain syndrome 3/9/2018   • CKD (chronic kidney disease), stage III 2016   • Coronary artery disease    • DDD (degenerative disc disease), lumbar    • Heme positive stool    • Hx of colonoscopy     Complete   • Hyperlipidemia    • Hypertension    • Iron deficiency anemia 2018   • Neck strain    • Osteoarthritis    • Seizure disorder (CMS/HCC) 2016   • Seizures (CMS/HCC)    • Sepsis (CMS/HCC)    • Stroke (CMS/HCC)    • Vitamin D deficiency    • Wrist fracture, right      Past Surgical History:   Procedure Laterality Date   • CARDIAC SURGERY     •  COLONOSCOPY     • CORONARY ARTERY BYPASS GRAFT  1996   • HERNIA REPAIR Right     Inguinal hernia repair          SWALLOW EVALUATION (last 72 hours)      SLP Adult Swallow Evaluation     Row Name 07/18/18 0815       Rehab Evaluation    Document Type evaluation  -AD    Total Evaluation Minutes, SLP 17  -AD    Subjective Information --   Pt unable to state  -AD    Patient Observations poorly cooperative;decreased LOC;unable to respond   incoherent speech  -AD    Patient/Family Observations Pt seen upright in bed, arouses, but difficult to understand. Pt's speech is incoherent with occasional word (oh baby) heard. Mumbling and perseverative vocalizations heard. Pt unable to follow directions or respond to questions. No family present. Nursing initially present.   -AD    Patient Effort poor  -AD    Comment due to confusion and decreased mental status.   -AD    Symptoms Noted During/After Treatment other (see comments)   confusion; difficult to assess pain  -AD       General Information    Patient Profile Reviewed yes  -AD    Pertinent History Of Current Problem Pt admitted with afib, dicitis and diverticulitis. Positive for sepsis with recent subacute stroke in right frontal and high posterior left frontopariental junction as well as post right tempoparietal area. Confusion that has worsened. Swallow eval order to assess safety of po diet.   -AD    Current Method of Nutrition soft textures;ground;thin liquids   cardiac  -AD    Precautions/Limitations, Vision other (see comments)   unable to assess  -AD    Precautions/Limitations, Hearing --   unable to assess  -AD    Prior Level of Function-Communication cognitive-linguistic impairment  -AD    Prior Level of Function-Swallowing no diet consistency restrictions;safe, efficient swallowing in all situations;regular textures;thin liquids  -AD    Plans/Goals Discussed with patient   unable to demonstrate understanding  -AD    Barriers to Rehab cognitive status;medically  complex;uncooperative  -AD    Patient's Goals for Discharge patient could not state  -AD    Family Goals for Discharge other (see comments)   family not available  -AD       Pain Assessment    Additional Documentation --   unable to assess  -AD       Oral Musculature and Cranial Nerve Assessment    Oral Motor General Assessment unable to assess  -AD    Oral Motor, Comment Unable to perform oral motor function or cranial nerve assessment due to confusion and inability to follow commands  -AD       General Eating/Swallowing Observations    Respiratory Support Currently in Use room air  -AD    Positioning During Eating upright 90 degree;upright in bed  -AD    Utensils Used spoon  -AD    Consistencies Trialed pureed;thin liquids   thin in 1/2 spoon size trials from straw  -AD       Respiratory    Respiratory Status WFL;room air;during swallowing/eating  -AD       Clinical Swallow Eval    Clinical Swallow Evaluation Summary Attempted clinical eval with pain medication crushed in applesauce and sips of thin in 1/2 teaspoon trials from a straw. Pt refusing to take po by turning head away and vocalizing. Able to get po med/puree in mouth with repeated trials by spoon. Pt with some holding and delay of oral/pharyngeal transit. No clinical signs of aspiration noted and palpated laryngeal elevation appeared WFL when pharyngeal swallow triggered. Only minimal to no delay of swallow on thin trials and no clinical signs of aspiration. Pt not cooperative and very confused/unable to participate. Recommend NPO with exception of meds that cannot be given IV. Recommend to reorder when mental status improves and pt is able to participate in evaluation.   -AD       Clinical Impression    SLP Swallowing Diagnosis moderate;oral dysfunction;suspected pharyngeal dysfunction  -AD    Functional Impact risk of aspiration/pneumonia;risk of malnutrition;risk of dehydration  -AD    Rehab Potential/Prognosis, Swallowing other (see comments)   poor  at this time due to mental status/encephalopathy  -AD    Criteria for Skilled Therapeutic Interventions Met not appropriate;no significant expected improvement in functional status;other (see comments)   at this time  -AD       Recommendations    Therapy Frequency (Swallow) evaluation only  -AD    SLP Diet Recommendation NPO  -AD    Recommended Diagnostics reassess via clinical swallow evaluation;other (see comments)   when able to participate  -AD    SLP Rec. for Method of Medication Administration meds via alternate route   crushed in puree if unable to get IV  -AD    Monitor for Signs of Aspiration yes;cough;elevated WBC count;gurgly voice;throat clearing;pneumonia  -AD    Anticipated Dischage Disposition unknown  -AD      User Key  (r) = Recorded By, (t) = Taken By, (c) = Cosigned By    Initials Name Effective Dates    NORY Weathers MS [facility]-SLP 06/22/16 -         EDUCATION  The patient has been educated in the following areas:   Dysphagia (Swallowing Impairment) NPO rationale. Pt unable to demonstrate understanding of teaching at this time.    SLP Recommendation and Plan  SLP Swallowing Diagnosis: moderate, oral dysfunction, suspected pharyngeal dysfunction  SLP Diet Recommendation: NPO  Monitor for Signs of Aspiration: yes, cough, elevated WBC count, gurgly voice, throat clearing, pneumonia  Recommended Diagnostics: reassess via clinical swallow evaluation, other (see comments) (when able to participate)  Criteria for Skilled Therapeutic Interventions Met: not appropriate, no significant expected improvement in functional status, other (see comments) (at this time)  Anticipated Dischage Disposition: unknown  Rehab Potential/Prognosis, Swallowing: other (see comments) (poor at this time due to mental status/encephalopathy)  Therapy Frequency (Swallow): evaluation only    Plan of Care Reviewed With: patient  Outcome Summary: Clinical Swallow Eval/SLP: Pt with extremely limited ability to participate due to  confusion and presumed pain. Attempted po crushed medication in applesauce. Pt not wiilingly taking medication. Able to get single bites in his mouth with some oral holding and then pt swallows. No clinical signs of aspiration noted on several trials but he consistently avoids the po intake. SLP also provide with trials of water by holding straw and placing the liquid in his mouth. He swallowing with less holding with no clinical signs of aspiration. No significant oral residue of puree/crushed pills or water. some material cleared from the lips. No further attempts were made due to confusion/resistance. Recommend re-eval when pt better able to participate, meds via IV when possible otherwise crushed in applesauce in fully upright positioning followed by oral care. Oral swabs for moisture and oral care. Please reorder when pt more alert and cooperative.        Time Calculation:         Time Calculation- SLP     Row Name 07/18/18 1847             Time Calculation- SLP    SLP Start Time 0758  -AD      SLP Stop Time 0815  -AD      SLP Time Calculation (min) 17 min  -AD      Total Timed Code Minutes- SLP 0 minute(s)  -AD      SLP Non-Billable Time (min) 0 min  -AD      TCU Minutes- SLP 0 min  -AD      SLP Received On 07/18/18  -AD        User Key  (r) = Recorded By, (t) = Taken By, (c) = Cosigned By    Initials Name Provider Type    NORY Weathers MS CCC-SLP Speech Therapist          Therapy Charges for Today     Code Description Service Date Service Provider Modifiers Qty    09277885016 HC ST EVAL ORAL PHARYNG SWALLOW 1 7/18/2018 Cammie Weathers MS CCC-SLP GN 1        SLP Discharge Summary  Anticipated Dischage Disposition: unknown    Cammie Weathers MS CCC-SLP  7/18/2018

## 2018-07-19 NOTE — NURSING NOTE
11am patient was transferred to CT for scan. Patient respirations changed to a shallow and rapid rhythm, color was pale/gray.  Simultaneous eye twitching hyperextending neck, muscle jerking to LUE & BLE. Lasting approximately 2-3min VS were WNL during that time  1205 patient involuntarily began hyperextending neck, simultaneous eye twitching muscle jerking to RUE & RLE. Increased O2 to 10L NC without improvement, switched patient to a non breather at 10L. Seizure activity lasting approximately 4min Ativan 1mg IV given.   1234 patient O2 Sat continues in the low 80's. Increased nonrebreather to 15L

## 2018-07-19 NOTE — NURSING NOTE
Continued Stay Note  JOLYNN Thibodeaux     Patient Name: Fahad Muhammad  MRN: [id number]  Today's Date: 7/19/2018    Admit Date: 7/9/2018          Discharge Plan     Row Name 07/19/18 1300       Plan    Plan continue to assess needs    Plan Comments Arrived to patient room for planned family conference. Informed by primary RN that family has already spoken with ARNP about patient condition and plan of care. Spoke with Jacey EDMOND who states she met with family and discussed plan of care and will continue to follow up with patient and family. Met with patients wife, Carlota, and son, Santos, in family conference area. Santos states he has been on the phone with his brother (Ty) and he is about an hour away. Walked with family to patient room- emotional support given, encouraged them to call CM with any needs or concerns and informed them that [facility] will continue to check on patient. Mayra KEARNEY is at bedside and updates family of patient status. Will continue to follow.              Discharge Codes    No documentation.           Ricardo Storey RN

## 2018-07-19 NOTE — SIGNIFICANT NOTE
Called to bedside by CAIO Nunez for patient decline after several attempts to place dobhoff that was directed by patient's wife to go ahead and place. Per her report, patient was seizing with these attempts and oxygen saturations dropped requiring 10 liter oximizer. STAT CXR ordered to evaluate for pneumothorax.   Continues to decline clinically, wife and son at bedside.

## 2018-07-19 NOTE — PLAN OF CARE
Problem: Patient Care Overview  Goal: Plan of Care Review  Outcome: Ongoing (interventions implemented as appropriate)   07/19/18 1656   Coping/Psychosocial   Plan of Care Reviewed With patient;spouse;son   Plan of Care Review   Progress declining   OTHER   Outcome Summary Patient progression declining. CT to r/o bleeed was negative. Patient has had 2 large seizures this morning. Neurologically pt is not responding to verbal or painful stimuli, unable to follow commands. Pt o2 sat has been in the 80's-90's on 15L nonrebreather. Dr. Langston, spouse, and son had a care conference to discuss decline and code status. Family has agreed to mechanical ventilation but no CPR. unsuccessfully attempted twice to place Dobhoff, will reassess condiition tomorrow for placement, will need to be placed under fluro. K 3.1 was given 6 runs of KCL will redraw K @ 2130     07/19/18 1656   Coping/Psychosocial   Plan of Care Reviewed With patient;spouse;son   Plan of Care Review   Progress declining     Goal: Individualization and Mutuality  Outcome: Ongoing (interventions implemented as appropriate)   07/19/18 1656   Individualization   Patient Specific Preferences Dereck     Goal: Discharge Needs Assessment  Outcome: Ongoing (interventions implemented as appropriate)   07/19/18 1656   Discharge Needs Assessment   Readmission Within the Last 30 Days no previous admission in last 30 days   Concerns to be Addressed adjustment to diagnosis/illness;care coordination/care conferences       Problem: Skin Injury Risk (Adult)  Goal: Skin Health and Integrity  Outcome: Ongoing (interventions implemented as appropriate)   07/19/18 1656   Skin Injury Risk (Adult)   Skin Health and Integrity making progress toward outcome       Problem: Fall Risk (Adult)  Goal: Absence of Fall  Outcome: Ongoing (interventions implemented as appropriate)   07/19/18 1656   Fall Risk (Adult)   Absence of Fall making progress toward outcome       Problem: Pain, Acute  (Adult)  Goal: Acceptable Pain Control/Comfort Level  Outcome: Ongoing (interventions implemented as appropriate)   07/19/18 1656   Pain, Acute (Adult)   Acceptable Pain Control/Comfort Level making progress toward outcome       Problem: Wound (Includes Pressure Injury) (Adult)  Goal: Signs and Symptoms of Listed Potential Problems Will be Absent, Minimized or Managed (Wound)  Outcome: Ongoing (interventions implemented as appropriate)   07/19/18 1656   Goal/Outcome Evaluation   Problems Assessed (Wound) all   Problems Present (Wound) delayed wound healing       Problem: Sepsis/Septic Shock (Adult)  Goal: Signs and Symptoms of Listed Potential Problems Will be Absent, Minimized or Managed (Sepsis/Septic Shock)  Outcome: Ongoing (interventions implemented as appropriate)   07/19/18 1656   Goal/Outcome Evaluation   Problems Assessed (Sepsis) all   Problems Present (Sepsis) hypoperfusion/hemodynamic instability;hypoxia/hypoxemia;undernutrition       Problem: Nutrition, Imbalanced: Inadequate Oral Intake (Adult)  Goal: Improved Oral Intake  Outcome: Ongoing (interventions implemented as appropriate)   07/19/18 1656   Nutrition, Imbalanced: Inadequate Oral Intake (Adult)   Improved Oral Intake unable to achieve outcome     Goal: Prevent Further Weight Loss  Outcome: Ongoing (interventions implemented as appropriate)   07/19/18 1656   Nutrition, Imbalanced: Inadequate Oral Intake (Adult)   Prevent Further Weight Loss unable to achieve outcome       Problem: Skin and Soft Tissue Infection (Adult)  Goal: Signs and Symptoms of Listed Potential Problems Will be Absent, Minimized or Managed (Skin and Soft Tissue Infection)  Outcome: Ongoing (interventions implemented as appropriate)   07/19/18 1656   Goal/Outcome Evaluation   Problems Assessed (Skin and Soft Tissue Infection) all   Problems Present (Skin/Soft Tissue Inf) infection progression;pain

## 2018-07-19 NOTE — NURSING NOTE
Unsuccessful dobhoff attempts x2 pt showing signs of distress and decline. md notified will reassess status for insertion

## 2018-07-19 NOTE — NURSING NOTE
Continued Stay Note  JOLYNN Thibodeaux     Patient Name: Fahad Muhammad  MRN: [id number]  Today's Date: 7/19/2018    Admit Date: 7/9/2018          Discharge Plan     Row Name 07/19/18 0928       Plan    Plan continue to assess needs    Plan Comments Spoke with patients wife, Hao, rashad MD request family meeting to discuss plan of care. She will reach out to her sons and plan to be in patient room at 1pm today.  Jacey EDMOND & Mayra KEARNEY updated. Will contnue to follow.              Discharge Codes    No documentation.           Ricardo Storey RN

## 2018-07-19 NOTE — PLAN OF CARE
Called back to room. Wife and son have changed their minds and want all efforts made including intubation. They will decide regarding chest compressions/shocking heart/pressors when other son arrives but for now want all aggressive measures taken. Status changed back to full code.   Notably, patient's oxygen saturation is 98% and oxygen is being weaned down by staff. No need for intubation at this time.

## 2018-07-19 NOTE — PROGRESS NOTES
Adult Nutrition  Assessment/PES    Patient Name:  Fahad Muhammad  YOB: 1935  MRN: [id number]  Admit Date:  7/9/2018    Assessment Date:  7/19/2018    Comments:  Recommend- Clarify if care goals include DHT/PEG for nutrition  Osmolite 1.2 start 15 ml/hr increase 10 ml every 6 hrs to goal 71 ml/hr would provide 1704 ml, 2045 kcal, 94 gm pro, 268 gm CHO, 1397 ml free water, 170% DRI   Free water 27 ml/hr to meet rest of estimated fluid needs      Osmolite 1.2 contains no fiber this is likely most appropriate due to noted divirticulitis during this admission.   Materials management will obtain from [place] once ordered today ( based on current formulary this product not kept in- house)   Will cont to follow and monitor plan for nutrition          Reason for Assessment     Row Name 07/19/18 1128          Reason for Assessment    Reason For Assessment follow-up protocol     Diagnosis other (see comments)   septic emobolis, pending DHT for nutrition, bacteremia/encephalopathy             Nutrition/Diet History     Row Name 07/19/18 1129          Nutrition/Diet History    Typical Food/Fluid Intake Pt receiving care in ICU. Noted family meeting plan for today Re: care goals/nutrition support etc. Pt with steady decline per staff over past several days.              Anthropometrics     Row Name 07/19/18 1130 07/19/18 0606       Anthropometrics    Weight --   86.7 kg  86.7 kg (191 lb 3.2 oz)       Body Mass Index (BMI)    BMI Assessment BMI 18.5-24.9: normal  --            Labs/Tests/Procedures/Meds     Row Name 07/19/18 1130          Labs/Procedures/Meds    Lab Results Reviewed reviewed     Lab Results Comments Na 129 L , K 3.1 L, Mg 2.1 wnl        Diagnostic Tests/Procedures    Diagnostic Test/Procedure Reviewed reviewed     Diagnostic Test/Procedures Comments MRI brain noted        Medications    Pertinent Medications Reviewed reviewed                 Nutrition Prescription Ordered     Row Name 07/19/18  1131          Nutrition Prescription PO    Current PO Diet Soft Texture     Texture Ground        Nutrition Prescription EN    Product Osmolite 1 aruna     TF Delivery Method Continuous     Water flush (mL)  20 mL        Nutrition Prescription PN    PN Goal Rate (mL/hr) 70 mL/hr             Evaluation of Received Nutrient/Fluid Intake     Row Name 07/19/18 1140          Fluid Intake Evaluation    Oral Fluid (mL) --   no data x 3 days        PO Evaluation    Number of Days PO Intake Evaluated Insufficient Data             Problem/Interventions:        Problem 1     Row Name 07/19/18 1141          Nutrition Diagnoses Problem 1    Problem 1 Malnutrition     Etiology (related to) Factors Affecting Nutrition     Signs/Symptoms (evidenced by) Unintended Weight Change;Report/Observation     Unintended Weight Change Loss     Number of Pounds Lost 35.6 #     Weight loss time period 3-4 months                     Intervention Goal     Row Name 07/19/18 1141          Intervention Goal    PO Initiate feeding     Weight No significant weight loss             Nutrition Intervention     Row Name 07/19/18 1142          Nutrition Intervention    RD/Tech Action Follow Tx progress               Education/Evaluation     Row Name 07/19/18 1142          Education    Education Education not appropriate at this time        Monitor/Evaluation    Monitor Per protocol;I&O;Pertinent labs;Skin status;Symptoms         Electronically signed by:  Keri Cleaning RD  07/19/18 11:42 AM

## 2018-07-19 NOTE — PROGRESS NOTES
"SERVICE: [facility] HOSPITALIST    CONSULTANTS: ID/Neuro/Cardiology/dietician/SLP/    CHIEF COMPLAINT: f/u embolic stroke/diskitis/endocarditis/diverticulitis/sacral wound    SUBJECTIVE: The patient is no longer moving his right side at all and has right sided neglect as well. He is non-conversant, unable to swallow. Clinically stable per staff without acute issues with HR/agitation/pain concerns overnight. Condition continues to deteriorate despite all aggressive efforts.     OBJECTIVE:    /63   Pulse 84   Temp 98.4 °F (36.9 °C)   Resp 18   Ht 190.5 cm (75\")   Wt 86.7 kg (191 lb 3.2 oz)   SpO2 94%   BMI 23.90 kg/m²     MEDS/LABS REVIEWED AND ORDERED    ampicillin 2 g Intravenous Q4H   aspirin 150 mg Rectal Daily   dextrose 5 % and sodium chloride 0.9 %      diclofenac 4 g Topical 4x Daily   digoxin 125 mcg Intravenous Daily   diltiaZEM 20 mg Intravenous Q6H   enoxaparin 1 mg/kg Subcutaneous Q12H   levETIRAcetam 1,000 mg Intravenous Q12H   lidocaine 2 patch Transdermal Q24H   Methocarbamol 1,000 mg Intravenous Q8H   metoprolol tartrate 5 mg Intravenous Q8H   potassium chloride 20 mEq Oral Daily     Physical Exam   Constitutional: He appears well-developed.   Pale, non-conversant   HENT:   Head: Normocephalic and atraumatic.   Edentulous, left sided facial droop   Cardiovascular: Normal rate.    Irregular rhythm   Pulmonary/Chest: Effort normal and breath sounds normal.   Abdominal: Soft. Bowel sounds are normal. He exhibits no distension. There is no tenderness. There is no guarding.   Musculoskeletal: He exhibits no edema.   Neurological:   Awake, makes eye contact to left side, attempts to squeeze hand on left with fine jerking movements noted LUE/neck. He is non-verbal, unable to answer questions. No movement or reflexes noted on right side   Skin: Skin is warm and dry.   Vitals reviewed.    LAB/DIAGNOSTICS:    Lab Results (last 24 hours)     Procedure Component Value Units " Date/Time    POC Glucose Once [[id number]]  (Normal) Collected:  07/19/18 0648    Specimen:  Blood Updated:  07/19/18 0702     Glucose 80 mg/dL     Narrative:       Meter: [id number] : [id number] Shady Lee RN    Blood Culture - Blood, [[id number]]  (Normal) Collected:  07/16/18 0425    Specimen:  Blood from Arm, Left Updated:  07/19/18 0645     Blood Culture No growth at 3 days    Sedimentation Rate [[id number]]  (Abnormal) Collected:  07/19/18 0531    Specimen:  Blood Updated:  07/19/18 0637     Sed Rate 25 (H) mm/hr     Basic Metabolic Panel [[id number]]  (Abnormal) Collected:  07/19/18 0531    Specimen:  Blood Updated:  07/19/18 0634     Glucose 65 mg/dL      BUN 23 mg/dL      Creatinine 0.72 (L) mg/dL      Sodium 129 (L) mmol/L      Potassium 3.1 (L) mmol/L      Chloride 96 (L) mmol/L      CO2 20.9 (L) mmol/L      Calcium 6.9 (L) mg/dL      eGFR Non African Amer 105 mL/min/1.73      BUN/Creatinine Ratio 31.9 (H)     Anion Gap 12.1 mmol/L     Narrative:       The MDRD GFR formula is only valid for adults with stable renal function between ages 18 and 70.    C-reactive Protein [[id number]]  (Abnormal) Collected:  07/19/18 0531    Specimen:  Blood Updated:  07/19/18 0622     C-Reactive Protein 4.13 (H) mg/dL     CBC & Differential [[id number]] Collected:  07/19/18 0531    Specimen:  Blood Updated:  07/19/18 0615    Narrative:       The following orders were created for panel order CBC & Differential.  Procedure                               Abnormality         Status                     ---------                               -----------         ------                     CBC Auto Differential[[id number]]        Abnormal            Final result                 Please view results for these tests on the individual orders.    CBC Auto Differential [[id number]]  (Abnormal) Collected:  07/19/18 0531    Specimen:  Blood Updated:  07/19/18 0615     WBC 9.22 10*3/mm3      RBC 3.24 (L) 10*6/mm3      Hemoglobin 7.8 (L)  g/dL      Hematocrit 24.4 (L) %      MCV 75.3 (L) fL      MCH 24.1 (L) pg      MCHC 32.0 g/dL      RDW 18.6 (H) %      RDW-SD 50.4 fl      MPV 9.5 fL      Platelets 484 10*3/mm3      Neutrophil % 73.5 (H) %      Lymphocyte % 16.2 (L) %      Monocyte % 9.0 (H) %      Eosinophil % 0.8 %      Basophil % 0.2 %      Immature Grans % 0.3 %      Neutrophils, Absolute 6.78 10*3/mm3      Lymphocytes, Absolute 1.49 10*3/mm3      Monocytes, Absolute 0.83 10*3/mm3      Eosinophils, Absolute 0.07 (L) 10*3/mm3      Basophils, Absolute 0.02 10*3/mm3      Immature Grans, Absolute 0.03 10*3/mm3      nRBC 0.0 /100 WBC     POC Glucose Once [[id number]]  (Normal) Collected:  07/19/18 0017    Specimen:  Blood Updated:  07/19/18 0023     Glucose 84 mg/dL     Narrative:       Meter: [id number] : [id number] Pope Maycol ROSARIO    POC Glucose Once [[id number]]  (Abnormal) Collected:  07/18/18 2111    Specimen:  Blood Updated:  07/18/18 2127     Glucose 136 (H) mg/dL     Narrative:       Meter: [id number] : [id number] Shady Lee RN    POC Glucose Once [[id number]]  (Abnormal) Collected:  07/18/18 2011    Specimen:  Blood Updated:  07/18/18 2018     Glucose 67 (L) mg/dL     Narrative:       Meter: [id number] : [id number] Shady Lee RN    Blood Culture - Blood, [[id number]]  (Normal) Collected:  07/15/18 1910    Specimen:  Blood from Arm, Right Updated:  07/18/18 1915     Blood Culture No growth at 3 days    Potassium [[id number]]  (Normal) Collected:  07/18/18 1754    Specimen:  Blood Updated:  07/18/18 1834     Potassium 4.4 mmol/L         ECG 12 Lead   Final Result   RR Interval= 667 ms   IN Interval= 148 ms   QRSD Interval= 140 ms   QT Interval= 412 ms   QTc Interval= 504 ms   Heart Rate= 90 ms   P Axis= 48 deg   QRS Axis= 14 deg   T Wave Axis= -8 deg   I: 40 Axis= 15 deg   T: 40 Axis= deg   ST Axis= -58 deg   ATRIAL FIBRILLATION - new   LEFT ATRIAL ABNORMALITY   RIGHT BUNDLE BRANCH BLOCK   Electronically Signed by:  Jose  Ry ([facility] 14-Jul-2018 16:38:55   Date and Time of Study: 2018-07-14 08:54:00      ECG 12 Lead   Final Result   RR Interval= 380 ms   RI Interval= ms   QRSD Interval= 92 ms   QT Interval= 296 ms   QTc Interval= 480 ms   Heart Rate= 158 ms   P Axis= 0 deg   QRS Axis= 17 deg   T Wave Axis= 15 deg   I: 40 Axis= -4 deg   T: 40 Axis= 47 deg   ST Axis= 228 deg   SUPRAVENTRICULAR TACHYCARDIA - new   VENTRICULAR BIGEMINY   BORDERLINE LOW VOLTAGE IN FRONTAL LEADS   REPOLARIZATION ABNORMALITY, PROB RATE RELATED   Electronically Signed by:  Ry Garcia ([facility] 14-Jul-2018 16:38:26   Date and Time of Study: 2018-07-14 08:42:38      ECG 12 Lead   Final Result   RR Interval= 373 ms   RI Interval= ms   QRSD Interval= 86 ms   QT Interval= 316 ms   QTc Interval= 517 ms   Heart Rate= 161 ms   P Axis= 0 deg   QRS Axis= 31 deg   T Wave Axis= 53 deg   I: 40 Axis= 43 deg   T: 40 Axis= deg   ST Axis= 198 deg   SUPRAVENTRICULAR TACHYCARDIA   VENTRICULAR BIGEMINY   BORDERLINE LOW VOLTAGE IN FRONTAL LEADS   ST DEPRESSION, PROBABLY RATE RELATED   pqt repeat   Electronically Signed by:  Rocky BriceñoYAQUELIN) ([facility]) 13-Jul-2018    13:10:52   Date and Time of Study: 2018-07-13 02:28:25      ECG 12 Lead   Final Result   RR Interval= 373 ms   RI Interval= ms   QRSD Interval= 94 ms   QT Interval= 308 ms   QTc Interval= 504 ms   Heart Rate= 161 ms   P Axis= 0 deg   QRS Axis= 43 deg   T Wave Axis= 69 deg   I: 40 Axis= 34 deg   T: 40 Axis= 185 deg   ST Axis= 160 deg   SUPRAVENTRICULAR TACHYCARDIA   VENTRICULAR BIGEMINY   ABERRANT COMPLEX   BORDERLINE LOW VOLTAGE IN FRONTAL LEADS   REPOLARIZATION ABNORMALITY, PROB RATE RELATED   pqt repeat   Electronically Signed by:  Rocky BriceñoYAQUELIN) ([facility]) 13-Jul-2018    13:09:50   Date and Time of Study: 2018-07-12 23:36:29      ECG 12 Lead   Final Result   RR Interval= 619 ms   RI Interval= 180 ms   QRSD Interval= 102 ms   QT Interval= 360 ms   QTc Interval= 458 ms   Heart Rate= 97 ms   P Axis= 46 deg   QRS  Axis= 41 deg   T Wave Axis= 26 deg   I: 40 Axis= 29 deg   T: 40 Axis= 120 deg   ST Axis= 0 deg   SINUS RHYTHM   PROBABLE LEFT ATRIAL ABNORMALITY   LOW VOLTAGE IN FRONTAL LEADS   atypical rbbb no major change   Electronically Signed by:  Rocky BriceñoYAQUELIN) ([facility]) 13-Jul-2018    13:10:16   Date and Time of Study: 2018-07-13 05:41:26      ECG 12 Lead   Final Result   RR Interval= 405 ms   VT Interval= ms   QRSD Interval= 94 ms   QT Interval= 304 ms   QTc Interval= 478 ms   Heart Rate= 148 ms   P Axis= deg   QRS Axis= 36 deg   T Wave Axis= 72 deg   I: 40 Axis= 28 deg   T: 40 Axis= 47 deg   ST Axis= 178 deg   JUNCTIONAL TACHYCARDIA   LOW VOLTAGE IN FRONTAL LEADS   REPOLARIZATION ABNORMALITY, PROB RATE RELATED   BORDERLINE PROLONGED QT INTERVAL   NO PRIOR TRACING AVAILABLE FOR COMPARISON   Electronically Signed by:  Gonzalo Collins ([facility]) 12-Jul-2018 20:20:06   Date and Time of Study: 2018-07-12 11:32:45      ECG 12 Lead   Final Result   RR Interval= 594 ms   VT Interval= 168 ms   QRSD Interval= 120 ms   QT Interval= 368 ms   QTc Interval= 477 ms   Heart Rate= 101 ms   P Axis= 16 deg   QRS Axis= 19 deg   T Wave Axis= 3 deg   I: 40 Axis= 5 deg   T: 40 Axis= 113 deg   ST Axis= -14 deg   SINUS TACHYCARDIA   IVCD, CONSIDER ATYPICAL RBBB   CONSIDER ANTERIOR INFARCT   NO SIGNIFICANT CHANGE FROM PREVIOUS ECG   Electronically Signed by:  Gonzalo Collins ([facility]) 12-Jul-2018 20:19:43   Date and Time of Study: 2018-07-12 12:15:46      ECG 12 Lead   Final Result   RR Interval= 571 ms   VT Interval= ms   QRSD Interval= 140 ms   QT Interval= 384 ms   QTc Interval= 508 ms   Heart Rate= 105 ms   P Axis= deg   QRS Axis= -25 deg   T Wave Axis= -26 deg   I: 40 Axis= 13 deg   T: 40 Axis= 141 deg   ST Axis= -22 deg   ATRIAL FLUTTER, A-RATE 258   RIGHT BUNDLE BRANCH BLOCK   NO SIGNIFICANT CHANGE FROM PREVIOUS ECG   Electronically Signed by:  Naeem Laughlin ([facility]) 10-Jul-2018 15:30:48   Date and Time of Study: 2018-07-10 07:21:18        Results  for orders placed during the hospital encounter of 07/09/18   Adult Transthoracic Echo Limited W/ Cont if Necessary Per Protocol    Narrative · There is calcification of the aortic valve.  · Moderate aortic valve regurgitation is present.  · Moderate-to-severe mitral valve regurgitation is present  · Mild tricuspid valve regurgitation is present.  · Left atrial cavity size is dilated.  · Left ventricular systolic function is normal.        Mri Brain With & Without Contrast    Result Date: 7/18/2018  BRAIN WITHOUT CONTRAST:  Study is moderately motion degraded.  There is faint T2 signal and restricted diffusion, predominately centered in the posterior right temporal and parietal region.  This area shows some petechial hemorrhage.  There is also a small area of petechial hemorrhage in the posterior right frontal lobe and high posterior left frontoparietal junction.  No associated mass effect.  No hydrocephalus or extra-axial fluid collection.  There is no evidence for an acute or early subacute large territory infarct.  When allowing for motion, major intracranial flow voids appear to remain intact.  Paranasal sinuses and the mastoid air cells are clear.  BRAIN WITH CONTRAST:  The areas of suspected late subacute infarct do show some reactive enhancement after the administration of contrast.  IMPRESSION:  1.  Areas of late subacute or early chronic infarct as detailed above, largest area is in the posterior right temporoparietal region.  There is some evidence for petechial hemorrhage and reactive enhancement. 2.  No significant mass effect.  3.  No evidence for an acute or early subacute large territory infarct. Findings were discussed with Dr. Trejo at the time of this dictation.      Xr Chest 1 View    Result Date: 7/17/2018  PICC in good position.  This report was finalized on 7/17/2018 11:47 AM by Dr. Jefferson Bowen MD.      ASSESSMENT/PLAN:  1. Sepsis secondary to Enterococcus and Pan-sensitive E-coli  Bacteremia secondary to acute diskitis/acute endocarditis/acute mild diverticulitis/sacral wound with embolic stroke: GI/ID/Cardiology/neurology following     Obvious signs of stroke today with lack of right sided movement  Lab improvement, clinical deterioration  Case conference requested to discuss current situation with family/goals of care    -Talked to Neurosurgery (Paola, NP for Dr. Haskins who reviewed MRIs) at [facility], not a surgical candidate at this time secondary to Xarelto, last dose 7/15/18 at 1811 pm and must be off x 5 days prior to any surgery.   -For this reason he was changed to therapeutic dose Lovenox for easy reversibility until no surgical intervention potential.   -Per neurosurgery, the only indication for surgery would be failing antibiotic therapy   -Lab improvement and afebrile would not require surgery at this time  -BC done 7/16/18 =NG x 3 days   -[facility] normal today at 9.22, platelets normal at 484,000, procalcitonin negative  -IV robaxin (patient notes this is only medication that has helped, he is currently not taking po safely)  -Ampicillin per Dr. Cantu    2. Acute stroke syndrome secondary to late subacute/early chronic right temporal/parietal/h/o seizures and stroke:  3. Acute metabolic encephalopathy:   Secondary to septic emboli/subacute cerebral infarcts  Limit narcotics, continue robaxin  IV haldol per Dr. Yap for agitation  Change aspirin to rectal then via dobhoff once tolerating feeds  Increase Keppra to 1000 mg twice daily for jerking movements noted LUE  HOB elevated/seizure precautions/aspiration precautions    4. Acute dysphagia secondary to above:   5. Hyponatremia, possible cerebral salt wasting/electrolyte imbalance: sodium down to 129  Change fluids to D5NS at 75 ml/hr  Remains on electrolyte replacement protocol as needed  Placing dobhoff today after discussion with family, adjust to labs  May need to consider fludrocortisone??, monitor closely     6. Urinary retention:  unger in place, strict I&O, monitor    7. Chest pain r/o ACS: resolved  8. Chronic Afib and flutter w/intermittent RVR/SVT:   9. CAD/Dyslipidemia/h/o CABG: cardiology following  No further SVT in several days  Continues diltiazem, metoprolol, digoxin  TSH normal  Continue therapeutic dose lovenox as above  Changed meds to IV   Monitor      10. Chronic iron deficiency anemia/thrombocytosis:   Hemoglobin lower at 7.8, no active blood loss noted  Platelets back to normal  Monitor closely      11. HTN: BP at goal on IV: metoprolol tartrate 5 mg every 8 hours/diltiazem 20 mg every 6 hours  Off bumex      12. CKD stage 2-3:   Creatinine baseline 1.0, currently 0.72  Continue to HOLD bumex     13. Lower extremity edema: resolved, currently no edema     14. Cholelithiasis/right sided abdominal pain:   Staff notes patient c/o right abdominal pain, none on my exam  stones are large, no CBD dilatation, GB non-distended, doubtful acute concern     15. Ascending thoracic aortic aneurysm: unchanged by CT, no acute issues here    Plan: case conference with family at 1 pm today to establish goals of care

## 2018-07-19 NOTE — PLAN OF CARE
Meeting held with myself, Mayra Thapa, RN, wife, son regarding goals of care. Full explanation of current status, care to date, complicating factors discussed, questions answered. Status changed to DNR per wife direction per discussions she has had with patient previously. We will continue to aggressively treat his infection and manage all care concerns as able without ventilation/CPR.

## 2018-07-19 NOTE — PROGRESS NOTES
LOS: 10 days   Patient Care Team:  Elisabeth Warren MD as PCP - General (Internal Medicine)  Alexsander Matute MD as Consulting Physician (Neurology)  MD Ry Goff III, MD as Consulting Physician (Cardiology)    Chief Complaint: following for SBE     Interval History:  Confused/encephalopathic.      Objective   Vital Signs  Temp:  [97.5 °F (36.4 °C)-98.7 °F (37.1 °C)] 98.4 °F (36.9 °C)  Heart Rate:  [66-82] 68  Resp:  [15-22] 16  BP: (103-120)/(46-64) 110/58    Intake/Output Summary (Last 24 hours) at 07/19/18 0735  Last data filed at 07/19/18 0606   Gross per 24 hour   Intake             3310 ml   Output              500 ml   Net             2810 ml           Physical Exam   Constitutional: He appears lethargic. He has a sickly appearance.   HENT:   Head: Normocephalic.   Nose: Nose normal.   Eyes: Conjunctivae are normal.   Neck: No JVD present.   Cardiovascular: Normal rate.  An irregularly irregular rhythm present.   Murmur heard.   Systolic murmur is present with a grade of 2/6   Pulmonary/Chest: Effort normal.   Abdominal: Soft.   Musculoskeletal: He exhibits edema (doughy edema with varicosities).   Neurological: He appears lethargic. He is disoriented. He displays tremor.   Skin: Skin is warm and dry. No erythema.   Vitals reviewed.      Results Review:        Results from last 7 days  Lab Units 07/19/18  0531 07/18/18  1754 07/18/18  0426 07/17/18  0722   SODIUM mmol/L 129*  --  141 137   POTASSIUM mmol/L 3.1* 4.4 3.5 3.5   CHLORIDE mmol/L 96*  --  98 97*   CO2 mmol/L 20.9*  --  30.0* 29.6*   BUN mg/dL 23  --  26* 23   CREATININE mg/dL 0.72*  --  1.04 1.14   GLUCOSE mg/dL 65  --  66 87   CALCIUM mg/dL 6.9*  --  8.6* 8.7*       Results from last 7 days  Lab Units 07/14/18  0447 07/13/18  1259 07/13/18  0551   TROPONIN T ng/mL 0.107* 0.135* 0.073*       Results from last 7 days  Lab Units 07/19/18  0531 07/18/18  0426 07/17/18  0722   WBC 10*3/mm3 9.22 11.24* 14.43*    HEMOGLOBIN g/dL 7.8* 8.8* 9.3*   HEMATOCRIT % 24.4* 28.1* 28.5*   PLATELETS 10*3/mm3 484 573* 562*               Results from last 7 days  Lab Units 07/18/18  0426   MAGNESIUM mg/dL 2.1           I reviewed the patient's new clinical results.  I personally viewed and interpreted the patient's EKG/Telemetry data        Medication Review:     ampicillin 2 g Intravenous Q4H   aspirin 81 mg Oral Daily   diclofenac 4 g Topical 4x Daily   digoxin 125 mcg Intravenous Daily   diltiaZEM 20 mg Intravenous Q6H   enoxaparin 1 mg/kg Subcutaneous Q12H   levETIRAcetam 1,000 mg Intravenous Q12H   lidocaine 2 patch Transdermal Q24H   Methocarbamol 1,000 mg Intravenous Q8H   metoprolol tartrate 5 mg Intravenous Q8H   potassium chloride 20 mEq Oral Daily         dextrose 5 % and sodium chloride 0.9 % 75 mL/hr   Pharmacy to Dose enoxaparin (LOVENOX)        Assessment/Plan     1.  PSVT/PAF/atrial flutter - in atrial fibrillation at present but rate controlled; continue BB/digoxin/diltiazem.  Digoxin is 0.55. He's on enoxaparin instead of rivaroxaban for potential surgery for discitis  If necessary  2.  Elevated troponin - known history of CAD, not a candidate for ischemic workup, continue medical therapy  3.  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  4.  Acute diverticulitis - per primary team.   5.  Altered mental status - still extremely encephalopathic  6. CVA- late subacute infarct  7. Anemia- worse  8. Hypokalemia- replete      Ry Garcia III, MD  07/19/18  7:35 AM

## 2018-07-19 NOTE — PROGRESS NOTES
LOS: 9 days   Patient Care Team:  Elisabeth Warren MD as PCP - General (Internal Medicine)  Alexsander Matute MD as Consulting Physician (Neurology)  MD Ry Goff III, MD as Consulting Physician (Cardiology)        Subjective     Interval History:     Patient Complaints:  Confused ICU  Patient Denies:  NV Chills       Review of Systems:    10 point ROS done    Objective     Vital Signs  Temp:  [97.1 °F (36.2 °C)-98.7 °F (37.1 °C)] 97.7 °F (36.5 °C)  Heart Rate:  [67-94] 67  Resp:  [18-22] 22  BP: ()/(43-67) 120/64    Physical Exam:     General Appearance:    Alert, cconfused   Head:    Normocephalic, without obvious abnormality, atraumatic   Eyes:            Lids and lashes normal, conjunctivae and sclerae normal, no   icterus, no pallor, corneas clear, PERRLA   Ears:    Ears appear intact with no abnormalities noted   Throat:   No oral lesions, no thrush, oral mucosa moist   Neck:   No adenopathy, supple, trachea midline, no thyromegaly, no   carotid bruit, no JVD   Back:     No kyphosis present, no scoliosis present, no skin lesions,      erythema or scars, no tenderness to percussion or                   palpation,   range of motion normal   Lungs:     Clear to auscultation,respirations regular, even and                  unlabored    Heart:    Regular rhythm and normal rate, normal S1 and S2, no            murmur, no gallop, no rub, no click   Chest Wall:    No abnormalities observed   Abdomen:     Normal bowel sounds, no masses, no organomegaly, soft        non-tender, non-distended, no guarding, no rebound                tenderness   Rectal:     Deferred   Extremities:   Moves all extremities well, no edema, no cyanosis, no             redness   Pulses:   Pulses palpable and equal bilaterally   Skin:   No bleeding, bruising or rash   Lymph nodes:   No palpable adenopathy   Neurologic:   Cranial nerves 2 - 12 grossly intact, sensation intact, DTR       present and equal  bilaterally          Results Review:      Lab Results (last 72 hours)     Procedure Component Value Units Date/Time    Hemoglobin A1c [[id number]] Collected:  07/13/18 0225    Specimen:  Blood Updated:  07/13/18 0851    Blood Culture - Blood, [[id number]]  (Abnormal) Collected:  07/11/18 1008    Specimen:  Blood from Hand, Right Updated:  07/13/18 0702     Blood Culture Enterococcus faecalis (A)     Gram Stain Result Aerobic Bottle Gram positive cocci in chains    Blood Culture - Blood, [[id number]]  (Abnormal) Collected:  07/11/18 1009    Specimen:  Blood from Hand, Left Updated:  07/13/18 0653     Blood Culture Enterococcus faecalis (A)     Gram Stain Result Anaerobic Bottle Gram positive cocci in chains      Aerobic Bottle Gram positive cocci in chains    Blood Culture - Blood, Arm, Left [[id number]]  (Abnormal) Collected:  07/10/18 1130    Specimen:  Blood from Arm, Left Updated:  07/13/18 0653     Blood Culture Enterococcus faecalis (A)     Gram Stain Result Aerobic Bottle Gram positive cocci in chains      Aerobic Bottle Gram negative bacilli    Blood Culture - Blood, Arm, Left [[id number]]  (Abnormal)  (Susceptibility) Collected:  07/10/18 1200    Specimen:  Blood from Arm, Left Updated:  07/13/18 0650     Blood Culture Escherichia coli (A)      Enterococcus faecalis (A)     Gram Stain Result Aerobic Bottle Gram positive cocci in chains    Susceptibility      Escherichia coli     CRUZ     Ampicillin 4 ug/ml Susceptible     Ampicillin + Sulbactam <=2 ug/ml Susceptible     Cefazolin <=4 ug/ml Susceptible     Cefepime <=1 ug/ml Susceptible     Cefoxitin <=4 ug/ml Susceptible     Ceftriaxone <=1 ug/ml Susceptible     Ertapenem <=0.5 ug/ml Susceptible     Gentamicin <=1 ug/ml Susceptible     Levofloxacin <=0.12 ug/ml Susceptible     Meropenem <=0.25 ug/ml Susceptible     Piperacillin + Tazobactam <=4 ug/ml Susceptible     Trimethoprim + Sulfamethoxazole <=20 ug/ml Susceptible                    Troponin [[id number]]   (Abnormal) Collected:  07/13/18 0551    Specimen:  Blood Updated:  07/13/18 0624     Troponin T 0.073 (H) ng/mL     Narrative:       Troponin T Reference Ranges:  Less than 0.03 ng/mL:    Negative for AMI  0.03 to 0.09 ng/mL:      Indeterminant for AMI  Greater than 0.09 ng/mL: Positive for AMI    Troponin [[id number]]  (Normal) Collected:  07/13/18 0225    Specimen:  Blood Updated:  07/13/18 0245     Troponin T 0.028 ng/mL     Narrative:       Troponin T Reference Ranges:  Less than 0.03 ng/mL:    Negative for AMI  0.03 to 0.09 ng/mL:      Indeterminant for AMI  Greater than 0.09 ng/mL: Positive for AMI    Basic Metabolic Panel [[id number]]  (Abnormal) Collected:  07/13/18 0225    Specimen:  Blood Updated:  07/13/18 0244     Glucose 116 (H) mg/dL      BUN 20 mg/dL      Creatinine 0.99 mg/dL      Sodium 134 (L) mmol/L      Potassium 4.0 mmol/L      Chloride 97 (L) mmol/L      CO2 25.0 mmol/L      Calcium 9.2 mg/dL      eGFR Non African Amer 73 mL/min/1.73      BUN/Creatinine Ratio 20.2     Anion Gap 12.0 mmol/L     Narrative:       The MDRD GFR formula is only valid for adults with stable renal function between ages 18 and 70.    CBC & Differential [[id number]] Collected:  07/13/18 0225    Specimen:  Blood Updated:  07/13/18 0234    Narrative:       The following orders were created for panel order CBC & Differential.  Procedure                               Abnormality         Status                     ---------                               -----------         ------                     Scan Slide[[id number]]                                                                  CBC Auto Differential[[id number]]        Abnormal            Final result                 Please view results for these tests on the individual orders.    CBC Auto Differential [[id number]]  (Abnormal) Collected:  07/13/18 0225    Specimen:  Blood Updated:  07/13/18 0229     WBC 18.33 (H) 10*3/mm3      RBC 3.67 (L) 10*6/mm3      Hemoglobin 9.0 (L)  g/dL      Hematocrit 26.9 (L) %      MCV 73.3 (L) fL      MCH 24.5 (L) pg      MCHC 33.5 g/dL      RDW 18.0 (H) %      RDW-SD 47.5 fl      MPV 9.1 fL      Platelets 561 (H) 10*3/mm3      Neutrophil % 75.2 (H) %      Lymphocyte % 15.3 (L) %      Monocyte % 8.7 (H) %      Eosinophil % 0.2 %      Basophil % 0.2 %      Immature Grans % 0.4 %      Neutrophils, Absolute 13.78 (H) 10*3/mm3      Lymphocytes, Absolute 2.80 10*3/mm3      Monocytes, Absolute 1.60 (H) 10*3/mm3      Eosinophils, Absolute 0.04 (L) 10*3/mm3      Basophils, Absolute 0.03 10*3/mm3      Immature Grans, Absolute 0.08 (H) 10*3/mm3      nRBC 0.0 /100 WBC     Troponin [[id number]]  (Normal) Collected:  07/12/18 2336    Specimen:  Blood Updated:  07/12/18 2358     Troponin T <0.010 ng/mL     Narrative:       Troponin T Reference Ranges:  Less than 0.03 ng/mL:    Negative for AMI  0.03 to 0.09 ng/mL:      Indeterminant for AMI  Greater than 0.09 ng/mL: Positive for AMI    Vancomycin, Random [[id number]]  (Normal) Collected:  07/12/18 2209    Specimen:  Blood Updated:  07/12/18 2241     Vancomycin Random 25.60 mcg/mL     Gentamicin Level, Trough [[id number]]  (Normal) Collected:  07/12/18 1800    Specimen:  Blood Updated:  07/12/18 2120     Gentamicin Trough 0.60 mcg/mL     Potassium [[id number]]  (Normal) Collected:  07/11/18 2014    Specimen:  Blood Updated:  07/11/18 2034     Potassium 4.7 mmol/L     Blood Culture ID, PCR - Blood, [[id number]]  (Abnormal) Collected:  07/10/18 1130    Specimen:  Blood from Arm, Left Updated:  07/11/18 1109     BCID, PCR Enterococcus spp. Identification by BCID PCR. (C)     BCID, PCR 2 Escherichia coli. Identification by BCID PCR. (C)    Basic Metabolic Panel [[id number]]  (Abnormal) Collected:  07/11/18 0409    Specimen:  Blood Updated:  07/11/18 0521     Glucose 107 (H) mg/dL      BUN 22 mg/dL      Creatinine 1.03 mg/dL      Sodium 132 (L) mmol/L      Potassium 3.6 mmol/L      Chloride 95 (L) mmol/L      CO2 29.8 (H) mmol/L       Calcium 9.0 mg/dL      eGFR Non African Amer 69 mL/min/1.73      BUN/Creatinine Ratio 21.4     Anion Gap 7.2 mmol/L     Narrative:       The MDRD GFR formula is only valid for adults with stable renal function between ages 18 and 70.    CBC & Differential [[id number]] Collected:  07/11/18 0409    Specimen:  Blood Updated:  07/11/18 0505    Narrative:       The following orders were created for panel order CBC & Differential.  Procedure                               Abnormality         Status                     ---------                               -----------         ------                     Scan Slide[[id number]]                                                                  CBC Auto Differential[[id number]]        Abnormal            Final result                 Please view results for these tests on the individual orders.    CBC Auto Differential [[id number]]  (Abnormal) Collected:  07/11/18 0409    Specimen:  Blood Updated:  07/11/18 0504     WBC 19.67 (H) 10*3/mm3      RBC 3.71 (L) 10*6/mm3      Hemoglobin 9.0 (L) g/dL      Hematocrit 27.7 (L) %      MCV 74.7 (L) fL      MCH 24.3 (L) pg      MCHC 32.5 g/dL      RDW 18.2 (H) %      RDW-SD 49.3 fl      MPV 9.2 fL      Platelets 539 (H) 10*3/mm3      Neutrophil % 75.4 (H) %      Lymphocyte % 13.0 (L) %      Monocyte % 10.6 (H) %      Eosinophil % 0.2 %      Basophil % 0.2 %      Immature Grans % 0.6 (H) %      Neutrophils, Absolute 14.87 (H) 10*3/mm3      Lymphocytes, Absolute 2.55 10*3/mm3      Monocytes, Absolute 2.08 (H) 10*3/mm3      Eosinophils, Absolute 0.03 (L) 10*3/mm3      Basophils, Absolute 0.03 10*3/mm3      Immature Grans, Absolute 0.11 (H) 10*3/mm3      nRBC 0.0 /100 WBC     Urinalysis, Microscopic Only - Urine, Clean Catch [[id number]]  (Abnormal) Collected:  07/10/18 1649    Specimen:  Urine from Urine, Clean Catch Updated:  07/10/18 4738     RBC, UA 13-20 (A) /HPF      WBC, UA 0-2 (A) /HPF      Bacteria, UA None Seen /HPF      Squamous  Epithelial Cells, UA 0-2 /HPF      Hyaline Casts, UA 0-2 /LPF      Methodology Manual Light Microscopy    Urinalysis With Culture If Indicated - Urine, Clean Catch [[id number]]  (Abnormal) Collected:  07/10/18 1649    Specimen:  Urine from Urine, Clean Catch Updated:  07/10/18 1743     Color, UA Dark Yellow (A)     Appearance, UA Clear     pH, UA <=5.0     Specific Gravity, UA 1.025     Comment: Result obtained by Refractometer        Glucose, UA Negative     Ketones, UA Trace (A)     Bilirubin, UA Small (1+) (A)     Blood, UA Moderate (2+) (A)     Protein, UA Negative     Leuk Esterase, UA Negative     Nitrite, UA Negative     Urobilinogen, UA 1.0 E.U./dL    Fungus Culture, Blood - Blood, Arm, Left [[id number]] Collected:  07/10/18 1130    Specimen:  Blood from Arm, Left Updated:  07/10/18 1735    Fungus Culture, Blood - Blood, Arm, Left [[id number]] Collected:  07/10/18 1200    Specimen:  Blood from Hand, Left Updated:  07/10/18 1735    TSH [[id number]]  (Normal) Collected:  07/10/18 0411    Specimen:  Blood Updated:  07/10/18 1238     TSH 0.814 mIU/mL     Procalcitonin [[id number]]  (Normal) Collected:  07/10/18 0411    Specimen:  Blood Updated:  07/10/18 1207     Procalcitonin 0.15 ng/mL     Narrative:       As a Marker for Sepsis (Non-Neonates):   1. <0.5 ng/mL represents a low risk of severe sepsis and/or septic shock.  2. >2 ng/mL represents a high risk of severe sepsis and/or septic shock.    As a Marker for Lower Respiratory Tract Infections that require antibiotic therapy:    PCT on Admission     Antibiotic Therapy       6-12 Hrs later  > 0.5                Strongly Recommended             >0.25 - <0.5         Recommended  0.1 - 0.25           Discouraged              Remeasure/reassess PCT  <0.1                 Strongly Discouraged     Remeasure/reassess PCT                     PCT values of < 0.5 ng/mL do not exclude an infection, because localized infections (without systemic signs) may be associated with  such low concentrations, or a systemic infection in its initial stages (< 6 hours). Furthermore, increased PCT can occur without infection. PCT concentrations between 0.5 and 2.0 ng/mL should be interpreted taking into account the patient's history. It is recommended to retest PCT within 6-24 hours if any concentrations < 2 ng/mL are obtained.    C-reactive Protein [[id number]]  (Abnormal) Collected:  07/10/18 0411    Specimen:  Blood Updated:  07/10/18 1207     C-Reactive Protein 9.27 (H) mg/dL     Sedimentation Rate [[id number]]  (Abnormal) Collected:  07/10/18 0411    Specimen:  Blood Updated:  07/10/18 1153     Sed Rate 35 (H) mm/hr           Imaging Results (last 72 hours)     Procedure Component Value Units Date/Time    SCANNED - IMAGING [[id number]] Resulted:  07/09/18      Updated:  07/12/18 1203    CT Abdomen Pelvis Without Contrast [[id number]] Collected:  07/12/18 0707     Updated:  07/12/18 0719    Narrative:       CT ABDOMEN AND PELVIS, NONCONTRAST, 7/11/2018     HISTORY:  82-year-old male admitted to the hospital 2 days ago with sacral  decubitus wound, lower extremity edema, low back pain and cardiac  arrhythmia. Bacteremia is noted.     TECHNIQUE:  CT examination of the abdomen and pelvis was performed without IV  contrast due to abnormal renal function. Radiation dose reduction  techniques included automated exposure control or exposure modulation  based on body size. Radiation audit for CT and nuclear cardiology exams  in the last 12 months: 0.     COMPARISON:  *  CT abdomen/pelvis, 12/15/2016.  *  CT chest, 12/8/2016.     ABDOMEN FINDINGS:  Multiple large gallstones are present with thin the gallbladder. There  is no bile duct dilatation. Liver, pancreas and spleen are normal in  size and appearance without contrast.     The kidneys are unobstructed. Suspected small typical and hyperdense  right renal cysts are incompletely evaluated without contrast but are  unchanged since the study of 12/15/2016.      Moderate sigmoid diverticulosis. There is mild wall thickening involving  the mid sigmoid colon with some adjacent mild soft tissue stranding in  the left lower pelvic pericolonic fat. Mild acute diverticulitis is  suspected. No evidence of abscess, bowel perforation or bowel  obstruction. Remaining segments of small bowel and colon are normal in  caliber and appearance. Normal appendix.     PELVIS FINDINGS:  Urinary bladder, prostate and rectum are within normal limits. Tiny  bilateral fat-containing inguinal hernias.     Cutaneous and subcutaneous decubitus wound changes in the midline  posterior and inferior to the lower sacrococcygeal spine. No abscess or  other fluid collection is seen. Underlying osseous structures are  unremarkable.     Lower chest images show low ascending thoracic aortic aneurysm measuring  up to 5.4 cm. This segment is unchanged since CT chest, 12/8/2016.       Impression:       1. CT findings suggesting mild acute diverticulitis involving the mid  sigmoid colon in the left lower pelvis. No evidence of abscess or other  complicating feature.  2. Cholelithiasis. Multiple large gallstones within the gallbladder. No  bile duct dilatation.  3. Cutaneous and subcutaneous decubitus wounds changes posterior  inferior to the lower sacrococcygeal spine. No abscess or additional  complicating feature is visible.  4. Ascending thoracic aortic aneurysm, unchanged since 12/8/2016.     This report was finalized on 7/12/2018 7:17 AM by Dr. Jefferson Bowen MD.       MRI Lumbar Spine With & Without Contrast [[id number]] Updated:  07/11/18 1443    US Venous Doppler Lower Extremity Bilateral (duplex) [[id number]] Collected:  07/10/18 1733     Updated:  07/10/18 1735    Narrative:       VENOUS DOPPLER ULTRASOUND, BILATERAL LOWER EXTREMITIES, 7/10/2018     HISTORY:   82-year-old male with two week history of bilateral lower extremity  edema.     TECHNIQUE:   Venous Doppler ultrasound examination of both  legs was performed using  grey-scale, spectral Doppler, and color flow Doppler ultrasound imaging.     FINDINGS:   The examination is negative.  There is no evidence of deep venous  thrombosis from the groin to the lower calf bilaterally. The greater  saphenous veins are also patent.       Impression:       Negative examination.  No evidence of lower extremity DVT on the right  or left.     This report was finalized on 7/10/2018 5:33 PM by Dr. Jefferson Bowen MD.               Medication Review:     Hospital Medications (active)       Dose Frequency Start End    acetaminophen (TYLENOL) tablet 650 mg 650 mg Every 4 Hours PRN 7/10/2018     Sig - Route: Take 2 tablets by mouth Every 4 (Four) Hours As Needed for Mild Pain , Headache or Fever. - Oral    ampicillin-sulbactam (UNASYN) injection 3 g 3 g Every 6 Hours 7/13/2018 7/18/2018    Sig - Route: Inject 3 g into the shoulder, thigh, or buttocks Every 6 (Six) Hours. - Intramuscular    aspirin chewable tablet 162 mg 162 mg Once 7/13/2018 7/13/2018    Sig - Route: Chew 2 tablets 1 (One) Time. - Oral    aspirin chewable tablet 81 mg 81 mg Daily 7/9/2018     Sig - Route: Chew 1 tablet Daily. - Oral    b complex-C-folic acid capsule 1 mg 1 capsule Daily 7/9/2018     Sig - Route: Take 1 capsule by mouth Daily. - Oral    bumetanide (BUMEX) tablet 2 mg 2 mg Daily 7/13/2018     Sig - Route: Take 2 tablets by mouth Daily. - Oral    cetirizine (zyrTEC) tablet 5 mg 5 mg Daily 7/9/2018     Sig - Route: Take 0.5 tablets by mouth Daily. - Oral    diltiaZEM (CARDIZEM) injection 20 mg 20 mg Once 7/12/2018 7/12/2018    Sig - Route: Infuse 4 mL into a venous catheter 1 (One) Time. - Intravenous    diltiaZEM CD (CARDIZEM CD) 24 hr capsule 300 mg 300 mg Every 24 Hours Scheduled 7/13/2018     Sig - Route: Take 300 mg by mouth Daily. - Oral    guaiFENesin (MUCINEX) 12 hr tablet 600 mg 600 mg Daily 7/9/2018     Sig - Route: Take 1 tablet by mouth Daily. - Oral    HYDROmorphone (DILAUDID)  "injection 0.25 mg 0.25 mg Every 2 Hours PRN 7/10/2018 7/20/2018    Sig - Route: Infuse 0.25 mL into a venous catheter Every 2 (Two) Hours As Needed for Moderate Pain  or Severe Pain . - Intravenous    lactated ringers infusion 500 mL 500 mL Once 7/13/2018 7/13/2018    Sig - Route: Infuse 500 mL into a venous catheter 1 (One) Time. - Intravenous    lactobacillus acidophilus (RISAQUAD) capsule 1 capsule 1 capsule Daily 7/12/2018     Sig - Route: Take 1 capsule by mouth Daily. - Oral    levETIRAcetam (KEPPRA) tablet 500 mg 500 mg Every 12 Hours Scheduled 7/9/2018     Sig - Route: Take 1 tablet by mouth Every 12 (Twelve) Hours. - Oral    LORazepam (ATIVAN) injection 1 mg 1 mg Once As Needed 7/11/2018     Sig - Route: Infuse 0.5 mL into a venous catheter 1 (One) Time As Needed for Anxiety (for CT scan). - Intravenous    Magnesium Sulfate 2 gram / 50mL Infusion (GIVE X 3 BAGS TO EQUAL 6GM TOTAL DOSE) - Mg 1.1 - 1/5 mg/dl 2 g As Needed 7/10/2018     Sig - Route: Infuse 50 mL into a venous catheter As Needed (See Administration Instructions). - Intravenous    Linked Group 1:  \"Or\" Linked Group Details        Magnesium Sulfate 2 gram Bolus, followed by 8 gram infusion (total Mg dose 10 grams)- Mg less than or equal to 1mg/dL 2 g As Needed 7/10/2018     Sig - Route: Infuse 50 mL into a venous catheter As Needed (See Administration Instructions). - Intravenous    Linked Group 1:  \"Or\" Linked Group Details        Magnesium Sulfate 4 gram infusion- Mg 1.6-1.9 mg/dL 4 g As Needed 7/10/2018     Sig - Route: Infuse 100 mL into a venous catheter As Needed (See Administration Instructions). - Intravenous    Linked Group 1:  \"Or\" Linked Group Details        methocarbamol (ROBAXIN) tablet 500 mg 500 mg 2 Times Daily PRN 7/9/2018     Sig - Route: Take 1 tablet by mouth 2 (Two) Times a Day As Needed for Muscle Spasms. - Oral    metoprolol succinate XL (TOPROL-XL) 24 hr tablet 100 mg 100 mg Every 24 Hours Scheduled 7/9/2018     Sig - " "Route: Take 2 tablets by mouth Daily. - Oral    metoprolol tartrate (LOPRESSOR) 5 MG/5ML injection  - ADS Override Pull   7/13/2018 7/13/2018    Notes to Pharmacy: Created by cabinet ancelmoide    metoprolol tartrate (LOPRESSOR) injection 2.5 mg 2.5 mg Once 7/13/2018 7/13/2018    Sig - Route: Infuse 2.5 mL into a venous catheter 1 (One) Time. - Intravenous    metoprolol tartrate (LOPRESSOR) injection 5 mg 5 mg Once 7/13/2018     Sig - Route: Infuse 5 mL into a venous catheter 1 (One) Time. - Intravenous    nitroglycerin (NITROSTAT) 0.4 MG SL tablet  - ADS Override Pull   7/12/2018 7/12/2018    Notes to Pharmacy: Created by cabinet override    nitroglycerin (NITROSTAT) ointment 1 inch 1 inch Once 7/13/2018     Sig - Route: Apply 1 inch topically 1 (One) Time. - Topical    nitroglycerin (NITROSTAT) SL tablet 0.4 mg 0.4 mg Every 5 Minutes PRN 7/12/2018     Sig - Route: Place 1 tablet under the tongue Every 5 (Five) Minutes As Needed for Chest Pain (times 3 as needed). - Sublingual    Pharmacy to dose vancomycin  Continuous PRN 7/11/2018 8/8/2018    Sig - Route: Continuous As Needed for Consult. - Does not apply    polyethylene glycol (MIRALAX) powder 17 g 17 g Daily 7/10/2018     Sig - Route: Take 17 g by mouth Daily. - Oral    potassium chloride (K-DUR,KLOR-CON) CR tablet 40 mEq 40 mEq As Needed 7/10/2018     Sig - Route: Take 2 tablets by mouth As Needed (potassium replacement.  see admin instructions). - Oral    Linked Group 2:  \"Or\" Linked Group Details        potassium chloride (KLOR-CON) packet 40 mEq 40 mEq As Needed 7/10/2018     Sig - Route: Take 40 mEq by mouth As Needed (potassium replacement, see admin instructions). - Oral    Linked Group 2:  \"Or\" Linked Group Details        potassium chloride 10 mEq in 100 mL IVPB 10 mEq Every 1 Hour PRN 7/10/2018     Sig - Route: Infuse 100 mL into a venous catheter Every 1 (One) Hour As Needed (potassium protocol PERIPHERAL - see admin instructions). - Intravenous    " "Linked Group 2:  \"Or\" Linked Group Details        rivaroxaban (XARELTO) tablet 15 mg 15 mg Daily 7/9/2018     Sig - Route: Take 1 tablet by mouth Daily. - Oral    sennosides-docusate sodium (SENOKOT-S) 8.6-50 MG tablet 2 tablet 2 tablet 2 Times Daily 7/10/2018     Sig - Route: Take 2 tablets by mouth 2 (Two) Times a Day. - Oral    sodium chloride 0.9 % flush 1-10 mL 1-10 mL As Needed 7/9/2018     Sig - Route: Infuse 1-10 mL into a venous catheter As Needed for Line Care. - Intravenous    sodium chloride 0.9 % infusion  - ADS Override Pull   7/11/2018 7/12/2018    Notes to Pharmacy: Created by cabinet override    sodium chloride 0.9 % infusion 40 mL 40 mL As Needed 7/9/2018     Sig - Route: Infuse 40 mL into a venous catheter As Needed for Line Care. - Intravenous    vancomycin (VANCOCIN) 500 MG injection  - ADS Override Pull   7/11/2018 7/12/2018    Notes to Pharmacy: Created by cabinet override    diltiaZEM CD (CARDIZEM CD) 24 hr capsule 240 mg (Discontinued) 240 mg Every 24 Hours Scheduled 7/10/2018 7/12/2018    Sig - Route: Take 2 capsules by mouth Daily. - Oral    metoprolol tartrate (LOPRESSOR) tablet 25 mg (Discontinued) 25 mg Once 7/13/2018 7/13/2018    Sig - Route: Take 1 tablet by mouth 1 (One) Time. - Oral    piperacillin-tazobactam (ZOSYN) 3.375 g/100 mL 0.9% NS IVPB (mbp) (Discontinued) 3.375 g Every 8 Hours 7/11/2018 7/13/2018    Sig - Route: Infuse 100 mL into a venous catheter Every 8 (Eight) Hours. - Intravenous    vancomycin 1500 mg/250 mL 0.9% NS IVPB (Discontinued) 1,500 mg Every 12 Hours 7/11/2018 7/13/2018    Sig - Route: Infuse 250 mL into a venous catheter Every 12 (Twelve) Hours. - Intravenous          ampicillin 2 g Intravenous Q4H   aspirin 81 mg Oral Daily   diclofenac 4 g Topical 4x Daily   [START ON 7/19/2018] digoxin 125 mcg Intravenous Daily   diltiaZEM 20 mg Intravenous Q6H   enoxaparin 1 mg/kg Subcutaneous Q12H   levETIRAcetam 500 mg Intravenous Q12H   lidocaine 2 patch Transdermal " Q24H   Methocarbamol 1,000 mg Intravenous Q8H   metoprolol succinate  mg Oral Q24H   metoprolol tartrate 5 mg Intravenous Q8H   potassium chloride 20 mEq Oral Daily       Assessment/Plan       Principal Problem:    Bacteremia due to Escherichia coli  Active Problems:    Hypertension    Stage 3 chronic kidney disease    Decubitus ulcer of sacral region    Abnormal echocardiogram    Atrial fibrillation and flutter (CMS/HCC)    Leg swelling    Bacteremia due to Enterococcus    Metabolic encephalopathy    Perseveration    Diverticulitis Better  TME  ? Discitis  ? SBE      Plan :    Change  To IV ampicillin 2 gm IV Q 4 H  No need for Vanc  His Enterococci is PCN sensitive    MRI Brain  Repeat BC    SKIP noted   Thank you    Liborio Cantu MD  07/18/18  9:04 PM

## 2018-07-19 NOTE — PLAN OF CARE
Family yet again has changed their minds regarding code status.  They want intubation/ventilator but no chest compression or defibrillation.   Consult by spiritual care requested. Code status again changed in record

## 2018-07-19 NOTE — PLAN OF CARE
Problem: Patient Care Overview  Goal: Plan of Care Review  Pt responding to voice; opens eyes spontaneously. Pt has refused p.o intake this shift- this nurse has offered multiple times. Pt's meds converted primarily to IV form. Pt's pain controlled by IV robaxin. Pt turned and repositioned q2hr. Pt continues on IV ampicillin. Weakness and diminished sensation noted to RLE and RUE. Dr. Huizar made aware. - Rosa Caruso RN [id number] 07/19/2018  Goal: Individualization and Mutuality  Outcome: Ongoing (interventions implemented as appropriate)    Goal: Discharge Needs Assessment  Outcome: Ongoing (interventions implemented as appropriate)    Goal: Interprofessional Rounds/Family Conf  Outcome: Ongoing (interventions implemented as appropriate)      Problem: Skin Injury Risk (Adult)  Goal: Skin Health and Integrity  Outcome: Ongoing (interventions implemented as appropriate)      Problem: Fall Risk (Adult)  Goal: Absence of Fall  Outcome: Ongoing (interventions implemented as appropriate)      Problem: Pain, Acute (Adult)  Goal: Acceptable Pain Control/Comfort Level  Outcome: Ongoing (interventions implemented as appropriate)      Problem: Wound (Includes Pressure Injury) (Adult)  Goal: Signs and Symptoms of Listed Potential Problems Will be Absent, Minimized or Managed (Wound)  Outcome: Ongoing (interventions implemented as appropriate)      Problem: Sepsis/Septic Shock (Adult)  Goal: Signs and Symptoms of Listed Potential Problems Will be Absent, Minimized or Managed (Sepsis/Septic Shock)  Outcome: Ongoing (interventions implemented as appropriate)      Problem: Nutrition, Imbalanced: Inadequate Oral Intake (Adult)  Goal: Improved Oral Intake  Outcome: Ongoing (interventions implemented as appropriate)    Goal: Prevent Further Weight Loss  Outcome: Ongoing (interventions implemented as appropriate)      Problem: Skin and Soft Tissue Infection (Adult)  Goal: Signs and Symptoms of Listed Potential Problems Will be Absent,  Minimized or Managed (Skin and Soft Tissue Infection)  Outcome: Ongoing (interventions implemented as appropriate)

## 2018-07-19 NOTE — NURSING NOTE
Patients family has additional questions & have discussed overall condition & now decided they may want to put him on the ventilator. Aries Queen NP to notify her of families change of Care & wishes/code status possibly needing to be reevaluated despite earlier care meeting with family & MD's . SD NP will meet with family again to discuss plan of care & intubation needs.

## 2018-07-19 NOTE — PROGRESS NOTES
LOS: 10 days   Patient Care Team:  Elisabeth Warren MD as PCP - General (Internal Medicine)  Alexsander Matute MD as Consulting Physician (Neurology)  MD Ry Goff III, MD as Consulting Physician (Cardiology)        Subjective     Interval History:     Patient Complaints:  Confused ICU  Patient Denies:  NV Chills       Review of Systems:    10 point ROS done    Objective     Vital Signs  Temp:  [97.5 °F (36.4 °C)-98.7 °F (37.1 °C)] 98.4 °F (36.9 °C)  Heart Rate:  [66-90] 84  Resp:  [15-22] 18  BP: (103-122)/(46-64) 122/63    Physical Exam:     General Appearance:    Alert, cconfused   Head:    Normocephalic, without obvious abnormality, atraumatic   Eyes:            Lids and lashes normal, conjunctivae and sclerae normal, no   icterus, no pallor, corneas clear, PERRLA   Ears:    Ears appear intact with no abnormalities noted   Throat:   No oral lesions, no thrush, oral mucosa moist   Neck:   No adenopathy, supple, trachea midline, no thyromegaly, no   carotid bruit, no JVD   Back:     No kyphosis present, no scoliosis present, no skin lesions,      erythema or scars, no tenderness to percussion or                   palpation,   range of motion normal   Lungs:     Clear to auscultation,respirations regular, even and                  unlabored    Heart:    Regular rhythm and normal rate, normal S1 and S2, no            murmur, no gallop, no rub, no click   Chest Wall:    No abnormalities observed   Abdomen:     Normal bowel sounds, no masses, no organomegaly, soft        non-tender, non-distended, no guarding, no rebound                tenderness   Rectal:     Deferred   Extremities:   Moves all extremities well, no edema, no cyanosis, no             redness   Pulses:   Pulses palpable and equal bilaterally   Skin:   No bleeding, bruising or rash   Lymph nodes:   No palpable adenopathy   Neurologic:   Cranial nerves 2 - 12 grossly intact, sensation intact, DTR       present and equal  bilaterally          Results Review:      Lab Results (last 72 hours)     Procedure Component Value Units Date/Time    Hemoglobin A1c [[id number]] Collected:  07/13/18 0225    Specimen:  Blood Updated:  07/13/18 0851    Blood Culture - Blood, [[id number]]  (Abnormal) Collected:  07/11/18 1008    Specimen:  Blood from Hand, Right Updated:  07/13/18 0702     Blood Culture Enterococcus faecalis (A)     Gram Stain Result Aerobic Bottle Gram positive cocci in chains    Blood Culture - Blood, [[id number]]  (Abnormal) Collected:  07/11/18 1009    Specimen:  Blood from Hand, Left Updated:  07/13/18 0653     Blood Culture Enterococcus faecalis (A)     Gram Stain Result Anaerobic Bottle Gram positive cocci in chains      Aerobic Bottle Gram positive cocci in chains    Blood Culture - Blood, Arm, Left [[id number]]  (Abnormal) Collected:  07/10/18 1130    Specimen:  Blood from Arm, Left Updated:  07/13/18 0653     Blood Culture Enterococcus faecalis (A)     Gram Stain Result Aerobic Bottle Gram positive cocci in chains      Aerobic Bottle Gram negative bacilli    Blood Culture - Blood, Arm, Left [[id number]]  (Abnormal)  (Susceptibility) Collected:  07/10/18 1200    Specimen:  Blood from Arm, Left Updated:  07/13/18 0650     Blood Culture Escherichia coli (A)      Enterococcus faecalis (A)     Gram Stain Result Aerobic Bottle Gram positive cocci in chains    Susceptibility      Escherichia coli     CRUZ     Ampicillin 4 ug/ml Susceptible     Ampicillin + Sulbactam <=2 ug/ml Susceptible     Cefazolin <=4 ug/ml Susceptible     Cefepime <=1 ug/ml Susceptible     Cefoxitin <=4 ug/ml Susceptible     Ceftriaxone <=1 ug/ml Susceptible     Ertapenem <=0.5 ug/ml Susceptible     Gentamicin <=1 ug/ml Susceptible     Levofloxacin <=0.12 ug/ml Susceptible     Meropenem <=0.25 ug/ml Susceptible     Piperacillin + Tazobactam <=4 ug/ml Susceptible     Trimethoprim + Sulfamethoxazole <=20 ug/ml Susceptible                    Troponin [[id number]]   (Abnormal) Collected:  07/13/18 0551    Specimen:  Blood Updated:  07/13/18 0624     Troponin T 0.073 (H) ng/mL     Narrative:       Troponin T Reference Ranges:  Less than 0.03 ng/mL:    Negative for AMI  0.03 to 0.09 ng/mL:      Indeterminant for AMI  Greater than 0.09 ng/mL: Positive for AMI    Troponin [[id number]]  (Normal) Collected:  07/13/18 0225    Specimen:  Blood Updated:  07/13/18 0245     Troponin T 0.028 ng/mL     Narrative:       Troponin T Reference Ranges:  Less than 0.03 ng/mL:    Negative for AMI  0.03 to 0.09 ng/mL:      Indeterminant for AMI  Greater than 0.09 ng/mL: Positive for AMI    Basic Metabolic Panel [[id number]]  (Abnormal) Collected:  07/13/18 0225    Specimen:  Blood Updated:  07/13/18 0244     Glucose 116 (H) mg/dL      BUN 20 mg/dL      Creatinine 0.99 mg/dL      Sodium 134 (L) mmol/L      Potassium 4.0 mmol/L      Chloride 97 (L) mmol/L      CO2 25.0 mmol/L      Calcium 9.2 mg/dL      eGFR Non African Amer 73 mL/min/1.73      BUN/Creatinine Ratio 20.2     Anion Gap 12.0 mmol/L     Narrative:       The MDRD GFR formula is only valid for adults with stable renal function between ages 18 and 70.    CBC & Differential [[id number]] Collected:  07/13/18 0225    Specimen:  Blood Updated:  07/13/18 0234    Narrative:       The following orders were created for panel order CBC & Differential.  Procedure                               Abnormality         Status                     ---------                               -----------         ------                     Scan Slide[[id number]]                                                                  CBC Auto Differential[[id number]]        Abnormal            Final result                 Please view results for these tests on the individual orders.    CBC Auto Differential [[id number]]  (Abnormal) Collected:  07/13/18 0225    Specimen:  Blood Updated:  07/13/18 0229     WBC 18.33 (H) 10*3/mm3      RBC 3.67 (L) 10*6/mm3      Hemoglobin 9.0 (L)  g/dL      Hematocrit 26.9 (L) %      MCV 73.3 (L) fL      MCH 24.5 (L) pg      MCHC 33.5 g/dL      RDW 18.0 (H) %      RDW-SD 47.5 fl      MPV 9.1 fL      Platelets 561 (H) 10*3/mm3      Neutrophil % 75.2 (H) %      Lymphocyte % 15.3 (L) %      Monocyte % 8.7 (H) %      Eosinophil % 0.2 %      Basophil % 0.2 %      Immature Grans % 0.4 %      Neutrophils, Absolute 13.78 (H) 10*3/mm3      Lymphocytes, Absolute 2.80 10*3/mm3      Monocytes, Absolute 1.60 (H) 10*3/mm3      Eosinophils, Absolute 0.04 (L) 10*3/mm3      Basophils, Absolute 0.03 10*3/mm3      Immature Grans, Absolute 0.08 (H) 10*3/mm3      nRBC 0.0 /100 WBC     Troponin [[id number]]  (Normal) Collected:  07/12/18 2336    Specimen:  Blood Updated:  07/12/18 2358     Troponin T <0.010 ng/mL     Narrative:       Troponin T Reference Ranges:  Less than 0.03 ng/mL:    Negative for AMI  0.03 to 0.09 ng/mL:      Indeterminant for AMI  Greater than 0.09 ng/mL: Positive for AMI    Vancomycin, Random [[id number]]  (Normal) Collected:  07/12/18 2209    Specimen:  Blood Updated:  07/12/18 2241     Vancomycin Random 25.60 mcg/mL     Gentamicin Level, Trough [[id number]]  (Normal) Collected:  07/12/18 1800    Specimen:  Blood Updated:  07/12/18 2120     Gentamicin Trough 0.60 mcg/mL     Potassium [[id number]]  (Normal) Collected:  07/11/18 2014    Specimen:  Blood Updated:  07/11/18 2034     Potassium 4.7 mmol/L     Blood Culture ID, PCR - Blood, [[id number]]  (Abnormal) Collected:  07/10/18 1130    Specimen:  Blood from Arm, Left Updated:  07/11/18 1109     BCID, PCR Enterococcus spp. Identification by BCID PCR. (C)     BCID, PCR 2 Escherichia coli. Identification by BCID PCR. (C)    Basic Metabolic Panel [[id number]]  (Abnormal) Collected:  07/11/18 0409    Specimen:  Blood Updated:  07/11/18 0521     Glucose 107 (H) mg/dL      BUN 22 mg/dL      Creatinine 1.03 mg/dL      Sodium 132 (L) mmol/L      Potassium 3.6 mmol/L      Chloride 95 (L) mmol/L      CO2 29.8 (H) mmol/L       Calcium 9.0 mg/dL      eGFR Non African Amer 69 mL/min/1.73      BUN/Creatinine Ratio 21.4     Anion Gap 7.2 mmol/L     Narrative:       The MDRD GFR formula is only valid for adults with stable renal function between ages 18 and 70.    CBC & Differential [[id number]] Collected:  07/11/18 0409    Specimen:  Blood Updated:  07/11/18 0505    Narrative:       The following orders were created for panel order CBC & Differential.  Procedure                               Abnormality         Status                     ---------                               -----------         ------                     Scan Slide[[id number]]                                                                  CBC Auto Differential[[id number]]        Abnormal            Final result                 Please view results for these tests on the individual orders.    CBC Auto Differential [[id number]]  (Abnormal) Collected:  07/11/18 0409    Specimen:  Blood Updated:  07/11/18 0504     WBC 19.67 (H) 10*3/mm3      RBC 3.71 (L) 10*6/mm3      Hemoglobin 9.0 (L) g/dL      Hematocrit 27.7 (L) %      MCV 74.7 (L) fL      MCH 24.3 (L) pg      MCHC 32.5 g/dL      RDW 18.2 (H) %      RDW-SD 49.3 fl      MPV 9.2 fL      Platelets 539 (H) 10*3/mm3      Neutrophil % 75.4 (H) %      Lymphocyte % 13.0 (L) %      Monocyte % 10.6 (H) %      Eosinophil % 0.2 %      Basophil % 0.2 %      Immature Grans % 0.6 (H) %      Neutrophils, Absolute 14.87 (H) 10*3/mm3      Lymphocytes, Absolute 2.55 10*3/mm3      Monocytes, Absolute 2.08 (H) 10*3/mm3      Eosinophils, Absolute 0.03 (L) 10*3/mm3      Basophils, Absolute 0.03 10*3/mm3      Immature Grans, Absolute 0.11 (H) 10*3/mm3      nRBC 0.0 /100 WBC     Urinalysis, Microscopic Only - Urine, Clean Catch [[id number]]  (Abnormal) Collected:  07/10/18 1649    Specimen:  Urine from Urine, Clean Catch Updated:  07/10/18 0858     RBC, UA 13-20 (A) /HPF      WBC, UA 0-2 (A) /HPF      Bacteria, UA None Seen /HPF      Squamous  Epithelial Cells, UA 0-2 /HPF      Hyaline Casts, UA 0-2 /LPF      Methodology Manual Light Microscopy    Urinalysis With Culture If Indicated - Urine, Clean Catch [[id number]]  (Abnormal) Collected:  07/10/18 1649    Specimen:  Urine from Urine, Clean Catch Updated:  07/10/18 1743     Color, UA Dark Yellow (A)     Appearance, UA Clear     pH, UA <=5.0     Specific Gravity, UA 1.025     Comment: Result obtained by Refractometer        Glucose, UA Negative     Ketones, UA Trace (A)     Bilirubin, UA Small (1+) (A)     Blood, UA Moderate (2+) (A)     Protein, UA Negative     Leuk Esterase, UA Negative     Nitrite, UA Negative     Urobilinogen, UA 1.0 E.U./dL    Fungus Culture, Blood - Blood, Arm, Left [[id number]] Collected:  07/10/18 1130    Specimen:  Blood from Arm, Left Updated:  07/10/18 1735    Fungus Culture, Blood - Blood, Arm, Left [[id number]] Collected:  07/10/18 1200    Specimen:  Blood from Hand, Left Updated:  07/10/18 1735    TSH [[id number]]  (Normal) Collected:  07/10/18 0411    Specimen:  Blood Updated:  07/10/18 1238     TSH 0.814 mIU/mL     Procalcitonin [[id number]]  (Normal) Collected:  07/10/18 0411    Specimen:  Blood Updated:  07/10/18 1207     Procalcitonin 0.15 ng/mL     Narrative:       As a Marker for Sepsis (Non-Neonates):   1. <0.5 ng/mL represents a low risk of severe sepsis and/or septic shock.  2. >2 ng/mL represents a high risk of severe sepsis and/or septic shock.    As a Marker for Lower Respiratory Tract Infections that require antibiotic therapy:    PCT on Admission     Antibiotic Therapy       6-12 Hrs later  > 0.5                Strongly Recommended             >0.25 - <0.5         Recommended  0.1 - 0.25           Discouraged              Remeasure/reassess PCT  <0.1                 Strongly Discouraged     Remeasure/reassess PCT                     PCT values of < 0.5 ng/mL do not exclude an infection, because localized infections (without systemic signs) may be associated with  such low concentrations, or a systemic infection in its initial stages (< 6 hours). Furthermore, increased PCT can occur without infection. PCT concentrations between 0.5 and 2.0 ng/mL should be interpreted taking into account the patient's history. It is recommended to retest PCT within 6-24 hours if any concentrations < 2 ng/mL are obtained.    C-reactive Protein [[id number]]  (Abnormal) Collected:  07/10/18 0411    Specimen:  Blood Updated:  07/10/18 1207     C-Reactive Protein 9.27 (H) mg/dL     Sedimentation Rate [[id number]]  (Abnormal) Collected:  07/10/18 0411    Specimen:  Blood Updated:  07/10/18 1153     Sed Rate 35 (H) mm/hr           Imaging Results (last 72 hours)     Procedure Component Value Units Date/Time    SCANNED - IMAGING [[id number]] Resulted:  07/09/18      Updated:  07/12/18 1203    CT Abdomen Pelvis Without Contrast [[id number]] Collected:  07/12/18 0707     Updated:  07/12/18 0719    Narrative:       CT ABDOMEN AND PELVIS, NONCONTRAST, 7/11/2018     HISTORY:  82-year-old male admitted to the hospital 2 days ago with sacral  decubitus wound, lower extremity edema, low back pain and cardiac  arrhythmia. Bacteremia is noted.     TECHNIQUE:  CT examination of the abdomen and pelvis was performed without IV  contrast due to abnormal renal function. Radiation dose reduction  techniques included automated exposure control or exposure modulation  based on body size. Radiation audit for CT and nuclear cardiology exams  in the last 12 months: 0.     COMPARISON:  *  CT abdomen/pelvis, 12/15/2016.  *  CT chest, 12/8/2016.     ABDOMEN FINDINGS:  Multiple large gallstones are present with thin the gallbladder. There  is no bile duct dilatation. Liver, pancreas and spleen are normal in  size and appearance without contrast.     The kidneys are unobstructed. Suspected small typical and hyperdense  right renal cysts are incompletely evaluated without contrast but are  unchanged since the study of 12/15/2016.      Moderate sigmoid diverticulosis. There is mild wall thickening involving  the mid sigmoid colon with some adjacent mild soft tissue stranding in  the left lower pelvic pericolonic fat. Mild acute diverticulitis is  suspected. No evidence of abscess, bowel perforation or bowel  obstruction. Remaining segments of small bowel and colon are normal in  caliber and appearance. Normal appendix.     PELVIS FINDINGS:  Urinary bladder, prostate and rectum are within normal limits. Tiny  bilateral fat-containing inguinal hernias.     Cutaneous and subcutaneous decubitus wound changes in the midline  posterior and inferior to the lower sacrococcygeal spine. No abscess or  other fluid collection is seen. Underlying osseous structures are  unremarkable.     Lower chest images show low ascending thoracic aortic aneurysm measuring  up to 5.4 cm. This segment is unchanged since CT chest, 12/8/2016.       Impression:       1. CT findings suggesting mild acute diverticulitis involving the mid  sigmoid colon in the left lower pelvis. No evidence of abscess or other  complicating feature.  2. Cholelithiasis. Multiple large gallstones within the gallbladder. No  bile duct dilatation.  3. Cutaneous and subcutaneous decubitus wounds changes posterior  inferior to the lower sacrococcygeal spine. No abscess or additional  complicating feature is visible.  4. Ascending thoracic aortic aneurysm, unchanged since 12/8/2016.     This report was finalized on 7/12/2018 7:17 AM by Dr. Jefferson Bowen MD.       MRI Lumbar Spine With & Without Contrast [[id number]] Updated:  07/11/18 1443    US Venous Doppler Lower Extremity Bilateral (duplex) [[id number]] Collected:  07/10/18 1733     Updated:  07/10/18 1735    Narrative:       VENOUS DOPPLER ULTRASOUND, BILATERAL LOWER EXTREMITIES, 7/10/2018     HISTORY:   82-year-old male with two week history of bilateral lower extremity  edema.     TECHNIQUE:   Venous Doppler ultrasound examination of both  legs was performed using  grey-scale, spectral Doppler, and color flow Doppler ultrasound imaging.     FINDINGS:   The examination is negative.  There is no evidence of deep venous  thrombosis from the groin to the lower calf bilaterally. The greater  saphenous veins are also patent.       Impression:       Negative examination.  No evidence of lower extremity DVT on the right  or left.     This report was finalized on 7/10/2018 5:33 PM by Dr. Jefferson Bowen MD.               Medication Review:     Hospital Medications (active)       Dose Frequency Start End    acetaminophen (TYLENOL) tablet 650 mg 650 mg Every 4 Hours PRN 7/10/2018     Sig - Route: Take 2 tablets by mouth Every 4 (Four) Hours As Needed for Mild Pain , Headache or Fever. - Oral    ampicillin-sulbactam (UNASYN) injection 3 g 3 g Every 6 Hours 7/13/2018 7/18/2018    Sig - Route: Inject 3 g into the shoulder, thigh, or buttocks Every 6 (Six) Hours. - Intramuscular    aspirin chewable tablet 162 mg 162 mg Once 7/13/2018 7/13/2018    Sig - Route: Chew 2 tablets 1 (One) Time. - Oral    aspirin chewable tablet 81 mg 81 mg Daily 7/9/2018     Sig - Route: Chew 1 tablet Daily. - Oral    b complex-C-folic acid capsule 1 mg 1 capsule Daily 7/9/2018     Sig - Route: Take 1 capsule by mouth Daily. - Oral    bumetanide (BUMEX) tablet 2 mg 2 mg Daily 7/13/2018     Sig - Route: Take 2 tablets by mouth Daily. - Oral    cetirizine (zyrTEC) tablet 5 mg 5 mg Daily 7/9/2018     Sig - Route: Take 0.5 tablets by mouth Daily. - Oral    diltiaZEM (CARDIZEM) injection 20 mg 20 mg Once 7/12/2018 7/12/2018    Sig - Route: Infuse 4 mL into a venous catheter 1 (One) Time. - Intravenous    diltiaZEM CD (CARDIZEM CD) 24 hr capsule 300 mg 300 mg Every 24 Hours Scheduled 7/13/2018     Sig - Route: Take 300 mg by mouth Daily. - Oral    guaiFENesin (MUCINEX) 12 hr tablet 600 mg 600 mg Daily 7/9/2018     Sig - Route: Take 1 tablet by mouth Daily. - Oral    HYDROmorphone (DILAUDID)  "injection 0.25 mg 0.25 mg Every 2 Hours PRN 7/10/2018 7/20/2018    Sig - Route: Infuse 0.25 mL into a venous catheter Every 2 (Two) Hours As Needed for Moderate Pain  or Severe Pain . - Intravenous    lactated ringers infusion 500 mL 500 mL Once 7/13/2018 7/13/2018    Sig - Route: Infuse 500 mL into a venous catheter 1 (One) Time. - Intravenous    lactobacillus acidophilus (RISAQUAD) capsule 1 capsule 1 capsule Daily 7/12/2018     Sig - Route: Take 1 capsule by mouth Daily. - Oral    levETIRAcetam (KEPPRA) tablet 500 mg 500 mg Every 12 Hours Scheduled 7/9/2018     Sig - Route: Take 1 tablet by mouth Every 12 (Twelve) Hours. - Oral    LORazepam (ATIVAN) injection 1 mg 1 mg Once As Needed 7/11/2018     Sig - Route: Infuse 0.5 mL into a venous catheter 1 (One) Time As Needed for Anxiety (for CT scan). - Intravenous    Magnesium Sulfate 2 gram / 50mL Infusion (GIVE X 3 BAGS TO EQUAL 6GM TOTAL DOSE) - Mg 1.1 - 1/5 mg/dl 2 g As Needed 7/10/2018     Sig - Route: Infuse 50 mL into a venous catheter As Needed (See Administration Instructions). - Intravenous    Linked Group 1:  \"Or\" Linked Group Details        Magnesium Sulfate 2 gram Bolus, followed by 8 gram infusion (total Mg dose 10 grams)- Mg less than or equal to 1mg/dL 2 g As Needed 7/10/2018     Sig - Route: Infuse 50 mL into a venous catheter As Needed (See Administration Instructions). - Intravenous    Linked Group 1:  \"Or\" Linked Group Details        Magnesium Sulfate 4 gram infusion- Mg 1.6-1.9 mg/dL 4 g As Needed 7/10/2018     Sig - Route: Infuse 100 mL into a venous catheter As Needed (See Administration Instructions). - Intravenous    Linked Group 1:  \"Or\" Linked Group Details        methocarbamol (ROBAXIN) tablet 500 mg 500 mg 2 Times Daily PRN 7/9/2018     Sig - Route: Take 1 tablet by mouth 2 (Two) Times a Day As Needed for Muscle Spasms. - Oral    metoprolol succinate XL (TOPROL-XL) 24 hr tablet 100 mg 100 mg Every 24 Hours Scheduled 7/9/2018     Sig - " "Route: Take 2 tablets by mouth Daily. - Oral    metoprolol tartrate (LOPRESSOR) 5 MG/5ML injection  - ADS Override Pull   7/13/2018 7/13/2018    Notes to Pharmacy: Created by cabinet ancelmoide    metoprolol tartrate (LOPRESSOR) injection 2.5 mg 2.5 mg Once 7/13/2018 7/13/2018    Sig - Route: Infuse 2.5 mL into a venous catheter 1 (One) Time. - Intravenous    metoprolol tartrate (LOPRESSOR) injection 5 mg 5 mg Once 7/13/2018     Sig - Route: Infuse 5 mL into a venous catheter 1 (One) Time. - Intravenous    nitroglycerin (NITROSTAT) 0.4 MG SL tablet  - ADS Override Pull   7/12/2018 7/12/2018    Notes to Pharmacy: Created by cabinet override    nitroglycerin (NITROSTAT) ointment 1 inch 1 inch Once 7/13/2018     Sig - Route: Apply 1 inch topically 1 (One) Time. - Topical    nitroglycerin (NITROSTAT) SL tablet 0.4 mg 0.4 mg Every 5 Minutes PRN 7/12/2018     Sig - Route: Place 1 tablet under the tongue Every 5 (Five) Minutes As Needed for Chest Pain (times 3 as needed). - Sublingual    Pharmacy to dose vancomycin  Continuous PRN 7/11/2018 8/8/2018    Sig - Route: Continuous As Needed for Consult. - Does not apply    polyethylene glycol (MIRALAX) powder 17 g 17 g Daily 7/10/2018     Sig - Route: Take 17 g by mouth Daily. - Oral    potassium chloride (K-DUR,KLOR-CON) CR tablet 40 mEq 40 mEq As Needed 7/10/2018     Sig - Route: Take 2 tablets by mouth As Needed (potassium replacement.  see admin instructions). - Oral    Linked Group 2:  \"Or\" Linked Group Details        potassium chloride (KLOR-CON) packet 40 mEq 40 mEq As Needed 7/10/2018     Sig - Route: Take 40 mEq by mouth As Needed (potassium replacement, see admin instructions). - Oral    Linked Group 2:  \"Or\" Linked Group Details        potassium chloride 10 mEq in 100 mL IVPB 10 mEq Every 1 Hour PRN 7/10/2018     Sig - Route: Infuse 100 mL into a venous catheter Every 1 (One) Hour As Needed (potassium protocol PERIPHERAL - see admin instructions). - Intravenous    " "Linked Group 2:  \"Or\" Linked Group Details        rivaroxaban (XARELTO) tablet 15 mg 15 mg Daily 7/9/2018     Sig - Route: Take 1 tablet by mouth Daily. - Oral    sennosides-docusate sodium (SENOKOT-S) 8.6-50 MG tablet 2 tablet 2 tablet 2 Times Daily 7/10/2018     Sig - Route: Take 2 tablets by mouth 2 (Two) Times a Day. - Oral    sodium chloride 0.9 % flush 1-10 mL 1-10 mL As Needed 7/9/2018     Sig - Route: Infuse 1-10 mL into a venous catheter As Needed for Line Care. - Intravenous    sodium chloride 0.9 % infusion  - ADS Override Pull   7/11/2018 7/12/2018    Notes to Pharmacy: Created by cabinet override    sodium chloride 0.9 % infusion 40 mL 40 mL As Needed 7/9/2018     Sig - Route: Infuse 40 mL into a venous catheter As Needed for Line Care. - Intravenous    vancomycin (VANCOCIN) 500 MG injection  - ADS Override Pull   7/11/2018 7/12/2018    Notes to Pharmacy: Created by cabinet override    diltiaZEM CD (CARDIZEM CD) 24 hr capsule 240 mg (Discontinued) 240 mg Every 24 Hours Scheduled 7/10/2018 7/12/2018    Sig - Route: Take 2 capsules by mouth Daily. - Oral    metoprolol tartrate (LOPRESSOR) tablet 25 mg (Discontinued) 25 mg Once 7/13/2018 7/13/2018    Sig - Route: Take 1 tablet by mouth 1 (One) Time. - Oral    piperacillin-tazobactam (ZOSYN) 3.375 g/100 mL 0.9% NS IVPB (mbp) (Discontinued) 3.375 g Every 8 Hours 7/11/2018 7/13/2018    Sig - Route: Infuse 100 mL into a venous catheter Every 8 (Eight) Hours. - Intravenous    vancomycin 1500 mg/250 mL 0.9% NS IVPB (Discontinued) 1,500 mg Every 12 Hours 7/11/2018 7/13/2018    Sig - Route: Infuse 250 mL into a venous catheter Every 12 (Twelve) Hours. - Intravenous          ampicillin 2 g Intravenous Q4H   aspirin 150 mg Rectal Daily   dextrose 5 % and sodium chloride 0.9 %      diclofenac 4 g Topical 4x Daily   digoxin 125 mcg Intravenous Daily   diltiaZEM 20 mg Intravenous Q6H   enoxaparin 1 mg/kg Subcutaneous Q12H   levETIRAcetam 1,000 mg Intravenous Q12H "   lidocaine 2 patch Transdermal Q24H   Methocarbamol 1,000 mg Intravenous Q8H   metoprolol tartrate 5 mg Intravenous Q8H   potassium chloride 20 mEq Oral Daily       Assessment/Plan       Principal Problem:    Bacteremia due to Escherichia coli  Active Problems:    Hypertension    Stage 3 chronic kidney disease    Decubitus ulcer of sacral region    Abnormal echocardiogram    Atrial fibrillation and flutter (CMS/HCC)    Leg swelling    Bacteremia due to Enterococcus    Metabolic encephalopathy    Perseveration    Diverticulitis Better  TME   Discitis   SBE  CVA s    Plan :     IV ampicillin 2 gm IV Q 4 H  For 6 weeks    His Enterococci is PCN sensitive    MRI Brain noted +  Repeat BC    SKIP noted   Thank you    Liborio Cantu MD  07/19/18  10:35 AM

## 2018-07-20 NOTE — NURSING NOTE
@ 6:545 pm Pt with increased RR in 30's. Noted distress, tachycardia 120's-130's. sats @77% on 15 L non re-breather. Rapid response called for assessment for intubation needs. Pt intubated per Dr Kelly due to resp distress . Dr Burt to assess for further care needs. Family at bedside updated .

## 2018-07-20 NOTE — NURSING NOTE
Witnessed call to wife By Nurse Emperatriz DUDLEY RN. Wife informed that patient was not doing well this am and rapid decline probable. Wife was advised to notify sons and head to hospital.

## 2018-07-20 NOTE — PLAN OF CARE
Problem: Ventilation, Mechanical Invasive (Adult)  Intervention: Prevent Airway Displacement/Mechanical Dysfunction   07/20/18 0150   Prevent Airway Displacement/Mechanical Dysfunction   Airway Safety Measures mask at bedside;manual resuscitator at bedside;suction at bedside     Intervention: Optimize Oxygenation/Ventilation   07/20/18 0150   Respiratory Interventions   Airway/Ventilation Management airway patency maintained

## 2018-07-20 NOTE — NURSING NOTE
0805 MD at bedside. unable to  O2 sat with head probe MAP 23 femoral pulse of 66.  0823 monitor showing bradycardia.. Femoral pulse weak and thready MD At bedside  0828 Dr. Bustamante pronounced patient

## 2018-07-20 NOTE — NURSING NOTE
Case Management Discharge Note    Final Note:  @0828    Destination     No service has been selected for the patient.      Durable Medical Equipment     No service has been selected for the patient.      Dialysis/Infusion     No service has been selected for the patient.      Home Medical Care     No service has been selected for the patient.      Social Care     No service has been selected for the patient.             Final Discharge Disposition Code: 41 -  in medical facility

## 2018-07-21 LAB — BACTERIA SPEC AEROBE CULT: NORMAL

## 2018-07-23 NOTE — DISCHARGE SUMMARY
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  CKD Stage III POA  Essential HTN POA  Stable Ascending Thoracic Aortic Aneurysm POA  Cholelithiasis POA    PCP  Patient Care Team:  Elisabeth Warren MD as PCP - General (Internal Medicine)  Alexsander Matute MD as Consulting Physician (Neurology)  MD Ry Goff III, MD as Consulting Physician (Cardiology)    Consults:   Consults     Date and Time Order Name Status Description    7/16/2018 1243 Inpatient Neurology Consult Completed     7/11/2018 1815 Inpatient Gastroenterology Consult Completed     7/9/2018 1553 Inpatient Cardiology Consult Completed       Inpatient Infectious Disease Consult  Inpatient Pulmonology Consult    Operations and Procedures Performed:       Ct Abdomen Pelvis Without Contrast    Result Date: 7/12/2018  Narrative: CT ABDOMEN AND PELVIS, NONCONTRAST, 7/11/2018  HISTORY: 82-year-old male admitted to the hospital 2 days ago with sacral decubitus wound, lower extremity edema, low back pain and cardiac arrhythmia. Bacteremia is noted.   TECHNIQUE: CT examination of the abdomen and pelvis was performed without IV contrast due to abnormal renal function. Radiation dose reduction techniques included automated exposure control or exposure modulation based on body size. Radiation audit for CT and nuclear cardiology exams in the last 12 months: 0.  COMPARISON: *  CT abdomen/pelvis, 12/15/2016. *  CT chest, 12/8/2016.  ABDOMEN FINDINGS: Multiple large gallstones are present with thin the gallbladder. There is no bile duct dilatation. Liver, pancreas and spleen are normal in size and appearance without contrast.  The kidneys are unobstructed. Suspected small typical and hyperdense right renal cysts are incompletely evaluated without contrast but are unchanged since the study of 12/15/2016.  Moderate sigmoid diverticulosis. There is mild wall thickening involving the mid sigmoid colon with some adjacent mild soft tissue stranding in the left lower pelvic pericolonic fat. Mild acute diverticulitis is suspected. No evidence of abscess, bowel perforation or bowel obstruction. Remaining segments of small bowel and colon are normal in caliber and appearance. Normal appendix.  PELVIS FINDINGS: Urinary bladder, prostate and rectum are within normal limits. Tiny bilateral fat-containing inguinal hernias.  Cutaneous and subcutaneous decubitus wound changes in the midline posterior and inferior to the lower sacrococcygeal spine. No abscess or other fluid collection is seen. Underlying osseous structures are unremarkable.  Lower chest images show low ascending thoracic aortic aneurysm measuring up to 5.4 cm. This segment is unchanged since CT chest, 12/8/2016.      Impression: 1. CT findings suggesting mild acute diverticulitis involving the mid sigmoid colon in the left lower pelvis. No evidence of abscess or other complicating feature. 2. Cholelithiasis. Multiple large gallstones within the gallbladder. No bile duct dilatation. 3. Cutaneous and subcutaneous  decubitus wounds changes posterior inferior to the lower sacrococcygeal spine. No abscess or additional complicating feature is visible. 4. Ascending thoracic aortic aneurysm, unchanged since 12/8/2016.  This report was finalized on 7/12/2018 7:17 AM by Dr. Jefferson Bowen MD.      Ct Head Without Contrast    Result Date: 7/19/2018  Narrative: CT HEAD, NONCONTRAST, 7/19/2018  HISTORY: 82-year-old male admitted to the hospital 7/9/2018 with sepsis and bacteremia. Sacral decubitus wound and sigmoid diverticulitis. He has metabolic encephalopathy with lethargy. History of seizure disorder. MRI yesterday showed evidence of late subacute or chronic infarcts and potential petechial products of hemorrhage. Exam today for follow-up.  TECHNIQUE: CT examination of the head without IV contrast. Radiation dose reduction techniques included automated exposure control or exposure modulation based on body size. Radiation audit for CT and nuclear cardiology exams in the last 12 months: 2.  FINDINGS: No acute intracranial abnormality is identified. Subtle low-attenuation changes in the right posterior temporal-parietal corresponding to the region of infarct noted on MRI yesterday. Small chronic low-attenuation focus in the right occipital white matter is also noted. No acute intracranial hemorrhage is appreciable by CT.  Mild generalized cerebral atrophy. Mild diffuse low-attenuation white matter changes most likely related to chronic small vessel disease.      Impression: 1. No appreciable intracranial hemorrhage by CT. 2. No acute intracranial abnormality is visible. 3. 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 Small subacute or chronic white matter infarct right temporal lobe. 3. Diffuse chronic changes as noted above.  This report was finalized on 7/19/2018 11:42 AM by Dr. Jefferson Bowen MD.      Mri Brain With & Without Contrast    Result Date: 7/18/2018  Narrative: MRI BRAIN WITH AND WITHOUT CONTRAST  DATE OF SERVICE:  07/17/2018 INDICATION:  Lethargy since Sunday morning.  Order states observation for abscess of focal abnormality.  PROCEDURE:  Multiplanar and multisequence MR imaging of the brain prior to and following 17 mL of MultiHance.  COMPARISON:  Brain MR from 12/05/2016.      Impression: BRAIN WITHOUT CONTRAST:  Study is moderately motion degraded.  There is faint T2 signal and restricted diffusion, predominately centered in the posterior right temporal and parietal region.  This area shows some petechial hemorrhage.  There is also a small area of petechial hemorrhage in the posterior right frontal lobe and high posterior left frontoparietal junction.  No associated mass effect.  No hydrocephalus or extra-axial fluid collection.  There is no evidence for an acute or early subacute large territory infarct.  When allowing for motion, major intracranial flow voids appear to remain intact.  Paranasal sinuses and the mastoid air cells are clear.  BRAIN WITH CONTRAST:  The areas of suspected late subacute infarct do show some reactive enhancement after the administration of contrast.  IMPRESSION:  1.  Areas of late subacute or early chronic infarct as detailed above, largest area is in the posterior right temporoparietal region.  There is some evidence for petechial hemorrhage and reactive enhancement. 2.  No significant mass effect.  3.  No evidence for an acute or early subacute large territory infarct. Findings were discussed with Dr. Trejo at the time of this dictation.      Ct Abdomen Pelvis With Contrast    Result Date: 7/16/2018  Narrative: CT ABDOMEN AND PELVIS, WITH CONTRAST, 7/15/2018  HISTORY: 82-year-old male admitted to the hospital 7/9/2018 with sepsis found to have enterococcus and Escherichia coli bacteremia. Sacral decubitus wound and acute sigmoid diverticulitis. Exam for follow-up.  TECHNIQUE: CT examination of the abdomen and pelvis with IV contrast Radiation dose reduction techniques  included automated exposure control or exposure modulation based on body size. Radiation audit for CT and nuclear cardiology exams in the last 12 months: 1.  COMPARISON: *  CT abdomen/pelvis, 7/11/2018.  ABDOMEN FINDINGS: Upper abdominal images are degraded by patient respiratory motion artifact. Patchy infiltrate and atelectasis in the lung bases has increased since the prior exam, greater on the right. There is no pleural effusion.  Multiple large gallstones within a nondistended, mildly thick-walled gallbladder. No bile duct dilatation.  Liver, pancreas, spleen and kidneys are within normal limits. No evidence of upper urinary tract obstruction. Normal caliber abdominal aorta.  Mild acute diverticulitis changes have improved since the prior exam. There is persistent short segment wall thickening involving the mid sigmoid colon. Follow-up colonoscopy is recommended for eventual follow-up to exclude sigmoid colon mass when clinically appropriate. There is no bowel dilatation, there is no evidence of abscess or other complicating feature. Appendix is not seen.  PELVIS FINDINGS: Urinary bladder is decompressed with a Amin catheter. Prostate and rectum are unremarkable. Skin thickening and subcutaneous soft tissue stranding between the upper gluteal crease and coccyx representing low-grade decubitus change described clinically. No abscess or additional complicating feature.  Multilevel vertebral compression deformities at L4, L2, T12 and T9. Severe multilevel degenerative disc space changes and lower lumbar degenerative facet arthropathy.      Impression: 1. Motion degraded exam. 2. Improving sigmoid diverticulitis, see above. 3. Developing infiltrate and atelectasis in the lung bases, greater on the right. 4. Cholelithiasis. 5. Mild decubitus changes region of the upper gluteal crease as noted above. 6. Multilevel vertebral compression fracture deformities as noted above. Advanced lumbar spine degenerative changes.  7. Initial stat report to the ordering service from Dr. Ck Wylie at 2235 hours on 7/15/2018.  This report was finalized on 7/16/2018 6:46 AM by Dr. Jefferson Bowen MD.      Xr Chest 1 View    Result Date: 7/20/2018  Narrative: CHEST X-RAY, 7/20/2018 (07:53)     HISTORY: Acute respiratory failure. Intubated. Follow-up cardiopulmonary status.  TECHNIQUE: AP portable chest x-ray.  FINDINGS: Endotracheal tube in good position with tip in the mid thoracic trachea 5.7 cm above the steven.  Improved overall lung expansion since the previous study obtained last evening. Improved bibasilar atelectasis.  CABG. Heart size normal. Pulmonary vascularity is normal. PICC in good position.      Impression: 1. ETT and PICC in good position. 2. Improved overall lung expansion and improvement in bibasilar atelectasis since last evening.  This report was finalized on 7/20/2018 8:15 AM by Dr. Jefferson Bowen MD.      Xr Chest 1 View    Result Date: 7/20/2018  Narrative: CHEST X-RAY, 7/19/2018     HISTORY: Acute respiratory failure, hypoxia. Endotracheal tube placement. Sepsis, bacteremia, metabolic encephalopathy.  TECHNIQUE: AP portable upright chest x-ray.  FINDINGS: Newly placed endotracheal tube tip is in the upper thoracic trachea about 6.8 cm above the steven.  Worsening consolidation and atelectasis in the right lower lung. Persistent infiltrate and atelectasis in the left lung base.  Heart size normal. CABG. PICC in good position.      Impression: 1. Endotracheal tube tip in the upper thoracic trachea as noted. 2. Worsening consolidation and volume loss in the right lung base. 3. Initial stat report to the ICU from Dr. Bill Mustafa at 2125 hours on 7/19/2018.  This report was finalized on 7/20/2018 7:28 AM by Dr. Jefferson Bowen MD.      Xr Chest 1 View    Result Date: 7/19/2018  Narrative: CHEST X-RAY, 7/19/2018 (12:41)     HISTORY: 82-year-old male with sepsis, bacteremia, metabolic encephalopathy. Worsening hypoxia  today.  TECHNIQUE: AP portable chest x-ray.  FINDINGS: Dobbhoff tube has been removed since the earlier study.  Cardiomegaly with probable mild vascular congestion unchanged since earlier today. Low lung volumes with infiltrate or atelectasis in the lung bases, also unchanged.  CABG. PICC in good position.      Impression: 1. No change since earlier today. 2. Dobbhoff tube removal. PICC in good position. 3. Stable cardiomegaly. Possible mild vascular congestion. 4. Low lung volumes with infiltrate or atelectasis in the lung bases.  This report was finalized on 7/19/2018 1:13 PM by Dr. Jefferson Bowen MD.      Xr Chest 1 View    Result Date: 7/19/2018  Narrative: CHEST X-RAY, 7/19/2018     HISTORY: Dobbhoff tube placement, 1st stage.  TECHNIQUE: AP portable chest x-ray.  FINDINGS: Dobbhoff tube tip is in the proximal left mainstem bronchus. This should be removed completely, and the x-ray should be needed after replacement. I discussed the findings directly with the patient's nurse prior to this dictation.  Elevation right hemidiaphragm. Stable cardiomegaly. PICC in good position.  Diffusely increased interstitial markings suggests potential vascular congestion or volume overload that is new since 7/17/2018.      Impression: 1. Malpositioned Dobbhoff tube should be removed. Immediate result to the patient's nurse. 2. Possible mild vascular congestion or volume overload, new since 7/17/2018. The remainder of the examination is stable.  This report was finalized on 7/19/2018 11:02 AM by Dr. Jefferson Bowen MD.      Xr Chest 1 View    Result Date: 7/17/2018  Narrative: CHEST X-RAY, 7/17/2018     HISTORY: PICC placement.  TECHNIQUE: AP portable chest x-ray.  FINDINGS: Left arm PICC tip syncopal position in the upper SVC.  Shallow lung expansion with chronic elevation right hemidiaphragm and bibasilar pulmonary atelectasis. Upper lungs clear. CABG.      Impression: PICC in good position.  This report was finalized on  7/17/2018 11:47 AM by Dr. Jefferson Bowen MD.      Xr Chest 1 View    Result Date: 7/9/2018  Narrative: CHEST X-RAY, 7/9/2018  HISTORY: 82-year-old male admitted to the hospital today with newly noted tachycardia and lower extremity swelling. Some shortness of air over the last two weeks. History of atrial fibrillation.  TECHNIQUE: AP portable upright chest x-ray.  COMPARISON: *  Chest x-ray, 12-16. *  CT chest, 12/8/2016.  FINDINGS: Lung volumes are chronically low, and there is chronic elevation of the right hemidiaphragm with scarring or atelectasis in the right lung base. The lungs are otherwise clear.  Mild to moderate cardiomegaly. Ulnar vascularity is within normal limits. Ectatic and tortuous thoracic aorta. Postop changes previous CABG surgery.      Impression: 1. Cardiomegaly. CABG. Pulmonary vascularity is within normal limits. 2. Chronically low lung volumes with chronic elevation of the right hemidiaphragm and chronic scarring or atelectasis in the right lung base. 3. Ectatic and tortuous thoracic aorta.  This report was finalized on 7/9/2018 4:39 PM by Dr. Jefferson Bowen MD.      Us Venous Doppler Lower Extremity Bilateral (duplex)    Result Date: 7/10/2018  Narrative: VENOUS DOPPLER ULTRASOUND, BILATERAL LOWER EXTREMITIES, 7/10/2018  HISTORY: 82-year-old male with two week history of bilateral lower extremity edema.  TECHNIQUE: Venous Doppler ultrasound examination of both legs was performed using grey-scale, spectral Doppler, and color flow Doppler ultrasound imaging.  FINDINGS: The examination is negative.  There is no evidence of deep venous thrombosis from the groin to the lower calf bilaterally. The greater saphenous veins are also patent.      Impression: Negative examination.  No evidence of lower extremity DVT on the right or left.  This report was finalized on 7/10/2018 5:33 PM by Dr. Jefferson Bowen MD.      Mri Lumbar Spine With & Without Contrast    Result Date:  7/18/2018  Narrative: MRI OF THE LUMBAR SPINE WITH AND WITHOUT CONTRAST, 07/16/2018. COMPARISON: MRI of the lumbar spine without contrast dated 07/11/2018. HISTORY: Severe back pain. Evaluate for discitis, psoas abscess. Follow-up prior MRI. FINDINGS: Multisequence, multiplanar imaging of the lumbar spine was obtained with and without contrast. MultiHance 17 mL was administered intravenously. Redemonstrated is edema at L4 superior endplate and to a lesser extent in the adjacent disc. It does not appear to have worsened in the interval. No adjacent enhancing disc or psoas abscess is seen. Minimal probable new faint enhancement of the nerve roots of the cauda equina in the lower lumbar spine cannot be completely excluded. No conglomeration of nerve roots or mass.The compression fractures and degenerative changes have not changed in the last 5 days. Gallstone is noted near the neck of the gallbladder measuring 1.9 x 1.8 cm. It was not seen in the current position in the prior study.     Impression: 1. There is no interval worsening of superior endplate compression deformity or the associated edema. No enhancing components are noted in the disc nor is there any obvious psoas abscess. Findings favor degenerative change over acute aggressive discitis, osteomyelitis. 2. Degenerative changes and the compression fractures are stable when compared to the prior study from 5 days ago. 3. Minimal probable new faint enhancement of the nerve roots of the cauda equina in the lower lumbar spine cannot be completely excluded.  It could be artifactual or related to very early polyneuritis. No clumping of nerve roots or mass.    Mri Lumbar Spine With & Without Contrast    Result Date: 7/13/2018  Narrative: MRI LUMBAR SPINE WITH AND WITHOUT CONTRAST DATE OF STUDY:  07/11/2018 COMPARISON:  CT lumbar spine without contrast dated 06/07/2016 from [facility]. HISTORY:  Severe low back pain with bilateral lower extremity  radiculopathy.  FINDINGS:  Multisequence and multiplanar imaging of the lumbar spine was obtained with and without contrast.  MultiHance was administered intravenously.  There is edema with compression fracture at T12.  There is maximal vertebral body height loss of atleast 90% in the mid vertebral body.  There is displacement of fracture fragment anteriorly and posteriorly with boderline size to mild canal stenosis. Inferior endplate chronic Schmorl node is at L2, stable.  Edema is at L3-4 disk and adjacent endplates without any enhancement.  It is worse when compared to the prior CT with new superior endplate compression deformity and vertebral body height loss of 20% to 30%.  Disk osteophyte complexes are noted at multiple levels.  Conus terminates at L1.  Bilateral renal lesions are noted, which are probably cysts.  There is a party exophytic lesion noted in the lateral aspect of the upper to mid right kidney with decreased T1 and decreased T2 signal suggestive of proteinaceous or hemorrhagic components.  It is probably a complicated cyst.  No obvious enhancing solid component is seen.  T11-12:  Disk osteophyte complex with left perineural cyst.  No canal stenosis.  T12-L1:  Disk osteophyte complex with boderline size to mild canal stenosis, bilateral neural foraminal narrowing.  moderate-to-severe on the left and at least moderate on the right. L1-2:  Disk osteophyte complex, which is asymmetrically prominent in the left foraminal to extraforaminal region.  Mild bilateral facet changes, mild right and moderate-to-severe left neural foraminal narrowing with borderline size canal.  L2-3:  Disk osteophyte complex, which is asymmetrically prominent in bilateral foraminal to extraforaminal regions, particularly on the right.  It extends to the right subarticular and central region also, suggestive of superimposed broad-based disk protrusion.  Severe canal stenosis, moderate left and severe right lateral recess  stenosis, moderate-to-severe bilateral neural foraminal narrowing.  Mild bilateral facet hypertrophic changes. L3-4:  Disk osteophyte complex with moderate bilateral facet hypertrophic changes and ligamentum flavum thickening with very severe canal stenosis, moderate-to-severe bilateral lateral recess stenosis, mild-to-moderate bilateral neural foraminal narrowing. L4-5:  Disk osteophyte complex with mild-to-moderate bilateral facet hypertrophic changes, severe canal stenosis, particularly in the transverse dimension, mild-to-moderate bilateral lateral recess stenosis and mild-to-moderate bilateral neural foraminal narrowing. L5-S1:  Disk osteophyte complex with bilateral facet hypertrophic changes, mild-to-moderate bilateral neural foraminal narrowing.  No canal stenosis.      Impression:  1.  Multilevel degenerative changes are noted, worse from L2-3 to L4-5 as described above.  2.  Interval new severe compression deformity of T12 with bone edema and displacement of fragments, both anterior and posteriorly.  New since 2016.  There is more than 90% loss of T12 vertebral body height in the mid body. 3.  New superior endplate compression deformity of L4 with edema in endplates and adjacent disks.  Given the lack of enhancement, it is less likely to be diskitis and is probably related to compression deformity.  4.  Chronic stable compression inferior endplate deformity of L2.        Allergies:  is allergic to gabapentin; levofloxacin; and statins.      History of Present Illness:  Pt presented to his PCP for ongoing back pain and a new ulcer on his sacrum due to his limited mobility due to his back pain. Found to be in Afib with RVR and with increased LE edema, admitted to the hospital from clinic with presumed exacerbation of his Diastolic Heart Failure.     Hospital Course  Pt had a complicated course and a non-specific presentation of symptoms. Was afebrile at first during his hospitalization complicating the  clinical picture. Endocarditis could not be confirmed until late in the hospital course due to pt's HR instability from his afib w/ RVR. Though pt was treated with appropriate antibiotics for cultured organisms and though  his infectious markers were mostly improving, the pt continued to decline. Due to his extensive medical co morbidities, he was deemed not a surgical candidate.    He had episodes of typical cardiac chest pain with troponin elevations, Recurrent Tachyarhythmias, A generalized seizure despite being on keppra, respiratory failure requiring mechanical ventilation, coma and shock.     The patient passed away while on mechanical ventilation, and 3 pressors. The family had requested no chest compressions, so CPR was not performed on asystole. Time of death was 08:28.    Last Lab Results:   Lab Results (most recent)     Procedure Component Value Units Date/Time    Blood Culture - Blood, [[id number]]  (Normal) Collected:  07/16/18 0425    Specimen:  Blood from Arm, Left Updated:  07/21/18 0645     Blood Culture No growth at 5 days    Blood Culture - Blood, [[id number]]  (Normal) Collected:  07/15/18 1910    Specimen:  Blood from Arm, Right Updated:  07/20/18 1915     Blood Culture No growth at 5 days    Blood Gas, Arterial [[id number]]  (Abnormal) Collected:  07/20/18 0733    Specimen:  Arterial Blood Updated:  07/20/18 0743     Site Right Brachial     Dank's Test N/A     pH, Arterial 7.021 (C) pH units      pCO2, Arterial 58.6 (H) mm Hg      pO2, Arterial 118.0 (H) mm Hg      HCO3, Arterial 15.2 (L) mmol/L      Base Excess, Arterial -15.5 (L) mmol/L      O2 Saturation, Arterial 96.3 %      Hemoglobin, Blood Gas 10.0 (L) g/dL      Temperature 37.0 C      Barometric Pressure for Blood Gas 736 mmHg      Modality Ventilator     FIO2 100 %      Ventilator Mode AC     Set Tidal Volume 500     Set Mech Resp Rate 22.0     PEEP 8.0     Notified Who RB     Notified By [id number]     Notified Time 07/20/2018 07:42      Collected by [id number]     pCO2, Temperature Corrected 58.6 (H) mm Hg      pH, Temp Corrected 7.021 (C) pH Units      pO2, Temperature Corrected 118 (H) mm Hg     Basic Metabolic Panel [[id number]]  (Abnormal) Collected:  07/20/18 0525    Specimen:  Blood Updated:  07/20/18 0609     Glucose 111 (H) mg/dL      BUN 29 (H) mg/dL      Creatinine 2.05 (H) mg/dL      Sodium 144 mmol/L      Potassium 5.6 (H) mmol/L      Chloride 102 mmol/L      CO2 17.2 (L) mmol/L      Calcium 8.8 mg/dL      eGFR Non African Amer 31 (L) mL/min/1.73      BUN/Creatinine Ratio 14.1     Anion Gap 24.8 mmol/L     Narrative:       The MDRD GFR formula is only valid for adults with stable renal function between ages 18 and 70.    CBC & Differential [[id number]] Collected:  07/20/18 0525    Specimen:  Blood Updated:  07/20/18 0609    Narrative:       The following orders were created for panel order CBC & Differential.  Procedure                               Abnormality         Status                     ---------                               -----------         ------                     Manual Differential[[id number]]          Abnormal            Final result               Scan Slide[[id number]]                                                                  CBC Auto Differential[[id number]]        Abnormal            Final result                 Please view results for these tests on the individual orders.    CBC Auto Differential [[id number]]  (Abnormal) Collected:  07/20/18 0525    Specimen:  Blood Updated:  07/20/18 0609     WBC 39.74 (H) 10*3/mm3      RBC 4.11 (L) 10*6/mm3      Hemoglobin 10.1 (L) g/dL      Hematocrit 33.4 (L) %      MCV 81.3 fL      MCH 24.6 (L) pg      MCHC 30.2 (L) g/dL      RDW 19.9 (H) %      RDW-SD 56.6 (H) fl      MPV 9.8 fL      Platelets 479 10*3/mm3     Manual Differential [[id number]]  (Abnormal) Collected:  07/20/18 0525    Specimen:  Blood Updated:  07/20/18 0609     Neutrophil % 86.0 (H) %      Lymphocyte % 4.0 (L) %       Monocyte % 1.0 (L) %      Bands %  9.0 (H) %      Metamyelocyte % 0.0 %      Myelocyte % 0.0 %      Promyelocyte % 0.0 %      Atypical Lymphocyte % 0.0 %      Blasts % 0.0 %      Plasma Cells % 0.0 %      Prolymphocyte % 0.0 %      Other Cells % 0.0 %      Neutrophils Absolute 37.75 (H) 10*3/mm3      Lymphocytes Absolute 1.59 10*3/mm3      Monocytes Absolute 0.40 10*3/mm3      Acanthocytes Slight/1+     Anisocytosis Slight/1+     [place] Cells Slight/1+     Macrocytes Mod/2+     WBC Morphology Normal     Platelet Morphology Normal    Blood Gas, Arterial [[id number]]  (Abnormal) Collected:  07/20/18 0518    Specimen:  Arterial Blood Updated:  07/20/18 0531     Site Right Brachial     Dank's Test N/A     pH, Arterial 7.147 (C) pH units      pCO2, Arterial 44.1 mm Hg      pO2, Arterial 155.0 (H) mm Hg      HCO3, Arterial 15.3 (L) mmol/L      Base Excess, Arterial -13.0 (L) mmol/L      O2 Saturation, Arterial 98.8 %      Hemoglobin, Blood Gas 10.7 (L) g/dL      Temperature 37.0 C      Barometric Pressure for Blood Gas 736 mmHg      Modality Ventilator     FIO2 100 %      Ventilator Mode AC     Set Tidal Volume 500     Set Mech Resp Rate 16.0     PEEP 5.0     Notified Who RB     Notified By [id number]     Notified Time 07/20/2018 05:31     Collected by [id number]     pCO2, Temperature Corrected 44.1 mm Hg      pH, Temp Corrected 7.147 (C) pH Units      pO2, Temperature Corrected 155 (H) mm Hg     Potassium [[id number]]  (Normal) Collected:  07/19/18 2145    Specimen:  Blood Updated:  07/19/18 2213     Potassium 4.2 mmol/L     POC Glucose Once [[id number]]  (Abnormal) Collected:  07/19/18 2157    Specimen:  Blood Updated:  07/19/18 2205     Glucose 149 (H) mg/dL     Narrative:       Meter: [id number] : [id number] Ranjit Awan RN    POC Glucose Once [[id number]]  (Normal) Collected:  07/19/18 1758    Specimen:  Blood Updated:  07/19/18 2205     Glucose 128 mg/dL     Narrative:       Meter: [id number] : [id number] [place]  Lashell Carcamo RN    Blood Gas, Arterial [[id number]]  (Abnormal) Collected:  07/19/18 2005    Specimen:  Arterial Blood Updated:  07/19/18 2012     Site Right Brachial     Dank's Test N/A     pH, Arterial 7.342 (L) pH units      pCO2, Arterial 43.6 mm Hg      pO2, Arterial 70.7 (L) mm Hg      HCO3, Arterial 23.7 mmol/L      Base Excess, Arterial -2.1 (L) mmol/L      O2 Saturation, Arterial 92.1 (L) %      Hemoglobin, Blood Gas 11.0 (L) g/dL      Temperature 37.0 C      Barometric Pressure for Blood Gas 737 mmHg      Modality Ventilator     FIO2 100 %      Ventilator Mode AC     Set Tidal Volume 500     Set Mech Resp Rate 16.0     PEEP 5.0     Collected by [id number]     pCO2, Temperature Corrected 43.6 mm Hg      pH, Temp Corrected 7.342 (L) pH Units      pO2, Temperature Corrected 70.7 (L) mm Hg     POC Glucose Once [[id number]]  (Normal) Collected:  07/19/18 1307    Specimen:  Blood Updated:  07/19/18 1316     Glucose 113 mg/dL     Narrative:       Meter: [id number] : [id number] Luz Torres NURSING ASSISTANT    POC Glucose Once [[id number]]  (Normal) Collected:  07/19/18 0648    Specimen:  Blood Updated:  07/19/18 0702     Glucose 80 mg/dL     Narrative:       Meter: [id number] : [id number] Shady Lee RN    Sedimentation Rate [[id number]]  (Abnormal) Collected:  07/19/18 0531    Specimen:  Blood Updated:  07/19/18 0637     Sed Rate 25 (H) mm/hr     Basic Metabolic Panel [[id number]]  (Abnormal) Collected:  07/19/18 0531    Specimen:  Blood Updated:  07/19/18 0634     Glucose 65 mg/dL      BUN 23 mg/dL      Creatinine 0.72 (L) mg/dL      Sodium 129 (L) mmol/L      Potassium 3.1 (L) mmol/L      Chloride 96 (L) mmol/L      CO2 20.9 (L) mmol/L      Calcium 6.9 (L) mg/dL      eGFR Non African Amer 105 mL/min/1.73      BUN/Creatinine Ratio 31.9 (H)     Anion Gap 12.1 mmol/L     Narrative:       The MDRD GFR formula is only valid for adults with stable renal function between ages 18 and 70.    C-reactive Protein  [[id number]]  (Abnormal) Collected:  07/19/18 0531    Specimen:  Blood Updated:  07/19/18 0622     C-Reactive Protein 4.13 (H) mg/dL     CBC & Differential [[id number]] Collected:  07/19/18 0531    Specimen:  Blood Updated:  07/19/18 0615    Narrative:       The following orders were created for panel order CBC & Differential.  Procedure                               Abnormality         Status                     ---------                               -----------         ------                     CBC Auto Differential[[id number]]        Abnormal            Final result                 Please view results for these tests on the individual orders.    CBC Auto Differential [[id number]]  (Abnormal) Collected:  07/19/18 0531    Specimen:  Blood Updated:  07/19/18 0615     WBC 9.22 10*3/mm3      RBC 3.24 (L) 10*6/mm3      Hemoglobin 7.8 (L) g/dL      Hematocrit 24.4 (L) %      MCV 75.3 (L) fL      MCH 24.1 (L) pg      MCHC 32.0 g/dL      RDW 18.6 (H) %      RDW-SD 50.4 fl      MPV 9.5 fL      Platelets 484 10*3/mm3      Neutrophil % 73.5 (H) %      Lymphocyte % 16.2 (L) %      Monocyte % 9.0 (H) %      Eosinophil % 0.8 %      Basophil % 0.2 %      Immature Grans % 0.3 %      Neutrophils, Absolute 6.78 10*3/mm3      Lymphocytes, Absolute 1.49 10*3/mm3      Monocytes, Absolute 0.83 10*3/mm3      Eosinophils, Absolute 0.07 (L) 10*3/mm3      Basophils, Absolute 0.02 10*3/mm3      Immature Grans, Absolute 0.03 10*3/mm3      nRBC 0.0 /100 WBC     POC Glucose Once [[id number]]  (Normal) Collected:  07/19/18 0017    Specimen:  Blood Updated:  07/19/18 0023     Glucose 84 mg/dL     Narrative:       Meter: [id number] : [id number] Pope Maycol ROSARIO    POC Glucose Once [[id number]]  (Abnormal) Collected:  07/18/18 2111    Specimen:  Blood Updated:  07/18/18 2127     Glucose 136 (H) mg/dL     Narrative:       Meter: [id number] : [id number] Shady Lee RN    POC Glucose Once [[id number]]  (Abnormal) Collected:  07/18/18 2011     Specimen:  Blood Updated:  07/18/18 2018     Glucose 67 (L) mg/dL     Narrative:       Meter: [id number] : [id number] Shady Lee RN    Potassium [[id number]]  (Normal) Collected:  07/18/18 1754    Specimen:  Blood Updated:  07/18/18 1834     Potassium 4.4 mmol/L     Magnesium [[id number]]  (Normal) Collected:  07/18/18 0426    Specimen:  Blood Updated:  07/18/18 0847     Magnesium 2.1 mg/dL     Basic Metabolic Panel [[id number]]  (Abnormal) Collected:  07/18/18 0426    Specimen:  Blood from Arm, Left Updated:  07/18/18 0650     Glucose 66 mg/dL      BUN 26 (H) mg/dL      Creatinine 1.04 mg/dL      Sodium 141 mmol/L      Potassium 3.5 mmol/L      Chloride 98 mmol/L      CO2 30.0 (H) mmol/L      Calcium 8.6 (L) mg/dL      eGFR Non African Amer 69 mL/min/1.73      BUN/Creatinine Ratio 25.0     Anion Gap 13.0 mmol/L     Narrative:       The MDRD GFR formula is only valid for adults with stable renal function between ages 18 and 70.    CBC & Differential [[id number]] Collected:  07/18/18 0426    Specimen:  Blood Updated:  07/18/18 0628    Narrative:       The following orders were created for panel order CBC & Differential.  Procedure                               Abnormality         Status                     ---------                               -----------         ------                     CBC Auto Differential[[id number]]        Abnormal            Final result                 Please view results for these tests on the individual orders.    CBC Auto Differential [[id number]]  (Abnormal) Collected:  07/18/18 0426    Specimen:  Blood from Arm, Left Updated:  07/18/18 0628     WBC 11.24 (H) 10*3/mm3      RBC 3.70 (L) 10*6/mm3      Hemoglobin 8.8 (L) g/dL      Hematocrit 28.1 (L) %      MCV 75.9 (L) fL      MCH 23.8 (L) pg      MCHC 31.3 g/dL      RDW 18.9 (H) %      RDW-SD 51.3 fl      MPV 9.8 fL      Platelets 573 (H) 10*3/mm3      Neutrophil % 68.4 %      Lymphocyte % 20.4 %      Monocyte % 9.4 (H) %       Eosinophil % 1.0 %      Basophil % 0.4 %      Immature Grans % 0.4 %      Neutrophils, Absolute 7.69 10*3/mm3      Lymphocytes, Absolute 2.29 10*3/mm3      Monocytes, Absolute 1.06 (H) 10*3/mm3      Eosinophils, Absolute 0.11 10*3/mm3      Basophils, Absolute 0.05 10*3/mm3      Immature Grans, Absolute 0.04 (H) 10*3/mm3      nRBC 0.0 /100 WBC     Fungus Culture, Blood - Blood, Arm, Left [[id number]] Collected:  07/10/18 1130    Specimen:  Blood from Arm, Left Updated:  07/17/18 1745     Fungus Culture No fungus isolated at 1 week    Fungus Culture, Blood - Blood, Arm, Left [[id number]] Collected:  07/10/18 1200    Specimen:  Blood from Hand, Left Updated:  07/17/18 1745     Fungus Culture No fungus isolated at 1 week    Procalcitonin [[id number]]  (Abnormal) Collected:  07/17/18 0722    Specimen:  Blood Updated:  07/17/18 1029     Procalcitonin 0.08 (L) ng/mL     Narrative:       As a Marker for Sepsis (Non-Neonates):   1. <0.5 ng/mL represents a low risk of severe sepsis and/or septic shock.  2. >2 ng/mL represents a high risk of severe sepsis and/or septic shock.    As a Marker for Lower Respiratory Tract Infections that require antibiotic therapy:    PCT on Admission     Antibiotic Therapy       6-12 Hrs later  > 0.5                Strongly Recommended             >0.25 - <0.5         Recommended  0.1 - 0.25           Discouraged              Remeasure/reassess PCT  <0.1                 Strongly Discouraged     Remeasure/reassess PCT                     PCT values of < 0.5 ng/mL do not exclude an infection, because localized infections (without systemic signs) may be associated with such low concentrations, or a systemic infection in its initial stages (< 6 hours). Furthermore, increased PCT can occur without infection. PCT concentrations between 0.5 and 2.0 ng/mL should be interpreted taking into account the patient's history. It is recommended to retest PCT within 6-24 hours if any concentrations < 2 ng/mL are  obtained.    Digoxin Level [[id number]]  (Abnormal) Collected:  07/17/18 0722    Specimen:  Blood Updated:  07/17/18 0954     Digoxin 0.55 (L) ng/mL     Sedimentation Rate [[id number]]  (Abnormal) Collected:  07/17/18 0722    Specimen:  Blood Updated:  07/17/18 0836     Sed Rate 52 (H) mm/hr     Comprehensive Metabolic Panel [[id number]]  (Abnormal) Collected:  07/17/18 0722    Specimen:  Blood Updated:  07/17/18 0816     Glucose 87 mg/dL      BUN 23 mg/dL      Creatinine 1.14 mg/dL      Sodium 137 mmol/L      Potassium 3.5 mmol/L      Chloride 97 (L) mmol/L      CO2 29.6 (H) mmol/L      Calcium 8.7 (L) mg/dL      Total Protein 5.8 (L) g/dL      Albumin 2.60 (L) g/dL      ALT (SGPT) 15 U/L      AST (SGOT) 20 U/L      Alkaline Phosphatase 125 U/L      Total Bilirubin 0.8 mg/dL      eGFR Non African Amer 62 mL/min/1.73      Globulin 3.2 gm/dL      A/G Ratio 0.8 g/dL      BUN/Creatinine Ratio 20.2     Anion Gap 10.4 mmol/L     Narrative:       The MDRD GFR formula is only valid for adults with stable renal function between ages 18 and 70.    C-reactive Protein [[id number]]  (Abnormal) Collected:  07/17/18 0722    Specimen:  Blood Updated:  07/17/18 0815     C-Reactive Protein 3.99 (H) mg/dL     CBC & Differential [[id number]] Collected:  07/17/18 0722    Specimen:  Blood Updated:  07/17/18 0805    Narrative:       The following orders were created for panel order CBC & Differential.  Procedure                               Abnormality         Status                     ---------                               -----------         ------                     CBC Auto Differential[[id number]]        Abnormal            Final result                 Please view results for these tests on the individual orders.    CBC Auto Differential [[id number]]  (Abnormal) Collected:  07/17/18 0722    Specimen:  Blood Updated:  07/17/18 0805     WBC 14.43 (H) 10*3/mm3      RBC 3.80 (L) 10*6/mm3      Hemoglobin 9.3 (L) g/dL      Hematocrit 28.5  (L) %      MCV 75.0 (L) fL      MCH 24.5 (L) pg      MCHC 32.6 g/dL      RDW 18.6 (H) %      RDW-SD 49.7 fl      MPV 9.1 fL      Platelets 562 (H) 10*3/mm3      Neutrophil % 67.2 %      Lymphocyte % 19.4 (L) %      Monocyte % 11.9 (H) %      Eosinophil % 0.3 %      Basophil % 0.6 %      Immature Grans % 0.6 (H) %      Neutrophils, Absolute 9.70 (H) 10*3/mm3      Lymphocytes, Absolute 2.80 10*3/mm3      Monocytes, Absolute 1.71 (H) 10*3/mm3      Eosinophils, Absolute 0.04 (L) 10*3/mm3      Basophils, Absolute 0.09 10*3/mm3      Immature Grans, Absolute 0.09 (H) 10*3/mm3      nRBC 0.0 /100 WBC     Comprehensive Metabolic Panel [[id number]]  (Abnormal) Collected:  07/16/18 0425    Specimen:  Blood from Arm, Left Updated:  07/16/18 0618     Glucose 93 mg/dL      BUN 16 mg/dL      Creatinine 0.98 mg/dL      Sodium 139 mmol/L      Potassium 3.9 mmol/L      Chloride 97 (L) mmol/L      CO2 32.1 (H) mmol/L      Calcium 9.2 mg/dL      Total Protein 6.3 g/dL      Albumin 2.90 (L) g/dL      ALT (SGPT) 18 U/L      AST (SGOT) 28 U/L      Alkaline Phosphatase 152 (H) U/L      Total Bilirubin 0.9 mg/dL      eGFR Non African Amer 74 mL/min/1.73      Globulin 3.4 gm/dL      A/G Ratio 0.9 g/dL      BUN/Creatinine Ratio 16.3     Anion Gap 9.9 mmol/L     Narrative:       The MDRD GFR formula is only valid for adults with stable renal function between ages 18 and 70.    CBC (No Diff) [[id number]]  (Abnormal) Collected:  07/16/18 0425    Specimen:  Blood from Arm, Left Updated:  07/16/18 0610     WBC 15.53 (H) 10*3/mm3      RBC 3.87 (L) 10*6/mm3      Hemoglobin 9.4 (L) g/dL      Hematocrit 28.6 (L) %      MCV 73.9 (L) fL      MCH 24.3 (L) pg      MCHC 32.9 g/dL      RDW 18.3 (H) %      RDW-SD 47.9 fl      MPV 9.7 fL      Platelets 654 (H) 10*3/mm3     Comprehensive Metabolic Panel [[id number]]  (Abnormal) Collected:  07/15/18 1902    Specimen:  Blood Updated:  07/15/18 1929     Glucose 114 (H) mg/dL      BUN 17 mg/dL      Creatinine 1.08  mg/dL      Sodium 133 (L) mmol/L      Potassium 3.5 mmol/L      Chloride 92 (L) mmol/L      CO2 32.4 (H) mmol/L      Calcium 9.3 mg/dL      Total Protein 6.4 g/dL      Albumin 3.10 (L) g/dL      ALT (SGPT) 20 U/L      AST (SGOT) 29 U/L      Alkaline Phosphatase 149 (H) U/L      Total Bilirubin 1.0 mg/dL      eGFR Non African Amer 66 mL/min/1.73      Globulin 3.3 gm/dL      A/G Ratio 0.9 g/dL      BUN/Creatinine Ratio 15.7     Anion Gap 8.6 mmol/L     Narrative:       The MDRD GFR formula is only valid for adults with stable renal function between ages 18 and 70.    CBC (No Diff) [[id number]]  (Abnormal) Collected:  07/15/18 1901    Specimen:  Blood Updated:  07/15/18 1914     WBC 14.75 (H) 10*3/mm3      RBC 3.96 (L) 10*6/mm3      Hemoglobin 9.7 (L) g/dL      Hematocrit 29.4 (L) %      MCV 74.2 (L) fL      MCH 24.5 (L) pg      MCHC 33.0 g/dL      RDW 18.0 (H) %      RDW-SD 47.3 fl      MPV 9.4 fL      Platelets 664 (H) 10*3/mm3     Basic Metabolic Panel [[id number]]  (Abnormal) Collected:  07/15/18 0446    Specimen:  Blood from Arm, Right Updated:  07/15/18 0545     Glucose 100 (H) mg/dL      BUN 15 mg/dL      Creatinine 0.96 mg/dL      Sodium 140 mmol/L      Potassium 4.4 mmol/L      Chloride 98 mmol/L      CO2 31.1 (H) mmol/L      Calcium 9.2 mg/dL      eGFR Non African Amer 75 mL/min/1.73      BUN/Creatinine Ratio 15.6     Anion Gap 10.9 mmol/L     Narrative:       The MDRD GFR formula is only valid for adults with stable renal function between ages 18 and 70.    Potassium [[id number]]  (Normal) Collected:  07/14/18 1655    Specimen:  Blood Updated:  07/14/18 1725     Potassium 3.9 mmol/L     Blood Culture - Blood, [[id number]]  (Abnormal) Collected:  07/11/18 1008    Specimen:  Blood from Hand, Right Updated:  07/14/18 0625     Blood Culture Enterococcus faecalis (A)     Gram Stain Result Aerobic Bottle Gram positive cocci in chains    Narrative:       Refer to previous blood culture collected on 07/10 at 1200 for  CRUZ's.     Blood Culture - Blood, [[id number]]  (Abnormal) Collected:  07/11/18 1009    Specimen:  Blood from Hand, Left Updated:  07/14/18 0624     Blood Culture Enterococcus faecalis (A)     Gram Stain Result Anaerobic Bottle Gram positive cocci in chains      Aerobic Bottle Gram positive cocci in chains    Narrative:       Refer to previous blood culture collected on 07/10 at 1200 for CRUZ's.     Blood Culture - Blood, Arm, Left [[id number]]  (Abnormal) Collected:  07/10/18 1130    Specimen:  Blood from Arm, Left Updated:  07/14/18 0624     Blood Culture Enterococcus faecalis (A)     Gram Stain Result Aerobic Bottle Gram positive cocci in chains      Aerobic Bottle Gram negative bacilli    Narrative:       Refer to previous blood culture collected on 07/10 at 1200 for CRUZ's.     Blood Culture - Blood, Arm, Left [[id number]]  (Abnormal)  (Susceptibility) Collected:  07/10/18 1200    Specimen:  Blood from Arm, Left Updated:  07/14/18 0623     Blood Culture Escherichia coli (A)      Enterococcus faecalis (A)     Gram Stain Result Aerobic Bottle Gram positive cocci in chains    Susceptibility      Escherichia coli    Enterococcus faecalis       CRUZ    CRUZ       Ampicillin 4 ug/ml Susceptible    <=2 ug/ml Susceptible       Ampicillin + Sulbactam <=2 ug/ml Susceptible             Cefazolin <=4 ug/ml Susceptible             Cefepime <=1 ug/ml Susceptible             Cefoxitin <=4 ug/ml Susceptible             Ceftriaxone <=1 ug/ml Susceptible             Ertapenem <=0.5 ug/ml Susceptible             Gentamicin <=1 ug/ml Susceptible             Gentamicin High Level Synergy       SYN-S  Susceptible       Levofloxacin <=0.12 ug/ml Susceptible             Meropenem <=0.25 ug/ml Susceptible             Piperacillin + Tazobactam <=4 ug/ml Susceptible             Trimethoprim + Sulfamethoxazole <=20 ug/ml Susceptible             Vancomycin       1 ug/ml Susceptible                      Comprehensive Metabolic Panel [[id number]]   (Abnormal) Collected:  07/14/18 0447    Specimen:  Blood from Arm, Right Updated:  07/14/18 0559     Glucose 103 (H) mg/dL      BUN 15 mg/dL      Creatinine 0.92 mg/dL      Sodium 140 mmol/L      Potassium 3.2 (L) mmol/L      Chloride 98 mmol/L      CO2 30.5 (H) mmol/L      Calcium 9.2 mg/dL      Total Protein 5.9 (L) g/dL      Albumin 2.70 (L) g/dL      ALT (SGPT) 16 U/L      AST (SGOT) 28 U/L      Alkaline Phosphatase 146 (H) U/L      Total Bilirubin 0.9 mg/dL      eGFR Non African Amer 79 mL/min/1.73      Globulin 3.2 gm/dL      A/G Ratio 0.8 g/dL      BUN/Creatinine Ratio 16.3     Anion Gap 11.5 mmol/L     Narrative:       The MDRD GFR formula is only valid for adults with stable renal function between ages 18 and 70.    Troponin [[id number]]  (Abnormal) Collected:  07/14/18 0447    Specimen:  Blood from Arm, Right Updated:  07/14/18 0552     Troponin T 0.107 (C) ng/mL     Narrative:       Troponin T Reference Ranges:  Less than 0.03 ng/mL:    Negative for AMI  0.03 to 0.09 ng/mL:      Indeterminant for AMI  Greater than 0.09 ng/mL: Positive for AMI    Procalcitonin [[id number]]  (Normal) Collected:  07/14/18 0447    Specimen:  Blood from Arm, Right Updated:  07/14/18 0548     Procalcitonin 0.11 ng/mL     Narrative:       As a Marker for Sepsis (Non-Neonates):   1. <0.5 ng/mL represents a low risk of severe sepsis and/or septic shock.  2. >2 ng/mL represents a high risk of severe sepsis and/or septic shock.    As a Marker for Lower Respiratory Tract Infections that require antibiotic therapy:    PCT on Admission     Antibiotic Therapy       6-12 Hrs later  > 0.5                Strongly Recommended             >0.25 - <0.5         Recommended  0.1 - 0.25           Discouraged              Remeasure/reassess PCT  <0.1                 Strongly Discouraged     Remeasure/reassess PCT                     PCT values of < 0.5 ng/mL do not exclude an infection, because localized infections (without systemic signs) may be  associated with such low concentrations, or a systemic infection in its initial stages (< 6 hours). Furthermore, increased PCT can occur without infection. PCT concentrations between 0.5 and 2.0 ng/mL should be interpreted taking into account the patient's history. It is recommended to retest PCT within 6-24 hours if any concentrations < 2 ng/mL are obtained.    Sedimentation Rate [[id number]]  (Normal) Collected:  07/14/18 0447    Specimen:  Blood from Arm, Right Updated:  07/14/18 0540     Sed Rate 10 mm/hr     CBC & Differential [[id number]] Collected:  07/14/18 0447    Specimen:  Blood Updated:  07/14/18 0539    Narrative:       The following orders were created for panel order CBC & Differential.  Procedure                               Abnormality         Status                     ---------                               -----------         ------                     Scan Slide[[id number]]                                                                  CBC Auto Differential[[id number]]        Abnormal            Final result                 Please view results for these tests on the individual orders.    CBC Auto Differential [[id number]]  (Abnormal) Collected:  07/14/18 0447    Specimen:  Blood from Arm, Right Updated:  07/14/18 0539     WBC 15.32 (H) 10*3/mm3      RBC 3.74 (L) 10*6/mm3      Hemoglobin 9.1 (L) g/dL      Hematocrit 27.6 (L) %      MCV 73.8 (L) fL      MCH 24.3 (L) pg      MCHC 33.0 g/dL      RDW 18.0 (H) %      RDW-SD 47.8 fl      MPV 9.5 fL      Platelets 619 (H) 10*3/mm3      Neutrophil % 75.7 (H) %      Lymphocyte % 11.9 (L) %      Monocyte % 11.0 (H) %      Eosinophil % 0.5 %      Basophil % 0.4 %      Immature Grans % 0.5 %      Neutrophils, Absolute 11.61 (H) 10*3/mm3      Lymphocytes, Absolute 1.82 10*3/mm3      Monocytes, Absolute 1.69 (H) 10*3/mm3      Eosinophils, Absolute 0.07 (L) 10*3/mm3      Basophils, Absolute 0.06 10*3/mm3      Immature Grans, Absolute 0.07 (H) 10*3/mm3      nRBC  0.0 /100 WBC     Narrative:       Appended report. These results have been appended to a previously verified report.    C-reactive Protein [[id number]]  (Abnormal) Collected:  07/14/18 0447    Specimen:  Blood from Arm, Right Updated:  07/14/18 0538     C-Reactive Protein 7.28 (H) mg/dL     Troponin [[id number]]  (Abnormal) Collected:  07/13/18 1259    Specimen:  Blood Updated:  07/13/18 1332     Troponin T 0.135 (C) ng/mL     Narrative:       Troponin T Reference Ranges:  Less than 0.03 ng/mL:    Negative for AMI  0.03 to 0.09 ng/mL:      Indeterminant for AMI  Greater than 0.09 ng/mL: Positive for AMI    Hemoglobin A1c [[id number]]  (Normal) Collected:  07/13/18 0225    Specimen:  Blood Updated:  07/13/18 0916     Hemoglobin A1C 5.00 %     Narrative:       Hemoglobin A1C Ranges:    Increased Risk for Diabetes  5.7% to 6.4%  Diabetes                     >= 6.5%  Diabetic Goal                < 7.0%    Troponin [[id number]]  (Abnormal) Collected:  07/13/18 0551    Specimen:  Blood Updated:  07/13/18 0624     Troponin T 0.073 (H) ng/mL     Narrative:       Troponin T Reference Ranges:  Less than 0.03 ng/mL:    Negative for AMI  0.03 to 0.09 ng/mL:      Indeterminant for AMI  Greater than 0.09 ng/mL: Positive for AMI    Troponin [[id number]]  (Normal) Collected:  07/13/18 0225    Specimen:  Blood Updated:  07/13/18 0245     Troponin T 0.028 ng/mL     Narrative:       Troponin T Reference Ranges:  Less than 0.03 ng/mL:    Negative for AMI  0.03 to 0.09 ng/mL:      Indeterminant for AMI  Greater than 0.09 ng/mL: Positive for AMI    Basic Metabolic Panel [[id number]]  (Abnormal) Collected:  07/13/18 0225    Specimen:  Blood Updated:  07/13/18 0244     Glucose 116 (H) mg/dL      BUN 20 mg/dL      Creatinine 0.99 mg/dL      Sodium 134 (L) mmol/L      Potassium 4.0 mmol/L      Chloride 97 (L) mmol/L      CO2 25.0 mmol/L      Calcium 9.2 mg/dL      eGFR Non African Amer 73 mL/min/1.73      BUN/Creatinine Ratio 20.2     Anion Gap  12.0 mmol/L     Narrative:       The MDRD GFR formula is only valid for adults with stable renal function between ages 18 and 70.    CBC & Differential [[id number]] Collected:  07/13/18 0225    Specimen:  Blood Updated:  07/13/18 0234    Narrative:       The following orders were created for panel order CBC & Differential.  Procedure                               Abnormality         Status                     ---------                               -----------         ------                     Scan Slide[[id number]]                                                                  CBC Auto Differential[[id number]]        Abnormal            Final result                 Please view results for these tests on the individual orders.    CBC Auto Differential [[id number]]  (Abnormal) Collected:  07/13/18 0225    Specimen:  Blood Updated:  07/13/18 0229     WBC 18.33 (H) 10*3/mm3      RBC 3.67 (L) 10*6/mm3      Hemoglobin 9.0 (L) g/dL      Hematocrit 26.9 (L) %      MCV 73.3 (L) fL      MCH 24.5 (L) pg      MCHC 33.5 g/dL      RDW 18.0 (H) %      RDW-SD 47.5 fl      MPV 9.1 fL      Platelets 561 (H) 10*3/mm3      Neutrophil % 75.2 (H) %      Lymphocyte % 15.3 (L) %      Monocyte % 8.7 (H) %      Eosinophil % 0.2 %      Basophil % 0.2 %      Immature Grans % 0.4 %      Neutrophils, Absolute 13.78 (H) 10*3/mm3      Lymphocytes, Absolute 2.80 10*3/mm3      Monocytes, Absolute 1.60 (H) 10*3/mm3      Eosinophils, Absolute 0.04 (L) 10*3/mm3      Basophils, Absolute 0.03 10*3/mm3      Immature Grans, Absolute 0.08 (H) 10*3/mm3      nRBC 0.0 /100 WBC     Troponin [[id number]]  (Normal) Collected:  07/12/18 2336    Specimen:  Blood Updated:  07/12/18 9388     Troponin T <0.010 ng/mL     Narrative:       Troponin T Reference Ranges:  Less than 0.03 ng/mL:    Negative for AMI  0.03 to 0.09 ng/mL:      Indeterminant for AMI  Greater than 0.09 ng/mL: Positive for AMI    Vancomycin, Random [[id number]]  (Normal) Collected:  07/12/18 2201     Specimen:  Blood Updated:  07/12/18 2241     Vancomycin Random 25.60 mcg/mL     Gentamicin Level, Trough [[id number]]  (Normal) Collected:  07/12/18 1800    Specimen:  Blood Updated:  07/12/18 2120     Gentamicin Trough 0.60 mcg/mL     Potassium [[id number]]  (Normal) Collected:  07/11/18 2014    Specimen:  Blood Updated:  07/11/18 2034     Potassium 4.7 mmol/L     Blood Culture ID, PCR - Blood, [[id number]]  (Abnormal) Collected:  07/10/18 1130    Specimen:  Blood from Arm, Left Updated:  07/11/18 1109     BCID, PCR Enterococcus spp. Identification by BCID PCR. (C)     BCID, PCR 2 Escherichia coli. Identification by BCID PCR. (C)    Basic Metabolic Panel [[id number]]  (Abnormal) Collected:  07/11/18 0409    Specimen:  Blood Updated:  07/11/18 0521     Glucose 107 (H) mg/dL      BUN 22 mg/dL      Creatinine 1.03 mg/dL      Sodium 132 (L) mmol/L      Potassium 3.6 mmol/L      Chloride 95 (L) mmol/L      CO2 29.8 (H) mmol/L      Calcium 9.0 mg/dL      eGFR Non African Amer 69 mL/min/1.73      BUN/Creatinine Ratio 21.4     Anion Gap 7.2 mmol/L     Narrative:       The MDRD GFR formula is only valid for adults with stable renal function between ages 18 and 70.    CBC & Differential [[id number]] Collected:  07/11/18 0409    Specimen:  Blood Updated:  07/11/18 0505    Narrative:       The following orders were created for panel order CBC & Differential.  Procedure                               Abnormality         Status                     ---------                               -----------         ------                     Scan Slide[[id number]]                                                                  CBC Auto Differential[[id number]]        Abnormal            Final result                 Please view results for these tests on the individual orders.    CBC Auto Differential [[id number]]  (Abnormal) Collected:  07/11/18 0409    Specimen:  Blood Updated:  07/11/18 0504     WBC 19.67 (H) 10*3/mm3      RBC 3.71 (L)  10*6/mm3      Hemoglobin 9.0 (L) g/dL      Hematocrit 27.7 (L) %      MCV 74.7 (L) fL      MCH 24.3 (L) pg      MCHC 32.5 g/dL      RDW 18.2 (H) %      RDW-SD 49.3 fl      MPV 9.2 fL      Platelets 539 (H) 10*3/mm3      Neutrophil % 75.4 (H) %      Lymphocyte % 13.0 (L) %      Monocyte % 10.6 (H) %      Eosinophil % 0.2 %      Basophil % 0.2 %      Immature Grans % 0.6 (H) %      Neutrophils, Absolute 14.87 (H) 10*3/mm3      Lymphocytes, Absolute 2.55 10*3/mm3      Monocytes, Absolute 2.08 (H) 10*3/mm3      Eosinophils, Absolute 0.03 (L) 10*3/mm3      Basophils, Absolute 0.03 10*3/mm3      Immature Grans, Absolute 0.11 (H) 10*3/mm3      nRBC 0.0 /100 WBC     Urinalysis, Microscopic Only - Urine, Clean Catch [[id number]]  (Abnormal) Collected:  07/10/18 1649    Specimen:  Urine from Urine, Clean Catch Updated:  07/10/18 1758     RBC, UA 13-20 (A) /HPF      WBC, UA 0-2 (A) /HPF      Bacteria, UA None Seen /HPF      Squamous Epithelial Cells, UA 0-2 /HPF      Hyaline Casts, UA 0-2 /LPF      Methodology Manual Light Microscopy    Urinalysis With Culture If Indicated - Urine, Clean Catch [[id number]]  (Abnormal) Collected:  07/10/18 1649    Specimen:  Urine from Urine, Clean Catch Updated:  07/10/18 1743     Color, UA Dark Yellow (A)     Appearance, UA Clear     pH, UA <=5.0     Specific Gravity, UA 1.025     Comment: Result obtained by Refractometer        Glucose, UA Negative     Ketones, UA Trace (A)     Bilirubin, UA Small (1+) (A)     Blood, UA Moderate (2+) (A)     Protein, UA Negative     Leuk Esterase, UA Negative     Nitrite, UA Negative     Urobilinogen, UA 1.0 E.U./dL    TSH [[id number]]  (Normal) Collected:  07/10/18 0411    Specimen:  Blood Updated:  07/10/18 1238     TSH 0.814 mIU/mL     Procalcitonin [[id number]]  (Normal) Collected:  07/10/18 0411    Specimen:  Blood Updated:  07/10/18 1207     Procalcitonin 0.15 ng/mL     Narrative:       As a Marker for Sepsis (Non-Neonates):   1. <0.5 ng/mL represents a  low risk of severe sepsis and/or septic shock.  2. >2 ng/mL represents a high risk of severe sepsis and/or septic shock.    As a Marker for Lower Respiratory Tract Infections that require antibiotic therapy:    PCT on Admission     Antibiotic Therapy       6-12 Hrs later  > 0.5                Strongly Recommended             >0.25 - <0.5         Recommended  0.1 - 0.25           Discouraged              Remeasure/reassess PCT  <0.1                 Strongly Discouraged     Remeasure/reassess PCT                     PCT values of < 0.5 ng/mL do not exclude an infection, because localized infections (without systemic signs) may be associated with such low concentrations, or a systemic infection in its initial stages (< 6 hours). Furthermore, increased PCT can occur without infection. PCT concentrations between 0.5 and 2.0 ng/mL should be interpreted taking into account the patient's history. It is recommended to retest PCT within 6-24 hours if any concentrations < 2 ng/mL are obtained.    C-reactive Protein [[id number]]  (Abnormal) Collected:  07/10/18 0411    Specimen:  Blood Updated:  07/10/18 1207     C-Reactive Protein 9.27 (H) mg/dL     Sedimentation Rate [[id number]]  (Abnormal) Collected:  07/10/18 0411    Specimen:  Blood Updated:  07/10/18 1153     Sed Rate 35 (H) mm/hr     Magnesium [[id number]]  (Normal) Collected:  07/10/18 0411    Specimen:  Blood Updated:  07/10/18 0603     Magnesium 1.7 mg/dL     Basic Metabolic Panel [[id number]]  (Abnormal) Collected:  07/10/18 0411    Specimen:  Blood Updated:  07/10/18 0450     Glucose 102 (H) mg/dL      BUN 19 mg/dL      Creatinine 1.03 mg/dL      Sodium 136 mmol/L      Potassium 2.9 (L) mmol/L      Chloride 97 (L) mmol/L      CO2 26.6 mmol/L      Calcium 9.4 mg/dL      eGFR Non African Amer 69 mL/min/1.73      BUN/Creatinine Ratio 18.4     Anion Gap 12.4 mmol/L     Narrative:       The MDRD GFR formula is only valid for adults with stable renal function between ages 18  and 70.    CBC (No Diff) [[id number]]  (Abnormal) Collected:  07/10/18 0411    Specimen:  Blood Updated:  07/10/18 0423     WBC 17.08 (H) 10*3/mm3      RBC 3.93 (L) 10*6/mm3      Hemoglobin 9.5 (L) g/dL      Hematocrit 29.2 (L) %      MCV 74.3 (L) fL      MCH 24.2 (L) pg      MCHC 32.5 g/dL      RDW 18.4 (H) %      RDW-SD 49.1 fl      MPV 9.0 fL      Platelets 547 (H) 10*3/mm3     Comprehensive Metabolic Panel [[id number]]  (Abnormal) Collected:  07/09/18 1609    Specimen:  Blood Updated:  07/09/18 1631     Glucose 95 mg/dL      BUN 20 mg/dL      Creatinine 1.08 mg/dL      Sodium 136 mmol/L      Potassium 3.7 mmol/L      Chloride 95 (L) mmol/L      CO2 26.0 mmol/L      Calcium 9.9 mg/dL      Total Protein 6.9 g/dL      Albumin 3.40 (L) g/dL      ALT (SGPT) 18 U/L      AST (SGOT) 23 U/L      Alkaline Phosphatase 167 (H) U/L      Total Bilirubin 1.3 (H) mg/dL      eGFR Non African Amer 66 mL/min/1.73      Globulin 3.5 gm/dL      A/G Ratio 1.0 g/dL      BUN/Creatinine Ratio 18.5     Anion Gap 15.0 mmol/L     Narrative:       The MDRD GFR formula is only valid for adults with stable renal function between ages 18 and 70.    Magnesium [[id number]]  (Normal) Collected:  07/09/18 1609    Specimen:  Blood Updated:  07/09/18 1631     Magnesium 1.7 mg/dL     CBC (No Diff) [[id number]]  (Abnormal) Collected:  07/09/18 1609    Specimen:  Blood Updated:  07/09/18 1616     WBC 17.89 (H) 10*3/mm3      RBC 4.13 (L) 10*6/mm3      Hemoglobin 10.2 (L) g/dL      Hematocrit 31.0 (L) %      MCV 75.1 (L) fL      MCH 24.7 (L) pg      MCHC 32.9 g/dL      RDW 18.6 (H) %      RDW-SD 50.3 fl      MPV 9.6 fL      Platelets 547 (H) 10*3/mm3         Imaging Results (most recent)     Procedure Component Value Units Date/Time    XR Chest 1 View [[id number]] Collected:  07/20/18 0814     Updated:  07/20/18 0817    Narrative:       CHEST X-RAY, 7/20/2018 (07:53)         HISTORY:  Acute respiratory failure. Intubated. Follow-up cardiopulmonary status.      TECHNIQUE:  AP portable chest x-ray.     FINDINGS:  Endotracheal tube in good position with tip in the mid thoracic trachea  5.7 cm above the steven.     Improved overall lung expansion since the previous study obtained last  evening. Improved bibasilar atelectasis.     CABG. Heart size normal. Pulmonary vascularity is normal. PICC in good  position.       Impression:       1. ETT and PICC in good position.  2. Improved overall lung expansion and improvement in bibasilar  atelectasis since last evening.     This report was finalized on 7/20/2018 8:15 AM by Dr. Jefferson Boewn MD.       XR Chest 1 View [[id number]] Collected:  07/20/18 0726     Updated:  07/20/18 0730    Narrative:       CHEST X-RAY, 7/19/2018         HISTORY:  Acute respiratory failure, hypoxia. Endotracheal tube placement. Sepsis,  bacteremia, metabolic encephalopathy.     TECHNIQUE:  AP portable upright chest x-ray.     FINDINGS:  Newly placed endotracheal tube tip is in the upper thoracic trachea  about 6.8 cm above the steven.     Worsening consolidation and atelectasis in the right lower lung.  Persistent infiltrate and atelectasis in the left lung base.     Heart size normal. CABG. PICC in good position.       Impression:       1. Endotracheal tube tip in the upper thoracic trachea as noted.  2. Worsening consolidation and volume loss in the right lung base.  3. Initial stat report to the ICU from Dr. Bill Mustafa at 2125 hours on  7/19/2018.     This report was finalized on 7/20/2018 7:28 AM by Dr. Jefferson Bowen MD.       XR Chest 1 View [[id number]] Collected:  07/19/18 1308     Updated:  07/19/18 1315    Narrative:       CHEST X-RAY, 7/19/2018 (12:41)         HISTORY:  82-year-old male with sepsis, bacteremia, metabolic encephalopathy.  Worsening hypoxia today.     TECHNIQUE:  AP portable chest x-ray.     FINDINGS:  Dobbhoff tube has been removed since the earlier study.     Cardiomegaly with probable mild vascular congestion  unchanged since  earlier today. Low lung volumes with infiltrate or atelectasis in the  lung bases, also unchanged.     CABG. PICC in good position.       Impression:       1. No change since earlier today.  2. Dobbhoff tube removal. PICC in good position.  3. Stable cardiomegaly. Possible mild vascular congestion.  4. Low lung volumes with infiltrate or atelectasis in the lung bases.     This report was finalized on 7/19/2018 1:13 PM by Dr. Jefferson Bowen MD.       CT Head Without Contrast [[id number]] Collected:  07/19/18 1134     Updated:  07/19/18 1144    Narrative:       CT HEAD, NONCONTRAST, 7/19/2018     HISTORY:  82-year-old male admitted to the hospital 7/9/2018 with sepsis and  bacteremia. Sacral decubitus wound and sigmoid diverticulitis. He has  metabolic encephalopathy with lethargy. History of seizure disorder. 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 Exam today for follow-up.     TECHNIQUE:  CT examination of the head without IV contrast. Radiation dose reduction  techniques included automated exposure control or exposure modulation  based on body size. Radiation audit for CT and nuclear cardiology exams  in the last 12 months: 2.     FINDINGS:  No acute intracranial abnormality is identified. Subtle low-attenuation  changes in the right posterior temporal-parietal corresponding to the  region of infarct noted on MRI yesterday. Small chronic low-attenuation  focus in the right occipital white matter is also noted. No acute  intracranial hemorrhage is appreciable by CT.     Mild generalized cerebral atrophy. Mild diffuse low-attenuation white  matter changes most likely related to chronic small vessel disease.       Impression:       1. No appreciable intracranial hemorrhage by CT.  2. No acute intracranial abnormality is visible.  3. 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 Small  subacute or chronic  white matter infarct right temporal lobe.  3. Diffuse chronic changes as noted above.     This report was finalized on 7/19/2018 11:42 AM by Dr. Jefferson Bowen MD.       XR Chest 1 View [[id number]] Collected:  07/19/18 1100     Updated:  07/19/18 1104    Narrative:       CHEST X-RAY, 7/19/2018         HISTORY:  Dobbhoff tube placement, 1st stage.     TECHNIQUE:  AP portable chest x-ray.     FINDINGS:  Dobbhoff tube tip is in the proximal left mainstem bronchus. This should  be removed completely, and the x-ray should be needed after replacement.  I discussed the findings directly with the patient's nurse prior to this  dictation.     Elevation right hemidiaphragm. Stable cardiomegaly. PICC in good  position.     Diffusely increased interstitial markings suggests potential vascular  congestion or volume overload that is new since 7/17/2018.       Impression:       1. Malpositioned Dobbhoff tube should be removed. Immediate result to  the patient's nurse.  2. Possible mild vascular congestion or volume overload, new since  7/17/2018. The remainder of the examination is stable.     This report was finalized on 7/19/2018 11:02 AM by Dr. Jefferson Bowen MD.       MRI Lumbar Spine With & Without Contrast [[id number]] Resulted:  07/18/18 1620     Updated:  07/18/18 1625    Narrative:         MRI OF THE LUMBAR SPINE WITH AND WITHOUT CONTRAST, 07/16/2018.    COMPARISON: MRI of the lumbar spine without contrast dated 07/11/2018.     HISTORY: Severe back pain. Evaluate for discitis, psoas abscess. Follow-up   prior MRI.    FINDINGS: Multisequence, multiplanar imaging of the lumbar spine was   obtained with and without contrast. MultiHance 17 mL was administered   intravenously. Redemonstrated is edema at L4 superior endplate and to a   lesser extent in the adjacent disc. It does not appear to have worsened in   the interval. No adjacent enhancing disc or psoas abscess is seen. Minimal   probable new faint enhancement of  the nerve roots of the cauda equina in   the lower lumbar spine cannot be completely excluded. No conglomeration of   nerve roots or mass.The compression fractures and degenerative changes   have not changed in the last 5 days. Gallstone is noted near the neck of   the gallbladder measuring 1.9 x 1.8 cm. It was not seen in the current   position in the prior study.       Impression:       1. There is no interval worsening of superior endplate compression   deformity or the associated edema. No enhancing components are noted in   the disc nor is there any obvious psoas abscess. Findings favor   degenerative change over acute aggressive discitis, osteomyelitis.   2. Degenerative changes and the compression fractures are stable when   compared to the prior study from 5 days ago.   3. Minimal probable new faint enhancement of the nerve roots of the cauda   equina in the lower lumbar spine cannot be completely excluded.  It could   be artifactual or related to very early polyneuritis. No clumping of nerve   roots or mass.    MRI Brain With & Without Contrast [[id number]] Resulted:  07/18/18 1046     Updated:  07/18/18 1048    Narrative:       MRI BRAIN WITH AND WITHOUT CONTRAST     DATE OF SERVICE:  07/17/2018     INDICATION:  Lethargy since Jj morning.  Order states observation for   abscess of focal abnormality.      PROCEDURE:  Multiplanar and multisequence MR imaging of the brain prior to   and following 17 mL of MultiHance.      COMPARISON:  Brain MR from 12/05/2016.        Impression:       BRAIN WITHOUT CONTRAST:  Study is moderately motion degraded.  There is   faint T2 signal and restricted diffusion, predominately centered in the   posterior right temporal and parietal region.  This area shows some   petechial hemorrhage.  There is also a small area of petechial hemorrhage   in the posterior right frontal lobe and high posterior left frontoparietal   junction.  No associated mass effect.  No hydrocephalus or  extra-axial   fluid collection.  There is no evidence for an acute or early subacute   large territory infarct.  When allowing for motion, major intracranial   flow voids appear to remain intact.  Paranasal sinuses and the mastoid air   cells are clear.      BRAIN WITH CONTRAST:  The areas of suspected late subacute infarct do show   some reactive enhancement after the administration of contrast.      IMPRESSION:    1.  Areas of late subacute or early chronic infarct as detailed above,   largest area is in the posterior right temporoparietal region.  There is   some evidence for petechial hemorrhage and reactive enhancement.   2.  No significant mass effect.    3.  No evidence for an acute or early subacute large territory infarct.     Findings were discussed with Dr. Trejo at the time of this dictation.        SCANNED - IMAGING [[id number]] Resulted:  07/09/18      Updated:  07/17/18 1409    XR Chest 1 View [[id number]] Collected:  07/17/18 1146     Updated:  07/17/18 1149    Narrative:       CHEST X-RAY, 7/17/2018         HISTORY:  PICC placement.     TECHNIQUE:  AP portable chest x-ray.     FINDINGS:  Left arm PICC tip syncopal position in the upper SVC.     Shallow lung expansion with chronic elevation right hemidiaphragm and  bibasilar pulmonary atelectasis. Upper lungs clear. CABG.       Impression:       PICC in good position.     This report was finalized on 7/17/2018 11:47 AM by Dr. Jefferson Bowen MD.       CT Abdomen Pelvis With Contrast [[id number]] Collected:  07/16/18 0635     Updated:  07/16/18 0648    Narrative:       CT ABDOMEN AND PELVIS, WITH CONTRAST, 7/15/2018     HISTORY:  82-year-old male admitted to the hospital 7/9/2018 with sepsis found to  have enterococcus and Escherichia coli bacteremia. Sacral decubitus  wound and acute sigmoid diverticulitis. Exam for follow-up.     TECHNIQUE:  CT examination of the abdomen and pelvis with IV contrast Radiation dose  reduction techniques included  automated exposure control or exposure  modulation based on body size. Radiation audit for CT and nuclear  cardiology exams in the last 12 months: 1.     COMPARISON:  *  CT abdomen/pelvis, 7/11/2018.     ABDOMEN FINDINGS:  Upper abdominal images are degraded by patient respiratory motion  artifact. Patchy infiltrate and atelectasis in the lung bases has  increased since the prior exam, greater on the right. There is no  pleural effusion.     Multiple large gallstones within a nondistended, mildly thick-walled  gallbladder. No bile duct dilatation.     Liver, pancreas, spleen and kidneys are within normal limits. No  evidence of upper urinary tract obstruction. Normal caliber abdominal  aorta.     Mild acute diverticulitis changes have improved since the prior exam.  There is persistent short segment wall thickening involving the mid  sigmoid colon. Follow-up colonoscopy is recommended for eventual  follow-up to exclude sigmoid colon mass when clinically appropriate.  There is no bowel dilatation, there is no evidence of abscess or other  complicating feature. Appendix is not seen.     PELVIS FINDINGS:  Urinary bladder is decompressed with a Amin catheter. Prostate and  rectum are unremarkable. Skin thickening and subcutaneous soft tissue  stranding between the upper gluteal crease and coccyx representing  low-grade decubitus change described clinically. No abscess or  additional complicating feature.     Multilevel vertebral compression deformities at L4, L2, T12 and T9.  Severe multilevel degenerative disc space changes and lower lumbar  degenerative facet arthropathy.       Impression:       1. Motion degraded exam.  2. Improving sigmoid diverticulitis, see above.  3. Developing infiltrate and atelectasis in the lung bases, greater on  the right.  4. Cholelithiasis.  5. Mild decubitus changes region of the upper gluteal crease as noted  above.  6. Multilevel vertebral compression fracture deformities as noted  above.  Advanced lumbar spine degenerative changes.  7. Initial stat report to the ordering service from Dr. Ck Wylie at  2235 hours on 7/15/2018.     This report was finalized on 7/16/2018 6:46 AM by Dr. Jefferson Bowen MD.       MRI Lumbar Spine With & Without Contrast [[id number]] Resulted:  07/13/18 1748     Updated:  07/13/18 1751    Narrative:       MRI LUMBAR SPINE WITH AND WITHOUT CONTRAST     DATE OF STUDY:  07/11/2018     COMPARISON:  CT lumbar spine without contrast dated 06/07/2016 from   [facility].    HISTORY:  Severe low back pain with bilateral lower extremity   radiculopathy.      FINDINGS:  Multisequence and multiplanar imaging of the lumbar spine was   obtained with and without contrast.  MultiHance was administered   intravenously.  There is edema with compression fracture at T12.  There is   maximal vertebral body height loss of atleast 90% in the mid vertebral   body.  There is displacement of fracture fragment anteriorly and   posteriorly with boderline size to mild canal stenosis. Inferior endplate   chronic Schmorl node is at L2, stable.  Edema is at L3-4 disk and adjacent   endplates without any enhancement.  It is worse when compared to the prior   CT with new superior endplate compression deformity and vertebral body   height loss of 20% to 30%.  Disk osteophyte complexes are noted at   multiple levels.  Conus terminates at L1.  Bilateral renal lesions are   noted, which are probably cysts.  There is a party exophytic lesion noted   in the lateral aspect of the upper to mid right kidney with decreased T1   and decreased T2 signal suggestive of proteinaceous or hemorrhagic   components.  It is probably a complicated cyst.  No obvious enhancing   solid component is seen.      T11-12:  Disk osteophyte complex with left perineural cyst.  No canal   stenosis.      T12-L1:  Disk osteophyte complex with boderline size to mild canal   stenosis, bilateral neural foraminal  narrowing.  moderate-to-severe on the   left and at least moderate on the right.     L1-2:  Disk osteophyte complex, which is asymmetrically prominent in the   left foraminal to extraforaminal region.  Mild bilateral facet changes,   mild right and moderate-to-severe left neural foraminal narrowing with   borderline size canal.      L2-3:  Disk osteophyte complex, which is asymmetrically prominent in   bilateral foraminal to extraforaminal regions, particularly on the right.    It extends to the right subarticular and central region also, suggestive   of superimposed broad-based disk protrusion.  Severe canal stenosis,   moderate left and severe right lateral recess stenosis, moderate-to-severe   bilateral neural foraminal narrowing.  Mild bilateral facet hypertrophic   changes.     L3-4:  Disk osteophyte complex with moderate bilateral facet hypertrophic   changes and ligamentum flavum thickening with very severe canal stenosis,   moderate-to-severe bilateral lateral recess stenosis, mild-to-moderate   bilateral neural foraminal narrowing.     L4-5:  Disk osteophyte complex with mild-to-moderate bilateral facet   hypertrophic changes, severe canal stenosis, particularly in the   transverse dimension, mild-to-moderate bilateral lateral recess stenosis   and mild-to-moderate bilateral neural foraminal narrowing.     L5-S1:  Disk osteophyte complex with bilateral facet hypertrophic changes,   mild-to-moderate bilateral neural foraminal narrowing.  No canal stenosis.          Impression:          1.  Multilevel degenerative changes are noted, worse from L2-3 to L4-5 as   described above.    2.  Interval new severe compression deformity of T12 with bone edema and   displacement of fragments, both anterior and posteriorly.  New since 2016.    There is more than 90% loss of T12 vertebral body height in the mid body.     3.  New superior endplate compression deformity of L4 with edema in   endplates and adjacent disks.   Given the lack of enhancement, it is less   likely to be diskitis and is probably related to compression deformity.    4.  Chronic stable compression inferior endplate deformity of L2.            SCANNED - IMAGING [[id number]] Resulted:  07/09/18      Updated:  07/12/18 1203    CT Abdomen Pelvis Without Contrast [[id number]] Collected:  07/12/18 0707     Updated:  07/12/18 0719    Narrative:       CT ABDOMEN AND PELVIS, NONCONTRAST, 7/11/2018     HISTORY:  82-year-old male admitted to the hospital 2 days ago with sacral  decubitus wound, lower extremity edema, low back pain and cardiac  arrhythmia. Bacteremia is noted.     TECHNIQUE:  CT examination of the abdomen and pelvis was performed without IV  contrast due to abnormal renal function. Radiation dose reduction  techniques included automated exposure control or exposure modulation  based on body size. Radiation audit for CT and nuclear cardiology exams  in the last 12 months: 0.     COMPARISON:  *  CT abdomen/pelvis, 12/15/2016.  *  CT chest, 12/8/2016.     ABDOMEN FINDINGS:  Multiple large gallstones are present with thin the gallbladder. There  is no bile duct dilatation. Liver, pancreas and spleen are normal in  size and appearance without contrast.     The kidneys are unobstructed. Suspected small typical and hyperdense  right renal cysts are incompletely evaluated without contrast but are  unchanged since the study of 12/15/2016.     Moderate sigmoid diverticulosis. There is mild wall thickening involving  the mid sigmoid colon with some adjacent mild soft tissue stranding in  the left lower pelvic pericolonic fat. Mild acute diverticulitis is  suspected. No evidence of abscess, bowel perforation or bowel  obstruction. Remaining segments of small bowel and colon are normal in  caliber and appearance. Normal appendix.     PELVIS FINDINGS:  Urinary bladder, prostate and rectum are within normal limits. Tiny  bilateral fat-containing inguinal hernias.      Cutaneous and subcutaneous decubitus wound changes in the midline  posterior and inferior to the lower sacrococcygeal spine. No abscess or  other fluid collection is seen. Underlying osseous structures are  unremarkable.     Lower chest images show low ascending thoracic aortic aneurysm measuring  up to 5.4 cm. This segment is unchanged since CT chest, 12/8/2016.       Impression:       1. CT findings suggesting mild acute diverticulitis involving the mid  sigmoid colon in the left lower pelvis. No evidence of abscess or other  complicating feature.  2. Cholelithiasis. Multiple large gallstones within the gallbladder. No  bile duct dilatation.  3. Cutaneous and subcutaneous decubitus wounds changes posterior  inferior to the lower sacrococcygeal spine. No abscess or additional  complicating feature is visible.  4. Ascending thoracic aortic aneurysm, unchanged since 12/8/2016.     This report was finalized on 7/12/2018 7:17 AM by Dr. Jefferson Bowen MD.       US Venous Doppler Lower Extremity Bilateral (duplex) [[id number]] Collected:  07/10/18 1733     Updated:  07/10/18 1735    Narrative:       VENOUS DOPPLER ULTRASOUND, BILATERAL LOWER EXTREMITIES, 7/10/2018     HISTORY:   82-year-old male with two week history of bilateral lower extremity  edema.     TECHNIQUE:   Venous Doppler ultrasound examination of both legs was performed using  grey-scale, spectral Doppler, and color flow Doppler ultrasound imaging.     FINDINGS:   The examination is negative.  There is no evidence of deep venous  thrombosis from the groin to the lower calf bilaterally. The greater  saphenous veins are also patent.       Impression:       Negative examination.  No evidence of lower extremity DVT on the right  or left.     This report was finalized on 7/10/2018 5:33 PM by Dr. Jefferson Bowen MD.       XR Chest 1 View [[id number]] Collected:  07/09/18 1636     Updated:  07/09/18 1641    Narrative:       CHEST X-RAY, 7/9/2018      HISTORY:   82-year-old male admitted to the hospital today with newly noted  tachycardia and lower extremity swelling. Some shortness of air over the  last two weeks. History of atrial fibrillation.     TECHNIQUE:  AP portable upright chest x-ray.     COMPARISON:  *  Chest x-ray, .  *  CT chest, 2016.     FINDINGS:  Lung volumes are chronically low, and there is chronic elevation of the  right hemidiaphragm with scarring or atelectasis in the right lung base.  The lungs are otherwise clear.     Mild to moderate cardiomegaly. Ulnar vascularity is within normal  limits. Ectatic and tortuous thoracic aorta. Postop changes previous  CABG surgery.       Impression:       1. Cardiomegaly. CABG. Pulmonary vascularity is within normal limits.  2. Chronically low lung volumes with chronic elevation of the right  hemidiaphragm and chronic scarring or atelectasis in the right lung  base.  3. Ectatic and tortuous thoracic aorta.     This report was finalized on 2018 4:39 PM by Dr. Jefferson Bowen MD.             PROCEDURES  Intubation    Condition on Discharge:      Physical Exam:  Physical Exam   Constitutional:   Heart and lung sounds are absent. No spontaneous cardiac or respiratory activity. Patient not responding/not reactive to painful or verbal stimuli/sternal rub.  Corneal reflexes absent, pupils fixed and dilated.         Test Results Pending at Discharge   Order Current Status    Fungus Culture, Blood - Blood, Arm, Left Preliminary result    Fungus Culture, Blood - Blood, Arm, Left Preliminary result           Nereyda Bustamante MD  18  9:34 AM    Time: Critical care 35 min

## 2018-08-07 LAB
FUNGUS WND CULT: NORMAL
FUNGUS WND CULT: NORMAL

## 2018-08-07 NOTE — PROGRESS NOTES
Physical Therapy Daily Progress Note      Visit # : 15  Fahad Muhammad reports: My back has been really bothering me - painful to get out of bed this morning     Subjective     Objective       Palpation   Left   Hypertonic in the erector spinae, lumbar interspinals, lumbar paraspinals and quadratus lumborum.      See Exercise, Manual, and Modality Logs for complete treatment.       Assessment & Plan     Assessment  Assessment details: Patient reported increased subjective reports in trying to get out of bed in the mornings. Presents with increased muscle tone Left > Right Lumbar PVM's. Improved muscle tone after Dry Needling today. Educated patient to apply moist heat again tonight at home.  Cont PT 1x per week or as indicated by patient for Dry Needling.         Progress per Plan of Care           Manual Therapy:         mins  43463;  Therapeutic Exercise:         mins  29730;     Neuromuscular Eulalio:        mins  77728;    Therapeutic Activity:         mins  00471;     Gait Training:           mins  92208;     Ultrasound:          mins  53428;    Electrical Stimulation:         mins  94360 ( );  Dry Needling     15     mins self-pay    Timed Treatment:      mins   Total Treatment:     25   mins    Madiha Green PT  Physical Therapist  [place] License # [id number]   Right arm;

## 2020-10-03 NOTE — PROGRESS NOTES
Physical Therapy Daily Progress Note    Visit # : 14  Fahad Muhammad reports: I could tell that I missed last week my back has been hurting worse    Subjective     Objective       Palpation   Left   Hypertonic in the erector spinae, iliopsoas, lumbar interspinals, lumbar paraspinals and quadratus lumborum.      See Exercise, Manual, and Modality Logs for complete treatment.       Assessment & Plan     Assessment  Assessment details: Patient reported increased subjective reports and mobility after missing last dry needling session. Presents with increased muscle tone bilateral Lumbar PVM's. Improved muscle tone after Dry Needling today. Educated patient to apply moist heat again tonight at home.  Cont PT 1x per week or as indicated by patient for Dry Needling.         Progress strengthening /stabilization /functional activity           Manual Therapy:         mins  94511;  Therapeutic Exercise:         mins  34606;     Neuromuscular Eulalio:       mins  17042;    Therapeutic Activity:          mins  43288;     Gait Training:           mins  03894;     Ultrasound:          mins  70923;    Electrical Stimulation:        mins  71160 ( );  Dry Needling     15     mins self-pay    Timed Treatment:      mins   Total Treatment:     25   mins    Madiha Green PT  Physical Therapist  [place] License # [id number]   No

## 2023-08-09 NOTE — PROGRESS NOTES
Fahad Muhammad is a 82 y.o. male, who presents with a chief complaint of   Chief Complaint   Patient presents with   • Follow-up     3 x day f/u    • Edema       81 yo M here for follow up of his right arm cellulitis. He had elevated white blood cell count and CRP. Placed on Keflex and has tolerated well. Swelling has gone down and there is less pain. Still swollen though.     HR is all better, down to 85 now. Feels and looks much better.     He has has been worried about his weight loss. He has iron deficiency anemia on blood work with low iron and was supposed to follow up with Dr. Ng and has not done so. States he didn't know that he need to call, but stated in a message to Charla that he wanted to call and schedule on his own.          The following portions of the patient's history were reviewed and updated as appropriate: allergies, current medications, past family history, past medical history, past social history, past surgical history and problem list.    Allergies: Gabapentin; Levofloxacin; and Statins    Current Outpatient Prescriptions:   •  aspirin 81 MG chewable tablet, Chew 81 mg daily., Disp: , Rfl:   •  cephalexin (KEFLEX) 500 MG capsule, Take 1 capsule by mouth 3 (Three) Times a Day for 10 days., Disp: 30 capsule, Rfl: 0  •  cetirizine (zyrTEC) 5 MG tablet, Take 5 mg by mouth., Disp: , Rfl:   •  diltiaZEM CD (CARDIZEM CD) 240 MG 24 hr capsule, Take 1 capsule by mouth Daily., Disp: 90 capsule, Rfl: 3  •  docusate sodium 100 MG capsule, Take 100 mg by mouth 2 (Two) Times a Day. (Patient taking differently: Take 100 mg by mouth As Needed.), Disp: , Rfl: 0  •  fenofibrate micronized (LOFIBRA) 134 MG capsule, Take 1 capsule by mouth Every Morning Before Breakfast., Disp: 90 capsule, Rfl: 3  •  furosemide (LASIX) 20 MG tablet, Take 1 tablet by mouth Daily. (Patient taking differently: Take 20 mg by mouth As Needed.), Disp: 90 tablet, Rfl: 3  •  guaiFENesin (MUCINEX) 600 MG 12 hr tablet,  "Take 1 tablet by mouth 2 (Two) Times a Day. (Patient taking differently: Take 600 mg by mouth Daily.), Disp: 60 tablet, Rfl: 1  •  levETIRAcetam (KEPPRA) 500 MG tablet, Take 1 tablet by mouth 2 (Two) Times a Day., Disp: 180 tablet, Rfl: 3  •  methocarbamol (ROBAXIN) 500 MG tablet, TAKE 1 TABLET BY MOUTH TWICE A DAY, PRN, Disp: , Rfl: 0  •  metoprolol succinate XL (TOPROL-XL) 100 MG 24 hr tablet, Take 1 tablet by mouth Daily., Disp: 30 tablet, Rfl: 6  •  Misc Natural Products (GLUCOSAMINE CHONDROITIN ADV PO), Take  by mouth., Disp: , Rfl:   •  Multiple Vitamin (MULTI-VITAMIN) tablet, Take 1 tablet by mouth daily., Disp: , Rfl:   •  Omega-3 Fatty Acids (FISH OIL) 1000 MG capsule capsule, Take 1,000 mg by mouth daily., Disp: , Rfl:   •  rivaroxaban (XARELTO) 15 MG tablet, Take 1 tablet by mouth Daily., Disp: 90 tablet, Rfl: 1  •  Saw Palmetto, Serenoa repens, 450 MG capsule, Take  by mouth., Disp: , Rfl:   There are no discontinued medications.    Review of Systems   Constitutional: Positive for fatigue. Negative for chills and fever.   Respiratory: Negative for cough and shortness of breath.    Cardiovascular: Negative for chest pain and palpitations.   Gastrointestinal: Negative for abdominal pain, diarrhea, nausea and vomiting.   Skin:        Swelling is better in right arm with less pain    Neurological: Negative for headaches.             /64 (BP Location: Left arm, Patient Position: Sitting, Cuff Size: Adult)   Pulse 85   Temp 97.6 °F (36.4 °C) (Oral)   Resp 18   Ht 190.5 cm (75\")   Wt 94.2 kg (207 lb 9.6 oz)   SpO2 98%   BMI 25.95 kg/m²       Physical Exam   Constitutional: He is oriented to person, place, and time. He appears well-developed and well-nourished. No distress.   HENT:   Head: Normocephalic and atraumatic.   Right Ear: External ear normal.   Left Ear: External ear normal.   Mouth/Throat: Oropharynx is clear and moist. No oropharyngeal exudate.   Eyes: Conjunctivae are normal. Right eye " exhibits no discharge. Left eye exhibits no discharge. No scleral icterus.   Cardiovascular: Normal rate, regular rhythm and normal heart sounds.  Exam reveals no gallop and no friction rub.    No murmur heard.  Pulmonary/Chest: Effort normal and breath sounds normal. No respiratory distress. He has no wheezes. He has no rales.   Neurological: He is alert and oriented to person, place, and time.   Skin: Skin is warm. No rash noted.   Edema of right upper arm is better. No redness or discharge. Skin is overall pale    Psychiatric: He has a normal mood and affect. His behavior is normal.   Nursing note and vitals reviewed.        Results for orders placed or performed in visit on 05/18/18   Comprehensive Metabolic Panel   Result Value Ref Range    Glucose 95 65 - 99 mg/dL    BUN 22 8 - 23 mg/dL    Creatinine 1.05 0.76 - 1.27 mg/dL    Sodium 135 (L) 136 - 145 mmol/L    Potassium 3.9 3.5 - 5.2 mmol/L    Chloride 99 98 - 107 mmol/L    CO2 24.6 22.0 - 29.0 mmol/L    Calcium 10.0 8.8 - 10.5 mg/dL    Total Protein 6.9 6.0 - 8.5 g/dL    Albumin 3.50 3.50 - 5.20 g/dL    ALT (SGPT) 13 5 - 41 U/L    AST (SGOT) 19 5 - 40 U/L    Alkaline Phosphatase 97 40 - 129 U/L    Total Bilirubin 1.2 0.2 - 1.2 mg/dL    eGFR Non African Amer 68 >60 mL/min/1.73    Globulin 3.4 gm/dL    A/G Ratio 1.0 g/dL    BUN/Creatinine Ratio 21.0 7.0 - 25.0    Anion Gap 11.4 mmol/L   C-reactive Protein   Result Value Ref Range    C-Reactive Protein 11.40 (H) 0.00 - 0.50 mg/dL   CBC Auto Differential   Result Value Ref Range    WBC 16.60 (H) 4.80 - 10.80 10*3/mm3    RBC 4.56 (L) 4.70 - 6.10 10*6/mm3    Hemoglobin 11.4 (L) 14.0 - 18.0 g/dL    Hematocrit 34.2 (L) 42.0 - 52.0 %    MCV 75.0 (L) 80.0 - 94.0 fL    MCH 25.0 (L) 27.0 - 31.0 pg    MCHC 33.3 31.0 - 37.0 g/dL    RDW 18.2 (H) 11.5 - 14.5 %    RDW-SD 48.7 37.0 - 54.0 fl    MPV 9.5 7.4 - 10.4 fL    Platelets 386 140 - 500 10*3/mm3    Neutrophil % 82.8 (H) 45.0 - 70.0 %    Lymphocyte % 7.5 (L) 20.0 - 45.0 %     Monocyte % 8.9 (H) 3.0 - 8.0 %    Eosinophil % 0.1 0.0 - 4.0 %    Basophil % 0.2 0.0 - 2.0 %    Immature Grans % 0.5 0.0 - 0.5 %    Neutrophils, Absolute 13.75 (H) 1.50 - 8.30 10*3/mm3    Lymphocytes, Absolute 1.25 0.60 - 4.80 10*3/mm3    Monocytes, Absolute 1.48 (H) 0.00 - 1.00 10*3/mm3    Eosinophils, Absolute 0.01 (L) 0.10 - 0.30 10*3/mm3    Basophils, Absolute 0.03 0.00 - 0.20 10*3/mm3    Immature Grans, Absolute 0.08 (H) 0.00 - 0.03 10*3/mm3    nRBC 0.0 0.0 - 0.0 /100 WBC           Fahad was seen today for follow-up and edema.    Diagnoses and all orders for this visit:    Cellulitis of right upper extremity  -     CBC & Differential  -     C-reactive Protein    Acute neutrophilia  -     CBC & Differential  -     C-reactive Protein    Elevated C-reactive protein (CRP)  -     C-reactive Protein    Weight loss, abnormal  -     Ambulatory Referral to Gastroenterology    Iron deficiency anemia, unspecified iron deficiency anemia type  -     Ambulatory Referral to Gastroenterology    Complete all ten days of antibiotics. Reassuring that on physical exam that he is doing better. Will check CBC and CRP today to make sure that these are looking better.     He has had weight loss of 20lbs in the last year that he states is unintentional. Blood work has been normal besides his JEAN MARIE. He needs to call Dr. Ng to schedule appointment. He asked that he and his wife arrange, but did not follow up. We also need to add PSA and will add this to blood work that was drawn today. Otherwise, all other cancer screenings are up to date. See back as scheduled in one month for weight check.       Return for Next scheduled follow up.    Elisabeth Warren MD  05/21/2018     [] : The components of the vaccine(s) to be administered today are listed in the plan of care. The disease(s) for which the vaccine(s) are intended to prevent and the risks have been discussed with the caretaker.  The risks are also included in the appropriate vaccination information statements which have been provided to the patient's caregiver.  The caregiver has given consent to vaccinate. [Met privately with the adolescent for part of the office visit?] : Met privately with the adolescent for part of the office visit? Yes [Adolescent demonstrates understanding of his/her conditions and how to take prescribed medications?] : Adolescent demonstrates understanding of his/her conditions and how to take prescribed medications? Yes [Adolescent asks questions during each office  visit and participates in the care plan?] : Adolescent asks questions during each office visit and participates in the care plan? Yes [Adolescent is competent in independently making appointments, filling prescriptions, following up on referrals, and seeking emergency services, as needed?] : Adolescent is competent in independently making appointments, filling prescriptions, following up on referrals, and seeking emergency services, as needed? Yes